# Patient Record
Sex: MALE | Race: WHITE | NOT HISPANIC OR LATINO | Employment: OTHER | URBAN - METROPOLITAN AREA
[De-identification: names, ages, dates, MRNs, and addresses within clinical notes are randomized per-mention and may not be internally consistent; named-entity substitution may affect disease eponyms.]

---

## 2017-01-16 ENCOUNTER — GENERIC CONVERSION - ENCOUNTER (OUTPATIENT)
Dept: OTHER | Facility: OTHER | Age: 67
End: 2017-01-16

## 2017-03-01 ENCOUNTER — ALLSCRIPTS OFFICE VISIT (OUTPATIENT)
Dept: OTHER | Facility: OTHER | Age: 67
End: 2017-03-01

## 2017-03-01 DIAGNOSIS — R68.81 EARLY SATIETY: ICD-10-CM

## 2017-03-01 DIAGNOSIS — R05.9 COUGH: ICD-10-CM

## 2017-03-01 DIAGNOSIS — I51.9 HEART DISEASE: ICD-10-CM

## 2017-03-01 DIAGNOSIS — I10 ESSENTIAL (PRIMARY) HYPERTENSION: ICD-10-CM

## 2017-05-05 ENCOUNTER — LAB (OUTPATIENT)
Dept: LAB | Facility: MEDICAL CENTER | Age: 67
End: 2017-05-05
Payer: COMMERCIAL

## 2017-05-05 DIAGNOSIS — I10 ESSENTIAL (PRIMARY) HYPERTENSION: ICD-10-CM

## 2017-05-05 DIAGNOSIS — R05.9 COUGH: ICD-10-CM

## 2017-05-05 DIAGNOSIS — R68.81 EARLY SATIETY: ICD-10-CM

## 2017-05-05 DIAGNOSIS — I51.9 HEART DISEASE: ICD-10-CM

## 2017-05-05 LAB
ALBUMIN SERPL BCP-MCNC: 3.9 G/DL (ref 3.5–5)
ALP SERPL-CCNC: 80 U/L (ref 46–116)
ALT SERPL W P-5'-P-CCNC: 35 U/L (ref 12–78)
ANION GAP SERPL CALCULATED.3IONS-SCNC: 7 MMOL/L (ref 4–13)
AST SERPL W P-5'-P-CCNC: 22 U/L (ref 5–45)
BASOPHILS # BLD AUTO: 0.04 THOUSANDS/ΜL (ref 0–0.1)
BASOPHILS NFR BLD AUTO: 1 % (ref 0–1)
BILIRUB SERPL-MCNC: 0.62 MG/DL (ref 0.2–1)
BUN SERPL-MCNC: 16 MG/DL (ref 5–25)
CALCIUM SERPL-MCNC: 8.9 MG/DL (ref 8.3–10.1)
CHLORIDE SERPL-SCNC: 109 MMOL/L (ref 100–108)
CHOLEST SERPL-MCNC: 136 MG/DL (ref 50–200)
CO2 SERPL-SCNC: 25 MMOL/L (ref 21–32)
CREAT SERPL-MCNC: 0.86 MG/DL (ref 0.6–1.3)
EOSINOPHIL # BLD AUTO: 0.26 THOUSAND/ΜL (ref 0–0.61)
EOSINOPHIL NFR BLD AUTO: 5 % (ref 0–6)
ERYTHROCYTE [DISTWIDTH] IN BLOOD BY AUTOMATED COUNT: 13.7 % (ref 11.6–15.1)
GFR SERPL CREATININE-BSD FRML MDRD: >60 ML/MIN/1.73SQ M
GLUCOSE P FAST SERPL-MCNC: 97 MG/DL (ref 65–99)
HCT VFR BLD AUTO: 42.1 % (ref 36.5–49.3)
HDLC SERPL-MCNC: 54 MG/DL (ref 40–60)
HGB BLD-MCNC: 14.3 G/DL (ref 12–17)
LDLC SERPL CALC-MCNC: 65 MG/DL (ref 0–100)
LYMPHOCYTES # BLD AUTO: 1.43 THOUSANDS/ΜL (ref 0.6–4.47)
LYMPHOCYTES NFR BLD AUTO: 26 % (ref 14–44)
MCH RBC QN AUTO: 31.4 PG (ref 26.8–34.3)
MCHC RBC AUTO-ENTMCNC: 34 G/DL (ref 31.4–37.4)
MCV RBC AUTO: 93 FL (ref 82–98)
MONOCYTES # BLD AUTO: 0.48 THOUSAND/ΜL (ref 0.17–1.22)
MONOCYTES NFR BLD AUTO: 9 % (ref 4–12)
NEUTROPHILS # BLD AUTO: 3.29 THOUSANDS/ΜL (ref 1.85–7.62)
NEUTS SEG NFR BLD AUTO: 59 % (ref 43–75)
NRBC BLD AUTO-RTO: 0 /100 WBCS
PLATELET # BLD AUTO: 208 THOUSANDS/UL (ref 149–390)
PMV BLD AUTO: 10.4 FL (ref 8.9–12.7)
POTASSIUM SERPL-SCNC: 4.4 MMOL/L (ref 3.5–5.3)
PROT SERPL-MCNC: 7.3 G/DL (ref 6.4–8.2)
RBC # BLD AUTO: 4.55 MILLION/UL (ref 3.88–5.62)
SODIUM SERPL-SCNC: 141 MMOL/L (ref 136–145)
TRIGL SERPL-MCNC: 83 MG/DL
WBC # BLD AUTO: 5.51 THOUSAND/UL (ref 4.31–10.16)

## 2017-05-05 PROCEDURE — 85025 COMPLETE CBC W/AUTO DIFF WBC: CPT

## 2017-05-05 PROCEDURE — 80053 COMPREHEN METABOLIC PANEL: CPT

## 2017-05-05 PROCEDURE — 80061 LIPID PANEL: CPT

## 2017-05-05 PROCEDURE — 36415 COLL VENOUS BLD VENIPUNCTURE: CPT

## 2017-05-22 ENCOUNTER — ALLSCRIPTS OFFICE VISIT (OUTPATIENT)
Dept: OTHER | Facility: OTHER | Age: 67
End: 2017-05-22

## 2017-08-28 ENCOUNTER — ANESTHESIA EVENT (OUTPATIENT)
Dept: GASTROENTEROLOGY | Facility: HOSPITAL | Age: 67
End: 2017-08-28
Payer: COMMERCIAL

## 2017-08-29 ENCOUNTER — ANESTHESIA (OUTPATIENT)
Dept: GASTROENTEROLOGY | Facility: HOSPITAL | Age: 67
End: 2017-08-29
Payer: COMMERCIAL

## 2017-08-29 ENCOUNTER — GENERIC CONVERSION - ENCOUNTER (OUTPATIENT)
Dept: OTHER | Facility: OTHER | Age: 67
End: 2017-08-29

## 2017-08-29 ENCOUNTER — HOSPITAL ENCOUNTER (OUTPATIENT)
Facility: HOSPITAL | Age: 67
Setting detail: OUTPATIENT SURGERY
Discharge: HOME | End: 2017-08-29
Attending: INTERNAL MEDICINE | Admitting: INTERNAL MEDICINE
Payer: COMMERCIAL

## 2017-08-29 VITALS
HEART RATE: 54 BPM | RESPIRATION RATE: 18 BRPM | WEIGHT: 190 LBS | BODY MASS INDEX: 28.14 KG/M2 | TEMPERATURE: 97.2 F | OXYGEN SATURATION: 97 % | SYSTOLIC BLOOD PRESSURE: 135 MMHG | DIASTOLIC BLOOD PRESSURE: 68 MMHG | HEIGHT: 69 IN

## 2017-08-29 DIAGNOSIS — Z86.010 HISTORY OF COLONIC POLYPS: ICD-10-CM

## 2017-08-29 PROCEDURE — 88342 IMHCHEM/IMCYTCHM 1ST ANTB: CPT | Performed by: INTERNAL MEDICINE

## 2017-08-29 PROCEDURE — 88305 TISSUE EXAM BY PATHOLOGIST: CPT | Performed by: INTERNAL MEDICINE

## 2017-08-29 RX ORDER — LIDOCAINE HYDROCHLORIDE 10 MG/ML
INJECTION, SOLUTION INFILTRATION; PERINEURAL AS NEEDED
Status: DISCONTINUED | OUTPATIENT
Start: 2017-08-29 | End: 2017-08-29 | Stop reason: SURG

## 2017-08-29 RX ORDER — SENNOSIDES 8.6 MG
650 CAPSULE ORAL 2 TIMES DAILY
COMMUNITY

## 2017-08-29 RX ORDER — ASCORBIC ACID 500 MG
500 TABLET ORAL DAILY
COMMUNITY

## 2017-08-29 RX ORDER — LISINOPRIL 10 MG/1
5 TABLET ORAL DAILY
COMMUNITY
End: 2018-02-14 | Stop reason: SDUPTHER

## 2017-08-29 RX ORDER — SODIUM CHLORIDE, SODIUM LACTATE, POTASSIUM CHLORIDE, CALCIUM CHLORIDE 600; 310; 30; 20 MG/100ML; MG/100ML; MG/100ML; MG/100ML
100 INJECTION, SOLUTION INTRAVENOUS CONTINUOUS
Status: DISCONTINUED | OUTPATIENT
Start: 2017-08-29 | End: 2017-08-29 | Stop reason: HOSPADM

## 2017-08-29 RX ORDER — OMEPRAZOLE 20 MG/1
20 CAPSULE, DELAYED RELEASE ORAL DAILY
COMMUNITY
End: 2018-11-07 | Stop reason: ALTCHOICE

## 2017-08-29 RX ORDER — DIPHENOXYLATE HYDROCHLORIDE AND ATROPINE SULFATE 2.5; .025 MG/1; MG/1
1 TABLET ORAL DAILY
COMMUNITY

## 2017-08-29 RX ORDER — PROPOFOL 10 MG/ML
INJECTION, EMULSION INTRAVENOUS AS NEEDED
Status: DISCONTINUED | OUTPATIENT
Start: 2017-08-29 | End: 2017-08-29 | Stop reason: SURG

## 2017-08-29 RX ORDER — METOPROLOL SUCCINATE 25 MG/1
12.5 TABLET, EXTENDED RELEASE ORAL DAILY
COMMUNITY

## 2017-08-29 RX ORDER — SIMVASTATIN 40 MG
20 TABLET ORAL
COMMUNITY

## 2017-08-29 RX ORDER — MELOXICAM 7.5 MG/1
7.5 TABLET ORAL 4 TIMES DAILY PRN
COMMUNITY
End: 2018-11-07 | Stop reason: ALTCHOICE

## 2017-08-29 RX ORDER — TRAMADOL HYDROCHLORIDE 50 MG/1
50 TABLET ORAL EVERY 6 HOURS PRN
COMMUNITY
End: 2018-11-07 | Stop reason: ALTCHOICE

## 2017-08-29 RX ORDER — PROPOFOL 10 MG/ML
INJECTION, EMULSION INTRAVENOUS CONTINUOUS PRN
Status: DISCONTINUED | OUTPATIENT
Start: 2017-08-29 | End: 2017-08-29 | Stop reason: SURG

## 2017-08-29 RX ORDER — ORPHENADRINE CITRATE 100 MG/1
100 TABLET, EXTENDED RELEASE ORAL 2 TIMES DAILY PRN
COMMUNITY
End: 2019-11-18

## 2017-08-29 RX ORDER — ASPIRIN 325 MG
325 TABLET ORAL DAILY
COMMUNITY
End: 2019-05-08

## 2017-08-29 RX ADMIN — LIDOCAINE HYDROCHLORIDE 100 MG: 10 INJECTION, SOLUTION INFILTRATION; PERINEURAL at 11:29

## 2017-08-29 RX ADMIN — PROPOFOL 110 MCG/KG/MIN: 10 INJECTION, EMULSION INTRAVENOUS at 11:29

## 2017-08-29 RX ADMIN — SODIUM CHLORIDE, POTASSIUM CHLORIDE, SODIUM LACTATE AND CALCIUM CHLORIDE 100 ML/HR: 600; 310; 30; 20 INJECTION, SOLUTION INTRAVENOUS at 09:34

## 2017-08-29 RX ADMIN — PROPOFOL 50 MG: 10 INJECTION, EMULSION INTRAVENOUS at 11:45

## 2017-08-29 RX ADMIN — PROPOFOL 150 MG: 10 INJECTION, EMULSION INTRAVENOUS at 11:29

## 2017-09-11 ENCOUNTER — GENERIC CONVERSION - ENCOUNTER (OUTPATIENT)
Dept: OTHER | Facility: OTHER | Age: 67
End: 2017-09-11

## 2017-09-20 ENCOUNTER — GENERIC CONVERSION - ENCOUNTER (OUTPATIENT)
Dept: OTHER | Facility: OTHER | Age: 67
End: 2017-09-20

## 2017-09-25 ENCOUNTER — ALLSCRIPTS OFFICE VISIT (OUTPATIENT)
Dept: OTHER | Facility: OTHER | Age: 67
End: 2017-09-25

## 2017-09-25 DIAGNOSIS — Z12.5 ENCOUNTER FOR SCREENING FOR MALIGNANT NEOPLASM OF PROSTATE: ICD-10-CM

## 2017-09-25 DIAGNOSIS — I10 ESSENTIAL (PRIMARY) HYPERTENSION: ICD-10-CM

## 2017-09-25 DIAGNOSIS — I51.9 HEART DISEASE: ICD-10-CM

## 2017-09-25 DIAGNOSIS — H69.80 OTHER SPECIFIED DISORDERS OF EUSTACHIAN TUBE, UNSPECIFIED EAR: ICD-10-CM

## 2017-11-10 ENCOUNTER — GENERIC CONVERSION - ENCOUNTER (OUTPATIENT)
Dept: OTHER | Facility: OTHER | Age: 67
End: 2017-11-10

## 2018-01-10 NOTE — MISCELLANEOUS
Attempted to contact you regarding results  Please give the office a call at your convenience      We can be reached at [phone]    Thank you,    Dr Regis Hutchinson and Staff       Electronically signed by:Debbie Paulson Mohs   Sep 20 2017 12:47PM EST

## 2018-01-12 VITALS
SYSTOLIC BLOOD PRESSURE: 132 MMHG | TEMPERATURE: 97.6 F | BODY MASS INDEX: 28.8 KG/M2 | HEART RATE: 76 BPM | OXYGEN SATURATION: 98 % | DIASTOLIC BLOOD PRESSURE: 72 MMHG | WEIGHT: 195 LBS

## 2018-01-12 VITALS
BODY MASS INDEX: 28.49 KG/M2 | SYSTOLIC BLOOD PRESSURE: 124 MMHG | WEIGHT: 192.38 LBS | DIASTOLIC BLOOD PRESSURE: 72 MMHG | HEART RATE: 70 BPM | TEMPERATURE: 97.6 F | HEIGHT: 69 IN

## 2018-01-13 VITALS
TEMPERATURE: 97.4 F | BODY MASS INDEX: 29.33 KG/M2 | HEART RATE: 68 BPM | SYSTOLIC BLOOD PRESSURE: 128 MMHG | WEIGHT: 198 LBS | HEIGHT: 69 IN | RESPIRATION RATE: 16 BRPM | DIASTOLIC BLOOD PRESSURE: 72 MMHG

## 2018-01-13 NOTE — RESULT NOTES
Discussion/Summary   Colonoscopy showed 1 tubular adenoma, would recommend repeat in 3 years >1cm  EGD did not show any H  pylori, celiac, however he had distal esophagus inflammation and prior EGD with ? chnages in lining of esophagus, therefore repeat EGD in 3 year, and continue PPi daily  thanks     Verified Results  (1) TISSUE EXAM [id number] 11:31AM Jaciel Said     Test Name Result Flag Reference   LAB AP CASE REPORT (Report)     Surgical Pathology Report             Case: [id number]                   Authorizing Provider: Mignon Hills MD    Collected:      08/29/2017 1131        Ordering Location:   [facility]    Received:      08/29/2017 [facility]                               Pathologist:      Doyle Young MD                                 Specimens:  A) - Duodenum, Cold biopsy duodenum                                  B) - Stomach, Cold gastric biopsy                                   C) - Esophagus, Cold biopsy distal esophagus                              D) - Polyp, Colorectal, Transverse polyp - cold forceps   LAB AP FINAL DIAGNOSIS (Report)     A  Duodenum (biopsy):    - Small bowel mucosa with no significant pathologic abnormalities  - No villous atrophy, increased intraepithelial lymphocytes or crypt   hyperplasia to suggest     malabsorptive enteropathy     - No active inflammation, granulomas, organisms, dysplasia or neoplasia   identified  B  Stomach (biopsy):    - Mild chronic inactive gastritis involving oxyntic and foveolar   mucosa  - Immunostain for H  pylori (with appropriate positive control) is   negative  - No intestinal metaplasia, dysplasia or neoplasia identified  C  Distal esophagus (biopsy):    - Cardio-oxyntic gastric mucosa with subacute and chronic inflammation     - No squamous mucosa present for evaluation     - No intestinal metaplasia, dysplasia or neoplasia identified      D  Transverse colon polyp (cold forceps):    - Portions of tubular adenoma  - No high-grade dysplasia or malignancy identified  Electronically signed by Jesus Demarco MD on 8/31/2017 at 12:36 PM   LAB AP NOTE      Interpretation performed at [facility], [address] [place], [place] [id number]  LAB AP SURGICAL ADDITIONAL INFORMATION (Report)     All controls performed with the immunohistochemical stains reported above   reacted appropriately  These tests were developed and their performance   characteristics determined by [facility] or   [facility]  They may not be cleared or approved by the [place]  Food and Drug Administration  The FDA has determined that such clearance   or approval is not necessary  These tests are used for clinical purposes  They should not be regarded as investigational or for research  This   laboratory has been approved by [facility], designated as a high-complexity   laboratory and is qualified to perform these tests  LAB AP GROSS DESCRIPTION (Report)     A  The specimen is received in formalin, labeled with the patient's name   and hospital number, and is designated Duodenum  The specimen consists   of multiple tan soft tissue fragments measuring in aggregate 0 7 x 0 6 x   0 2 cm  Entire submitted  One cassette  B  The specimen is received in formalin, labeled with the patient's name   and hospital number, and is designated Gastric biopsy  The specimen   consists of 2 tan soft tissue fragments measuring 0 1 and 0 7 cm in   greatest dimension  Entire submitted  One cassette  C  The specimen is received in formalin, labeled with the patient's name   and hospital number, and is designated Distal esophagus  The specimen   consists of 2 tan soft tissue fragments measuring 0 3 and 0 4 cm in   greatest dimension  Entire submitted  One cassette  D  The specimen is received in formalin, labeled with the patient's name   and hospital number, and is designated Transverse polyp  The specimen   consists of 4 tan red soft tissue fragments ranging from 0 1-0 4 cm in   greatest dimension  Entire submitted  One cassette  Note: The estimated total formalin fixation time based upon information   provided by the submitting clinician and the standard processing schedule   is 14 75 hours        AEK   LAB AP CLINICAL INFORMATION      History of colonic polyps   R/O CELIAC DX

## 2018-01-13 NOTE — PROGRESS NOTES
Assessment    1  Heart disease (429 9) (I51 9)   2  Hypertension (401 9) (I10)   3  Dysfunction of Eustachian tube, unspecified laterality (381 81) (H69 80)   4  Welcome to Medicare preventive visit (V70 0) (Z00 00)   5  Depression screen (V79 0) (Z13 89)   6  Encounter for screening for cardiovascular disorders (V81 2) (Z13 6)    Plan  Depression screen    · *VB-Depression Screening; Status:Complete;   Done: 77EMQ6957 12:00AM  Dysfunction of Eustachian tube, unspecified laterality, Heart disease, Hypertension    · (1) CBC/PLT/DIFF; Status:Active; Requested for:09Ryk4575;    · (1) COMPREHENSIVE METABOLIC PANEL; Status:Active; Requested for:40Lee7163;    · (1) LIPID PANEL, FASTING; Status:Active; Requested for:07Yss4264;   Encounter for screening for cardiovascular disorders    · EKG/ECG- POC; Status:Complete;   Done: 33RLA4075 12:00AM  Prostate cancer screening    · (1) PSA (SCREEN) (Dx V76 44 Screen for Prostate Cancer); Status:Active; Requested  for:44Vur5958;     Discussion/Summary    #1 welcome to Medicare - completed  See form  #2 health maintenance - I reviewed health maintenance as well as Medicare issues with patient  EKG done  See chronic follow-up note for chronic medical issues  Patient refuses flu shot but will get one later this year  We discussed Pneumovax/Prevnar 13  Follow-up at next scheduled visit  Recheck Medicare wellness in one year  Patient call for problems or concerns in the interim  Impression: Welcome to Medicare Visit  Cardiovascular screening and counseling: counseling was given on maintaining a healthy diet, counseling was given on maintaining a healthy weight, counseling was given on ways to improve cholesterol, counseling was given on ways to improve blood pressure, due for a lipid panel and Pt has hx of CAD - followed by Cardio     Diabetes screening and counseling: the risks and benefits of screening were discussed, counseling was given on maintaining a healthy diet, counseling was given on maintaining a healthy weight, counseling was given on ways to improve physical activity and due for blood glucose  Colorectal cancer screening and counseling: screening is current  Prostate cancer screening and counseling: the risks and benefits of screening were discussed and due for PSA  Glaucoma screening and counseling: the risks and benefits of screening were discussed and screening is current  Immunizations: the risks and benefits of influenza vaccination were discussed with the patient, the patient declines the influenza vaccination, influenza vaccination is recommended annually, the risks and benefits of pneumococcal vaccination were discussed with the patient, the patient declines the pneumococcal vaccination and the risks and benefits of the Zostavax vaccine were discussed with the patient  Advance Directive Planning: complete and up to date  Advice and education were given regarding fall risk reduction, increasing physical activity, nutrition (non-diabetic) and weight loss  Possible side effects of new medications were reviewed with the patient/guardian today  The treatment plan was reviewed with the patient/guardian  The patient/guardian understands and agrees with the treatment plan      Advance Directives  Advance Directive [facility]:   YES - Patient has an advance health care directive  Capacity/Competence: This patient has full decision making capacity for discussion of advance care planning and This patient has full decision making competency for discussion of advance care planning  The provider spent 1 minutes discussing Advance Directives  History of Present Illness  HPI: as above - Here for follow-up of multiple medical issues as well as welcome to Medicare  - pt started getting some ETD symptoms and has restarted Nasacort  No vertigo  - L neck and trapezius pain/muscle spasm that slowly worsened after canoeing with his wife while on vacation   After the pain improved, pt had decreased ROM above the shoulder (without pain) with numbness in the shoulder area  - pt denies CP, palp, lightheadedness or other CV symptoms  - pt had EGD/colonoscopy at the end of Aug  Colon with single 11mm polyp  - some feelings of not being completely empty with urination associated with occasional weak stream   Seems to come and go  Can be associated with nocturia x 1-2    - no extremity or neuro complaints  - AWV done   Welcome to Medicare and Wellness Visits: The patient is being seen for the welcome to medicare visit  Medicare Screening and Risk Factors   Hospitalizations: no previous hospitalizations  Medicare Screening Tests Risk Questions   Drug and Alcohol Use: The patient has never smoked cigarettes and has never used smokeless tobacco  The patient reports drinking 1 drinks per day  He has never used illicit drugs  Diet and Physical Activity: Current diet includes well balanced meals, limited junk food, 1 servings of fruit per day, 1 servings of vegetables per day, 2 servings of meat per day, 2 servings of whole grains per day, 1 servings of simple carbohydrates per day, 1 servings of dairy products per day, 2 cups of coffee per day, 1 cups of tea per day, 0 cans of regular soda per day, 0 cans of diet soda per day and 6 cups water qd  He exercises infrequently  Exercise: walking 24 hours per week  Mood Disorder and Cognitive Impairment Screening: PHQ-9 Depression Scale   Over the past 2 weeks, how often have you been bothered by the following problems? 1 ) Little interest or pleasure in doing things? Several days  2 ) Feeling down, depressed or hopeless? Not at all    3 ) Trouble falling asleep or sleeping too much? Not at all    4 ) Feeling tired or having little energy? Several days  5 ) Poor appetite or overeating? Not at all    6 ) Feeling bad about yourself, or that you are a failure, or have let yourself or your family down?  Not at all    7 ) Trouble concentrating on things, such as reading a newspaper or watching television? Not at all    8 ) Moving or speaking so slowly that other people could have noticed, or the opposite, moving or speaking faster than usual? Not at all  How difficult have these problems made it for you to do your work, take care of things at home, or get along with people? Somewhat difficult  Depression screening  no significant symptoms  He denies feeling down, depressed, or hopeless over the past two weeks  He denies feeling little interest or pleasure in doing things over the past two weeks  Cognitive impairment screening: difficulty learning/retaining new information, difficulty handling complex tasks, denies difficulty with reasoning, denies difficulty with spatial ability and orientation, denies difficulty with language and denies difficulty with behavior  Functional Ability/Level of Safety: Hearing is slightly decreased, slightly decreased in the right ear and slightly decreased in the left ear  He reports hearing difficulties  He does not use a hearing aid  Activities of daily living details: does not need help using the phone, no transportation help needed, does not need help shopping, no meal preparation help needed, does not need help doing housework, does not need help doing laundry, does not need help managing medications and does not need help managing money  Fall risk factors: The patient fell 0 times in the past 12 months  Home safety risk factors:  loose rugs, uneven floors, household clutter and no grab bars in the bathroom, but no unfamiliar surroundings, no poor household lighting and handrails on the stairs  Advance Directives: Advance directives: living will and durable power of  for health care directives, but no advance directives  Co-Managers and Medical Equipment/Suppliers: See Patient Care Team   Preventive Quality Program 65 and Older:  The patient is currently experiencing urinary symptoms  Urinary Incontinence Symptoms includes: urinary incontinence, incomplete bladder emptying and nocturia    Date of last glaucoma screen was 07/17/17      Patient Care Team    Care Team Member Role Specialty Office Number   Adrián Smith MD  Cardiology [phone]   Owen Encinas MD  Internal Medicine [phone]   Yemi Lugo MD Specialist Orthopedic Surgery [phone]   Karissa Diaz MD Specialist Dermatology [phone]   Adolfo Morgan MD   [phone]   Kevin Bush MD Specialist Gastroenterology Adult [phone]   Faye Armstrong MD  Gastroenterology Adult [phone]     Review of Systems    Over the past 2 weeks, how often have you been bothered by the following problems? 1 ) Little interest or pleasure in doing things? Several days  2 ) Feeling down, depressed or hopeless? Not at all    3 ) Trouble falling asleep or sleeping too much? Not at all    4 ) Feeling tired or having little energy? Several days  5 ) Poor appetite or overeating? Not at all    6 ) Feeling bad about yourself, or that you are a failure, or have let yourself or your family down? Not at all    7 ) Trouble concentrating on things, such as reading a newspaper or watching television? Not at all    8 ) Moving or speaking so slowly that other people could have noticed, or the opposite, moving or speaking faster than usual? Not at all    9 ) Thoughts that you would be better off dead or of hurting yourself in some way? Not at all  Score 2     Constitutional: as noted in HPI  Eyes: No complaints of eye pain, no red eyes, no discharge from eyes, no itchy eyes  ENT: no complaints of earache, no hearing loss, no nosebleeds, no nasal discharge, no sore throat, no hoarseness  Cardiovascular: No complaints of slow heart rate, no fast heart rate, no chest pain, no palpitations, no leg claudication, no lower extremity     Respiratory: No complaints of shortness of breath, no wheezing, no cough, no SOB on exertion, no orthopnea or PND  Gastrointestinal: No complaints of abdominal pain, no constipation, no nausea or vomiting, no diarrhea or bloody stools  Genitourinary: as noted in HPI  Musculoskeletal: as noted in HPI  Integumentary: No complaints of skin rash or skin lesions, no itching, no skin wound, no dry skin  Neurological: as noted in HPI  Psychiatric: Is not suicidal, no sleep disturbances, no anxiety or depression, no change in personality, no emotional problems  Endocrine: No complaints of proptosis, no hot flashes, no muscle weakness, no erectile dysfunction, no deepening of the voice, no feelings of weakness  Hematologic/Lymphatic: No complaints of swollen glands, no swollen glands in the neck, does not bleed easily, no easy bruising  Active Problems    1  Abdominal pain (789 00) (R10 9)   2  Acute frontal sinusitis, recurrence not specified (461 1) (J01 10)   3  Acute gastritis (535 00) (K29 00)   4  Arthralgia (719 40) (M25 50)   5  Chronic GERD (530 81) (K21 9)   6  Constipation (564 00) (K59 00)   7  Cough (786 2) (R05)   8  Dysfunction of Eustachian tube, unspecified laterality (381 81) (H69 80)   9  Dyspnea (786 09) (R06 00)   10  Early satiety (780 94) (R68 81)   11  Fatigue (780 79) (R53 83)   12  Heart disease (429 9) (I51 9)   13  History of colon polyps (V12 72) (Z86 010)   14  Hypertension (401 9) (I10)   15  Leg pain, anterior (729 5) (M79 606)   16  Low back pain (724 2) (M54 5)   17  Osteoarthritis (715 90) (M19 90)   18  Primary osteoarthritis of both hips (715 15) (M16 0)   19  Prostate cancer screening (V76 44) (Z12 5)   20  Rectal bleed (569 3) (K62 5)   21  Shortness of breath (786 05) (R06 02)   22  Sinusitis (473 9) (J32 9)   23  Trigger index finger of left hand (727 03) (S00 071)    Past Medical History    · History of colonic polyps (V12 72) (Z86 010)    The active problems and past medical history were reviewed and updated today        Surgical History    · History of Back Surgery   · History of Diagnostic Esophagogastroduodenoscopy   · History of Orchiectomy Left   · History of Wrist Surgery    The surgical history was reviewed and updated today  Family History  Mother    · Denied: Family history of Colon cancer   · Family history of Diabetes Mellitus (V18 0)   · Denied: Family history of colonic polyps   · Family history of Hypertension (V17 49)  Father    · Denied: Family history of Colon cancer   · Denied: Family history of colonic polyps   · Family history of Gastric Cancer (V16 0)   · Family history of Hypertension (V17 49)   · Family history of Liver Cancer    The family history was reviewed and updated today  Social History    · Alcohol Use (History)   · Daily Coffee Consumption (___ Cups/Day)   · Daily Cola Consumption (___ Cans/Day)   · Daily Tea Consumption (___ Cups/Day)   · Dental care, regularly   · Denied: History of Drug use   · Denied: History of Exercise frequency (times/week)   ·    · Never A Smoker   · Part-time employment   · Patient has discussed organ donor wishes with family (V49 89) (Z78 9)   · Patient has living will (V49 89) (Z78 9)   · Pets/Animals: Cat   · Power of  in existence   · Sexually active   · Some college   · Water intake, adequate (per day)  The social history was reviewed and updated today  Current Meds   1  Acetaminophen  MG Oral Tablet Extended Release Recorded   2  CoQ10 CAPS; Therapy: (LSNPJVOQ:97EMI4950) to Recorded   3  Echinacea CAPS; 1 TAB BID Recorded   4  Ecotrin 325 MG Oral Tablet Delayed Release; 1 TAB QD Recorded   5  Fiber Select Gummies Oral Tablet Chewable; 1 BID Recorded   6  GNP Melatonin 3 MG Oral Tablet; 1 tab QHS Recorded   7  Lisinopril 10 MG Oral Tablet; TAKE 1 TABLET DAILY; Therapy: 47WXE5783 to (Evaluate:30Jun2017)  Requested for: 13WLA3009; Last   Rx:73Rlu2720 Ordered   8  Loratadine 10 MG Oral Tablet Recorded   9   Metoprolol Succinate ER 25 MG Oral Tablet Extended Release 24 Hour; 1 TAB QD   Recorded   10  Multivitamins CAPS; Therapy: (XSIPBRWD:49BZS6161) to Recorded   11  Nasacort Allergy 24HR 55 MCG/ACT Nasal Aerosol; 2 puff bilat qa; Therapy: 70JEL2789 to Recorded   12  Omeprazole 20 MG Oral Capsule Delayed Release; TAKE 1 CAPSULE BY MOUTH    EVERY DAY; Therapy: 78MDU0125 to (Evaluate:12Dbu0350)  Requested for: 95Acd0532; Last    Rx:44Ddk6916 Ordered   13  Simvastatin 40 MG Oral Tablet; Therapy: 69Fdd6504 to (Last Rx:67Crz0953)  Requested for: 64Myb2989 Ordered   14  Suprep Bowel Prep Kit 17 5-3 13-1 6 GM/180ML Oral Solution; DILUTE CONTENTS AND    USE AS DIRECTED FOR BOWEL PREP; Therapy: 46YVC1769 to (Last Rx:45Xva7676)  Requested for: 47SKI5744 Ordered   15  Vitamin B6 TABS; Therapy: (HIIWQMEM:13VEM3160) to Recorded   16  Vitamin C TABS; Therapy: (SCSGGNXA:69BAI7374) to Recorded   17  Vitamin D3 CAPS; Therapy: (ZMSXEAZP:93OYC9273) to Recorded    The medication list was reviewed and updated today  Allergies    1  No Known Drug Allergies    2  No Known Food Allergies   3  Seasonal    Immunizations   1    Influenza  07-Jan-2014     Vitals  Signs    Temperature: 97 6 F  Heart Rate: 70  Systolic: 471  Diastolic: 72  Height: 5 ft 9 in  Weight: 192 lb 6 oz  BMI Calculated: 28 41  BSA Calculated: 2 03    Physical Exam    Constitutional   General appearance: No acute distress, well appearing and well nourished  Head and Face   Head and face: Normal     Eyes   Conjunctiva and lids: No erythema, swelling or discharge  Pupils and irises: Equal, round, reactive to light  Ophthalmoscopic examination: Normal fundi and optic discs  Ears, Nose, Mouth, and Throat   External inspection of ears and nose: Normal     Otoscopic examination: Tympanic membranes translucent with normal light reflex  Canals patent without erythema  Nasal mucosa, septum, and turbinates: Normal without edema or erythema      Lips, teeth, and gums: Normal, good dentition  Oropharynx: Normal with no erythema, edema, exudate or lesions  Neck   Neck: Abnormal   Slight tenderness to palpation over the lower paraspinal muscles/trapezius  Good range of motion  Posterior flexion with or without rotation does not cause left shoulder pain or numbness  Thyroid: Normal, no thyromegaly  Pulmonary   Respiratory effort: No increased work of breathing or signs of respiratory distress  Auscultation of lungs: Clear to auscultation  Cardiovascular   Auscultation of heart: Normal rate and rhythm, normal S1 and S2, no murmurs  Carotid pulses: 2+ bilaterally  Abdominal aorta: Normal     Pedal pulses: 2+ bilaterally  Peripheral vascular exam: Normal     Examination of extremities for edema and/or varicosities: Normal     Abdomen   Abdomen: Non-tender, no masses  Liver and spleen: No hepatomegaly or splenomegaly  Lymphatic   Palpation of lymph nodes in neck: No lymphadenopathy  Palpation of lymph nodes in other areas: No lymphadenopathy  Musculoskeletal   Gait and station: Normal     Inspection/palpation of digits and nails: Normal without clubbing or cyanosis  Inspection/palpation of joints, bones, and muscles: Normal     Muscle strength/tone: Normal     Skin   Skin and subcutaneous tissue: Normal without rashes or lesions  Neurologic   Cranial nerves: Cranial nerves 2-12 intact  Cortical function: Normal mental status  MMSE 30/30  Reflexes: 2+ and symmetric  Sensation: No sensory loss  Coordination: Normal finger to nose and heel to shin  normal gait and rhomberg  Psychiatric   Judgment and insight: Normal     Orientation to person, place and time: Normal     Recent and remote memory: Intact  5 min recall 3/3  Mood and affect: Normal   PHQ-9 = 2  Procedure    Procedure: Visual Acuity Test    Indication: routine screening  Inforrmation supplied by as,ma a Snellen chart     Results: 20/20 in both eyes with corrective device, 20/20 in the right eye with corrective device, 20/20 in the left eye with corrective device normal in both eyes  Color vision was reported by as,ma, screened with the [email or web address] Test and the results were normal    The patient tolerated the procedure well and was cooperative  There were no complications  Health Management  Chronic GERD   EGD; every 3 years; Last 29Aug2017; Next Due: 29Aug2020; Active  History of colon polyps   COLONOSCOPY (GI, SURG); every 3 years; Last 29Aug2017; Next Due: 29Aug2020; Active    Future Appointments    Date/Time Provider Specialty Site   04/09/2018 09:30 AM LLUVIA Musa   [address] [employer]     Signatures   Electronically signed by : LLUVIA Martinez ; Sep 25 2017  7:41PM EST                       (Author)

## 2018-01-17 NOTE — RESULT NOTES
Verified Results  FL UGI W/ [facility] [place]  [id number] 08:23AM Ponce Lozoya     Test Name Result Flag Reference   FL UGI W/ AIR W  (Report)     UPPER GI SERIES DOUBLE CONTRAST     INDICATION: Acid reflux and full feeling for 9 months  COMPARISON: None available     IMAGES: 45     FLUOROSCOPY TIME: 1 6 minutes     TECHNIQUE: The patient was given effervescent granules and barium by mouth and images of the esophagus, stomach, and small bowel were obtained  FINDINGS:  abdominal radiograph demonstrates moderate amounts of retained stool in the right colon suggesting constipation  No evidence of bowel obstruction  Bony structures are diffusely demineralized and degenerative, most notably the hips and    lumbar sacral junction  Visualized lung bases are clear with slight elevation of right hemidiaphragm  The esophagus is normal in caliber  Small left-sided laryngocele noted  Esophageal motility is normal and emptying of contrast from the esophagus is prompt  There is no mucosal mass, ulceration or fold thickening identified  Mild mass effect on    posterior esophagus from anterior osteophytes at C6-C7  The stomach is unremarkable in size  The gastric mucosa is normal  No penetrating ulcers or masses  Contrast empties promptly into the duodenum  The duodenum is normal in caliber  The ligament of Treitz/duodenojejunal junction lies in a normal position  Gastroesophageal reflux was not observed  Tiny sliding hiatal hernia only visible on Valsalva maneuver  IMPRESSION:   Tiny sliding hiatal hernia only visible on Valsalva maneuver  Tiny left laryngocele  Otherwise Unremarkable upper GI series  Workstation performed: [id number]     Signed by:    Vik Casey DO   2/15/16   125   100

## 2018-01-17 NOTE — MISCELLANEOUS
Message  patient called in stating bill was coded wrong, fax over order with correct CPT code 81349 (colonoscopy)  called billing depart Aminta Aparicio and spoke to her about the bill  she stated she will resubmit the bill once receiving order      Active Problems    1  Abdominal pain (789 00) (R10 9)   2  Acute frontal sinusitis, recurrence not specified (461 1) (J01 10)   3  Acute gastritis (535 00) (K29 00)   4  Arthralgia (719 40) (M25 50)   5  Chronic GERD (530 81) (K21 9)   6  Constipation (564 00) (K59 00)   7  Cough (786 2) (R05)   8  Depression screen (V79 0) (Z13 89)   9  Dysfunction of Eustachian tube, unspecified laterality (381 81) (H69 80)   10  Dyspnea (786 09) (R06 00)   11  Early satiety (780 94) (R68 81)   12  Encounter for screening for cardiovascular disorders (V81 2) (Z13 6)   13  Fatigue (780 79) (R53 83)   14  Heart disease (429 9) (I51 9)   15  History of colon polyps (V12 72) (Z86 010)   16  Hypertension (401 9) (I10)   17  Leg pain, anterior (729 5) (M79 606)   18  Low back pain (724 2) (M54 5)   19  Osteoarthritis (715 90) (M19 90)   20  Primary osteoarthritis of both hips (715 15) (M16 0)   21  Prostate cancer screening (V76 44) (Z12 5)   22  Rectal bleed (569 3) (K62 5)   23  Shortness of breath (786 05) (R06 02)   24  Sinusitis (473 9) (J32 9)   25  Trigger index finger of left hand (727 03) (M65 322)   26  Welcome to Medicare preventive visit (V70 0) (Z00 00)    Current Meds   1  Acetaminophen  MG Oral Tablet Extended Release Recorded   2  CoQ10 CAPS; Therapy: (ZUUFJPYS:84TVY0255) to Recorded   3  Echinacea CAPS; 1 TAB BID Recorded   4  Ecotrin 325 MG Oral Tablet Delayed Release; 1 TAB QD Recorded   5  Fiber Select Gummies Oral Tablet Chewable; 1 BID Recorded   6  GNP Melatonin 3 MG Oral Tablet; 1 tab QHS Recorded   7  Lisinopril 10 MG Oral Tablet; TAKE 1 TABLET DAILY; Therapy: 41VHB9885 to (Evaluate:30Jun2017)  Requested for: 82GZA0126; Last   Rx:35Wsr0099 Ordered   8   Loratadine 10 MG Oral Tablet Recorded   9  Metoprolol Succinate ER 25 MG Oral Tablet Extended Release 24 Hour; 1 TAB QD   Recorded   10  Multivitamins CAPS; Therapy: (PEEMNFSP:75KOI8696) to Recorded   11  Nasacort Allergy 24HR 55 MCG/ACT Nasal Aerosol; 2 puff bilat qa; Therapy: 00HLR2421 to Recorded   12  Omeprazole 20 MG Oral Capsule Delayed Release; TAKE 1 CAPSULE BY MOUTH    EVERY DAY; Therapy: 77JEV9826 to (Evaluate:17Sft4557)  Requested for: 11Efz7684; Last    Rx:07Hee2273 Ordered   13  Simvastatin 40 MG Oral Tablet; Therapy: 03Izs5099 to (Last Rx:83Tfi8858)  Requested for: 23Ktr6295 Ordered   14  Suprep Bowel Prep Kit 17 5-3 13-1 6 GM/180ML Oral Solution; DILUTE CONTENTS AND    USE AS DIRECTED FOR BOWEL PREP; Therapy: 36NSY0715 to (Last Rx:45Zsu9032)  Requested for: 80YYG5665 Ordered   15  Vitamin B6 TABS; Therapy: ([id number]) to Recorded   16  Vitamin C TABS; Therapy: (KCQYPZFA:93NDK9890) to Recorded   17  Vitamin D3 CAPS; Therapy: (TWQDZGLS:41ZJT8984) to Recorded    Allergies    1  No Known Drug Allergies    2  No Known Food Allergies   3   Seasonal    Signatures   Electronically signed by : Tomer Torres, ; Nov 10 2017  9:32AM EST                       (Author)

## 2018-01-23 NOTE — PROGRESS NOTES
Assessment   1  Heart disease (429 9) (I51 9)  2  Hypertension (401 9) (I10)  3  Acute gastritis (535 00) (K29 00)  4  Primary osteoarthritis of both hips (715 15) (M16 0)    Plan  Acute gastritis    · * FL UGI W/ AIR WO ; Status:Active; Requested for:14Jan2016;   Acute gastritis, Heart disease, Hypertension, Primary osteoarthritis of both hips    · (1) CBC/PLT/DIFF; Status:Active; Requested SWX:35OKJ2740;    · (1) COMPREHENSIVE METABOLIC PANEL; Status:Active; Requested RDU:59UEO5132;    · (1) LIPID PANEL, FASTING; Status:Active; Requested for:14Jan2016;   Cough, Dyspnea    · Stop: Montelukast Sodium 10 MG Oral Tablet  Unlinked    · Stop: Mucinex DM  MG Oral Tablet Extended Release 12 Hour    Discussion/Summary    #1 acute gastritis - I reviewed with pt  Improved with Omeprazole  Pt's father had gastric CA  REC: check UGI  Cont presetn meds  I reviewed diet  Avoid NSAIDs  Recheck 6m  #2 CAD - sable  Check labs  Monitor  Recheck as above  #3 HTN - stable  Cont present meds  #4 hip pain - cont PT and f/u with ortho  1  #5 R pre-auricular lesion - ? AK vs SCC  I reviewed with pt  REC: refer to Derm for second opinion and treatment  #6 1  HM - check labs as above in March  Recheck 6m  DELPHINE on next visit  Pt to call for problems or concerns in the interim   Possible side effects of new medications were reviewed with the patient/guardian today  The treatment plan was reviewed with the patient/guardian  The patient/guardian understands and agrees with the treatment plan   The patient was counseled regarding diagnostic results, instructions for management, risk factor reductions, prognosis, patient and family education, impressions, risks and benefits of treatment options, importance of compliance with treatment         1 Amended By: Romeo Tamez; Jan 26 2016 2:24 PM EST    Chief Complaint  6 month well check visit      History of Present Illness  as above  - pt notes that sinuses and cough have resolved  Stomach is better with omeprazole 20mg qd  Has not been taking NSAIDs for hip joint pain (taking tylenol)  - still with hip pain  Has been seen by ortho and had injections to both hips which worked for "3 weeks"  Pt in PT now - has improved ROM but unclear if it is helping the pain  - no CP, palp, lightheadedness or other CV symptoms  - no GI or  compliant  Up to date with colonoscopy (2013), and PSA (10/15)  - I reviewed recent labs with pt      Review of Systems    Constitutional: as noted in HPI  Eyes: No complaints of eye pain, no red eyes, no discharge from eyes, no itchy eyes  ENT: no complaints of earache, no hearing loss, no nosebleeds, no nasal discharge, no sore throat, no hoarseness  Cardiovascular: No complaints of slow heart rate, no fast heart rate, no chest pain, no palpitations, no leg claudication, no lower extremity  Respiratory: No complaints of shortness of breath, no wheezing, no cough, no SOB on exertion, no orthopnea or PND  Gastrointestinal: No complaints of abdominal pain, no constipation, no nausea or vomiting, no diarrhea or bloody stools  Genitourinary: No complaints of dysuria, no incontinence, no hesitancy, no nocturia, no genital lesion, no testicular pain  Musculoskeletal: as noted in HPI  Integumentary: No complaints of skin rash or skin lesions, no itching, no skin wound, no dry skin  Neurological: No compliants of headache, no confusion, no convulsions, no numbness or tingling, no dizziness or fainting, no limb weakness, no difficulty walking  Endocrine: No complaints of proptosis, no hot flashes, no muscle weakness, no erectile dysfunction, no deepening of the voice, no feelings of weakness  Hematologic/Lymphatic: No complaints of swollen glands, no swollen glands in the neck, does not bleed easily, no easy bruising  Active Problems   1  Acute frontal sinusitis, recurrence not specified (461 1) (J01 10)  2   Acute gastritis (535 00) (K29 00)  3  Arthralgia (719 40) (M25 50)  4  Cough (786 2) (R05)  5  Dysfunction of eustachian tube, unspecified laterality (381 81) (H69 80)  6  Dyspnea (786 09) (R06 00)  7  Heart disease (429 9) (I51 9)  8  Hypertension (401 9) (I10)  9  Leg pain, anterior (729 5) (M79 606)  10  Low back pain (724 2) (M54 5)  11  Osteoarthritis (715 90) (M19 90)  12  Primary osteoarthritis of both hips (715 15) (M16 0)  13  Prostate cancer screening (V76 44) (Z12 5)  14  Sinusitis (473 9) (J32 9)  15  Trigger index finger of left hand (727 03) (X09 963)    Past Medical History    The active problems and past medical history were reviewed and updated today  Surgical History   1  History of Back Surgery  2  History of Orchiectomy Left  3  History of Wrist Surgery    Family History   1  Family history of Diabetes Mellitus (V18 0)  2  Family history of Hypertension (V17 49)   3  Family history of Gastric Cancer (V16 0)  4  Family history of Hypertension (V17 49)  5  Family history of Liver Cancer    Social History    · Alcohol Use (History)   · Daily Coffee Consumption (___ Cups/Day)   · Daily Cola Consumption (___ Cans/Day)   · Daily Tea Consumption (___ Cups/Day)   · Never A Smoker  The social history was reviewed and updated today  Current Meds  1  Acetaminophen  MG Oral Tablet Extended Release Recorded  2  CoQ10 CAPS; Therapy: (BKBFBIRA:66OPI7573) to Recorded  3  Ecotrin 325 MG Oral Tablet Delayed Release; 1 TAB QD Recorded  4  GNP Melatonin 3 MG Oral Tablet; 1 tab QHS Recorded  5  Lisinopril 10 MG Oral Tablet; take 1 tablet every day; Therapy: 39AUA2060 to (Deidra Beltre)  Requested for: 08Apr2015; Last   Rx:08Apr2015 Ordered  6  Loratadine 10 MG Oral Tablet Recorded  7  Metoprolol Succinate ER 25 MG Oral Tablet Extended Release 24 Hour; 1 TAB QD   Recorded  8  Montelukast Sodium 10 MG Oral Tablet; take 1 tablet by mouth every day  Requested for:   41ACQ1823; Last Rx:13Jan2015 Ordered  9   Mucinex DM  MG Oral Tablet Extended Release 12 Hour Recorded  10  Multivitamins CAPS; Therapy: (WBYANDCH:61TKA6715) to Recorded  11  Omeprazole 20 MG Oral Capsule Delayed Release; TAKE 1 CAPSULE Daily; Therapy: 01WBH2482 to (Last Rx:06Jan2016)  Requested for: 06AWG2362 Ordered  12  Simvastatin 40 MG Oral Tablet; Therapy: 18Apr2011 to (Last Rx:18Apr2011)  Requested for: 18Apr2011 Ordered  13  Vitamin B6 TABS; Therapy: (XUBXJPVY:08FNR2299) to Recorded  14  Vitamin C TABS; Therapy: (HQEPPPAW:32PME5971) to Recorded  15  Vitamin D3 CAPS; Therapy: (CDERPCGR:79EHX9349) to Recorded    The medication list was reviewed and updated today  Allergies   1  No Known Drug Allergies    Vitals  Vital Signs [Data Includes: Current Encounter]    Recorded: 05FFQ8103 08:08AM   Temperature 96 1 F   Heart Rate 76   Respiration 16   Systolic 328   Diastolic 80   Height 5 ft 9 in   Weight 196 lb    BMI Calculated 28 94   BSA Calculated 2 04     Physical Exam    Constitutional   General appearance: No acute distress, well appearing and well nourished  Head and Face   1    Head and face: Abnormal  1  R pre-auricular area with erythematous, sl raised, scaled area with irreg border1   Palpation of the face and sinuses: No sinus tenderness  Eyes   Conjunctiva and lids: No erythema, swelling or discharge  Pupils and irises: Equal, round, reactive to light  Ophthalmoscopic examination: Normal fundi and optic discs  Ears, Nose, Mouth, and Throat   External inspection of ears and nose: Normal     Otoscopic examination: Tympanic membranes translucent with normal light reflex  Canals patent without erythema  Nasal mucosa, septum, and turbinates: Normal without edema or erythema  Lips, teeth, and gums: Normal, good dentition  Oropharynx: Normal with no erythema, edema, exudate or lesions  Neck   Neck: Supple, symmetric, trachea midline, no masses  Thyroid: Normal, no thyromegaly      Pulmonary Respiratory effort: No increased work of breathing or signs of respiratory distress  Auscultation of lungs: Clear to auscultation  Cardiovascular   Auscultation of heart: Normal rate and rhythm, normal S1 and S2, no murmurs  Carotid pulses: 2+ bilaterally  Abdominal aorta: Normal     Pedal pulses: 2+ bilaterally  Peripheral vascular exam: Normal     Examination of extremities for edema and/or varicosities: Normal     Abdomen   Abdomen: Non-tender, no masses  Liver and spleen: No hepatomegaly or splenomegaly  Musculoskeletal   Gait and station: Normal     Inspection/palpation of digits and nails: Normal without clubbing or cyanosis  Inspection/palpation of joints, bones, and muscles: Abnormal   mild decreased ROM of the hips  Muscle strength/tone: Normal     Skin   1    Skin and subcutaneous tissue: Abnormal  1  As above1   Neurologic   Cranial nerves: Cranial nerves 2-12 intact  Cortical function: Normal mental status  Reflexes: 2+ and symmetric  Sensation: No sensory loss          1 Amended By: Barbara Christianson; Jan 26 2016 2:23 PM EST    Results/Data  Results   (1) CBC/PLT/DIFF 26Oct2015 10:05AM Lizette Lozoya     Test Name Result Flag Reference   WHITE BLOOD CELL COUNT 6 5 Thousand/uL  3 8-10 8   RED BLOOD CELL COUNT 4 58 Million/uL  4 20-5 80   HEMOGLOBIN 14 2 g/dL  13 2-17 1   HEMATOCRIT 43 2 %  38 5-50 0   MCV 94 4 fL  80 0-100 0   MCH 31 0 pg  27 0-33 0   MCHC 32 8 g/dL  32 0-36 0   RDW 13 6 %  11 0-15 0   PLATELET COUNT 565 Thousand/uL  140-400   MPV 8 1 fL  7 5-11 5   ABSOLUTE NEUTROPHILS 4498 cells/uL  9603-3330   ABSOLUTE LYMPHOCYTES 1469 cells/uL  850-3900   ABSOLUTE MONOCYTES 286 cells/uL  200-950   ABSOLUTE EOSINOPHILS 208 cells/uL     ABSOLUTE BASOPHILS 39 cells/uL  0-200   NEUTROPHILS 69 2 %     LYMPHOCYTES 22 6 %     MONOCYTES 4 4 %     EOSINOPHILS 3 2 %     BASOPHILS 0 6 %       (1) COMPREHENSIVE METABOLIC PANEL 54GGP1963 58:94ON Tano Ann     Test Name Result Flag Reference   GLUCOSE 95 mg/dL  65-99   Fasting reference interval   UREA NITROGEN (BUN) 18 mg/dL  7-25   CREATININE 0 88 mg/dL  0 70-1 25   For patients >52years of age, the reference limit  for Creatinine is approximately 13% higher for people  identified as -American  eGFR NON-AFR  AMERICAN 91 mL/min/1 73m2  > OR = 60   eGFR AFRICAN AMERICAN 106 mL/min/1 73m2  > OR = 60   BUN/CREATININE RATIO   3-32   NOT APPLICABLE (calc)   SODIUM 140 mmol/L  135-146   POTASSIUM 4 6 mmol/L  3 5-5 3   CHLORIDE 106 mmol/L     CARBON DIOXIDE 26 mmol/L  19-30   CALCIUM 9 3 mg/dL  8 6-10 3   PROTEIN, TOTAL 7 1 g/dL  6 1-8 1   ALBUMIN 4 3 g/dL  3 6-5 1   GLOBULIN 2 8 g/dL (calc)  1 9-3 7   ALBUMIN/GLOBULIN RATIO 1 5 (calc)  1 0-2 5   BILIRUBIN, TOTAL 0 5 mg/dL  0 2-1 2   ALKALINE PHOSPHATASE 66 U/L     AST 21 U/L  10-35   ALT 26 U/L  9-46     (1) LIPID PANEL, FASTING 26Oct2015 10:05AM Luis Lozoya     Test Name Result Flag Reference   CHOLESTEROL, TOTAL 162 mg/dL  125-200   HDL CHOLESTEROL 71 mg/dL  > OR = 40   TRIGLICERIDES 66 mg/dL  <690   LDL-CHOLESTEROL 78 mg/dL (calc)  <130   Desirable range <100 mg/dL for patients with CHD or  diabetes and <70 mg/dL for diabetic patients with  known heart disease  CHOL/HDLC RATIO 2 3 (calc)  < OR = 5 0   NON HDL CHOLESTEROL 91 mg/dL (calc)     Target for non-HDL cholesterol is 30 mg/dL higher than   LDL cholesterol target  (1) PSA, DIAGNOSTIC (FOLLOW-UP) 26Oct2015 10:05AM Maria G Lozoya   REPORT COMMENT:  FASTING:YES     Test Name Result Flag Reference   PSA, TOTAL 0 3 ng/mL  < OR = 4 0   This test was performed using the Siemens  chemiluminescent method  Values obtained from  different assay methods cannot be used  interchangeably  PSA levels, regardless of  value, should not be interpreted as absolute  evidence of the presence or absence of disease       * Hips Xray Bilateral 2 Views (includes pelvis) 58DNN1266 10:23AM DevoraNerycynkranthi Rivas     Test Name Result Flag Reference   XR Hips Bilateral 2 V (Report)     [facility];[address];;[place];[address]   07/20/2015 1030   07/20/2015 1033   N/A     BILATERAL HIPS AND PELVIS     INDICATION- Bilateral hip pain  COMPARISON- None     VIEWS- AP pelvis and coned down views of each hip& 5 images^ 5 images     FINDINGS-     No acute pelvic fracture or pathologic bone lesions  Visualized bony pelvis appears intact  Degenerative changes are seen   at the lumbosacral junction  LEFT HIP-   There is moderate to severe narrowing and spurring seen at the left hip   joint  Bony alignment is maintained  Soft tissues are unremarkable  RIGHT HIP-   Moderate to severe narrowing and spurring is seen at the right hip   joint  Bony alignment is maintained  Soft tissues are unremarkable  IMPRESSION-     Moderate to severe degenerative changes are present in both hips  There are no fractures  Transcribed on- [id number]     HOSEA Ram MD   [place] Radiologist- HOSEA Adams MD   Electronically HOSEA Norwood MD   Released Date Time- 07/21/15 1133   ------------------------------------------------------------------------------   [id number]^YOLETTE ARREOLA   [id number]^YOLETTE ARREOLA     Future Appointments    Date/Time Provider Specialty Site   02/24/2016 09:10 AM LLUVIA Weiss   Orthopedic Surgery [facility]   07/19/2016 08:00 AM LLUVIA Steward ,  [place] [employer]     Signatures   Electronically signed by : JER Fraire ; Jan 16 2016  9:20AM EST                       (Author)    Electronically signed by : JER Fraire ; Jan 26 2016  2:25PM EST                       (Author)

## 2018-02-14 ENCOUNTER — LAB (OUTPATIENT)
Dept: LAB | Facility: CLINIC | Age: 68
End: 2018-02-14
Payer: COMMERCIAL

## 2018-02-14 DIAGNOSIS — I51.9 HEART DISEASE: ICD-10-CM

## 2018-02-14 DIAGNOSIS — Z12.5 ENCOUNTER FOR SCREENING FOR MALIGNANT NEOPLASM OF PROSTATE: ICD-10-CM

## 2018-02-14 DIAGNOSIS — H69.80 OTHER SPECIFIED DISORDERS OF EUSTACHIAN TUBE, UNSPECIFIED EAR: ICD-10-CM

## 2018-02-14 DIAGNOSIS — I10 ESSENTIAL (PRIMARY) HYPERTENSION: ICD-10-CM

## 2018-02-14 DIAGNOSIS — I10 ESSENTIAL HYPERTENSION: Primary | ICD-10-CM

## 2018-02-14 LAB
ALBUMIN SERPL BCP-MCNC: 4.3 G/DL (ref 3.5–5)
ALP SERPL-CCNC: 84 U/L (ref 46–116)
ALT SERPL W P-5'-P-CCNC: 39 U/L (ref 12–78)
ANION GAP SERPL CALCULATED.3IONS-SCNC: 5 MMOL/L (ref 4–13)
AST SERPL W P-5'-P-CCNC: 22 U/L (ref 5–45)
BASOPHILS # BLD AUTO: 0.04 THOUSANDS/ΜL (ref 0–0.1)
BASOPHILS NFR BLD AUTO: 1 % (ref 0–1)
BILIRUB SERPL-MCNC: 0.81 MG/DL (ref 0.2–1)
BUN SERPL-MCNC: 16 MG/DL (ref 5–25)
CALCIUM SERPL-MCNC: 9 MG/DL (ref 8.3–10.1)
CHLORIDE SERPL-SCNC: 106 MMOL/L (ref 100–108)
CHOLEST SERPL-MCNC: 131 MG/DL (ref 50–200)
CO2 SERPL-SCNC: 27 MMOL/L (ref 21–32)
CREAT SERPL-MCNC: 0.82 MG/DL (ref 0.6–1.3)
EOSINOPHIL # BLD AUTO: 0.25 THOUSAND/ΜL (ref 0–0.61)
EOSINOPHIL NFR BLD AUTO: 4 % (ref 0–6)
ERYTHROCYTE [DISTWIDTH] IN BLOOD BY AUTOMATED COUNT: 13.2 % (ref 11.6–15.1)
GFR SERPL CREATININE-BSD FRML MDRD: 93 ML/MIN/1.73SQ M
GLUCOSE P FAST SERPL-MCNC: 100 MG/DL (ref 65–99)
HCT VFR BLD AUTO: 42.2 % (ref 36.5–49.3)
HDLC SERPL-MCNC: 57 MG/DL (ref 40–60)
HGB BLD-MCNC: 14.9 G/DL (ref 12–17)
LDLC SERPL CALC-MCNC: 58 MG/DL (ref 0–100)
LYMPHOCYTES # BLD AUTO: 1.4 THOUSANDS/ΜL (ref 0.6–4.47)
LYMPHOCYTES NFR BLD AUTO: 25 % (ref 14–44)
MCH RBC QN AUTO: 32 PG (ref 26.8–34.3)
MCHC RBC AUTO-ENTMCNC: 35.3 G/DL (ref 31.4–37.4)
MCV RBC AUTO: 91 FL (ref 82–98)
MONOCYTES # BLD AUTO: 0.6 THOUSAND/ΜL (ref 0.17–1.22)
MONOCYTES NFR BLD AUTO: 11 % (ref 4–12)
NEUTROPHILS # BLD AUTO: 3.38 THOUSANDS/ΜL (ref 1.85–7.62)
NEUTS SEG NFR BLD AUTO: 59 % (ref 43–75)
NRBC BLD AUTO-RTO: 0 /100 WBCS
PLATELET # BLD AUTO: 214 THOUSANDS/UL (ref 149–390)
PMV BLD AUTO: 9.7 FL (ref 8.9–12.7)
POTASSIUM SERPL-SCNC: 4.4 MMOL/L (ref 3.5–5.3)
PROT SERPL-MCNC: 7.7 G/DL (ref 6.4–8.2)
PSA SERPL-MCNC: 0.3 NG/ML (ref 0–4)
RBC # BLD AUTO: 4.66 MILLION/UL (ref 3.88–5.62)
SODIUM SERPL-SCNC: 138 MMOL/L (ref 136–145)
TRIGL SERPL-MCNC: 81 MG/DL
WBC # BLD AUTO: 5.68 THOUSAND/UL (ref 4.31–10.16)

## 2018-02-14 PROCEDURE — 80061 LIPID PANEL: CPT

## 2018-02-14 PROCEDURE — 85025 COMPLETE CBC W/AUTO DIFF WBC: CPT

## 2018-02-14 PROCEDURE — G0103 PSA SCREENING: HCPCS

## 2018-02-14 PROCEDURE — 80053 COMPREHEN METABOLIC PANEL: CPT

## 2018-02-14 PROCEDURE — 36415 COLL VENOUS BLD VENIPUNCTURE: CPT

## 2018-02-14 RX ORDER — LISINOPRIL 10 MG/1
10 TABLET ORAL DAILY
Qty: 90 TABLET | Refills: 3 | Status: SHIPPED | OUTPATIENT
Start: 2018-02-14 | End: 2019-01-11 | Stop reason: SDUPTHER

## 2018-04-09 ENCOUNTER — OFFICE VISIT (OUTPATIENT)
Dept: FAMILY MEDICINE CLINIC | Facility: CLINIC | Age: 68
End: 2018-04-09
Payer: COMMERCIAL

## 2018-04-09 VITALS
SYSTOLIC BLOOD PRESSURE: 140 MMHG | WEIGHT: 201.2 LBS | TEMPERATURE: 97.6 F | DIASTOLIC BLOOD PRESSURE: 82 MMHG | HEART RATE: 60 BPM | BODY MASS INDEX: 29.8 KG/M2 | HEIGHT: 69 IN

## 2018-04-09 DIAGNOSIS — K21.9 CHRONIC GERD: Primary | ICD-10-CM

## 2018-04-09 DIAGNOSIS — I25.10 ATHEROSCLEROSIS OF NATIVE CORONARY ARTERY OF NATIVE HEART WITHOUT ANGINA PECTORIS: ICD-10-CM

## 2018-04-09 DIAGNOSIS — I10 BENIGN ESSENTIAL HYPERTENSION: ICD-10-CM

## 2018-04-09 PROBLEM — L57.0 ACTINIC KERATOSIS: Status: ACTIVE | Noted: 2017-12-08

## 2018-04-09 PROCEDURE — 1101F PT FALLS ASSESS-DOCD LE1/YR: CPT | Performed by: FAMILY MEDICINE

## 2018-04-09 PROCEDURE — 99214 OFFICE O/P EST MOD 30 MIN: CPT | Performed by: FAMILY MEDICINE

## 2018-04-09 RX ORDER — METHYLDOPA/HYDROCHLOROTHIAZIDE 250MG-25MG
1 TABLET ORAL 2 TIMES DAILY
COMMUNITY

## 2018-04-09 RX ORDER — NICOTINE POLACRILEX 4 MG/1
1 GUM, CHEWING ORAL DAILY
COMMUNITY
Start: 2016-01-06 | End: 2018-04-30 | Stop reason: SDUPTHER

## 2018-04-09 RX ORDER — TRIAMCINOLONE ACETONIDE 55 UG/1
SPRAY, METERED NASAL
COMMUNITY
Start: 2017-09-25

## 2018-04-09 RX ORDER — LORATADINE 10 MG/1
TABLET ORAL
COMMUNITY
End: 2018-11-07 | Stop reason: ALTCHOICE

## 2018-04-09 RX ORDER — ASPIRIN 325 MG
325 TABLET ORAL
COMMUNITY
Start: 2012-10-19 | End: 2018-04-30 | Stop reason: SDUPTHER

## 2018-04-09 RX ORDER — MULTIVITAMIN WITH IRON
100 TABLET ORAL
COMMUNITY
Start: 2012-10-17 | End: 2019-11-18

## 2018-04-09 RX ORDER — UBIDECARENONE 200 MG
200 CAPSULE ORAL
COMMUNITY

## 2018-04-09 RX ORDER — LANOLIN ALCOHOL/MO/W.PET/CERES
1 CREAM (GRAM) TOPICAL
COMMUNITY

## 2018-04-09 RX ORDER — LORATADINE 10 MG
TABLET ORAL 2 TIMES DAILY
COMMUNITY

## 2018-04-09 RX ORDER — ASCORBIC ACID 500 MG
500 TABLET ORAL
COMMUNITY
Start: 2012-10-17 | End: 2018-04-30 | Stop reason: SDUPTHER

## 2018-04-09 RX ORDER — DIPHENOXYLATE HYDROCHLORIDE AND ATROPINE SULFATE 2.5; .025 MG/1; MG/1
1 TABLET ORAL
COMMUNITY
Start: 2012-10-17 | End: 2018-04-30 | Stop reason: SDUPTHER

## 2018-04-09 NOTE — PROGRESS NOTES
Assessment/Plan:    Chronic GERD  Reviewed with patient  Urged diet control  Okay for patient to use omeprazole as needed  Recheck 6 months or as needed  Benign essential hypertension  Blood pressure suboptimally controlled  Patient has a blood pressure monitor at home  I will have him check his blood pressures daily for the next 2 weeks  He will follow up in 2 weeks in his office for blood pressure check  He will bring his home machine in to check for accuracy  Further recommendations based on these results  Coronary atherosclerosis  Stable  Up-to-date with Cardiology  Continue present medications  Recheck 6 months       Diagnoses and all orders for this visit:    Chronic GERD    Benign essential hypertension  -     CBC and differential; Future  -     Comprehensive metabolic panel; Future  -     Lipid panel; Future    Atherosclerosis of native coronary artery of native heart without angina pectoris  -     CBC and differential; Future  -     Comprehensive metabolic panel; Future  -     Lipid panel; Future          Subjective:      Patient ID: Stone Zendejas is a 74 y o  male  F/u multiple med issues  - Patient states that he is doing well  He is not exercising regularly  However, patient denies any chest pain, palpitations, lightheadedness or other cardiovascular symptoms with or without exertion  He is up-to-date with Cardiology ( Dr Brian Stratton)  - GERD symptoms improved with omeprazole  Pt had stopped after having his EGD/colonoscopy last Sept  Noticed symptoms returned with certain foods or too much coffee  Restarted Omeprazole 5 days ago  - no Gu or musculoskeletal complaints  - no other concerns  I reviewed recent labs with pt  The following portions of the patient's history were reviewed and updated as appropriate:   He  has a past medical history of Arthritis; Colon polyps; and Hypertension    He   Patient Active Problem List    Diagnosis Date Noted    Actinic keratosis 12/08/2017    Chronic GERD 05/22/2017    Lipid disorder 01/21/2016    Low back pain 07/14/2015    Osteoarthritis 06/03/2013    Benign essential hypertension 10/19/2012    Coronary atherosclerosis 10/17/2012     He  reports that he has never smoked  He does not have any smokeless tobacco history on file  He reports that he drinks alcohol  He reports that he does not use drugs    Current Outpatient Prescriptions   Medication Sig Dispense Refill    acetaminophen (TYLENOL) 650 mg CR tablet Take 650 mg by mouth 2 (two) times a day      ascorbic acid (VITAMIN C) 500 mg tablet Take 500 mg by mouth daily      aspirin 325 mg tablet Take 325 mg by mouth daily      Coenzyme Q10 200 MG capsule Take 200 mg by mouth      Echinacea 125 MG CAPS Take 1 tablet by mouth 2 (two) times a day      FIBER SELECT GUMMIES CHEW Chew 2 (two) times a day      lisinopril (ZESTRIL) 10 mg tablet Take 1 tablet (10 mg total) by mouth daily 90 tablet 3    loratadine (CLARITIN) 10 mg tablet Take by mouth      melatonin 3 mg Take 1 tablet by mouth      meloxicam (MOBIC) 7 5 mg tablet Take 7 5 mg by mouth 4 (four) times a day as needed      metoprolol succinate (TOPROL-XL) 25 mg 24 hr tablet Take 12 5 mg by mouth daily      multivitamin (THERAGRAN) TABS Take 1 tablet by mouth daily      omeprazole (PriLOSEC) 20 mg delayed release capsule Take 20 mg by mouth daily      orphenadrine (NORFLEX) 100 mg tablet Take 100 mg by mouth 2 (two) times a day as needed for muscle spasms      pyridoxine (VITAMIN B6) 100 mg tablet Take 100 mg by mouth      simvastatin (ZOCOR) 40 mg tablet Take 20 mg by mouth daily at bedtime      traMADol (ULTRAM) 50 mg tablet Take 50 mg by mouth every 6 (six) hours as needed for moderate pain      ascorbic acid (VITAMIN C) 500 MG tablet Take 500 mg by mouth      aspirin 325 mg tablet Take 325 mg by mouth      Cholecalciferol 2000 units TABS Take 2,000 Units by mouth      multivitamin (THERAGRAN) TABS Take 1 tablet by mouth      Omeprazole 20 MG TBEC Take 1 capsule by mouth daily      Triamcinolone Acetonide 55 MCG/ACT AERO into each nostril       No current facility-administered medications for this visit  He has No Known Allergies       Review of Systems   Constitutional: Negative  HENT: Negative  Eyes: Negative  Respiratory: Negative  Cardiovascular: Negative  Gastrointestinal: Negative  Endocrine: Negative  Genitourinary: Negative  Musculoskeletal: Negative  Skin: Negative  Allergic/Immunologic: Negative  Neurological: Negative  Hematological: Negative  Psychiatric/Behavioral: Negative  Objective:      /82   Pulse 60   Temp 97 6 °F (36 4 °C)   Ht 5' 9" (1 753 m)   Wt 91 3 kg (201 lb 3 2 oz)   BMI 29 71 kg/m²          Physical Exam   Constitutional: He is oriented to person, place, and time  He appears well-developed and well-nourished  HENT:   Head: Normocephalic and atraumatic  Right Ear: External ear normal    Left Ear: External ear normal    Nose: Nose normal    Mouth/Throat: Oropharynx is clear and moist    Eyes: Conjunctivae and EOM are normal  Pupils are equal, round, and reactive to light  Neck: Normal range of motion  Neck supple  No thyromegaly present  Cardiovascular: Normal rate, regular rhythm, normal heart sounds and intact distal pulses  No murmur heard  Pulmonary/Chest: Effort normal and breath sounds normal    Abdominal: Soft  Bowel sounds are normal  He exhibits no distension and no mass  There is no tenderness  Musculoskeletal: Normal range of motion  He exhibits no edema or tenderness  Lymphadenopathy:     He has no cervical adenopathy  Neurological: He is alert and oriented to person, place, and time  No cranial nerve deficit  Coordination normal    Skin: Skin is warm  Psychiatric: Judgment normal    Vitals reviewed

## 2018-04-10 NOTE — ASSESSMENT & PLAN NOTE
Reviewed with patient  Urged diet control  Okay for patient to use omeprazole as needed  Recheck 6 months or as needed

## 2018-04-23 ENCOUNTER — OFFICE VISIT (OUTPATIENT)
Dept: FAMILY MEDICINE CLINIC | Facility: CLINIC | Age: 68
End: 2018-04-23
Payer: COMMERCIAL

## 2018-04-23 VITALS — DIASTOLIC BLOOD PRESSURE: 82 MMHG | SYSTOLIC BLOOD PRESSURE: 128 MMHG

## 2018-04-23 DIAGNOSIS — I10 HYPERTENSION, UNSPECIFIED TYPE: Primary | ICD-10-CM

## 2018-04-23 PROCEDURE — 3074F SYST BP LT 130 MM HG: CPT | Performed by: FAMILY MEDICINE

## 2018-04-23 PROCEDURE — 99211 OFF/OP EST MAY X REQ PHY/QHP: CPT | Performed by: FAMILY MEDICINE

## 2018-04-23 PROCEDURE — 3079F DIAST BP 80-89 MM HG: CPT | Performed by: FAMILY MEDICINE

## 2018-04-23 NOTE — PROGRESS NOTES
Pt is here for blood pressure check, he had a high reading at last office visit, he also brought his home monitor in the office with him  He is currently taking lisinopril 10 mg and metoprolol succinate 12 5 mg  He states that he was becoming fatigued with the metoprolol 25 mg so he began taking a half tablet of it instead of the full  His readings at home have been both high and low  Blood pressure reading today in the office is 128/82 and his home monitor is 135/81  Per Dr Corey Fernandez continue the same dosages of medications and schedule a follow up within 3-6 months

## 2018-04-30 ENCOUNTER — OFFICE VISIT (OUTPATIENT)
Dept: FAMILY MEDICINE CLINIC | Facility: CLINIC | Age: 68
End: 2018-04-30
Payer: COMMERCIAL

## 2018-04-30 ENCOUNTER — TELEPHONE (OUTPATIENT)
Dept: FAMILY MEDICINE CLINIC | Facility: CLINIC | Age: 68
End: 2018-04-30

## 2018-04-30 VITALS
WEIGHT: 199.4 LBS | TEMPERATURE: 97.9 F | BODY MASS INDEX: 29.53 KG/M2 | SYSTOLIC BLOOD PRESSURE: 122 MMHG | DIASTOLIC BLOOD PRESSURE: 84 MMHG | HEART RATE: 68 BPM | HEIGHT: 69 IN

## 2018-04-30 DIAGNOSIS — J01.10 ACUTE NON-RECURRENT FRONTAL SINUSITIS: Primary | ICD-10-CM

## 2018-04-30 PROCEDURE — 1160F RVW MEDS BY RX/DR IN RCRD: CPT | Performed by: NURSE PRACTITIONER

## 2018-04-30 PROCEDURE — 3008F BODY MASS INDEX DOCD: CPT | Performed by: NURSE PRACTITIONER

## 2018-04-30 PROCEDURE — 99213 OFFICE O/P EST LOW 20 MIN: CPT | Performed by: NURSE PRACTITIONER

## 2018-04-30 RX ORDER — CEPHALEXIN 500 MG/1
500 CAPSULE ORAL 3 TIMES DAILY
Qty: 30 CAPSULE | Refills: 0 | Status: SHIPPED | OUTPATIENT
Start: 2018-04-30 | End: 2018-05-10

## 2018-04-30 NOTE — PROGRESS NOTES
Patient ID: Carmelita Ingram is a 74 y o  male  HPI: 74 y  o male presenting with nasal congestion and bilateral ear pressure starting 5 days ago  Patient developed a moist productive cough in the last two days and concerned the cold is worsening  He took Mucinex DM last night and was able to clear chest secretion this morning  The sinus and ear pressure remain and patient wants to feel better for an upcoming fish trip to [facility]  SUBJECTIVE    Family History   Problem Relation Age of Onset    Diabetes Mother     Hypertension Mother     Stomach cancer Father     Hypertension Father     Liver cancer Father      Social History     Social History    Marital status: /Civil Union     Spouse name: N/A    Number of children: N/A    Years of education: some college     Occupational History    part time      Social History Main Topics    Smoking status: Never Smoker    Smokeless tobacco: Not on file    Alcohol use Yes    Drug use: No    Sexual activity: Yes     Other Topics Concern    Not on file     Social History Narrative    Daily coffee consumption    Daily cola consumption    Daily tea consumption    Dental care regularly    Does not exercise    Patient has discussed organ donor wishes with family    Patient has living will    Pet: cat    POA in existence    Water intake adequate per day             Past Medical History:   Diagnosis Date    Arthritis     Colon polyps     Hypertension      Past Surgical History:   Procedure Laterality Date    BACK SURGERY      laminectomy    CARDIAC SURGERY      cardiac stent    CARPAL TUNNEL RELEASE      COLONOSCOPY      benign polyp removal    ORCHIECTOMY Left     NM ESOPHAGOGASTRODUODENOSCOPY TRANSORAL DIAGNOSTIC N/A 8/29/2017    Procedure: EGD AND COLONOSCOPY;  Surgeon: Lorin Rebolledo MD;  [facility];   Service: Gastroenterology    WRIST SURGERY       No Known Allergies    Current Outpatient Prescriptions:     acetaminophen (TYLENOL) 650 mg CR tablet, Take 650 mg by mouth 2 (two) times a day, Disp: , Rfl:     ascorbic acid (VITAMIN C) 500 mg tablet, Take 500 mg by mouth daily, Disp: , Rfl:     aspirin 325 mg tablet, Take 325 mg by mouth daily, Disp: , Rfl:     Cholecalciferol 2000 units TABS, Take 2,000 Units by mouth, Disp: , Rfl:     Coenzyme Q10 200 MG capsule, Take 200 mg by mouth, Disp: , Rfl:     dextromethorphan-guaifenesin (MUCINEX DM)  MG per 12 hr tablet, Take 1 tablet by mouth every 12 (twelve) hours, Disp: , Rfl:     Echinacea 125 MG CAPS, Take 1 tablet by mouth 2 (two) times a day, Disp: , Rfl:     FIBER SELECT GUMMIES CHEW, Chew 2 (two) times a day, Disp: , Rfl:     lisinopril (ZESTRIL) 10 mg tablet, Take 1 tablet (10 mg total) by mouth daily, Disp: 90 tablet, Rfl: 3    loratadine (CLARITIN) 10 mg tablet, Take by mouth, Disp: , Rfl:     melatonin 3 mg, Take 1 tablet by mouth, Disp: , Rfl:     metoprolol succinate (TOPROL-XL) 25 mg 24 hr tablet, Take 12 5 mg by mouth daily, Disp: , Rfl:     multivitamin (THERAGRAN) TABS, Take 1 tablet by mouth daily, Disp: , Rfl:     pyridoxine (VITAMIN B6) 100 mg tablet, Take 100 mg by mouth, Disp: , Rfl:     simvastatin (ZOCOR) 40 mg tablet, Take 20 mg by mouth daily at bedtime, Disp: , Rfl:     Triamcinolone Acetonide 55 MCG/ACT AERO, into each nostril, Disp: , Rfl:     meloxicam (MOBIC) 7 5 mg tablet, Take 7 5 mg by mouth 4 (four) times a day as needed, Disp: , Rfl:     omeprazole (PriLOSEC) 20 mg delayed release capsule, Take 20 mg by mouth daily, Disp: , Rfl:     orphenadrine (NORFLEX) 100 mg tablet, Take 100 mg by mouth 2 (two) times a day as needed for muscle spasms, Disp: , Rfl:     traMADol (ULTRAM) 50 mg tablet, Take 50 mg by mouth every 6 (six) hours as needed for moderate pain, Disp: , Rfl:     Review of Systems    Consitutional:  Denies, chills, fatigue and fever   ENT:  Positive for ear pain/pressure, nasal discharge, nasal congestion and post nasal drip   Denies eye pain, photophobia, loss of hearing, sore throat, hoarseness, itchy/watery eyes and sneezing  Pulmonary:  Positive for moist productive cough with yellow phelgm  Denies shortness of breath, dyspnea on exertion or wheezing  Cardiovascular:  Denies chest pain/pressure   Abdomen:   Denies abdominal pain, nausea, vomiting, diarrhea, constipation    Hematology/Lymphatics:   Denies swollen lymph nodes   Musculoskeletal:  Denies gait disturbance, myalgia,  arthalgia or muscle weakness    Integumentary:  Denies ecchymosis, petechiae ,rash or lesions   Neurological:  Denies headaches, dizziness, confusion, loss of consciousness or behavioral changes  Psychological:  Denies anxiety, depression or sleep disturbances      OBJECTIVE    /84   Pulse 68   Temp 97 9 °F (36 6 °C)   Ht 5' 9" (1 753 m)   Wt 90 4 kg (199 lb 6 4 oz)   BMI 29 45 kg/m²     Constitutional:  Well appearing and in no acute distress  ENT:  Bilateral TM dull without erythema or effusion noted, posterior pharynx erythematous with post nasal drip noted, frontal sinus tender to palpation  nasal mucosa erythematous and congested   Positive pressure increase with forward head tilt  Pulmonary:  clear to auscultation bilaterally and no crackles, no wheezes, chest expansion normal  Cardiovascular:  S1S2, regular rate and rhythm  Gastrointestinal:  abdomen is soft without significant tenderness   Lymphatic:  no lymphadenopathy   Musculoskeletal:  no muscular tenderness noted  Skin:  skin color, texture and turgor are normal; no bruising, rashes or lesions noted  Neurologic:  Alert and oriented x 4 and Affect and mood normal      Assessment/Plan:  Diagnoses and all orders for this visit:    Acute non-recurrent frontal sinusitis  -     cephalexin (KEFLEX) 500 mg capsule; Take 1 capsule (500 mg total) by mouth 3 (three) times a day for 10 days    Other orders  -     dextromethorphan-guaifenesin (MUCINEX DM)  MG per 12 hr tablet;  Take 1 tablet by mouth every 12 (twelve) hours      Acute non-recurrent frontal sinusitis  Reviewed with patient plan to treat with Cephalexin 500 mg TID for 10 days  Discussed with patient  To continue using Mucinex DM to keep post nasal secretions thin and able to be coughed out  Patient instructed to call in 72 hours if not feeling better or if symptoms worsen

## 2018-04-30 NOTE — TELEPHONE ENCOUNTER
Patient called stating he believes he has a sinus infection and is possibly going into his chest  Patient would like to be seen today   Patient is okay with seeing the NP if Dr Craft is full

## 2018-08-24 ENCOUNTER — APPOINTMENT (OUTPATIENT)
Dept: LAB | Facility: CLINIC | Age: 68
End: 2018-08-24
Payer: COMMERCIAL

## 2018-08-24 DIAGNOSIS — I10 BENIGN ESSENTIAL HYPERTENSION: ICD-10-CM

## 2018-08-24 DIAGNOSIS — I25.10 ATHEROSCLEROSIS OF NATIVE CORONARY ARTERY OF NATIVE HEART WITHOUT ANGINA PECTORIS: ICD-10-CM

## 2018-08-24 LAB
ALBUMIN SERPL BCP-MCNC: 4 G/DL (ref 3.5–5)
ALP SERPL-CCNC: 69 U/L (ref 46–116)
ALT SERPL W P-5'-P-CCNC: 37 U/L (ref 12–78)
ANION GAP SERPL CALCULATED.3IONS-SCNC: 8 MMOL/L (ref 4–13)
AST SERPL W P-5'-P-CCNC: 24 U/L (ref 5–45)
BASOPHILS # BLD AUTO: 0.04 THOUSANDS/ΜL (ref 0–0.1)
BASOPHILS NFR BLD AUTO: 1 % (ref 0–1)
BILIRUB SERPL-MCNC: 0.76 MG/DL (ref 0.2–1)
BUN SERPL-MCNC: 15 MG/DL (ref 5–25)
CALCIUM SERPL-MCNC: 9.1 MG/DL (ref 8.3–10.1)
CHLORIDE SERPL-SCNC: 108 MMOL/L (ref 100–108)
CHOLEST SERPL-MCNC: 119 MG/DL (ref 50–200)
CO2 SERPL-SCNC: 25 MMOL/L (ref 21–32)
CREAT SERPL-MCNC: 0.88 MG/DL (ref 0.6–1.3)
EOSINOPHIL # BLD AUTO: 0.26 THOUSAND/ΜL (ref 0–0.61)
EOSINOPHIL NFR BLD AUTO: 5 % (ref 0–6)
ERYTHROCYTE [DISTWIDTH] IN BLOOD BY AUTOMATED COUNT: 13.1 % (ref 11.6–15.1)
GFR SERPL CREATININE-BSD FRML MDRD: 90 ML/MIN/1.73SQ M
GLUCOSE P FAST SERPL-MCNC: 103 MG/DL (ref 65–99)
HCT VFR BLD AUTO: 42.5 % (ref 36.5–49.3)
HDLC SERPL-MCNC: 50 MG/DL (ref 40–60)
HGB BLD-MCNC: 14.2 G/DL (ref 12–17)
IMM GRANULOCYTES # BLD AUTO: 0.01 THOUSAND/UL (ref 0–0.2)
IMM GRANULOCYTES NFR BLD AUTO: 0 % (ref 0–2)
LDLC SERPL CALC-MCNC: 55 MG/DL (ref 0–100)
LYMPHOCYTES # BLD AUTO: 1.3 THOUSANDS/ΜL (ref 0.6–4.47)
LYMPHOCYTES NFR BLD AUTO: 25 % (ref 14–44)
MCH RBC QN AUTO: 31.1 PG (ref 26.8–34.3)
MCHC RBC AUTO-ENTMCNC: 33.4 G/DL (ref 31.4–37.4)
MCV RBC AUTO: 93 FL (ref 82–98)
MONOCYTES # BLD AUTO: 0.58 THOUSAND/ΜL (ref 0.17–1.22)
MONOCYTES NFR BLD AUTO: 11 % (ref 4–12)
NEUTROPHILS # BLD AUTO: 3.02 THOUSANDS/ΜL (ref 1.85–7.62)
NEUTS SEG NFR BLD AUTO: 58 % (ref 43–75)
NONHDLC SERPL-MCNC: 69 MG/DL
NRBC BLD AUTO-RTO: 0 /100 WBCS
PLATELET # BLD AUTO: 208 THOUSANDS/UL (ref 149–390)
PMV BLD AUTO: 10.2 FL (ref 8.9–12.7)
POTASSIUM SERPL-SCNC: 4.5 MMOL/L (ref 3.5–5.3)
PROT SERPL-MCNC: 7.5 G/DL (ref 6.4–8.2)
RBC # BLD AUTO: 4.56 MILLION/UL (ref 3.88–5.62)
SODIUM SERPL-SCNC: 141 MMOL/L (ref 136–145)
TRIGL SERPL-MCNC: 70 MG/DL
WBC # BLD AUTO: 5.21 THOUSAND/UL (ref 4.31–10.16)

## 2018-08-24 PROCEDURE — 36415 COLL VENOUS BLD VENIPUNCTURE: CPT

## 2018-08-24 PROCEDURE — 80053 COMPREHEN METABOLIC PANEL: CPT

## 2018-08-24 PROCEDURE — 85025 COMPLETE CBC W/AUTO DIFF WBC: CPT

## 2018-08-24 PROCEDURE — 80061 LIPID PANEL: CPT

## 2018-11-07 ENCOUNTER — OFFICE VISIT (OUTPATIENT)
Dept: FAMILY MEDICINE CLINIC | Facility: CLINIC | Age: 68
End: 2018-11-07
Payer: COMMERCIAL

## 2018-11-07 VITALS
BODY MASS INDEX: 29.62 KG/M2 | TEMPERATURE: 97.3 F | DIASTOLIC BLOOD PRESSURE: 88 MMHG | HEART RATE: 64 BPM | HEIGHT: 69 IN | SYSTOLIC BLOOD PRESSURE: 126 MMHG | WEIGHT: 200 LBS

## 2018-11-07 DIAGNOSIS — E78.9 LIPID DISORDER: ICD-10-CM

## 2018-11-07 DIAGNOSIS — K21.9 CHRONIC GERD: ICD-10-CM

## 2018-11-07 DIAGNOSIS — Z00.00 MEDICARE ANNUAL WELLNESS VISIT, SUBSEQUENT: Primary | ICD-10-CM

## 2018-11-07 DIAGNOSIS — Z23 ENCOUNTER FOR IMMUNIZATION: ICD-10-CM

## 2018-11-07 DIAGNOSIS — I10 BENIGN ESSENTIAL HYPERTENSION: ICD-10-CM

## 2018-11-07 DIAGNOSIS — I25.10 ATHEROSCLEROSIS OF NATIVE CORONARY ARTERY OF NATIVE HEART WITHOUT ANGINA PECTORIS: ICD-10-CM

## 2018-11-07 DIAGNOSIS — Z12.5 SCREENING FOR PROSTATE CANCER: ICD-10-CM

## 2018-11-07 PROCEDURE — 4040F PNEUMOC VAC/ADMIN/RCVD: CPT

## 2018-11-07 PROCEDURE — 99214 OFFICE O/P EST MOD 30 MIN: CPT | Performed by: FAMILY MEDICINE

## 2018-11-07 PROCEDURE — G0008 ADMIN INFLUENZA VIRUS VAC: HCPCS

## 2018-11-07 PROCEDURE — 3074F SYST BP LT 130 MM HG: CPT | Performed by: FAMILY MEDICINE

## 2018-11-07 PROCEDURE — 90670 PCV13 VACCINE IM: CPT

## 2018-11-07 PROCEDURE — 1125F AMNT PAIN NOTED PAIN PRSNT: CPT

## 2018-11-07 PROCEDURE — 1170F FXNL STATUS ASSESSED: CPT

## 2018-11-07 PROCEDURE — G0009 ADMIN PNEUMOCOCCAL VACCINE: HCPCS

## 2018-11-07 PROCEDURE — 3079F DIAST BP 80-89 MM HG: CPT | Performed by: FAMILY MEDICINE

## 2018-11-07 PROCEDURE — 3008F BODY MASS INDEX DOCD: CPT | Performed by: FAMILY MEDICINE

## 2018-11-07 PROCEDURE — 90662 IIV NO PRSV INCREASED AG IM: CPT

## 2018-11-07 PROCEDURE — G0439 PPPS, SUBSEQ VISIT: HCPCS | Performed by: FAMILY MEDICINE

## 2018-11-07 RX ORDER — FAMOTIDINE 40 MG/1
40 TABLET, FILM COATED ORAL
Qty: 30 TABLET | Refills: 3 | Status: SHIPPED | OUTPATIENT
Start: 2018-11-07 | End: 2019-05-08

## 2018-11-07 NOTE — PROGRESS NOTES
Assessment/Plan:    Chronic GERD  Stable  Will prescribe Pepcid instead of omeprazole prn (safer and quicker onset of action)  Recheck 6m    Benign essential hypertension  Stable  Cont present care  Recheck 6m    Coronary atherosclerosis  Asymptomatic  Cont present meds  Recheck 6m    Lipid disorder  Cont to monitor  Recheck  6m       Diagnoses and all orders for this visit:    Atherosclerosis of native coronary artery of native heart without angina pectoris  -     CBC and differential; Future  -     Comprehensive metabolic panel; Future  -     Lipid panel; Future    Chronic GERD  -     famotidine (PEPCID) 40 MG tablet; Take 1 tablet (40 mg total) by mouth daily at bedtime as needed for heartburn    Benign essential hypertension  -     CBC and differential; Future  -     Comprehensive metabolic panel; Future  -     Lipid panel; Future    Lipid disorder    Medicare annual wellness visit, subsequent    Screening for prostate cancer  -     PSA, Total Screen    Encounter for immunization  -     Flu Vaccine High Dose Split Preservative Free IM  -     PNEUMOCOCCAL CONJUGATE VACCINE 13-VALENT LESS THAN 5Y0  (Prevnar 13)          Subjective:      Patient ID: Negro Goode is a 79 y o  male  f/u multiple med issues  - pt feels well  No ongoing concerns/complaints  - up to date with Cardio  No Cp, palp, lightheadedness or other CV symptoms with or without exertion  - no Gi or  complaints  Has occasional GERD and would like refill of omeprazole to use prnUp todate with colonoscopy  - no extremity complaints  - AWV done        The following portions of the patient's history were reviewed and updated as appropriate:   He  has a past medical history of Arthritis; Colon polyps; and Hypertension    He   Patient Active Problem List    Diagnosis Date Noted    Actinic keratosis 12/08/2017    Chronic GERD 05/22/2017    Lipid disorder 01/21/2016    Low back pain 07/14/2015    Osteoarthritis 06/03/2013    Benign essential hypertension 10/19/2012    Coronary atherosclerosis 10/17/2012     He  has a past surgical history that includes Colonoscopy; Cardiac surgery; Orchiectomy (Left); Carpal tunnel release; Back surgery; pr esophagogastroduodenoscopy transoral diagnostic (N/A, 8/29/2017); and Wrist surgery  His family history includes Diabetes in his mother; Hypertension in his father and mother; Liver cancer in his father; Stomach cancer in his father  He  reports that he has never smoked  He does not have any smokeless tobacco history on file  He reports that he drinks alcohol  He reports that he does not use drugs  Current Outpatient Prescriptions   Medication Sig Dispense Refill    acetaminophen (TYLENOL) 650 mg CR tablet Take 650 mg by mouth 2 (two) times a day      ascorbic acid (VITAMIN C) 500 mg tablet Take 500 mg by mouth daily      aspirin 325 mg tablet Take 325 mg by mouth daily      Cholecalciferol 2000 units TABS Take 2,000 Units by mouth      Coenzyme Q10 200 MG capsule Take 200 mg by mouth      Echinacea 125 MG CAPS Take 1 tablet by mouth 2 (two) times a day      FIBER SELECT GUMMIES CHEW Chew 2 (two) times a day      lisinopril (ZESTRIL) 10 mg tablet Take 1 tablet (10 mg total) by mouth daily 90 tablet 3    melatonin 3 mg Take 1 tablet by mouth      metoprolol succinate (TOPROL-XL) 25 mg 24 hr tablet Take 12 5 mg by mouth daily      multivitamin (THERAGRAN) TABS Take 1 tablet by mouth daily      orphenadrine (NORFLEX) 100 mg tablet Take 100 mg by mouth 2 (two) times a day as needed for muscle spasms      pyridoxine (VITAMIN B6) 100 mg tablet Take 100 mg by mouth      simvastatin (ZOCOR) 40 mg tablet Take 20 mg by mouth daily at bedtime      Triamcinolone Acetonide 55 MCG/ACT AERO into each nostril      famotidine (PEPCID) 40 MG tablet Take 1 tablet (40 mg total) by mouth daily at bedtime as needed for heartburn 30 tablet 3     No current facility-administered medications for this visit        He is allergic to other       Review of Systems   Constitutional: Negative  HENT: Negative  Eyes: Negative  Respiratory: Negative  Cardiovascular: Negative  Gastrointestinal: Negative  Endocrine: Negative  Genitourinary: Negative  Musculoskeletal: Negative  Skin: Negative  Allergic/Immunologic: Negative  Neurological: Negative  Hematological: Negative  Psychiatric/Behavioral: Negative  Objective:      /88   Pulse 64   Temp (!) 97 3 °F (36 3 °C)   Ht 5' 9" (1 753 m)   Wt 90 7 kg (200 lb)   BMI 29 53 kg/m²          Physical Exam   Constitutional: He is oriented to person, place, and time  He appears well-developed and well-nourished  HENT:   Head: Normocephalic and atraumatic  Right Ear: External ear normal    Left Ear: External ear normal    Nose: Nose normal    Mouth/Throat: Oropharynx is clear and moist    Eyes: Pupils are equal, round, and reactive to light  Conjunctivae and EOM are normal    Neck: Normal range of motion  Neck supple  No thyromegaly present  Cardiovascular: Normal rate, regular rhythm, normal heart sounds and intact distal pulses  No murmur heard  Pulmonary/Chest: Effort normal and breath sounds normal    Abdominal: Soft  Bowel sounds are normal  He exhibits no distension and no mass  There is no tenderness  Musculoskeletal: Normal range of motion  He exhibits no edema  Lymphadenopathy:     He has no cervical adenopathy  Neurological: He is alert and oriented to person, place, and time  He has normal reflexes  No cranial nerve deficit  He exhibits normal muscle tone  Coordination normal    MMSE 30/30   Skin: Skin is warm  Psychiatric: Judgment normal    PHQ-2 = 2   Vitals reviewed

## 2018-11-07 NOTE — PATIENT INSTRUCTIONS
[facility]  Please follow the instructions below to securely access your online medical record  Xactiumhart allows you to send messages to your doctor, view your test results, renew your prescriptions, schedule appointments, and more  [facility] uses Single Sign on (SSO) Technology for you to log in and access our [facility], including [employer]  No more remembering multiple user names and passwords! We are going to guide you through, step by step, to help you set up your Navneetkathleenbonnie Alicja account which will provide access to your [employer] account  How Do I Sign Up? 1  In your Internet browser, go to [email or web address]       2  Click on the [facility] patient account and then click Dont have an                 Account? Create one now      3  Enter your demographic information and chose a user name (email address) and password  Think of one that is secure and easy to remember  Enter a Referral code if you have one (this is not your MyChart code ) Accept the Terms and Conditions and the Privacy Policy  4  Select your security questions that you will use to reset your password should you forget it  Click Submit  5  Enter your Xactiumhart Activation Code exactly as it appears below  You will not need to use this code after you have completed the sign-up process  If you do not sign up before the expiration date, you must request a new code  [employer] Activation Code: [email or web address]  Expires: 11/21/2018  8:35 AM    6  Confirm your email address  An email confirmation was sent to you  Please open that email and click Confirm your Email   You should then be redirected to our [employer] Single sign on page, where you will log on with the user name and password you created! Proceed to the [employer] Icon to view your personal health information          Additional Information  If you have questions, you can e-mail patient  [email or web address] or call [phone] to talk to our customer support staff  Remember, MyChart is NOT to be used for urgent needs  For medical emergencies, dial 911

## 2018-11-07 NOTE — PROGRESS NOTES
Assessment and Plan:    Problem List Items Addressed This Visit        Digestive    Chronic GERD     Stable  Will prescribe Pepcid instead of omeprazole prn (safer and quicker onset of action)  Recheck 6m         Relevant Medications    famotidine (PEPCID) 40 MG tablet       Cardiovascular and Mediastinum    Benign essential hypertension     Stable  Cont present care  Recheck 6m         Relevant Orders    CBC and differential    Comprehensive metabolic panel    Lipid panel    Coronary atherosclerosis - Primary     Asymptomatic  Cont present meds  Recheck 6m         Relevant Orders    CBC and differential    Comprehensive metabolic panel    Lipid panel       Other    Lipid disorder     Cont to monitor  Recheck  6m           Other Visit Diagnoses     Medicare annual wellness visit, subsequent        Screening for prostate cancer        Relevant Orders    PSA, Total Screen    Encounter for immunization        Relevant Orders    Flu Vaccine High Dose Split Preservative Free IM (Completed)    PNEUMOCOCCAL CONJUGATE VACCINE 13-VALENT LESS THAN 5Y0  (Prevnar 13) (Completed)        Health Maintenance Due   Topic Date Due    Medicare Annual Wellness Visit (AWV)  1950    DTaP,Tdap,and Td Vaccines (1 - Tdap) 09/10/1973    Pneumococcal PPSV23/PCV13 65+ Years / High and Highest Risk (2 of 2 - PPSV23) 01/02/2019         HPI:  Shama Gunderson is a 79 y o  male here for his Subsequent Wellness Visit      Patient Active Problem List   Diagnosis    Actinic keratosis    Chronic GERD    Coronary atherosclerosis    Benign essential hypertension    Lipid disorder    Low back pain    Osteoarthritis     Past Medical History:   Diagnosis Date    Arthritis     Colon polyps     Hypertension      Past Surgical History:   Procedure Laterality Date    BACK SURGERY      laminectomy    CARDIAC SURGERY      cardiac stent    CARPAL TUNNEL RELEASE      COLONOSCOPY      benign polyp removal    ORCHIECTOMY Left     CT ESOPHAGOGASTRODUODENOSCOPY TRANSORAL DIAGNOSTIC N/A 8/29/2017    Procedure: EGD AND COLONOSCOPY;  Surgeon: Alissa Kaba MD;  [facility]; Service: Gastroenterology    WRIST SURGERY       Family History   Problem Relation Age of Onset    Diabetes Mother     Hypertension Mother     Stomach cancer Father     Hypertension Father     Liver cancer Father      History   Smoking Status    Never Smoker   Smokeless Tobacco    Not on file     History   Alcohol Use    Yes      History   Drug Use No       Current Outpatient Prescriptions   Medication Sig Dispense Refill    acetaminophen (TYLENOL) 650 mg CR tablet Take 650 mg by mouth 2 (two) times a day      ascorbic acid (VITAMIN C) 500 mg tablet Take 500 mg by mouth daily      aspirin 325 mg tablet Take 325 mg by mouth daily      Cholecalciferol 2000 units TABS Take 2,000 Units by mouth      Coenzyme Q10 200 MG capsule Take 200 mg by mouth      Echinacea 125 MG CAPS Take 1 tablet by mouth 2 (two) times a day      FIBER SELECT GUMMIES CHEW Chew 2 (two) times a day      lisinopril (ZESTRIL) 10 mg tablet Take 1 tablet (10 mg total) by mouth daily 90 tablet 3    melatonin 3 mg Take 1 tablet by mouth      metoprolol succinate (TOPROL-XL) 25 mg 24 hr tablet Take 12 5 mg by mouth daily      multivitamin (THERAGRAN) TABS Take 1 tablet by mouth daily      orphenadrine (NORFLEX) 100 mg tablet Take 100 mg by mouth 2 (two) times a day as needed for muscle spasms      pyridoxine (VITAMIN B6) 100 mg tablet Take 100 mg by mouth      simvastatin (ZOCOR) 40 mg tablet Take 20 mg by mouth daily at bedtime      Triamcinolone Acetonide 55 MCG/ACT AERO into each nostril      famotidine (PEPCID) 40 MG tablet Take 1 tablet (40 mg total) by mouth daily at bedtime as needed for heartburn 30 tablet 3     No current facility-administered medications for this visit        Allergies   Allergen Reactions    Other Allergic Rhinitis     Seasonal     Immunization History Administered Date(s) Administered    Influenza 10/16/2016, 2017, 01/10/2018    Influenza TIV (IM) 2014    Influenza, high dose seasonal 0 5 mL 2018    Pneumococcal Conjugate 13-Valent 2018       Patient Care Team:  Loretta Ochoa MD as PCP - MD Isamar Mccormick MD Ingrid Spencer, MD Vinita Ion, MD Chanda Brooking, MD    Medicare Screening Tests and Risk Assessments:      Health Risk Assessment:  Patient rates overall health as very good  Patient feels that their physical health rating is Same  Eyesight was rated as Same  Hearing was rated as Same  Patient feels that their emotional and mental health rating is Same  Pain experienced by patient in the last 7 days has been Some  Patient's pain rating has been 3/10  Patient states that he has experienced no weight loss or gain in last 6 months  Emotional/Mental Health:  Patient has been feeling nervous/anxious  PHQ-9 Depression Screening:    Frequency of the following problems over the past two weeks:      1  Little interest or pleasure in doing things: 1 - several days      2  Feeling down, depressed, or hopeless: 0 - not at all      3  Trouble falling or staying asleep, or sleeping too much: 0 - not at all      4  Feeling tired or having little energy: 1 - several days      5  Poor appetite or overeatin - not at all      6  Feeling bad about yourself - or that you are a failure or have let yourself or your family down: 0 - not at all      7  Trouble concentrating on things, such as reading the newspaper or watching television: 0 - not at all      8  Moving or speaking so slowly that other people could have noticed  Or the opposite - being so fidgety or restless that you have been moving around a lot more than usual: 0 - not at all      9   Thoughts that you would be better off dead, or of hurting yourself in some way: 0 - not at all  PHQ-2 Score: 1  PHQ-9 Score: 2    Broken Bones/Falls: Fall Risk Assessment:    In the past year, patient has experienced: No history of falling in past year          Bladder/Bowel:  Patient has not leaked urine accidently in the last six months  Patient reports no loss of bowel control  Immunizations:  Patient has had a flu vaccination within the last year  Patient has not received a pneumonia shot  Patient has not received a shingles shot  Patient has received tetanus/diphtheria shot  Home Safety:  Patient does not have trouble with stairs inside or outside of their home  Patient currently reports that there are no safety hazards present in home, working smoke alarms, working carbon monoxide detectors  Preventative Screenings:   prostate cancer screen performed, 2/14/2018  colon cancer screen completed, 8/29/2017  cholesterol screen completed, 8/24/2018  glaucoma eye exam completed,     Nutrition:  Current diet: Regular with servings of the following:    Medications:  Patient is currently taking over-the-counter supplements  Patient is able to manage medications  Lifestyle Choices:  Patient reports no tobacco use  Patient has not smoked or used tobacco in the past   Patient reports alcohol use  Alcohol use per week: 2 or less  Patient drives a vehicle  Patient wears seat belt  Current level of exercise of physical activity described by patient as: stays active will go for a walk or boke ride at times  Activities of Daily Living:  Can get out of bed by his or her self, able to dress self, able to make own meals, able to do own shopping, able to bathe self, can do own laundry/housekeeping, can manage own money, pay bills and track expenses    Previous Hospitalizations:  No hospitalization or ED visit in past 12 months        Advanced Directives:  Patient has decided on a power of   Patient has spoken to designated power of   Patient has completed advanced directive          Preventative Screening/Counseling: Cardiovascular:      General: Risks and Benefits Discussed and Screening Current      Counseling: Healthy Diet, Healthy Weight, Improve Cholesterol, Improve Blood Pressure and Improve Exercise Tolerance          Diabetes:      General: Risks and Benefits Discussed and Screening Current      Counseling: Healthy Diet, Healthy Weight and Improve Physical Activity          Colorectal Cancer:      General: Risks and Benefits Discussed and Screening Current      Counseling: high fiber diet          Prostate Cancer:      General: Risks and Benefits Discussed and Screening Current          Glaucoma:      General: Risks and Benefits Discussed and Screening Current          Advanced Directives:   Patient has living will for healthcare, has durable POA for healthcare, patient has an advanced directive  Immunizations:  Patient reviewed and up to date      Influenza: Risks & Benefits Discussed and Influenza Due Today      Pneumococcal: Risks & Benefits Discussed and Pneumococcal Due Today      Other Preventative Counseling (Non-Medicare):   Fall Prevention, Increase physical activity and Nutrition Counseling

## 2018-11-08 NOTE — ASSESSMENT & PLAN NOTE
Stable  Will prescribe Pepcid instead of omeprazole prn (safer and quicker onset of action)   Recheck 6m

## 2019-01-11 DIAGNOSIS — I10 ESSENTIAL HYPERTENSION: ICD-10-CM

## 2019-01-13 RX ORDER — LISINOPRIL 10 MG/1
10 TABLET ORAL DAILY
Qty: 90 TABLET | Refills: 1 | Status: SHIPPED | OUTPATIENT
Start: 2019-01-13 | End: 2019-07-12 | Stop reason: SDUPTHER

## 2019-01-16 ENCOUNTER — OFFICE VISIT (OUTPATIENT)
Dept: FAMILY MEDICINE CLINIC | Facility: CLINIC | Age: 69
End: 2019-01-16
Payer: COMMERCIAL

## 2019-01-16 VITALS
DIASTOLIC BLOOD PRESSURE: 90 MMHG | TEMPERATURE: 98.1 F | HEIGHT: 69 IN | WEIGHT: 201 LBS | HEART RATE: 80 BPM | SYSTOLIC BLOOD PRESSURE: 130 MMHG | BODY MASS INDEX: 29.77 KG/M2

## 2019-01-16 DIAGNOSIS — J01.00 ACUTE NON-RECURRENT MAXILLARY SINUSITIS: Primary | ICD-10-CM

## 2019-01-16 PROCEDURE — 99213 OFFICE O/P EST LOW 20 MIN: CPT | Performed by: FAMILY MEDICINE

## 2019-01-16 PROCEDURE — 1160F RVW MEDS BY RX/DR IN RCRD: CPT | Performed by: FAMILY MEDICINE

## 2019-01-16 PROCEDURE — 3008F BODY MASS INDEX DOCD: CPT | Performed by: FAMILY MEDICINE

## 2019-01-16 RX ORDER — AMOXICILLIN 875 MG/1
875 TABLET, COATED ORAL 2 TIMES DAILY
Qty: 20 TABLET | Refills: 0 | Status: SHIPPED | OUTPATIENT
Start: 2019-01-16 | End: 2019-01-26

## 2019-01-16 NOTE — PROGRESS NOTES
Assessment/Plan:      Diagnoses and all orders for this visit:    Acute non-recurrent maxillary sinusitis  Comments:  I reviewed with pt  Trial of amox as directed  Restart flonase  Recheck 1 week if not improved - earlier if worse  Orders:  -     amoxicillin (AMOXIL) 875 mg tablet; Take 1 tablet (875 mg total) by mouth 2 (two) times a day for 10 days          Subjective:     Patient ID: Vanessa Lacey is a 79 y o  male  4+ week hx of slowly worsening nasal congestion, sinus pressure and intermittently productive cough  (+) ear pressure  No fever/chills  No improvement with Sudafed, fluticasone nasal and other conservative measures      Earache    Associated symptoms include coughing and a sore throat  Sore Throat    Associated symptoms include congestion, coughing and ear pain  Cough   Associated symptoms include ear pain and a sore throat  Pertinent negatives include no chills or fever  Review of Systems   Constitutional: Positive for fatigue  Negative for chills and fever  HENT: Positive for congestion, ear pain, sinus pressure and sore throat  Eyes: Negative  Respiratory: Positive for cough  Musculoskeletal: Negative  Objective:  Vitals:    01/16/19 1123   BP: 130/90   Pulse: 80   Temp: 98 1 °F (36 7 °C)   ; Physical Exam   Constitutional: Vital signs are normal  He appears well-developed  He appears toxic  He appears ill  No distress  HENT:   Head: Normocephalic and atraumatic  Right Ear: External ear normal    Left Ear: External ear normal    Nose: Mucosal edema and rhinorrhea present  No epistaxis  Right sinus exhibits maxillary sinus tenderness  Right sinus exhibits no frontal sinus tenderness  Left sinus exhibits maxillary sinus tenderness  Left sinus exhibits no frontal sinus tenderness  Mouth/Throat: Oropharynx is clear and moist    Eyes: Pupils are equal, round, and reactive to light  Conjunctivae and EOM are normal    Neck: Normal range of motion   No thyromegaly present  Cardiovascular: Normal rate, regular rhythm and normal heart sounds  Exam reveals no friction rub  No murmur heard  Pulmonary/Chest: Effort normal  No respiratory distress  He has no wheezes  He has no rales  Lymphadenopathy:     He has no cervical adenopathy  Neurological: He is alert  Skin: Skin is warm

## 2019-01-21 ENCOUNTER — TELEPHONE (OUTPATIENT)
Dept: FAMILY MEDICINE CLINIC | Facility: CLINIC | Age: 69
End: 2019-01-21

## 2019-01-21 NOTE — TELEPHONE ENCOUNTER
Ears are probably related to persistent eustachian tube dysfunction  They should hopefully improve within a few days   Pt to call Thurs if not improving

## 2019-01-21 NOTE — TELEPHONE ENCOUNTER
Was seen last week and feels much better     His ears still seem to be blocked     he's still suing all the meds you gave him

## 2019-03-08 ENCOUNTER — APPOINTMENT (OUTPATIENT)
Dept: LAB | Facility: CLINIC | Age: 69
End: 2019-03-08
Payer: COMMERCIAL

## 2019-03-08 DIAGNOSIS — I10 BENIGN ESSENTIAL HYPERTENSION: ICD-10-CM

## 2019-03-08 DIAGNOSIS — I25.10 ATHEROSCLEROSIS OF NATIVE CORONARY ARTERY OF NATIVE HEART WITHOUT ANGINA PECTORIS: ICD-10-CM

## 2019-03-08 LAB
ALBUMIN SERPL BCP-MCNC: 4.1 G/DL (ref 3.5–5)
ALP SERPL-CCNC: 88 U/L (ref 46–116)
ALT SERPL W P-5'-P-CCNC: 35 U/L (ref 12–78)
ANION GAP SERPL CALCULATED.3IONS-SCNC: 7 MMOL/L (ref 4–13)
AST SERPL W P-5'-P-CCNC: 22 U/L (ref 5–45)
BASOPHILS # BLD AUTO: 0.07 THOUSANDS/ΜL (ref 0–0.1)
BASOPHILS NFR BLD AUTO: 1 % (ref 0–1)
BILIRUB SERPL-MCNC: 0.74 MG/DL (ref 0.2–1)
BUN SERPL-MCNC: 14 MG/DL (ref 5–25)
CALCIUM SERPL-MCNC: 8.9 MG/DL (ref 8.3–10.1)
CHLORIDE SERPL-SCNC: 107 MMOL/L (ref 100–108)
CHOLEST SERPL-MCNC: 137 MG/DL (ref 50–200)
CO2 SERPL-SCNC: 24 MMOL/L (ref 21–32)
CREAT SERPL-MCNC: 0.92 MG/DL (ref 0.6–1.3)
EOSINOPHIL # BLD AUTO: 0.28 THOUSAND/ΜL (ref 0–0.61)
EOSINOPHIL NFR BLD AUTO: 5 % (ref 0–6)
ERYTHROCYTE [DISTWIDTH] IN BLOOD BY AUTOMATED COUNT: 13.2 % (ref 11.6–15.1)
GFR SERPL CREATININE-BSD FRML MDRD: 86 ML/MIN/1.73SQ M
GLUCOSE P FAST SERPL-MCNC: 99 MG/DL (ref 65–99)
HCT VFR BLD AUTO: 44 % (ref 36.5–49.3)
HDLC SERPL-MCNC: 49 MG/DL (ref 40–60)
HGB BLD-MCNC: 15.2 G/DL (ref 12–17)
IMM GRANULOCYTES # BLD AUTO: 0.01 THOUSAND/UL (ref 0–0.2)
IMM GRANULOCYTES NFR BLD AUTO: 0 % (ref 0–2)
LDLC SERPL CALC-MCNC: 69 MG/DL (ref 0–100)
LYMPHOCYTES # BLD AUTO: 1.48 THOUSANDS/ΜL (ref 0.6–4.47)
LYMPHOCYTES NFR BLD AUTO: 26 % (ref 14–44)
MCH RBC QN AUTO: 31.5 PG (ref 26.8–34.3)
MCHC RBC AUTO-ENTMCNC: 34.5 G/DL (ref 31.4–37.4)
MCV RBC AUTO: 91 FL (ref 82–98)
MONOCYTES # BLD AUTO: 0.55 THOUSAND/ΜL (ref 0.17–1.22)
MONOCYTES NFR BLD AUTO: 10 % (ref 4–12)
NEUTROPHILS # BLD AUTO: 3.22 THOUSANDS/ΜL (ref 1.85–7.62)
NEUTS SEG NFR BLD AUTO: 58 % (ref 43–75)
NONHDLC SERPL-MCNC: 88 MG/DL
NRBC BLD AUTO-RTO: 0 /100 WBCS
PLATELET # BLD AUTO: 194 THOUSANDS/UL (ref 149–390)
PMV BLD AUTO: 9.6 FL (ref 8.9–12.7)
POTASSIUM SERPL-SCNC: 4.5 MMOL/L (ref 3.5–5.3)
PROT SERPL-MCNC: 7.6 G/DL (ref 6.4–8.2)
PSA SERPL-MCNC: 0.4 NG/ML (ref 0–4)
RBC # BLD AUTO: 4.82 MILLION/UL (ref 3.88–5.62)
SODIUM SERPL-SCNC: 138 MMOL/L (ref 136–145)
TRIGL SERPL-MCNC: 93 MG/DL
WBC # BLD AUTO: 5.61 THOUSAND/UL (ref 4.31–10.16)

## 2019-03-08 PROCEDURE — G0103 PSA SCREENING: HCPCS | Performed by: FAMILY MEDICINE

## 2019-03-08 PROCEDURE — 80061 LIPID PANEL: CPT

## 2019-03-08 PROCEDURE — 36415 COLL VENOUS BLD VENIPUNCTURE: CPT

## 2019-03-08 PROCEDURE — 80053 COMPREHEN METABOLIC PANEL: CPT

## 2019-03-08 PROCEDURE — 85025 COMPLETE CBC W/AUTO DIFF WBC: CPT

## 2019-03-18 ENCOUNTER — OFFICE VISIT (OUTPATIENT)
Dept: FAMILY MEDICINE CLINIC | Facility: CLINIC | Age: 69
End: 2019-03-18
Payer: COMMERCIAL

## 2019-03-18 VITALS
TEMPERATURE: 98.1 F | WEIGHT: 199.2 LBS | HEIGHT: 69 IN | SYSTOLIC BLOOD PRESSURE: 132 MMHG | HEART RATE: 68 BPM | DIASTOLIC BLOOD PRESSURE: 82 MMHG | BODY MASS INDEX: 29.51 KG/M2

## 2019-03-18 DIAGNOSIS — J01.90 ACUTE NON-RECURRENT SINUSITIS, UNSPECIFIED LOCATION: Primary | ICD-10-CM

## 2019-03-18 PROCEDURE — 3008F BODY MASS INDEX DOCD: CPT | Performed by: FAMILY MEDICINE

## 2019-03-18 PROCEDURE — 1160F RVW MEDS BY RX/DR IN RCRD: CPT | Performed by: FAMILY MEDICINE

## 2019-03-18 PROCEDURE — 99213 OFFICE O/P EST LOW 20 MIN: CPT | Performed by: FAMILY MEDICINE

## 2019-03-18 RX ORDER — AMOXICILLIN 875 MG/1
875 TABLET, COATED ORAL 2 TIMES DAILY
Qty: 20 TABLET | Refills: 0 | Status: SHIPPED | OUTPATIENT
Start: 2019-03-18 | End: 2019-03-28

## 2019-03-18 RX ORDER — BENZONATATE 200 MG/1
200 CAPSULE ORAL 3 TIMES DAILY PRN
Qty: 20 CAPSULE | Refills: 0 | Status: SHIPPED | OUTPATIENT
Start: 2019-03-18 | End: 2019-05-30 | Stop reason: SDUPTHER

## 2019-03-18 NOTE — PROGRESS NOTES
BMI Counseling: Body mass index is 29 42 kg/m²  Discussed the patient's BMI with him   The BMI {[facility] BMI Counselin}

## 2019-03-18 NOTE — PROGRESS NOTES
Assessment/Plan:      Diagnoses and all orders for this visit:    Acute non-recurrent sinusitis, unspecified location  -     amoxicillin (AMOXIL) 875 mg tablet; Take 1 tablet (875 mg total) by mouth 2 (two) times a day for 10 days  -     benzonatate (TESSALON) 200 MG capsule; Take 1 capsule (200 mg total) by mouth 3 (three) times a day as needed for cough          Subjective:     Patient ID: Mireya Rivera is a 79 y o  male  4-5 day history of increased nasal congestion with sinus pressure and nonproductive cough  No fever or chills  Feels similar to his previous sinus infections  He does have some purulent nasal discharge  No other concerns  Review of Systems   Constitutional: Negative for activity change, chills, diaphoresis, fatigue and fever  HENT: Positive for congestion, postnasal drip, sinus pressure and sinus pain  Negative for ear discharge, ear pain and sore throat  Eyes: Negative  Respiratory: Positive for cough  Negative for wheezing  Cardiovascular: Negative  Objective:     Physical Exam   Constitutional: Vital signs are normal  He appears well-developed  Non-toxic appearance  No distress  HENT:   Head: Normocephalic and atraumatic  Right Ear: External ear normal    Left Ear: External ear normal    Nose: Mucosal edema and rhinorrhea present  No epistaxis  Right sinus exhibits frontal sinus tenderness  Right sinus exhibits no maxillary sinus tenderness  Left sinus exhibits frontal sinus tenderness  Left sinus exhibits no maxillary sinus tenderness  Mouth/Throat: Oropharynx is clear and moist    Eyes: Pupils are equal, round, and reactive to light  Conjunctivae and EOM are normal    Neck: Normal range of motion  No thyromegaly present  Cardiovascular: Normal rate, regular rhythm and normal heart sounds  Exam reveals no friction rub  No murmur heard  Pulmonary/Chest: Effort normal  No respiratory distress  He has no wheezes  He has no rales     Lymphadenopathy: He has no cervical adenopathy  Neurological: He is alert  Skin: Skin is warm

## 2019-05-08 ENCOUNTER — OFFICE VISIT (OUTPATIENT)
Dept: FAMILY MEDICINE CLINIC | Facility: CLINIC | Age: 69
End: 2019-05-08
Payer: COMMERCIAL

## 2019-05-08 VITALS
DIASTOLIC BLOOD PRESSURE: 82 MMHG | HEIGHT: 69 IN | BODY MASS INDEX: 29.51 KG/M2 | HEART RATE: 68 BPM | TEMPERATURE: 98.2 F | SYSTOLIC BLOOD PRESSURE: 128 MMHG | WEIGHT: 199.2 LBS

## 2019-05-08 DIAGNOSIS — I10 BENIGN ESSENTIAL HYPERTENSION: Primary | ICD-10-CM

## 2019-05-08 DIAGNOSIS — M65.332 TRIGGER MIDDLE FINGER OF LEFT HAND: ICD-10-CM

## 2019-05-08 DIAGNOSIS — E78.9 LIPID DISORDER: ICD-10-CM

## 2019-05-08 DIAGNOSIS — K21.9 CHRONIC GERD: ICD-10-CM

## 2019-05-08 DIAGNOSIS — I25.10 ATHEROSCLEROSIS OF NATIVE CORONARY ARTERY OF NATIVE HEART WITHOUT ANGINA PECTORIS: ICD-10-CM

## 2019-05-08 PROCEDURE — 3725F SCREEN DEPRESSION PERFORMED: CPT | Performed by: FAMILY MEDICINE

## 2019-05-08 PROCEDURE — 3079F DIAST BP 80-89 MM HG: CPT | Performed by: FAMILY MEDICINE

## 2019-05-08 PROCEDURE — 99214 OFFICE O/P EST MOD 30 MIN: CPT | Performed by: FAMILY MEDICINE

## 2019-05-08 PROCEDURE — 3074F SYST BP LT 130 MM HG: CPT | Performed by: FAMILY MEDICINE

## 2019-05-08 RX ORDER — FAMOTIDINE 40 MG/1
40 TABLET, FILM COATED ORAL
Qty: 90 TABLET | Refills: 1 | Status: SHIPPED | OUTPATIENT
Start: 2019-05-08 | End: 2019-11-10

## 2019-05-30 ENCOUNTER — OFFICE VISIT (OUTPATIENT)
Dept: FAMILY MEDICINE CLINIC | Facility: CLINIC | Age: 69
End: 2019-05-30
Payer: COMMERCIAL

## 2019-05-30 VITALS
BODY MASS INDEX: 29.3 KG/M2 | HEART RATE: 70 BPM | TEMPERATURE: 98.9 F | DIASTOLIC BLOOD PRESSURE: 88 MMHG | WEIGHT: 197.8 LBS | HEIGHT: 69 IN | SYSTOLIC BLOOD PRESSURE: 138 MMHG

## 2019-05-30 DIAGNOSIS — J01.00 ACUTE NON-RECURRENT MAXILLARY SINUSITIS: Primary | ICD-10-CM

## 2019-05-30 PROCEDURE — 3008F BODY MASS INDEX DOCD: CPT | Performed by: FAMILY MEDICINE

## 2019-05-30 PROCEDURE — 99213 OFFICE O/P EST LOW 20 MIN: CPT | Performed by: FAMILY MEDICINE

## 2019-05-30 PROCEDURE — 1036F TOBACCO NON-USER: CPT | Performed by: FAMILY MEDICINE

## 2019-05-30 RX ORDER — AMOXICILLIN 875 MG/1
875 TABLET, COATED ORAL 2 TIMES DAILY
Qty: 20 TABLET | Refills: 0 | Status: SHIPPED | OUTPATIENT
Start: 2019-05-30 | End: 2019-06-09

## 2019-05-30 RX ORDER — BENZONATATE 200 MG/1
200 CAPSULE ORAL 3 TIMES DAILY PRN
Qty: 20 CAPSULE | Refills: 0 | Status: SHIPPED | OUTPATIENT
Start: 2019-05-30 | End: 2019-11-18

## 2019-06-08 ENCOUNTER — OFFICE VISIT (OUTPATIENT)
Dept: URGENT CARE | Facility: MEDICAL CENTER | Age: 69
End: 2019-06-08
Payer: COMMERCIAL

## 2019-06-08 VITALS
SYSTOLIC BLOOD PRESSURE: 132 MMHG | BODY MASS INDEX: 29.36 KG/M2 | WEIGHT: 198.2 LBS | OXYGEN SATURATION: 97 % | RESPIRATION RATE: 18 BRPM | DIASTOLIC BLOOD PRESSURE: 82 MMHG | HEART RATE: 62 BPM | TEMPERATURE: 98 F | HEIGHT: 69 IN
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
## 2019-06-08 DIAGNOSIS — S80.861A TICK BITE OF RIGHT LOWER LEG, INITIAL ENCOUNTER: Primary | ICD-10-CM

## 2019-06-08 PROCEDURE — 99213 OFFICE O/P EST LOW 20 MIN: CPT | Performed by: PHYSICIAN ASSISTANT

## 2019-07-12 DIAGNOSIS — I10 ESSENTIAL HYPERTENSION: ICD-10-CM

## 2019-07-12 RX ORDER — LISINOPRIL 10 MG/1
TABLET ORAL
Qty: 90 TABLET | Refills: 1 | Status: SHIPPED | OUTPATIENT
Start: 2019-07-12 | End: 2020-01-08 | Stop reason: SDUPTHER

## 2019-10-08 ENCOUNTER — TRANSCRIBE ORDERS (OUTPATIENT)
Dept: LAB | Facility: CLINIC | Age: 69
End: 2019-10-08

## 2019-10-08 ENCOUNTER — APPOINTMENT (OUTPATIENT)
Dept: LAB | Facility: CLINIC | Age: 69
End: 2019-10-08
Payer: COMMERCIAL

## 2019-10-08 DIAGNOSIS — I10 BENIGN ESSENTIAL HYPERTENSION: ICD-10-CM

## 2019-10-08 LAB
ALBUMIN SERPL BCP-MCNC: 4.3 G/DL (ref 3.5–5)
ALP SERPL-CCNC: 84 U/L (ref 46–116)
ALT SERPL W P-5'-P-CCNC: 33 U/L (ref 12–78)
ANION GAP SERPL CALCULATED.3IONS-SCNC: 7 MMOL/L (ref 4–13)
AST SERPL W P-5'-P-CCNC: 20 U/L (ref 5–45)
BASOPHILS # BLD AUTO: 0.05 THOUSANDS/ΜL (ref 0–0.1)
BASOPHILS NFR BLD AUTO: 1 % (ref 0–1)
BILIRUB SERPL-MCNC: 0.79 MG/DL (ref 0.2–1)
BUN SERPL-MCNC: 14 MG/DL (ref 5–25)
CALCIUM SERPL-MCNC: 9.1 MG/DL (ref 8.3–10.1)
CHLORIDE SERPL-SCNC: 108 MMOL/L (ref 100–108)
CHOLEST SERPL-MCNC: 125 MG/DL (ref 50–200)
CO2 SERPL-SCNC: 24 MMOL/L (ref 21–32)
CREAT SERPL-MCNC: 0.94 MG/DL (ref 0.6–1.3)
EOSINOPHIL # BLD AUTO: 0.22 THOUSAND/ΜL (ref 0–0.61)
EOSINOPHIL NFR BLD AUTO: 4 % (ref 0–6)
ERYTHROCYTE [DISTWIDTH] IN BLOOD BY AUTOMATED COUNT: 13.1 % (ref 11.6–15.1)
GFR SERPL CREATININE-BSD FRML MDRD: 84 ML/MIN/1.73SQ M
GLUCOSE P FAST SERPL-MCNC: 103 MG/DL (ref 65–99)
HCT VFR BLD AUTO: 42.4 % (ref 36.5–49.3)
HDLC SERPL-MCNC: 53 MG/DL (ref 40–60)
HGB BLD-MCNC: 14.3 G/DL (ref 12–17)
IMM GRANULOCYTES # BLD AUTO: 0.01 THOUSAND/UL (ref 0–0.2)
IMM GRANULOCYTES NFR BLD AUTO: 0 % (ref 0–2)
LDLC SERPL CALC-MCNC: 53 MG/DL (ref 0–100)
LYMPHOCYTES # BLD AUTO: 1.51 THOUSANDS/ΜL (ref 0.6–4.47)
LYMPHOCYTES NFR BLD AUTO: 26 % (ref 14–44)
MCH RBC QN AUTO: 31.6 PG (ref 26.8–34.3)
MCHC RBC AUTO-ENTMCNC: 33.7 G/DL (ref 31.4–37.4)
MCV RBC AUTO: 94 FL (ref 82–98)
MONOCYTES # BLD AUTO: 0.57 THOUSAND/ΜL (ref 0.17–1.22)
MONOCYTES NFR BLD AUTO: 10 % (ref 4–12)
NEUTROPHILS # BLD AUTO: 3.51 THOUSANDS/ΜL (ref 1.85–7.62)
NEUTS SEG NFR BLD AUTO: 59 % (ref 43–75)
NONHDLC SERPL-MCNC: 72 MG/DL
NRBC BLD AUTO-RTO: 0 /100 WBCS
PLATELET # BLD AUTO: 205 THOUSANDS/UL (ref 149–390)
PMV BLD AUTO: 9.7 FL (ref 8.9–12.7)
POTASSIUM SERPL-SCNC: 4.3 MMOL/L (ref 3.5–5.3)
PROT SERPL-MCNC: 7.7 G/DL (ref 6.4–8.2)
RBC # BLD AUTO: 4.53 MILLION/UL (ref 3.88–5.62)
SODIUM SERPL-SCNC: 139 MMOL/L (ref 136–145)
TRIGL SERPL-MCNC: 93 MG/DL
WBC # BLD AUTO: 5.87 THOUSAND/UL (ref 4.31–10.16)

## 2019-10-08 PROCEDURE — 85025 COMPLETE CBC W/AUTO DIFF WBC: CPT

## 2019-10-08 PROCEDURE — 36415 COLL VENOUS BLD VENIPUNCTURE: CPT

## 2019-10-08 PROCEDURE — 80053 COMPREHEN METABOLIC PANEL: CPT

## 2019-10-08 PROCEDURE — 80061 LIPID PANEL: CPT

## 2019-11-08 ENCOUNTER — OFFICE VISIT (OUTPATIENT)
Dept: FAMILY MEDICINE CLINIC | Facility: CLINIC | Age: 69
End: 2019-11-08
Payer: COMMERCIAL

## 2019-11-08 ENCOUNTER — APPOINTMENT (OUTPATIENT)
Dept: RADIOLOGY | Facility: MEDICAL CENTER | Age: 69
End: 2019-11-08
Payer: COMMERCIAL

## 2019-11-08 VITALS
DIASTOLIC BLOOD PRESSURE: 80 MMHG | WEIGHT: 198.6 LBS | HEART RATE: 66 BPM | TEMPERATURE: 98.2 F | SYSTOLIC BLOOD PRESSURE: 122 MMHG | BODY MASS INDEX: 29.41 KG/M2 | HEIGHT: 69 IN

## 2019-11-08 DIAGNOSIS — M25.512 CHRONIC LEFT SHOULDER PAIN: ICD-10-CM

## 2019-11-08 DIAGNOSIS — E78.9 LIPID DISORDER: ICD-10-CM

## 2019-11-08 DIAGNOSIS — Z12.5 SCREENING FOR PROSTATE CANCER: ICD-10-CM

## 2019-11-08 DIAGNOSIS — Z23 ENCOUNTER FOR IMMUNIZATION: ICD-10-CM

## 2019-11-08 DIAGNOSIS — Z00.00 MEDICARE ANNUAL WELLNESS VISIT, SUBSEQUENT: Primary | ICD-10-CM

## 2019-11-08 DIAGNOSIS — G89.29 CHRONIC LEFT SHOULDER PAIN: ICD-10-CM

## 2019-11-08 DIAGNOSIS — K21.9 CHRONIC GERD: ICD-10-CM

## 2019-11-08 DIAGNOSIS — I25.10 ATHEROSCLEROSIS OF NATIVE CORONARY ARTERY OF NATIVE HEART WITHOUT ANGINA PECTORIS: ICD-10-CM

## 2019-11-08 DIAGNOSIS — I10 BENIGN ESSENTIAL HYPERTENSION: ICD-10-CM

## 2019-11-08 PROCEDURE — 90732 PPSV23 VACC 2 YRS+ SUBQ/IM: CPT | Performed by: FAMILY MEDICINE

## 2019-11-08 PROCEDURE — 99214 OFFICE O/P EST MOD 30 MIN: CPT | Performed by: FAMILY MEDICINE

## 2019-11-08 PROCEDURE — G0009 ADMIN PNEUMOCOCCAL VACCINE: HCPCS | Performed by: FAMILY MEDICINE

## 2019-11-08 PROCEDURE — 1101F PT FALLS ASSESS-DOCD LE1/YR: CPT | Performed by: FAMILY MEDICINE

## 2019-11-08 PROCEDURE — G0008 ADMIN INFLUENZA VIRUS VAC: HCPCS | Performed by: FAMILY MEDICINE

## 2019-11-08 PROCEDURE — 73030 X-RAY EXAM OF SHOULDER: CPT

## 2019-11-08 PROCEDURE — 90662 IIV NO PRSV INCREASED AG IM: CPT | Performed by: FAMILY MEDICINE

## 2019-11-08 PROCEDURE — G0439 PPPS, SUBSEQ VISIT: HCPCS | Performed by: FAMILY MEDICINE

## 2019-11-08 NOTE — PATIENT INSTRUCTIONS

## 2019-11-08 NOTE — PROGRESS NOTES
Assessment and Plan:     Problem List Items Addressed This Visit        Digestive    Chronic GERD     Stable  Cont famotidine prn - refilled  Recheck 6m         Relevant Medications    famotidine (PEPCID) 40 MG tablet       Cardiovascular and Mediastinum    Benign essential hypertension     Well controlled  Cont present treatment  Monitor labs  Recheck 6m           Relevant Orders    CBC and differential    Comprehensive metabolic panel    Lipid panel    Coronary atherosclerosis     Asymptomatic  Cont present meds  F/u with Cardio  Recheck 6m         Relevant Orders    CBC and differential    Comprehensive metabolic panel    Lipid panel       Other    Lipid disorder     Check labs  Continue simvastatin  Recheck 6m           Other Visit Diagnoses     Medicare annual wellness visit, subsequent    -  Primary    Chronic left shoulder pain        check xray  Refer to PT  Recheck 2-3 weeks if not improved    Relevant Orders    XR shoulder 2+ vw left (Completed)    Ambulatory referral to Physical Therapy    Encounter for immunization        Relevant Orders    influenza vaccine, [id number], high-dose, PF 0 5 mL (FLUZONE HIGH-DOSE) (Completed)    PNEUMOCOCCAL POLYSACCHARIDE VACCINE 23-VALENT =>1YO SQ IM (Pneumovax) (Completed)    Screening for prostate cancer        Relevant Orders    PSA, Total Screen           Preventive health issues were discussed with patient, and age appropriate screening tests were ordered as noted in patient's After Visit Summary  Personalized health advice and appropriate referrals for health education or preventive services given if needed, as noted in patient's After Visit Summary       History of Present Illness:     Patient presents for Medicare Annual Wellness visit    Patient Care Team:  Daisha Martinez MD as PCP - Fernandez Officer, MD Lawrence Suarez MD Gwinda Raw, MD as Endoscopist     Problem List:     Patient Active Problem List   Diagnosis    Actinic keratosis    Chronic GERD    Coronary atherosclerosis    Benign essential hypertension    Lipid disorder    Low back pain    Osteoarthritis    Trigger middle finger of left hand      Past Medical and Surgical History:     Past Medical History:   Diagnosis Date    Arthritis     Colon polyps     Hypertension      Past Surgical History:   Procedure Laterality Date    BACK SURGERY      laminectomy    CARDIAC SURGERY      cardiac stent    CARPAL TUNNEL RELEASE      COLONOSCOPY      benign polyp removal    ORCHIECTOMY Left     [place] ESOPHAGOGASTRODUODENOSCOPY TRANSORAL DIAGNOSTIC N/A 8/29/2017    Procedure: EGD AND COLONOSCOPY;  Surgeon: Desi Wetzel MD;  [facility]; Service: Gastroenterology    WRIST SURGERY        Family History:     Family History   Problem Relation Age of Onset    Diabetes Mother     Hypertension Mother     Stomach cancer Father     Hypertension Father     Liver cancer Father       Social History:     Social History     Socioeconomic History    Marital status: /Civil Union     Spouse name: None    Number of children: None    Years of education: some college    Highest education level: None   Occupational History    Occupation: part time   Social Needs    Financial resource strain: None    Food insecurity:     Worry: None     Inability: None    Transportation needs:     Medical: None     Non-medical: None   Tobacco Use    Smoking status: Never Smoker    Smokeless tobacco: Never Used   Substance and Sexual Activity    Alcohol use:  Yes    Drug use: No    Sexual activity: Yes   Lifestyle    Physical activity:     Days per week: None     Minutes per session: None    Stress: None   Relationships    Social connections:     Talks on phone: None     Gets together: None     Attends [employer] witness service: None     Active member of club or organization: None     Attends meetings of clubs or organizations: None     Relationship status: None    Intimate partner violence:     Fear of current or ex partner: None     Emotionally abused: None     Physically abused: None     Forced sexual activity: None   Other Topics Concern    None   Social History Narrative    Daily coffee consumption    Daily cola consumption    Daily tea consumption    Dental care regularly    Does not exercise    Patient has discussed organ donor wishes with family    Patient has living will    Pet: cat    POA in existence    Water intake adequate per day           Medications and Allergies:     Current Outpatient Medications   Medication Sig Dispense Refill    acetaminophen (TYLENOL) 650 mg CR tablet Take 650 mg by mouth 2 (two) times a day      ascorbic acid (VITAMIN C) 500 mg tablet Take 500 mg by mouth daily      aspirin 81 MG tablet Take 1 tablet (81 mg total) by mouth daily 30 tablet 5    benzonatate (TESSALON) 200 MG capsule Take 1 capsule (200 mg total) by mouth 3 (three) times a day as needed for cough (Patient not taking: Reported on 6/8/2019) 20 capsule 0    Cholecalciferol 2000 units TABS Take 2,000 Units by mouth      Coenzyme Q10 200 MG capsule Take 200 mg by mouth      Echinacea 125 MG CAPS Take 1 tablet by mouth 2 (two) times a day      famotidine (PEPCID) 40 MG tablet Take 1 tablet (40 mg total) by mouth daily at bedtime as needed for heartburn 90 tablet 1    FIBER SELECT GUMMIES CHEW Chew 2 (two) times a day      lisinopril (ZESTRIL) 10 mg tablet TAKE 1 TABLET DAILY 90 tablet 1    melatonin 3 mg Take 1 tablet by mouth      metoprolol succinate (TOPROL-XL) 25 mg 24 hr tablet Take 12 5 mg by mouth daily      multivitamin (THERAGRAN) TABS Take 1 tablet by mouth daily      mupirocin (BACTROBAN) 2 % ointment Apply topically 3 (three) times a day 22 g 0    orphenadrine (NORFLEX) 100 mg tablet Take 100 mg by mouth 2 (two) times a day as needed for muscle spasms      pyridoxine (VITAMIN B6) 100 mg tablet Take 100 mg by mouth      simvastatin (ZOCOR) 40 mg tablet Take 20 mg by mouth daily at bedtime      Triamcinolone Acetonide 55 MCG/ACT AERO into each nostril       No current facility-administered medications for this visit  Allergies   Allergen Reactions    Other Allergic Rhinitis     Seasonal      Immunizations:     Immunization History   Administered Date(s) Administered    INFLUENZA 01/13/2016, 10/16/2016, 01/11/2017, 01/10/2018, 11/07/2018    Influenza TIV (IM) 01/07/2014    Influenza, high dose seasonal 0 5 mL 11/07/2018, 11/08/2019    Pneumococcal Conjugate 13-Valent 11/07/2018    Pneumococcal Polysaccharide PPV23 11/08/2019      Health Maintenance:         Topic Date Due    Hepatitis C Screening  1950    CRC Screening: Colonoscopy  08/29/2020         Topic Date Due    DTaP,Tdap,and Td Vaccines (1 - Tdap) 09/10/1973      Medicare Health Risk Assessment:     /80 (BP Location: Left arm, Patient Position: Sitting, Cuff Size: Standard)   Pulse 66   Temp 98 2 °F (36 8 °C)   Ht 5' 9" (1 753 m)   Wt 90 1 kg (198 lb 9 6 oz)   BMI 29 33 kg/m²      Lala Peace is here for his Subsequent Wellness visit  Last Medicare Wellness visit information reviewed, patient interviewed and updates made to the record today  Health Risk Assessment:   Patient rates overall health as good  Patient feels that their physical health rating is same  Eyesight was rated as same  Hearing was rated as same  Patient feels that their emotional and mental health rating is same  Pain experienced in the last 7 days has been some  Patient's pain rating has been 7/10  Patient states that he has experienced no weight loss or gain in last 6 months  Intermittent L shoulder pain    Depression Screening:   PHQ-2 Score: 0      Fall Risk Screening: In the past year, patient has experienced: no history of falling in past year      Home Safety:  Patient does not have trouble with stairs inside or outside of their home   Patient has working smoke alarms and has working carbon monoxide detector  Home safety hazards include: none  Nutrition:   Current diet is Regular and Frequent junk food  Medications:   Patient is currently taking over-the-counter supplements  OTC medications include: see medication list  Patient is able to manage medications  Activities of Daily Living (ADLs)/Instrumental Activities of Daily Living (IADLs):   Walk and transfer into and out of bed and chair?: Yes  Dress and groom yourself?: Yes    Bathe or shower yourself?: Yes    Feed yourself? Yes  Do your laundry/housekeeping?: Yes  Manage your money, pay your bills and track your expenses?: Yes  Make your own meals?: Yes    Do your own shopping?: Yes    Previous Hospitalizations:   Any hospitalizations or ED visits within the last 12 months?: No      Advance Care Planning:   Living will: Yes    Durable POA for healthcare: Yes    Advanced directive: Yes    Advanced directive counseling given: Yes    End of Life Decisions reviewed with patient: Yes      Cognitive Screening:   Provider or family/friend/caregiver concerned regarding cognition?: No    PREVENTIVE SCREENINGS      Cardiovascular Screening:    General: Screening Current      Diabetes Screening:     General: Screening Current      Colorectal Cancer Screening:     General: Screening Current      Prostate Cancer Screening:    General: Screening Current      Osteoporosis Screening:    General: Screening Not Indicated      Abdominal Aortic Aneurysm (AAA) Screening:    Risk factors include: age between 73-69 yo        General: Screening Not Indicated      Lung Cancer Screening:     General: Screening Not Indicated      Hepatitis C Screening:    General: Risks and Benefits Discussed and History Hepatitis C    Hep C Screening Accepted: Yes      Other Counseling Topics:   Regular weightbearing exercise         Namrata Florian MD

## 2019-11-08 NOTE — PROGRESS NOTES
Assessment/Plan:    Chronic GERD  Stable  Cont famotidine prn - refilled  Recheck 6m    Benign essential hypertension  Well controlled  Cont present treatment  Monitor labs  Recheck 6m      Coronary atherosclerosis  Asymptomatic  Cont present meds  F/u with Cardio  Recheck 6m    Lipid disorder  Check labs  Continue simvastatin  Recheck 6m       Diagnoses and all orders for this visit:    Medicare annual wellness visit, subsequent    Chronic GERD  -     famotidine (PEPCID) 40 MG tablet; Take 1 tablet (40 mg total) by mouth daily at bedtime as needed for heartburn    Benign essential hypertension  -     CBC and differential; Future  -     Comprehensive metabolic panel; Future  -     Lipid panel; Future    Atherosclerosis of native coronary artery of native heart without angina pectoris  -     CBC and differential; Future  -     Comprehensive metabolic panel; Future  -     Lipid panel; Future    Lipid disorder    Chronic left shoulder pain  Comments:  check xray  Refer to PT  Recheck 2-3 weeks if not improved  Orders:  -     XR shoulder 2+ vw left; Future  -     Ambulatory referral to Physical Therapy; Future    Encounter for immunization  -     influenza vaccine, [id number], high-dose, PF 0 5 mL (FLUZONE HIGH-DOSE)  -     PNEUMOCOCCAL POLYSACCHARIDE VACCINE 23-VALENT =>1YO SQ IM (Pneumovax)    Screening for prostate cancer  -     PSA, Total Screen; Future          Subjective:      Patient ID: Cyril Del Toro is a 81 y o  male  f/u multiple med issues  - slowly worsening L shoulder pain over several years with worsening over the last 3 weeks  Pain is positional  No radiation to neck or arms  Pain in primarily subacromial  No pain with exertion  Started 3 years ago reaching quickly over head as the wind blew off his hat  - does not exercise regularly  No Cp, palp, lightheadedness or other CV symptoms with or without exertion  Pt is up to date with Cardio (last January)    - has occ GERD symptoms but overall it is well controled with famotidine  Would like a mail order script  Due for colonoscopy next year  - no  complaints other than some weak stream    - AWV done      The following portions of the patient's history were reviewed and updated as appropriate:   He  has a past medical history of Arthritis, Colon polyps, and Hypertension  He   Patient Active Problem List    Diagnosis Date Noted    Trigger middle finger of left hand 05/08/2019    Actinic keratosis 12/08/2017    Chronic GERD 05/22/2017    Lipid disorder 01/21/2016    Low back pain 07/14/2015    Osteoarthritis 06/03/2013    Benign essential hypertension 10/19/2012    Coronary atherosclerosis 10/17/2012     He  has a past surgical history that includes Colonoscopy; Cardiac surgery; Orchiectomy (Left); Carpal tunnel release; Back surgery; pr esophagogastroduodenoscopy transoral diagnostic (N/A, 8/29/2017); and Wrist surgery  He  reports that he has never smoked  He has never used smokeless tobacco  He reports that he drinks alcohol  He reports that he does not use drugs    Current Outpatient Medications   Medication Sig Dispense Refill    acetaminophen (TYLENOL) 650 mg CR tablet Take 650 mg by mouth 2 (two) times a day      ascorbic acid (VITAMIN C) 500 mg tablet Take 500 mg by mouth daily      aspirin 81 MG tablet Take 1 tablet (81 mg total) by mouth daily 30 tablet 5    benzonatate (TESSALON) 200 MG capsule Take 1 capsule (200 mg total) by mouth 3 (three) times a day as needed for cough (Patient not taking: Reported on 6/8/2019) 20 capsule 0    Cholecalciferol 2000 units TABS Take 2,000 Units by mouth      Coenzyme Q10 200 MG capsule Take 200 mg by mouth      Echinacea 125 MG CAPS Take 1 tablet by mouth 2 (two) times a day      famotidine (PEPCID) 40 MG tablet Take 1 tablet (40 mg total) by mouth daily at bedtime as needed for heartburn 90 tablet 1    FIBER SELECT GUMMIES CHEW Chew 2 (two) times a day      lisinopril (ZESTRIL) 10 mg tablet TAKE 1 TABLET DAILY 90 tablet 1    melatonin 3 mg Take 1 tablet by mouth      metoprolol succinate (TOPROL-XL) 25 mg 24 hr tablet Take 12 5 mg by mouth daily      multivitamin (THERAGRAN) TABS Take 1 tablet by mouth daily      mupirocin (BACTROBAN) 2 % ointment Apply topically 3 (three) times a day 22 g 0    orphenadrine (NORFLEX) 100 mg tablet Take 100 mg by mouth 2 (two) times a day as needed for muscle spasms      pyridoxine (VITAMIN B6) 100 mg tablet Take 100 mg by mouth      simvastatin (ZOCOR) 40 mg tablet Take 20 mg by mouth daily at bedtime      Triamcinolone Acetonide 55 MCG/ACT AERO into each nostril       No current facility-administered medications for this visit  He is allergic to other       Review of Systems      Objective:      /80 (BP Location: Left arm, Patient Position: Sitting, Cuff Size: Standard)   Pulse 66   Temp 98 2 °F (36 8 °C)   Ht 5' 9" (1 753 m)   Wt 90 1 kg (198 lb 9 6 oz)   BMI 29 33 kg/m²          Physical Exam   Constitutional: He is oriented to person, place, and time  He appears well-developed and well-nourished  HENT:   Head: Normocephalic and atraumatic  Right Ear: External ear normal    Left Ear: External ear normal    Nose: Nose normal    Mouth/Throat: Oropharynx is clear and moist    Eyes: Pupils are equal, round, and reactive to light  Conjunctivae and EOM are normal    Neck: Normal range of motion  Neck supple  No thyromegaly present  Cardiovascular: Normal rate, regular rhythm, normal heart sounds and intact distal pulses  No murmur heard  Pulmonary/Chest: Effort normal and breath sounds normal    Abdominal: Soft  Bowel sounds are normal  He exhibits no distension and no mass  There is no tenderness  Musculoskeletal: He exhibits no edema  Left shoulder: He exhibits tenderness (over anterior shoulder)  He exhibits normal range of motion, no swelling, no effusion, no spasm and normal pulse     Lymphadenopathy:     He has no cervical adenopathy  Neurological: He is alert and oriented to person, place, and time  He has normal reflexes  No cranial nerve deficit  He exhibits normal muscle tone  Coordination normal    minicog 5/5   Skin: Skin is warm  Psychiatric: He has a normal mood and affect  His behavior is normal  Judgment and thought content normal    PHQ-2 = 0   Vitals reviewed

## 2019-11-10 RX ORDER — FAMOTIDINE 40 MG/1
40 TABLET, FILM COATED ORAL
Qty: 90 TABLET | Refills: 1 | Status: SHIPPED | OUTPATIENT
Start: 2019-11-10 | End: 2020-05-29 | Stop reason: SDUPTHER

## 2019-11-11 ENCOUNTER — TELEPHONE (OUTPATIENT)
Dept: FAMILY MEDICINE CLINIC | Facility: CLINIC | Age: 69
End: 2019-11-11

## 2019-11-11 ENCOUNTER — OFFICE VISIT (OUTPATIENT)
Dept: FAMILY MEDICINE CLINIC | Facility: CLINIC | Age: 69
End: 2019-11-11
Payer: COMMERCIAL

## 2019-11-11 VITALS
BODY MASS INDEX: 28.79 KG/M2 | WEIGHT: 194.4 LBS | HEART RATE: 70 BPM | DIASTOLIC BLOOD PRESSURE: 82 MMHG | TEMPERATURE: 97.9 F | SYSTOLIC BLOOD PRESSURE: 124 MMHG | HEIGHT: 69 IN

## 2019-11-11 DIAGNOSIS — M25.512 CHRONIC LEFT SHOULDER PAIN: ICD-10-CM

## 2019-11-11 DIAGNOSIS — G89.29 CHRONIC LEFT SHOULDER PAIN: ICD-10-CM

## 2019-11-11 DIAGNOSIS — R68.89 FLU-LIKE SYMPTOMS: Primary | ICD-10-CM

## 2019-11-11 PROCEDURE — 99213 OFFICE O/P EST LOW 20 MIN: CPT | Performed by: FAMILY MEDICINE

## 2019-11-11 NOTE — TELEPHONE ENCOUNTER
FYI :  You referred him to Dr Amanda Koenig for shoulder, he does not do shoulders, so he will be seeing Dr Jerome Spear 11/18/2019

## 2019-11-13 ENCOUNTER — TELEPHONE (OUTPATIENT)
Dept: FAMILY MEDICINE CLINIC | Facility: CLINIC | Age: 69
End: 2019-11-13

## 2019-11-13 NOTE — TELEPHONE ENCOUNTER
Continue to watch for now   Cll Friday if not resolving - we could try an antibiotic for his sinuses at that time if needed

## 2019-11-13 NOTE — TELEPHONE ENCOUNTER
Cellulitis in arm is mostly gone  Flu symptoms are mostly gone, he does have slight sinus congestion/stuffy, sinus headache in the am when he gets up    Pl adv

## 2019-11-14 DIAGNOSIS — J32.9 SINUSITIS, UNSPECIFIED CHRONICITY, UNSPECIFIED LOCATION: Primary | ICD-10-CM

## 2019-11-14 RX ORDER — AMOXICILLIN 875 MG/1
875 TABLET, COATED ORAL 2 TIMES DAILY
Qty: 20 TABLET | Refills: 0 | Status: SHIPPED | OUTPATIENT
Start: 2019-11-14 | End: 2019-11-18

## 2019-11-14 NOTE — TELEPHONE ENCOUNTER
Patient called back stating his symptoms are getting worse  Can we please send in an antibiotic now?      [address] [phone]

## 2019-11-18 ENCOUNTER — OFFICE VISIT (OUTPATIENT)
Dept: OBGYN CLINIC | Facility: HOSPITAL | Age: 69
End: 2019-11-18
Payer: COMMERCIAL

## 2019-11-18 VITALS
WEIGHT: 196 LBS | BODY MASS INDEX: 29.03 KG/M2 | SYSTOLIC BLOOD PRESSURE: 149 MMHG | HEIGHT: 69 IN | HEART RATE: 71 BPM | DIASTOLIC BLOOD PRESSURE: 79 MMHG

## 2019-11-18 DIAGNOSIS — M19.012 PRIMARY OSTEOARTHRITIS, LEFT SHOULDER: Primary | ICD-10-CM

## 2019-11-18 PROCEDURE — 99203 OFFICE O/P NEW LOW 30 MIN: CPT | Performed by: ORTHOPAEDIC SURGERY

## 2019-11-18 PROCEDURE — 20610 DRAIN/INJ JOINT/BURSA W/O US: CPT | Performed by: ORTHOPAEDIC SURGERY

## 2019-11-18 PROCEDURE — 4040F PNEUMOC VAC/ADMIN/RCVD: CPT | Performed by: ORTHOPAEDIC SURGERY

## 2019-11-18 RX ORDER — BUPIVACAINE HYDROCHLORIDE 2.5 MG/ML
2 INJECTION, SOLUTION INFILTRATION; PERINEURAL
Status: COMPLETED | OUTPATIENT
Start: 2019-11-18 | End: 2019-11-18

## 2019-11-18 RX ORDER — BETAMETHASONE SODIUM PHOSPHATE AND BETAMETHASONE ACETATE 3; 3 MG/ML; MG/ML
6 INJECTION, SUSPENSION INTRA-ARTICULAR; INTRALESIONAL; INTRAMUSCULAR; SOFT TISSUE
Status: COMPLETED | OUTPATIENT
Start: 2019-11-18 | End: 2019-11-18

## 2019-11-18 RX ADMIN — BUPIVACAINE HYDROCHLORIDE 2 ML: 2.5 INJECTION, SOLUTION INFILTRATION; PERINEURAL at 14:44

## 2019-11-18 RX ADMIN — BETAMETHASONE SODIUM PHOSPHATE AND BETAMETHASONE ACETATE 6 MG: 3; 3 INJECTION, SUSPENSION INTRA-ARTICULAR; INTRALESIONAL; INTRAMUSCULAR; SOFT TISSUE at 14:44

## 2019-11-18 NOTE — PROGRESS NOTES
Assessment  Diagnoses and all orders for this visit:    Primary osteoarthritis, left shoulder        Discussion and Plan:    · Explained to the patient that he has an examination that is consistent with an acute flare of his mild osteoarthritic symptoms about the left shoulder  It is recommended that he begin formal physical therapy and a home exercise program for the above mentioned diagnosis  He was provided with a cortisone injection today into the glenohumeral joint  This is documented appropriately below  · He will follow up in 6-8 weeks for re evaluation of his symptoms  If symptoms persist an MRI will be warranted to evaluate for possible internal derangement  Subjective:   Patient ID: Ryan Jackson is a 81 y o  male      The patient presents today for an orthopedic surgery consultation from his PCP (Dr Chicho Boucher) on 11/11/2019 with a chief complaint of left shoulder pain  The pain began a few year(s) ago and is not associated with an acute injury  Patient notes that recently he does not mention any certain JULIAN but did have a few episodes when he had a sharp pain in the shoulder  The patient describes the pain as aching and dull in intensity,  intermittent, occurring with increasing frequency in timing, and localizes the pain to the  left subacromial joint, glenohumeral joint  The pain is worse with overhead work, overuse and raising arm over head and relieved by rest, ice, avoiding the painful activities  The pain is not associated with numbness and tingling  The pain is not associated with constitutional symptoms  The patient is not awoken at night by the pain  The patient has had no prior treatment              The following portions of the patient's history were reviewed and updated as appropriate: allergies, current medications, past family history, past medical history, past social history, past surgical history and problem list     Review of Systems   Constitutional: Negative for chills, fatigue, fever and unexpected weight change  HENT: Negative for hearing loss, nosebleeds and sore throat  Eyes: Negative for pain, redness and visual disturbance  Respiratory: Negative for cough, shortness of breath and wheezing  Cardiovascular: Negative for chest pain, palpitations and leg swelling  Gastrointestinal: Negative for abdominal pain, nausea and vomiting  Endocrine: Negative for polydipsia and polyuria  Genitourinary: Negative for frequency and urgency  Skin: Negative for color change, rash and wound  Neurological: Negative for dizziness, weakness, numbness and headaches  Psychiatric/Behavioral: Negative for behavioral problems, self-injury and suicidal ideas  Objective:  /79   Pulse 71   Ht 5' 9" (1 753 m)   Wt 88 9 kg (196 lb)   BMI 28 94 kg/m²       Left Shoulder Exam     Tenderness   The patient is experiencing no tenderness  Range of Motion   External rotation: 70   Forward flexion: 170   Internal rotation 0 degrees: Lumbar     Muscle Strength   Abduction: 5/5   External rotation: 5/5     Tests   Bejarano test: negative  Impingement: negative  Drop arm: negative    Other   Erythema: absent  Sensation: normal  Pulse: present               Physical Exam   Constitutional: He is oriented to person, place, and time  He appears well-developed and well-nourished  No distress  Eyes: Pupils are equal, round, and reactive to light  Conjunctivae are normal    Neck: Normal range of motion  Neck supple  Cardiovascular: Normal rate, regular rhythm and intact distal pulses  Pulmonary/Chest: Effort normal and breath sounds normal    Abdominal: Soft  Bowel sounds are normal    Neurological: He is alert and oriented to person, place, and time  He has normal reflexes  Skin: Skin is warm and dry  No rash noted  No erythema  Psychiatric: He has a normal mood and affect   His behavior is normal      Large joint arthrocentesis: L glenohumeral  Date/Time: 11/18/2019 2:44 PM  Consent given by: patient  Site marked: site marked  Timeout: Immediately prior to procedure a time out was called to verify the correct patient, procedure, equipment, support staff and site/side marked as required   Supporting Documentation  Indications: pain and diagnostic evaluation   Procedure Details  Location: shoulder - L glenohumeral  Preparation: Patient was prepped and draped in the usual sterile fashion  Needle size: 22 G  Ultrasound guidance: no  Approach: posterior  Medications administered: 2 mL bupivacaine 0 25 %; 6 mg betamethasone acetate-betamethasone sodium phosphate 6 (3-3) mg/mL    Patient tolerance: patient tolerated the procedure well with no immediate complications  Dressing:  Sterile dressing applied          I have personally reviewed pertinent films in PACS and my interpretation is as follows  X Ray Left Shoulder: Mild osteoarthritis of the glenohumeral joint  No other acute osseous abnormality      Scribe Attestation    I,:   Anjel Swain am acting as a scribe while in the presence of the attending physician :        I,:   Archana Delong MD personally performed the services described in this documentation    as scribed in my presence :

## 2019-11-21 ENCOUNTER — EVALUATION (OUTPATIENT)
Dept: PHYSICAL THERAPY | Facility: MEDICAL CENTER | Age: 69
End: 2019-11-21
Payer: COMMERCIAL

## 2019-11-21 DIAGNOSIS — M19.012 PRIMARY OSTEOARTHRITIS, LEFT SHOULDER: ICD-10-CM

## 2019-11-21 PROCEDURE — G8990 OTHER PT/OT CURRENT STATUS: HCPCS | Performed by: PHYSICAL THERAPIST

## 2019-11-21 PROCEDURE — 97110 THERAPEUTIC EXERCISES: CPT | Performed by: PHYSICAL THERAPIST

## 2019-11-21 PROCEDURE — G8991 OTHER PT/OT GOAL STATUS: HCPCS | Performed by: PHYSICAL THERAPIST

## 2019-11-21 PROCEDURE — 97161 PT EVAL LOW COMPLEX 20 MIN: CPT | Performed by: PHYSICAL THERAPIST

## 2019-11-21 NOTE — PROGRESS NOTES
PT Evaluation     Today's date: 2019  Patient name: Zayra Aguilar  : 1950  MRN: [id number]  Referring provider: Dimas Raygoza MD  Dx:   Encounter Diagnosis     ICD-10-CM    1  Primary osteoarthritis, left shoulder M19 012 Ambulatory referral to Physical Therapy                  Assessment  Assessment details: Zayra Aguilar is a 81 y o  male who presents with Primary osteoarthritis, left shoulder  Patient presents alert and oriented with the above impairments  Kip Karen will benefit from PT to addres deficits in order to maximize and return to prior level of function  No further referral appears necessary at this time based upon examination results  Prognosis is good given HEP compliance  Please contact me if you have any questions or recommendations  Impairments: abnormal or restricted ROM, abnormal movement, activity intolerance, impaired physical strength, lacks appropriate home exercise program and pain with function  Understanding of Dx/Px/POC: good   Prognosis: good    Goals  Short Term Goals:   1  Pain decreased 2 ratings in 4 weeks  2  ROM increased 10* in 4 weeks  3  Strength increased 1/2 grade in 4 weeks    Long Term Goals:   1  ADLS/IADLS in related activities improved to maximal level in 8 weeks  2  Reaching is improved to maximal level in 8 weeks  3   [place] with HEP in 8 weeks      Plan  Patient would benefit from: skilled physical therapy  Planned modality interventions: cryotherapy  Planned therapy interventions: strengthening, patient education, neuromuscular re-education, manual therapy, home exercise program, therapeutic exercise, stretching, flexibility and functional ROM exercises  Frequency: 1x week  Duration in weeks: 8  Treatment plan discussed with: patient        Subjective Evaluation    History of Present Illness  Mechanism of injury: Pt reports first feeling L shoulder pain a few years ago when moving quickly to grab hat off of his head that was blowing off   Pain subsided after about 1-2 weeks  About 6 months late he developed increased pain after paddling canoeing  The past few months shoulder pain again has increased in severity  Pain is intermittent in nature and most noted w/ reaching to don seat belt, reaching forward , reaching overhead  He was seen by PCP and then referred to Dr Shannon Gtz  Xrays revealed mild OA  He was given injection earlier this week which has helped to reduce his pain  He was also provided w/ referral to PT  He has remained independent w/ daily activity, but does remain cautious w/ certain movements  Initially he was experiencing sleep disturbance, but that since has subsided  He is retired     Pain  At best pain ratin  At worst pain ratin    Patient Goals  Patient goals for therapy: decreased pain, increased motion, increased strength and independence with ADLs/IADLs          Objective     Palpation     Additional Palpation Details  No tenderness to palpation    Active Range of Motion   Left Shoulder   Flexion: 150 degrees   Abduction: 165 degrees     Right Shoulder   Flexion: 165 degrees   Abduction: 175 degrees     Passive Range of Motion   Left Shoulder   Flexion: 155 degrees   Abduction: 170 degrees   External rotation 45°: 75 degrees   Internal rotation 45°: 50 degrees     Right Shoulder   Flexion: 170 degrees   Abduction: 180 degrees   External rotation 45°: 80 degrees   Internal rotation 45°: 60 degrees     Strength/Myotome Testing     Left Shoulder     Planes of Motion   Flexion: 5   Abduction: 5   External rotation at 90°: 4+   Internal rotation at 90°: 4+     Right Shoulder     Planes of Motion   Flexion: 5   Abduction: 5   External rotation at 90°: 5   Internal rotation at 90°: 5       Flowsheet Rows      Most Recent Value   PT/OT G-Codes   Current Score  68   Projected Score  75   FOTO information reviewed  Yes   Assessment Type  Evaluation   G code set  Other PT/OT Primary   Other PT Primary Current Status ()  CJ   Other PT Primary Goal Status ()  CJ             Precautions: cardiac stent, hypertension      Manual                                                                                   Exercise Diary  11/21            pulleys nv            Doorway pec stretch 30"x3            Wall slides 10" x10            TB rows GTB 5"x20            Tb extension nv            Towel IR stretch nv            Doorway ER stretch nv            B shoulder ER nv                                                                                                                                                                            Modalities

## 2019-11-26 ENCOUNTER — OFFICE VISIT (OUTPATIENT)
Dept: PHYSICAL THERAPY | Facility: MEDICAL CENTER | Age: 69
End: 2019-11-26
Payer: COMMERCIAL

## 2019-11-26 DIAGNOSIS — M19.012 PRIMARY OSTEOARTHRITIS, LEFT SHOULDER: Primary | ICD-10-CM

## 2019-11-26 PROCEDURE — 97110 THERAPEUTIC EXERCISES: CPT | Performed by: PHYSICAL THERAPIST

## 2019-11-26 PROCEDURE — 97112 NEUROMUSCULAR REEDUCATION: CPT | Performed by: PHYSICAL THERAPIST

## 2019-12-03 ENCOUNTER — OFFICE VISIT (OUTPATIENT)
Dept: PHYSICAL THERAPY | Facility: MEDICAL CENTER | Age: 69
End: 2019-12-03
Payer: COMMERCIAL

## 2019-12-03 DIAGNOSIS — M19.012 PRIMARY OSTEOARTHRITIS, LEFT SHOULDER: Primary | ICD-10-CM

## 2019-12-03 PROCEDURE — 97110 THERAPEUTIC EXERCISES: CPT | Performed by: PHYSICAL THERAPIST

## 2019-12-03 PROCEDURE — 97112 NEUROMUSCULAR REEDUCATION: CPT | Performed by: PHYSICAL THERAPIST

## 2019-12-03 NOTE — PROGRESS NOTES
Daily Note     Today's date: 12/3/2019  Patient name: John Carr  : 1950  MRN: [id number]  Referring provider: Kristin Leventhal, MD  Dx:   Encounter Diagnosis     ICD-10-CM    1  Primary osteoarthritis, left shoulder M19 012                   Subjective:  Pt feels that injection is beginning to wear off  Some pain noted w/ lifting w/ arm extended  Noticed pain while getting out boxes of [place] decorations over the weekend  Pain is not severe, but describes sensation as "not normal"      Objective: See treatment diary below  Precautions: cardiac stent, hypertension      Manual                                                                                   Exercise Diary  11/21 11/26 12/3          pulleys nv 5 min 5 min          Doorway pec stretch 30"x3 30"x3 30"x3          Wall slides 10" x10 10" x10 10" x10          TB rows GTB 5"x20 GTB 5"x20 GTB 5"x20          Tb extension nv GTB 5"x20 GTB 5"x20          Towel IR stretch nv 10"x 10 10" x10          Doorway ER stretch nv 30"x3 30"x3          TB ER nv RTB x20 RTB x20          TB IR  RTB x20 RTB x20                                                                                                                                                             Modalities                                                                Assessment: Tolerated treatment well  Patient demonstrated fatigue post treatment, exhibited good technique with therapeutic exercises and would benefit from continued PT Feels "good" post treatment  Plan: Continue per plan of care

## 2019-12-10 ENCOUNTER — OFFICE VISIT (OUTPATIENT)
Dept: PHYSICAL THERAPY | Facility: MEDICAL CENTER | Age: 69
End: 2019-12-10
Payer: COMMERCIAL

## 2019-12-10 DIAGNOSIS — M19.012 PRIMARY OSTEOARTHRITIS, LEFT SHOULDER: Primary | ICD-10-CM

## 2019-12-10 PROCEDURE — 97110 THERAPEUTIC EXERCISES: CPT | Performed by: PHYSICAL THERAPIST

## 2019-12-10 PROCEDURE — 97112 NEUROMUSCULAR REEDUCATION: CPT | Performed by: PHYSICAL THERAPIST

## 2019-12-10 NOTE — PROGRESS NOTES
Daily Note     Today's date: 12/10/2019  Patient name: Anna Rivas  : 1950  MRN: [id number]  Referring provider: Kamini Middleton MD  Dx:   Encounter Diagnosis     ICD-10-CM    1  Primary osteoarthritis, left shoulder M19 012                   Subjective:  Pt reports feeling about the same as last week  Objective: See treatment diary below  Precautions: cardiac stent, hypertension      Manual                                                                                   Exercise Diary  11/21 11/26 12/3 12/10         pulleys nv 5 min 5 min 5 min         Doorway pec stretch 30"x3 30"x3 30"x3 30"x3         Wall slides 10" x10 10" x10 10" x10 10" x10         TB rows GTB 5"x20 GTB 5"x20 GTB 5"x20 GTB 5"x20         Tb extension nv GTB 5"x20 GTB 5"x20 GTB 5"x20         Towel IR stretch nv 10"x 10 10" x10 10" x10         Doorway ER stretch nv 30"x3 30"x3 30"x3         TB ER nv RTB x20 RTB x20 RTB x20         TB IR  RTB x20 RTB x20 RTB x20         Supine active flex    x20         sidelying active abd    x20         sidelying ER    x20                                                                                                                     Modalities                                                                Assessment: Tolerated treatment well  Patient demonstrated fatigue post treatment, exhibited good technique with therapeutic exercises and would benefit from continued PT Progressed exercise as charted w/ good tolerance  Plan: Continue per plan of care

## 2019-12-17 ENCOUNTER — OFFICE VISIT (OUTPATIENT)
Dept: PHYSICAL THERAPY | Facility: MEDICAL CENTER | Age: 69
End: 2019-12-17
Payer: COMMERCIAL

## 2019-12-17 DIAGNOSIS — M19.012 PRIMARY OSTEOARTHRITIS, LEFT SHOULDER: Primary | ICD-10-CM

## 2019-12-17 PROCEDURE — 97112 NEUROMUSCULAR REEDUCATION: CPT | Performed by: PHYSICAL THERAPIST

## 2019-12-17 PROCEDURE — 97110 THERAPEUTIC EXERCISES: CPT | Performed by: PHYSICAL THERAPIST

## 2019-12-17 NOTE — PROGRESS NOTES
Daily Note     Today's date: 2019  Patient name: Thania Murguia  : 1950  MRN: [id number]  Referring provider: Yann Bhardwaj MD  Dx:   Encounter Diagnosis     ICD-10-CM    1  Primary osteoarthritis, left shoulder M19 012                   Subjective:  Pt reports muscle soreness following progressions last visit  Objective: See treatment diary below  Precautions: cardiac stent, hypertension      Manual                                                                                   Exercise Diary  11/21 11/26 12/3 12/10 12/17        pulleys nv 5 min 5 min 5 min 5 min        Doorway pec stretch 30"x3 30"x3 30"x3 30"x3 30"x3        Wall slides 10" x10 10" x10 10" x10 10" x10 10" x10        TB rows GTB 5"x20 GTB 5"x20 GTB 5"x20 GTB 5"x20 GTB 5"x20        Tb extension nv GTB 5"x20 GTB 5"x20 GTB 5"x20 GTB 5"x20        Towel IR stretch nv 10"x 10 10" x10 10" x10 np        Doorway ER stretch nv 30"x3 30"x3 30"x3 30"x3        TB ER nv RTB x20 RTB x20 RTB x20 RTB x20        TB IR  RTB x20 RTB x20 RTB x20 RTB x20        Supine active flex    x20 x20        sidelying active abd    x20 x20        sidelying ER    x20 x20        Serratus punches     x20                                                                                                       Modalities                                                                Assessment: Tolerated treatment well  Patient demonstrated fatigue post treatment, exhibited good technique with therapeutic exercises and would benefit from continued PT Added serratus punches today w/ good tolerance  No complaints of pain throughout treatment today  Plan: Continue per plan of care

## 2019-12-23 ENCOUNTER — OFFICE VISIT (OUTPATIENT)
Dept: PHYSICAL THERAPY | Facility: MEDICAL CENTER | Age: 69
End: 2019-12-23
Payer: COMMERCIAL

## 2019-12-23 DIAGNOSIS — M19.012 PRIMARY OSTEOARTHRITIS, LEFT SHOULDER: Primary | ICD-10-CM

## 2019-12-23 PROCEDURE — 97110 THERAPEUTIC EXERCISES: CPT | Performed by: PHYSICAL THERAPIST

## 2019-12-23 PROCEDURE — 97112 NEUROMUSCULAR REEDUCATION: CPT | Performed by: PHYSICAL THERAPIST

## 2019-12-31 ENCOUNTER — EVALUATION (OUTPATIENT)
Dept: PHYSICAL THERAPY | Facility: MEDICAL CENTER | Age: 69
End: 2019-12-31
Payer: COMMERCIAL

## 2019-12-31 DIAGNOSIS — M19.012 PRIMARY OSTEOARTHRITIS, LEFT SHOULDER: Primary | ICD-10-CM

## 2019-12-31 PROCEDURE — 97110 THERAPEUTIC EXERCISES: CPT | Performed by: PHYSICAL THERAPIST

## 2019-12-31 PROCEDURE — G8992 OTHER PT/OT  D/C STATUS: HCPCS | Performed by: PHYSICAL THERAPIST

## 2019-12-31 PROCEDURE — 97112 NEUROMUSCULAR REEDUCATION: CPT | Performed by: PHYSICAL THERAPIST

## 2019-12-31 PROCEDURE — G8991 OTHER PT/OT GOAL STATUS: HCPCS | Performed by: PHYSICAL THERAPIST

## 2019-12-31 NOTE — PROGRESS NOTES
PT Re-Evaluation  and PT Discharge    Today's date: 2019  Patient name: Joaquín Montague  : 1950  MRN: [id number]  Referring provider: Gayla Wolf MD  Dx:   Encounter Diagnosis     ICD-10-CM    1  Primary osteoarthritis, left shoulder M19 012                   Assessment  Assessment details: Joaquín Montague has attended  7 visits since initiating PT and has demonstrated overall improvement  Mobility and strength has improved with decreased reports of pain  Joaquín Montague has demonstrated an improvement in function as well  Currently, he has made steady progress towards his goals, an no longer reports limitations w/ daily activity  D/C today w/ updated HEP  Understanding of Dx/Px/POC: good   Prognosis: good    Goals  Short Term Goals:   1  Pain decreased 2 ratings in 4 weeks MET  2   ROM increased 10* in 4 weeks MET  3  Strength increased 1/2 grade in 4 weeks MET    Long Term Goals:   1  ADLS/IADLS in related activities improved to maximal level in 8 weeks MET  2  Reaching is improved to maximal level in 8 weeks  MET  3  [place] with HEP in 8 weeks  MET    Plan  Patient would benefit from: skilled physical therapy  Planned modality interventions: cryotherapy  Planned therapy interventions: strengthening, patient education, neuromuscular re-education, manual therapy, home exercise program, therapeutic exercise, stretching, flexibility and functional ROM exercises  Frequency: 1x week  Duration in weeks: 1  Treatment plan discussed with: patient        Subjective Evaluation    History of Present Illness  Mechanism of injury: Pt reports improvement since beginning PT  Reaching in all planes has improved  Some pain persists w/ reaching and pulling  He is independent w/ all daily activity avoiding heavy lifting       Pain  At best pain ratin  At worst pain rating: 3    Patient Goals  Patient goals for therapy: decreased pain, increased motion, increased strength and independence with ADLs/IADLs          Objective     Active Range of Motion   Left Shoulder   Flexion: 165 degrees   Abduction: 170 degrees     Passive Range of Motion   Left Shoulder   Flexion: 170 degrees   Abduction: 75 degrees   External rotation 45°: 85 degrees   Internal rotation 45°: 65 degrees     Strength/Myotome Testing     Left Shoulder     Planes of Motion   Flexion: 5   Abduction: 5   External rotation at 90°: 5   Internal rotation at 90°: 5       Flowsheet Rows      Most Recent Value   PT/OT G-Codes   Current Score  75   Projected Score  75             Precautions: cardiac stent, hypertension      Manual                                                                                   Exercise Diary  12/31            pulleys 5 MIN            Doorway pec stretch 30"x3            Wall slides 10" x10            TB rows GTB 5"x20            Tb extension GTB 5"x20            TB IR/ER RTB x20            Doorway ER stretch 30" x3            Supine active flex x20            sidelying active abd x20            sidelying ER x20            Serratus punches x20                                                                                                                                     Modalities

## 2020-01-08 DIAGNOSIS — I10 ESSENTIAL HYPERTENSION: ICD-10-CM

## 2020-01-08 RX ORDER — LISINOPRIL 10 MG/1
10 TABLET ORAL DAILY
Qty: 90 TABLET | Refills: 1 | Status: SHIPPED | OUTPATIENT
Start: 2020-01-08 | End: 2020-08-19 | Stop reason: SDUPTHER

## 2020-03-17 ENCOUNTER — APPOINTMENT (OUTPATIENT)
Dept: LAB | Facility: CLINIC | Age: 70
End: 2020-03-17
Payer: COMMERCIAL

## 2020-03-17 DIAGNOSIS — Z12.5 SCREENING FOR PROSTATE CANCER: ICD-10-CM

## 2020-03-17 DIAGNOSIS — I10 BENIGN ESSENTIAL HYPERTENSION: ICD-10-CM

## 2020-03-17 DIAGNOSIS — I25.10 ATHEROSCLEROSIS OF NATIVE CORONARY ARTERY OF NATIVE HEART WITHOUT ANGINA PECTORIS: ICD-10-CM

## 2020-03-17 LAB
ALBUMIN SERPL BCP-MCNC: 4.1 G/DL (ref 3.5–5)
ALP SERPL-CCNC: 76 U/L (ref 46–116)
ALT SERPL W P-5'-P-CCNC: 27 U/L (ref 12–78)
ANION GAP SERPL CALCULATED.3IONS-SCNC: 9 MMOL/L (ref 4–13)
AST SERPL W P-5'-P-CCNC: 20 U/L (ref 5–45)
BASOPHILS # BLD AUTO: 0.07 THOUSANDS/ΜL (ref 0–0.1)
BASOPHILS NFR BLD AUTO: 1 % (ref 0–1)
BILIRUB SERPL-MCNC: 0.64 MG/DL (ref 0.2–1)
BUN SERPL-MCNC: 19 MG/DL (ref 5–25)
CALCIUM SERPL-MCNC: 9.2 MG/DL (ref 8.3–10.1)
CHLORIDE SERPL-SCNC: 109 MMOL/L (ref 100–108)
CHOLEST SERPL-MCNC: 131 MG/DL (ref 50–200)
CO2 SERPL-SCNC: 23 MMOL/L (ref 21–32)
CREAT SERPL-MCNC: 0.88 MG/DL (ref 0.6–1.3)
EOSINOPHIL # BLD AUTO: 0.27 THOUSAND/ΜL (ref 0–0.61)
EOSINOPHIL NFR BLD AUTO: 5 % (ref 0–6)
ERYTHROCYTE [DISTWIDTH] IN BLOOD BY AUTOMATED COUNT: 13.2 % (ref 11.6–15.1)
GFR SERPL CREATININE-BSD FRML MDRD: 89 ML/MIN/1.73SQ M
GLUCOSE SERPL-MCNC: 92 MG/DL (ref 65–140)
HCT VFR BLD AUTO: 42.3 % (ref 36.5–49.3)
HDLC SERPL-MCNC: 53 MG/DL
HGB BLD-MCNC: 14.2 G/DL (ref 12–17)
IMM GRANULOCYTES # BLD AUTO: 0.02 THOUSAND/UL (ref 0–0.2)
IMM GRANULOCYTES NFR BLD AUTO: 0 % (ref 0–2)
LDLC SERPL CALC-MCNC: 62 MG/DL (ref 0–100)
LYMPHOCYTES # BLD AUTO: 1.73 THOUSANDS/ΜL (ref 0.6–4.47)
LYMPHOCYTES NFR BLD AUTO: 30 % (ref 14–44)
MCH RBC QN AUTO: 31.4 PG (ref 26.8–34.3)
MCHC RBC AUTO-ENTMCNC: 33.6 G/DL (ref 31.4–37.4)
MCV RBC AUTO: 94 FL (ref 82–98)
MONOCYTES # BLD AUTO: 0.59 THOUSAND/ΜL (ref 0.17–1.22)
MONOCYTES NFR BLD AUTO: 10 % (ref 4–12)
NEUTROPHILS # BLD AUTO: 3.03 THOUSANDS/ΜL (ref 1.85–7.62)
NEUTS SEG NFR BLD AUTO: 54 % (ref 43–75)
NONHDLC SERPL-MCNC: 78 MG/DL
NRBC BLD AUTO-RTO: 0 /100 WBCS
PLATELET # BLD AUTO: 207 THOUSANDS/UL (ref 149–390)
PMV BLD AUTO: 9.5 FL (ref 8.9–12.7)
POTASSIUM SERPL-SCNC: 4.4 MMOL/L (ref 3.5–5.3)
PROT SERPL-MCNC: 7.4 G/DL (ref 6.4–8.2)
PSA SERPL-MCNC: 0.4 NG/ML (ref 0–4)
RBC # BLD AUTO: 4.52 MILLION/UL (ref 3.88–5.62)
SODIUM SERPL-SCNC: 141 MMOL/L (ref 136–145)
TRIGL SERPL-MCNC: 82 MG/DL
WBC # BLD AUTO: 5.71 THOUSAND/UL (ref 4.31–10.16)

## 2020-03-17 PROCEDURE — 36415 COLL VENOUS BLD VENIPUNCTURE: CPT

## 2020-03-17 PROCEDURE — 80053 COMPREHEN METABOLIC PANEL: CPT

## 2020-03-17 PROCEDURE — G0103 PSA SCREENING: HCPCS

## 2020-03-17 PROCEDURE — 80061 LIPID PANEL: CPT

## 2020-03-17 PROCEDURE — 85025 COMPLETE CBC W/AUTO DIFF WBC: CPT

## 2020-05-12 ENCOUNTER — OFFICE VISIT (OUTPATIENT)
Dept: FAMILY MEDICINE CLINIC | Facility: CLINIC | Age: 70
End: 2020-05-12
Payer: COMMERCIAL

## 2020-05-12 VITALS
TEMPERATURE: 98.2 F | BODY MASS INDEX: 29.33 KG/M2 | WEIGHT: 198 LBS | DIASTOLIC BLOOD PRESSURE: 82 MMHG | HEART RATE: 74 BPM | SYSTOLIC BLOOD PRESSURE: 126 MMHG | HEIGHT: 69 IN

## 2020-05-12 DIAGNOSIS — I25.10 ATHEROSCLEROSIS OF NATIVE CORONARY ARTERY OF NATIVE HEART WITHOUT ANGINA PECTORIS: ICD-10-CM

## 2020-05-12 DIAGNOSIS — E78.9 LIPID DISORDER: ICD-10-CM

## 2020-05-12 DIAGNOSIS — I10 BENIGN ESSENTIAL HYPERTENSION: Primary | ICD-10-CM

## 2020-05-12 PROCEDURE — 3074F SYST BP LT 130 MM HG: CPT | Performed by: FAMILY MEDICINE

## 2020-05-12 PROCEDURE — 3079F DIAST BP 80-89 MM HG: CPT | Performed by: FAMILY MEDICINE

## 2020-05-12 PROCEDURE — 3008F BODY MASS INDEX DOCD: CPT | Performed by: FAMILY MEDICINE

## 2020-05-12 PROCEDURE — 4040F PNEUMOC VAC/ADMIN/RCVD: CPT | Performed by: FAMILY MEDICINE

## 2020-05-12 PROCEDURE — 99214 OFFICE O/P EST MOD 30 MIN: CPT | Performed by: FAMILY MEDICINE

## 2020-05-12 PROCEDURE — 1160F RVW MEDS BY RX/DR IN RCRD: CPT | Performed by: FAMILY MEDICINE

## 2020-05-12 PROCEDURE — 1036F TOBACCO NON-USER: CPT | Performed by: FAMILY MEDICINE

## 2020-05-29 DIAGNOSIS — K21.9 CHRONIC GERD: ICD-10-CM

## 2020-05-29 RX ORDER — FAMOTIDINE 40 MG/1
40 TABLET, FILM COATED ORAL
Qty: 90 TABLET | Refills: 1 | Status: SHIPPED | OUTPATIENT
Start: 2020-05-29

## 2020-08-19 DIAGNOSIS — I10 ESSENTIAL HYPERTENSION: ICD-10-CM

## 2020-08-19 RX ORDER — LISINOPRIL 10 MG/1
10 TABLET ORAL DAILY
Qty: 90 TABLET | Refills: 1 | Status: SHIPPED | OUTPATIENT
Start: 2020-08-19 | End: 2021-04-15 | Stop reason: SDUPTHER

## 2020-10-09 ENCOUNTER — APPOINTMENT (OUTPATIENT)
Dept: LAB | Facility: CLINIC | Age: 70
End: 2020-10-09
Payer: COMMERCIAL

## 2020-10-09 DIAGNOSIS — I10 BENIGN ESSENTIAL HYPERTENSION: ICD-10-CM

## 2020-10-09 LAB
ALBUMIN SERPL BCP-MCNC: 4.2 G/DL (ref 3.5–5)
ALP SERPL-CCNC: 86 U/L (ref 46–116)
ALT SERPL W P-5'-P-CCNC: 30 U/L (ref 12–78)
ANION GAP SERPL CALCULATED.3IONS-SCNC: 3 MMOL/L (ref 4–13)
AST SERPL W P-5'-P-CCNC: 23 U/L (ref 5–45)
BASOPHILS # BLD AUTO: 0.07 THOUSANDS/ΜL (ref 0–0.1)
BASOPHILS NFR BLD AUTO: 1 % (ref 0–1)
BILIRUB SERPL-MCNC: 0.66 MG/DL (ref 0.2–1)
BUN SERPL-MCNC: 16 MG/DL (ref 5–25)
CALCIUM SERPL-MCNC: 8.7 MG/DL (ref 8.3–10.1)
CHLORIDE SERPL-SCNC: 108 MMOL/L (ref 100–108)
CHOLEST SERPL-MCNC: 122 MG/DL (ref 50–200)
CO2 SERPL-SCNC: 27 MMOL/L (ref 21–32)
CREAT SERPL-MCNC: 0.9 MG/DL (ref 0.6–1.3)
EOSINOPHIL # BLD AUTO: 0.3 THOUSAND/ΜL (ref 0–0.61)
EOSINOPHIL NFR BLD AUTO: 5 % (ref 0–6)
ERYTHROCYTE [DISTWIDTH] IN BLOOD BY AUTOMATED COUNT: 13.2 % (ref 11.6–15.1)
GFR SERPL CREATININE-BSD FRML MDRD: 87 ML/MIN/1.73SQ M
GLUCOSE P FAST SERPL-MCNC: 98 MG/DL (ref 65–99)
HCT VFR BLD AUTO: 44.5 % (ref 36.5–49.3)
HDLC SERPL-MCNC: 55 MG/DL
HGB BLD-MCNC: 14.6 G/DL (ref 12–17)
IMM GRANULOCYTES # BLD AUTO: 0.01 THOUSAND/UL (ref 0–0.2)
IMM GRANULOCYTES NFR BLD AUTO: 0 % (ref 0–2)
LDLC SERPL CALC-MCNC: 47 MG/DL (ref 0–100)
LYMPHOCYTES # BLD AUTO: 1.61 THOUSANDS/ΜL (ref 0.6–4.47)
LYMPHOCYTES NFR BLD AUTO: 26 % (ref 14–44)
MCH RBC QN AUTO: 31.1 PG (ref 26.8–34.3)
MCHC RBC AUTO-ENTMCNC: 32.8 G/DL (ref 31.4–37.4)
MCV RBC AUTO: 95 FL (ref 82–98)
MONOCYTES # BLD AUTO: 0.54 THOUSAND/ΜL (ref 0.17–1.22)
MONOCYTES NFR BLD AUTO: 9 % (ref 4–12)
NEUTROPHILS # BLD AUTO: 3.76 THOUSANDS/ΜL (ref 1.85–7.62)
NEUTS SEG NFR BLD AUTO: 59 % (ref 43–75)
NONHDLC SERPL-MCNC: 67 MG/DL
NRBC BLD AUTO-RTO: 0 /100 WBCS
PLATELET # BLD AUTO: 200 THOUSANDS/UL (ref 149–390)
PMV BLD AUTO: 10.7 FL (ref 8.9–12.7)
POTASSIUM SERPL-SCNC: 4.5 MMOL/L (ref 3.5–5.3)
PROT SERPL-MCNC: 7.6 G/DL (ref 6.4–8.2)
RBC # BLD AUTO: 4.69 MILLION/UL (ref 3.88–5.62)
SODIUM SERPL-SCNC: 138 MMOL/L (ref 136–145)
TRIGL SERPL-MCNC: 98 MG/DL
WBC # BLD AUTO: 6.29 THOUSAND/UL (ref 4.31–10.16)

## 2020-10-09 PROCEDURE — 36415 COLL VENOUS BLD VENIPUNCTURE: CPT

## 2020-10-09 PROCEDURE — 85025 COMPLETE CBC W/AUTO DIFF WBC: CPT

## 2020-10-09 PROCEDURE — 80053 COMPREHEN METABOLIC PANEL: CPT

## 2020-10-09 PROCEDURE — 80061 LIPID PANEL: CPT

## 2020-10-22 ENCOUNTER — IMMUNIZATIONS (OUTPATIENT)
Dept: FAMILY MEDICINE CLINIC | Facility: CLINIC | Age: 70
End: 2020-10-22
Payer: COMMERCIAL

## 2020-10-22 DIAGNOSIS — Z23 ENCOUNTER FOR IMMUNIZATION: ICD-10-CM

## 2020-10-22 PROCEDURE — G0008 ADMIN INFLUENZA VIRUS VAC: HCPCS | Performed by: FAMILY MEDICINE

## 2020-10-22 PROCEDURE — 90662 IIV NO PRSV INCREASED AG IM: CPT | Performed by: FAMILY MEDICINE

## 2020-11-30 ENCOUNTER — OFFICE VISIT (OUTPATIENT)
Dept: FAMILY MEDICINE CLINIC | Facility: CLINIC | Age: 70
End: 2020-11-30
Payer: COMMERCIAL

## 2020-11-30 VITALS
SYSTOLIC BLOOD PRESSURE: 120 MMHG | HEART RATE: 76 BPM | DIASTOLIC BLOOD PRESSURE: 82 MMHG | BODY MASS INDEX: 28.73 KG/M2 | WEIGHT: 194 LBS | HEIGHT: 69 IN | TEMPERATURE: 98.1 F

## 2020-11-30 DIAGNOSIS — I25.10 ATHEROSCLEROSIS OF NATIVE CORONARY ARTERY OF NATIVE HEART WITHOUT ANGINA PECTORIS: ICD-10-CM

## 2020-11-30 DIAGNOSIS — Z86.010 HX OF ADENOMATOUS COLONIC POLYPS: ICD-10-CM

## 2020-11-30 DIAGNOSIS — E78.9 LIPID DISORDER: ICD-10-CM

## 2020-11-30 DIAGNOSIS — Z12.11 SCREEN FOR COLON CANCER: ICD-10-CM

## 2020-11-30 DIAGNOSIS — Z12.5 SCREENING FOR PROSTATE CANCER: ICD-10-CM

## 2020-11-30 DIAGNOSIS — I10 BENIGN ESSENTIAL HYPERTENSION: ICD-10-CM

## 2020-11-30 DIAGNOSIS — Z00.00 MEDICARE ANNUAL WELLNESS VISIT, SUBSEQUENT: Primary | ICD-10-CM

## 2020-11-30 DIAGNOSIS — K21.9 CHRONIC GERD: ICD-10-CM

## 2020-11-30 PROBLEM — Z86.0101 HX OF ADENOMATOUS COLONIC POLYPS: Status: ACTIVE | Noted: 2020-11-30

## 2020-11-30 PROCEDURE — T1015 CLINIC SERVICE: HCPCS | Performed by: FAMILY MEDICINE

## 2020-11-30 PROCEDURE — 99214 OFFICE O/P EST MOD 30 MIN: CPT | Performed by: FAMILY MEDICINE

## 2020-11-30 PROCEDURE — G0439 PPPS, SUBSEQ VISIT: HCPCS | Performed by: FAMILY MEDICINE

## 2020-12-16 ENCOUNTER — TELEPHONE (OUTPATIENT)
Dept: GASTROENTEROLOGY | Facility: CLINIC | Age: 70
End: 2020-12-16

## 2021-03-10 DIAGNOSIS — Z23 ENCOUNTER FOR IMMUNIZATION: ICD-10-CM

## 2021-03-18 ENCOUNTER — IMMUNIZATIONS (OUTPATIENT)
Dept: FAMILY MEDICINE CLINIC | Facility: HOSPITAL | Age: 71
End: 2021-03-18

## 2021-03-18 DIAGNOSIS — Z23 ENCOUNTER FOR IMMUNIZATION: Primary | ICD-10-CM

## 2021-03-18 PROCEDURE — 0001A SARS-COV-2 / COVID-19 MRNA VACCINE (PFIZER-BIONTECH) 30 MCG: CPT

## 2021-03-18 PROCEDURE — 91300 SARS-COV-2 / COVID-19 MRNA VACCINE (PFIZER-BIONTECH) 30 MCG: CPT

## 2021-04-02 ENCOUNTER — OFFICE VISIT (OUTPATIENT)
Dept: GASTROENTEROLOGY | Facility: AMBULARY SURGERY CENTER | Age: 71
End: 2021-04-02
Payer: COMMERCIAL

## 2021-04-02 VITALS
DIASTOLIC BLOOD PRESSURE: 78 MMHG | BODY MASS INDEX: 29.18 KG/M2 | SYSTOLIC BLOOD PRESSURE: 142 MMHG | HEIGHT: 69 IN | WEIGHT: 197 LBS

## 2021-04-02 DIAGNOSIS — K21.9 CHRONIC GERD: Primary | ICD-10-CM

## 2021-04-02 DIAGNOSIS — Z12.11 SCREEN FOR COLON CANCER: ICD-10-CM

## 2021-04-02 DIAGNOSIS — Z86.010 HX OF ADENOMATOUS COLONIC POLYPS: ICD-10-CM

## 2021-04-02 PROCEDURE — 3078F DIAST BP <80 MM HG: CPT | Performed by: INTERNAL MEDICINE

## 2021-04-02 PROCEDURE — 3077F SYST BP >= 140 MM HG: CPT | Performed by: INTERNAL MEDICINE

## 2021-04-02 PROCEDURE — 1036F TOBACCO NON-USER: CPT | Performed by: INTERNAL MEDICINE

## 2021-04-02 PROCEDURE — 3008F BODY MASS INDEX DOCD: CPT | Performed by: INTERNAL MEDICINE

## 2021-04-02 PROCEDURE — 99204 OFFICE O/P NEW MOD 45 MIN: CPT | Performed by: INTERNAL MEDICINE

## 2021-04-02 PROCEDURE — 1160F RVW MEDS BY RX/DR IN RCRD: CPT | Performed by: INTERNAL MEDICINE

## 2021-04-02 RX ORDER — MELOXICAM 7.5 MG/1
7.5 TABLET ORAL DAILY
COMMUNITY

## 2021-04-02 RX ORDER — LORATADINE 10 MG/1
CAPSULE, LIQUID FILLED ORAL
COMMUNITY

## 2021-04-02 NOTE — LETTER
April 2, 2021     Tierney Charles MD  [address]  [address] [place] [address]    Patient: Isidra Pearson   YOB: 1950   Date of Visit: 4/2/2021       Dear Dr Andrae Olvera:    Thank you for referring Isidra Pearson to me for evaluation  Below are my notes for this consultation  If you have questions, please do not hesitate to call me  I look forward to following your patient along with you  Sincerely,        Melany Mccarthy MD        CC: No Recipients  Melany Mccarthy MD  4/2/2021  2:23 PM  Sign when Signing Visit  Consultation - [facility]  Isidra Pearson 78 y o  male MRN: [id number]  Unit/Bed#:  Encounter: [id number]        Consults    ASSESSMENT/PLAN:       1  History of tubular adenoma- would recommend repeat colonoscopy at this time for increased risk screening purposes  Patient was explained about  the risks and benefits of the procedure  Risks including but not limited to bleeding, infection, perforation were explained in detail  Also explained about less than 100% sensitivity with the exam and other alternatives  2  Chronic GERD with gastritis on prior endoscopy- while there was no sign of Santiago's esophagus, there was a acute on chronic inflammation within the distal esophageal biopsies, patient is currently on famotidine 40 mg at bedtime  And has occasional breakthrough symptoms for which she takes Tums  Denies any dysphagia, hematemesis, coffee-ground emesis or melena  Patient does have family history of esophageal /gastric cancer in his father  Given breakthrough symptoms despite famotidine and chronic inflammation on distal esophageal biopsies with endoscopic concern for irregular squamocolumnar junction, it may be reasonable to proceed with repeat EGD to assess for Santiago's esophagus  Meanwhile would recommend to take famotidine every night instead of as needed    Depending on the findings on the endoscopy, will determine whether patient needs to be restarted on PPI  PPI was previously taken off due to the long term side effect profile associated with its use     ______________________________________________________________________    Reason for Consult / Principal Problem: [unfilled]    HPI: Raine Sarmiento is a 78y o  year old male with history of GERD, presents for colon cancer screening evaluation  Patient underwent colonoscopy by me in August of 2017 at which time he was noted to have single colon  Adenomatous polyp, severe diverticulosis involving the left side of the colon and small internal hemorrhoids  He was recommended repeat colonoscopy at 3 year interval     He was also noted to have irregular squamocolumnar junction on EGD along with mild chronic gastritis within the antrum  Duodenal biopsies were negative for celiac disease, gastric biopsies were negative for H pylori but did show mild chronic inactive gastritis without any intestinal metaplasia  Distal esophageal biopsies did not show signs of Santiago's but did show chronic inflammation  Patient reports that he has been doing well  Denies any change in bowel habits, hematochezia, abdominal pain or unintentional weight loss  Patient does report occasional symptoms of acid reflux, reports that he takes Pepcid on most nights but occasionally forgets  He also reports that sometimes he wakes up in the middle of the night with acid reflux especially when having eaten late at night  He reports that he has to then take Tums which help with symptom relief  He has been off of PPI for several years due to the concern for side effects long-term  He denies any dysphagia, odynophagia, loss of appetite or early satiety  No nausea or vomiting  Of note, patient does have family history of esophageal /gastric cancer in his father  He also endorses occasionally taking Mobic      Labs are reviewed, most recently hemoglobin was normal, liver functions are normal, kidney function was normal     Review of Systems: The remainder of the review of systems was negative except for the pertinent positives noted in HPI  Historical Information   Past Medical History:   Diagnosis Date    Arthritis     Colon polyps     GERD (gastroesophageal reflux disease)     Hypertension      Past Surgical History:   Procedure Laterality Date    BACK SURGERY      laminectomy    CARDIAC SURGERY      cardiac stent    CARPAL TUNNEL RELEASE      COLONOSCOPY      benign polyp removal    ORCHIECTOMY Left     NE ESOPHAGOGASTRODUODENOSCOPY TRANSORAL DIAGNOSTIC N/A 8/29/2017    Procedure: EGD AND COLONOSCOPY;  Surgeon: Penelope Marin MD;  [facility]; Service: Gastroenterology    UPPER GASTROINTESTINAL ENDOSCOPY      WRIST SURGERY       Social History   Social History     Substance and Sexual Activity   Alcohol Use Yes    Comment: occs     Social History     Substance and Sexual Activity   Drug Use No     Social History     Tobacco Use   Smoking Status Never Smoker   Smokeless Tobacco Never Used     Family History   Problem Relation Age of Onset    Diabetes Mother     Hypertension Mother     Stomach cancer Father     Hypertension Father     Liver cancer Father        Meds/Allergies     (Not in a hospital admission)    No current facility-administered medications for this visit  Allergies   Allergen Reactions    Other Allergic Rhinitis     Seasonal       Objective     Blood pressure 142/78, height 5' 9" (1 753 m), weight 89 4 kg (197 lb)  [unfilled]    PHYSICAL EXAM     GEN: well nourished, well developed, no acute distress  HEENT: anicteric, MMM, no cervical or supraclavicular lymphadenopathy  CV: RRR, no m/r/g  CHEST: CTA b/l, no WRR  ABD: +BS, soft, NT/ND, no hepatosplenomegaly  EXT: no c/c/e  SKIN: no rashes,  NEURO: aaox3    Lab Results:   No visits with results within 1 Day(s) from this visit     Latest known visit with results is:   Appointment on 10/09/2020 Component Date Value    WBC 10/09/2020 6 29     RBC 10/09/2020 4 69     Hemoglobin 10/09/2020 14 6     Hematocrit 10/09/2020 44 5     MCV 10/09/2020 95     MCH 10/09/2020 31 1     MCHC 10/09/2020 32 8     RDW 10/09/2020 13 2     MPV 10/09/2020 10 7     Platelets 59/33/9629 200     nRBC 10/09/2020 0     Neutrophils Relative 10/09/2020 59     Immat GRANS % 10/09/2020 0     Lymphocytes Relative 10/09/2020 26     Monocytes Relative 10/09/2020 9     Eosinophils Relative 10/09/2020 5     Basophils Relative 10/09/2020 1     Neutrophils Absolute 10/09/2020 3 76     Immature Grans Absolute 10/09/2020 0 01     Lymphocytes Absolute 10/09/2020 1 61     Monocytes Absolute 10/09/2020 0 54     Eosinophils Absolute 10/09/2020 0 30     Basophils Absolute 10/09/2020 0 07     Sodium 10/09/2020 138     Potassium 10/09/2020 4 5     Chloride 10/09/2020 108     CO2 10/09/2020 27     ANION GAP 10/09/2020 3*    BUN 10/09/2020 16     Creatinine 10/09/2020 0 90     Glucose, Fasting 10/09/2020 98     Calcium 10/09/2020 8 7     AST 10/09/2020 23     ALT 10/09/2020 30     Alkaline Phosphatase 10/09/2020 86     Total Protein 10/09/2020 7 6     Albumin 10/09/2020 4 2     Total Bilirubin 10/09/2020 0 66     eGFR 10/09/2020 87     Cholesterol 10/09/2020 122     Triglycerides 10/09/2020 98     HDL, Direct 10/09/2020 55     LDL Calculated 10/09/2020 47     Non-HDL-Chol (CHOL-HDL) 10/09/2020 67      Imaging Studies: I have personally reviewed pertinent films in PACS                Answers for HPI/ROS submitted by the patient on 4/1/2021   Abdominal pain  Chronicity: recurrent  Onset: more than 1 year ago  Onset quality: undetermined  Frequency: intermittently  Episode duration: 1 hours  Progression since onset: gradually improving  Pain location: epigastric region  Pain - numeric: 3/10  Pain quality: a sensation of fullness  Radiates to: does not radiate  anorexia: Yes  belching: Yes  constipation: Yes  Aggravated by: certain positions, drinking alcohol, NSAIDs  Relieved by: certain positions

## 2021-04-02 NOTE — PROGRESS NOTES
Consultation - 126 [facility]  Tino Luciano 78 y o  male MRN: [id number]  Unit/Bed#:  Encounter: [id number]        Consults    ASSESSMENT/PLAN:       1  History of tubular adenoma- would recommend repeat colonoscopy at this time for increased risk screening purposes  Patient was explained about  the risks and benefits of the procedure  Risks including but not limited to bleeding, infection, perforation were explained in detail  Also explained about less than 100% sensitivity with the exam and other alternatives  2  Chronic GERD with gastritis on prior endoscopy- while there was no sign of Santiago's esophagus, there was a acute on chronic inflammation within the distal esophageal biopsies, patient is currently on famotidine 40 mg at bedtime  And has occasional breakthrough symptoms for which she takes Tums  Denies any dysphagia, hematemesis, coffee-ground emesis or melena  Patient does have family history of esophageal /gastric cancer in his father  Given breakthrough symptoms despite famotidine and chronic inflammation on distal esophageal biopsies with endoscopic concern for irregular squamocolumnar junction, it may be reasonable to proceed with repeat EGD to assess for Santiago's esophagus  Meanwhile would recommend to take famotidine every night instead of as needed  Depending on the findings on the endoscopy, will determine whether patient needs to be restarted on PPI  PPI was previously taken off due to the long term side effect profile associated with its use     ______________________________________________________________________    Reason for Consult / Principal Problem: [unfilled]    HPI: Tino Luciano is a 78y o  year old male with history of GERD, presents for colon cancer screening evaluation     Patient underwent colonoscopy by me in August of 2017 at which time he was noted to have single colon  Adenomatous polyp, severe diverticulosis involving the left side of the colon and small internal hemorrhoids  He was recommended repeat colonoscopy at 3 year interval     He was also noted to have irregular squamocolumnar junction on EGD along with mild chronic gastritis within the antrum  Duodenal biopsies were negative for celiac disease, gastric biopsies were negative for H pylori but did show mild chronic inactive gastritis without any intestinal metaplasia  Distal esophageal biopsies did not show signs of Santiago's but did show chronic inflammation  Patient reports that he has been doing well  Denies any change in bowel habits, hematochezia, abdominal pain or unintentional weight loss  Patient does report occasional symptoms of acid reflux, reports that he takes Pepcid on most nights but occasionally forgets  He also reports that sometimes he wakes up in the middle of the night with acid reflux especially when having eaten late at night  He reports that he has to then take Tums which help with symptom relief  He has been off of PPI for several years due to the concern for side effects long-term  He denies any dysphagia, odynophagia, loss of appetite or early satiety  No nausea or vomiting  Of note, patient does have family history of esophageal /gastric cancer in his father  He also endorses occasionally taking Mobic  Labs are reviewed, most recently hemoglobin was normal, liver functions are normal, kidney function was normal     Review of Systems: The remainder of the review of systems was negative except for the pertinent positives noted in HPI       Historical Information   Past Medical History:   Diagnosis Date    Arthritis     Colon polyps     GERD (gastroesophageal reflux disease)     Hypertension      Past Surgical History:   Procedure Laterality Date    BACK SURGERY      laminectomy    CARDIAC SURGERY      cardiac stent    CARPAL TUNNEL RELEASE      COLONOSCOPY      benign polyp removal    ORCHIECTOMY Left     [place] ESOPHAGOGASTRODUODENOSCOPY TRANSORAL DIAGNOSTIC N/A 8/29/2017    Procedure: EGD AND COLONOSCOPY;  Surgeon: Deanne Del Cid MD;  [facility]; Service: Gastroenterology    UPPER GASTROINTESTINAL ENDOSCOPY      WRIST SURGERY       Social History   Social History     Substance and Sexual Activity   Alcohol Use Yes    Comment: occs     Social History     Substance and Sexual Activity   Drug Use No     Social History     Tobacco Use   Smoking Status Never Smoker   Smokeless Tobacco Never Used     Family History   Problem Relation Age of Onset    Diabetes Mother     Hypertension Mother     Stomach cancer Father     Hypertension Father     Liver cancer Father        Meds/Allergies     (Not in a hospital admission)    No current facility-administered medications for this visit  Allergies   Allergen Reactions    Other Allergic Rhinitis     Seasonal       Objective     Blood pressure 142/78, height 5' 9" (1 753 m), weight 89 4 kg (197 lb)  [unfilled]    PHYSICAL EXAM     GEN: well nourished, well developed, no acute distress  HEENT: anicteric, MMM, no cervical or supraclavicular lymphadenopathy  CV: RRR, no m/r/g  CHEST: CTA b/l, no WRR  ABD: +BS, soft, NT/ND, no hepatosplenomegaly  EXT: no c/c/e  SKIN: no rashes,  NEURO: aaox3    Lab Results:   No visits with results within 1 Day(s) from this visit     Latest known visit with results is:   Appointment on 10/09/2020   Component Date Value    WBC 10/09/2020 6 29     RBC 10/09/2020 4 69     Hemoglobin 10/09/2020 14 6     Hematocrit 10/09/2020 44 5     MCV 10/09/2020 95     MCH 10/09/2020 31 1     MCHC 10/09/2020 32 8     RDW 10/09/2020 13 2     MPV 10/09/2020 10 7     Platelets 95/68/6832 200     nRBC 10/09/2020 0     Neutrophils Relative 10/09/2020 59     Immat GRANS % 10/09/2020 0     Lymphocytes Relative 10/09/2020 26     Monocytes Relative 10/09/2020 9     Eosinophils Relative 10/09/2020 5     Basophils Relative 10/09/2020 1     Neutrophils Absolute 10/09/2020 3 76     Immature Grans Absolute 10/09/2020 0 01     Lymphocytes Absolute 10/09/2020 1 61     Monocytes Absolute 10/09/2020 0 54     Eosinophils Absolute 10/09/2020 0 30     Basophils Absolute 10/09/2020 0 07     Sodium 10/09/2020 138     Potassium 10/09/2020 4 5     Chloride 10/09/2020 108     CO2 10/09/2020 27     ANION GAP 10/09/2020 3*    BUN 10/09/2020 16     Creatinine 10/09/2020 0 90     Glucose, Fasting 10/09/2020 98     Calcium 10/09/2020 8 7     AST 10/09/2020 23     ALT 10/09/2020 30     Alkaline Phosphatase 10/09/2020 86     Total Protein 10/09/2020 7 6     Albumin 10/09/2020 4 2     Total Bilirubin 10/09/2020 0 66     eGFR 10/09/2020 87     Cholesterol 10/09/2020 122     Triglycerides 10/09/2020 98     HDL, Direct 10/09/2020 55     LDL Calculated 10/09/2020 47     Non-HDL-Chol (CHOL-HDL) 10/09/2020 67      Imaging Studies: I have personally reviewed pertinent films in PACS                Answers for HPI/ROS submitted by the patient on 4/1/2021   Abdominal pain  Chronicity: recurrent  Onset: more than 1 year ago  Onset quality: undetermined  Frequency: intermittently  Episode duration: 1 hours  Progression since onset: gradually improving  Pain location: epigastric region  Pain - numeric: 3/10  Pain quality: a sensation of fullness  Radiates to: does not radiate  anorexia: Yes  belching: Yes  constipation: Yes  Aggravated by: certain positions, drinking alcohol, NSAIDs  Relieved by: certain positions

## 2021-04-08 ENCOUNTER — IMMUNIZATIONS (OUTPATIENT)
Dept: FAMILY MEDICINE CLINIC | Facility: HOSPITAL | Age: 71
End: 2021-04-08

## 2021-04-08 DIAGNOSIS — Z23 ENCOUNTER FOR IMMUNIZATION: Primary | ICD-10-CM

## 2021-04-08 PROCEDURE — 0002A SARS-COV-2 / COVID-19 MRNA VACCINE (PFIZER-BIONTECH) 30 MCG: CPT

## 2021-04-08 PROCEDURE — 91300 SARS-COV-2 / COVID-19 MRNA VACCINE (PFIZER-BIONTECH) 30 MCG: CPT

## 2021-04-13 ENCOUNTER — APPOINTMENT (OUTPATIENT)
Dept: LAB | Facility: CLINIC | Age: 71
End: 2021-04-13
Payer: COMMERCIAL

## 2021-04-13 ENCOUNTER — TRANSCRIBE ORDERS (OUTPATIENT)
Dept: LAB | Facility: CLINIC | Age: 71
End: 2021-04-13

## 2021-04-13 DIAGNOSIS — Z12.5 SCREENING FOR PROSTATE CANCER: ICD-10-CM

## 2021-04-13 DIAGNOSIS — I10 BENIGN ESSENTIAL HYPERTENSION: ICD-10-CM

## 2021-04-13 LAB
ALBUMIN SERPL BCP-MCNC: 4 G/DL (ref 3.5–5)
ALP SERPL-CCNC: 87 U/L (ref 46–116)
ALT SERPL W P-5'-P-CCNC: 29 U/L (ref 12–78)
ANION GAP SERPL CALCULATED.3IONS-SCNC: 7 MMOL/L (ref 4–13)
AST SERPL W P-5'-P-CCNC: 27 U/L (ref 5–45)
BASOPHILS # BLD AUTO: 0.05 THOUSANDS/ΜL (ref 0–0.1)
BASOPHILS NFR BLD AUTO: 1 % (ref 0–1)
BILIRUB SERPL-MCNC: 0.87 MG/DL (ref 0.2–1)
BUN SERPL-MCNC: 16 MG/DL (ref 5–25)
CALCIUM SERPL-MCNC: 9.1 MG/DL (ref 8.3–10.1)
CHLORIDE SERPL-SCNC: 108 MMOL/L (ref 100–108)
CHOLEST SERPL-MCNC: 122 MG/DL (ref 50–200)
CO2 SERPL-SCNC: 23 MMOL/L (ref 21–32)
CREAT SERPL-MCNC: 0.86 MG/DL (ref 0.6–1.3)
EOSINOPHIL # BLD AUTO: 0.23 THOUSAND/ΜL (ref 0–0.61)
EOSINOPHIL NFR BLD AUTO: 4 % (ref 0–6)
ERYTHROCYTE [DISTWIDTH] IN BLOOD BY AUTOMATED COUNT: 13.2 % (ref 11.6–15.1)
GFR SERPL CREATININE-BSD FRML MDRD: 89 ML/MIN/1.73SQ M
GLUCOSE P FAST SERPL-MCNC: 98 MG/DL (ref 65–99)
HCT VFR BLD AUTO: 39.9 % (ref 36.5–49.3)
HDLC SERPL-MCNC: 49 MG/DL
HGB BLD-MCNC: 13.7 G/DL (ref 12–17)
IMM GRANULOCYTES # BLD AUTO: 0.01 THOUSAND/UL (ref 0–0.2)
IMM GRANULOCYTES NFR BLD AUTO: 0 % (ref 0–2)
LDLC SERPL CALC-MCNC: 61 MG/DL (ref 0–100)
LYMPHOCYTES # BLD AUTO: 1.43 THOUSANDS/ΜL (ref 0.6–4.47)
LYMPHOCYTES NFR BLD AUTO: 25 % (ref 14–44)
MCH RBC QN AUTO: 31.7 PG (ref 26.8–34.3)
MCHC RBC AUTO-ENTMCNC: 34.3 G/DL (ref 31.4–37.4)
MCV RBC AUTO: 92 FL (ref 82–98)
MONOCYTES # BLD AUTO: 0.55 THOUSAND/ΜL (ref 0.17–1.22)
MONOCYTES NFR BLD AUTO: 10 % (ref 4–12)
NEUTROPHILS # BLD AUTO: 3.43 THOUSANDS/ΜL (ref 1.85–7.62)
NEUTS SEG NFR BLD AUTO: 60 % (ref 43–75)
NONHDLC SERPL-MCNC: 73 MG/DL
NRBC BLD AUTO-RTO: 0 /100 WBCS
PLATELET # BLD AUTO: 196 THOUSANDS/UL (ref 149–390)
PMV BLD AUTO: 9.9 FL (ref 8.9–12.7)
POTASSIUM SERPL-SCNC: 4.1 MMOL/L (ref 3.5–5.3)
PROT SERPL-MCNC: 7.4 G/DL (ref 6.4–8.2)
PSA SERPL-MCNC: 0.3 NG/ML (ref 0–4)
RBC # BLD AUTO: 4.32 MILLION/UL (ref 3.88–5.62)
SODIUM SERPL-SCNC: 138 MMOL/L (ref 136–145)
TRIGL SERPL-MCNC: 59 MG/DL
WBC # BLD AUTO: 5.7 THOUSAND/UL (ref 4.31–10.16)

## 2021-04-13 PROCEDURE — 36415 COLL VENOUS BLD VENIPUNCTURE: CPT

## 2021-04-13 PROCEDURE — 80053 COMPREHEN METABOLIC PANEL: CPT

## 2021-04-13 PROCEDURE — G0103 PSA SCREENING: HCPCS

## 2021-04-13 PROCEDURE — 85025 COMPLETE CBC W/AUTO DIFF WBC: CPT

## 2021-04-13 PROCEDURE — 80061 LIPID PANEL: CPT

## 2021-04-15 DIAGNOSIS — I10 ESSENTIAL HYPERTENSION: ICD-10-CM

## 2021-04-16 RX ORDER — LISINOPRIL 10 MG/1
10 TABLET ORAL DAILY
Qty: 90 TABLET | Refills: 3 | Status: SHIPPED | OUTPATIENT
Start: 2021-04-16 | End: 2022-03-29 | Stop reason: SDUPTHER

## 2021-04-29 ENCOUNTER — ANESTHESIA EVENT (OUTPATIENT)
Dept: GASTROENTEROLOGY | Facility: AMBULARY SURGERY CENTER | Age: 71
End: 2021-04-29

## 2021-05-14 ENCOUNTER — HOSPITAL ENCOUNTER (OUTPATIENT)
Dept: GASTROENTEROLOGY | Facility: AMBULARY SURGERY CENTER | Age: 71
Setting detail: OUTPATIENT SURGERY
Discharge: HOME | End: 2021-05-14
Attending: INTERNAL MEDICINE
Payer: COMMERCIAL

## 2021-05-14 ENCOUNTER — ANESTHESIA (OUTPATIENT)
Dept: GASTROENTEROLOGY | Facility: AMBULARY SURGERY CENTER | Age: 71
End: 2021-05-14

## 2021-05-14 VITALS
SYSTOLIC BLOOD PRESSURE: 125 MMHG | WEIGHT: 182 LBS | HEIGHT: 69 IN | TEMPERATURE: 98.1 F | RESPIRATION RATE: 19 BRPM | DIASTOLIC BLOOD PRESSURE: 66 MMHG | HEART RATE: 71 BPM | BODY MASS INDEX: 26.96 KG/M2 | OXYGEN SATURATION: 96 %

## 2021-05-14 DIAGNOSIS — Z86.010 HX OF ADENOMATOUS COLONIC POLYPS: ICD-10-CM

## 2021-05-14 DIAGNOSIS — K25.3 ACUTE GASTRIC ULCER WITHOUT HEMORRHAGE OR PERFORATION: Primary | ICD-10-CM

## 2021-05-14 DIAGNOSIS — Z12.11 SCREEN FOR COLON CANCER: ICD-10-CM

## 2021-05-14 DIAGNOSIS — K21.9 CHRONIC GERD: ICD-10-CM

## 2021-05-14 PROCEDURE — 88342 IMHCHEM/IMCYTCHM 1ST ANTB: CPT | Performed by: PATHOLOGY

## 2021-05-14 PROCEDURE — 43239 EGD BIOPSY SINGLE/MULTIPLE: CPT | Performed by: INTERNAL MEDICINE

## 2021-05-14 PROCEDURE — 88341 IMHCHEM/IMCYTCHM EA ADD ANTB: CPT | Performed by: PATHOLOGY

## 2021-05-14 PROCEDURE — 88305 TISSUE EXAM BY PATHOLOGIST: CPT | Performed by: PATHOLOGY

## 2021-05-14 PROCEDURE — 88313 SPECIAL STAINS GROUP 2: CPT | Performed by: PATHOLOGY

## 2021-05-14 PROCEDURE — 45385 COLONOSCOPY W/LESION REMOVAL: CPT | Performed by: INTERNAL MEDICINE

## 2021-05-14 RX ORDER — GLYCOPYRROLATE 0.2 MG/ML
INJECTION INTRAMUSCULAR; INTRAVENOUS AS NEEDED
Status: DISCONTINUED | OUTPATIENT
Start: 2021-05-14 | End: 2021-05-14

## 2021-05-14 RX ORDER — SODIUM CHLORIDE, SODIUM LACTATE, POTASSIUM CHLORIDE, CALCIUM CHLORIDE 600; 310; 30; 20 MG/100ML; MG/100ML; MG/100ML; MG/100ML
INJECTION, SOLUTION INTRAVENOUS CONTINUOUS PRN
Status: DISCONTINUED | OUTPATIENT
Start: 2021-05-14 | End: 2021-05-14

## 2021-05-14 RX ORDER — LIDOCAINE HYDROCHLORIDE 20 MG/ML
INJECTION, SOLUTION EPIDURAL; INFILTRATION; INTRACAUDAL; PERINEURAL AS NEEDED
Status: DISCONTINUED | OUTPATIENT
Start: 2021-05-14 | End: 2021-05-14

## 2021-05-14 RX ORDER — PROPOFOL 10 MG/ML
INJECTION, EMULSION INTRAVENOUS AS NEEDED
Status: DISCONTINUED | OUTPATIENT
Start: 2021-05-14 | End: 2021-05-14

## 2021-05-14 RX ORDER — PANTOPRAZOLE SODIUM 40 MG/1
40 TABLET, DELAYED RELEASE ORAL DAILY
Qty: 60 TABLET | Refills: 3 | Status: SHIPPED | OUTPATIENT
Start: 2021-05-14 | End: 2021-05-17 | Stop reason: SDUPTHER

## 2021-05-14 RX ORDER — SODIUM CHLORIDE 9 MG/ML
50 INJECTION, SOLUTION INTRAVENOUS CONTINUOUS
Status: DISCONTINUED | OUTPATIENT
Start: 2021-05-14 | End: 2021-05-18 | Stop reason: HOSPADM

## 2021-05-14 RX ADMIN — PROPOFOL 20 MG: 10 INJECTION, EMULSION INTRAVENOUS at 11:08

## 2021-05-14 RX ADMIN — GLYCOPYRROLATE 0.1 MG: 0.2 INJECTION, SOLUTION INTRAMUSCULAR; INTRAVENOUS at 10:53

## 2021-05-14 RX ADMIN — LIDOCAINE HYDROCHLORIDE 5 ML: 20 INJECTION, SOLUTION EPIDURAL; INFILTRATION; INTRACAUDAL at 10:53

## 2021-05-14 RX ADMIN — PROPOFOL 30 MG: 10 INJECTION, EMULSION INTRAVENOUS at 11:04

## 2021-05-14 RX ADMIN — PROPOFOL 30 MG: 10 INJECTION, EMULSION INTRAVENOUS at 11:01

## 2021-05-14 RX ADMIN — PROPOFOL 50 MG: 10 INJECTION, EMULSION INTRAVENOUS at 10:54

## 2021-05-14 RX ADMIN — PROPOFOL 100 MG: 10 INJECTION, EMULSION INTRAVENOUS at 10:53

## 2021-05-14 RX ADMIN — SODIUM CHLORIDE, SODIUM LACTATE, POTASSIUM CHLORIDE, AND CALCIUM CHLORIDE: .6; .31; .03; .02 INJECTION, SOLUTION INTRAVENOUS at 10:49

## 2021-05-14 RX ADMIN — PROPOFOL 50 MG: 10 INJECTION, EMULSION INTRAVENOUS at 10:56

## 2021-05-14 NOTE — DISCHARGE INSTRUCTIONS
Gastritis   WHAT YOU NEED TO KNOW:   Gastritis is inflammation or irritation of the lining of your stomach  DISCHARGE INSTRUCTIONS:   Call 911 for any of the following:   · You develop chest pain or shortness of breath  Seek care immediately if:   · You vomit blood  · You have black or bloody bowel movements  · You have severe stomach or back pain  Contact your healthcare provider if:   · You have a fever  · You have new or worsening symptoms, even after treatment  · You have questions or concerns about your condition or care  Medicines:   · Medicines  may be given to help treat a bacterial infection or decrease stomach acid  · Take your medicine as directed  Contact your healthcare provider if you think your medicine is not helping or if you have side effects  Tell him or her if you are allergic to any medicine  Keep a list of the medicines, vitamins, and herbs you take  Include the amounts, and when and why you take them  Bring the list or the pill bottles to follow-up visits  Carry your medicine list with you in case of an emergency  Manage or prevent gastritis:   · Do not smoke  Nicotine and other chemicals in cigarettes and cigars can make your symptoms worse and cause lung damage  Ask your healthcare provider for information if you currently smoke and need help to quit  E-cigarettes or smokeless tobacco still contain nicotine  Talk to your healthcare provider before you use these products  · Do not drink alcohol  Alcohol can prevent healing and make your gastritis worse  Talk to your healthcare provider if you need help to stop drinking  · Do not take NSAIDs or aspirin unless directed  These and similar medicines can cause irritation  If your healthcare provider says it is okay to take NSAIDs, take them with food  · Do not eat foods that cause irritation  Foods such as oranges and salsa can cause burning or pain  Eat a variety of healthy foods  Examples include fruits (not citrus), vegetables, low-fat dairy products, beans, whole-grain breads, and lean meats and fish  Try to eat small meals, and drink water with your meals  Do not eat for at least 3 hours before you go to bed  · Find ways to relax and decrease stress  Stress can increase stomach acid and make gastritis worse  Activities such as yoga, meditation, or listening to music can help you relax  Spend time with friends, or do things you enjoy  Follow up with your healthcare provider as directed: You may need ongoing tests or treatment, or referral to a gastroenterologist  Write down your questions so you remember to ask them during your visits  © Copyright [address] Information is for End User's use only and may not be sold, redistributed or otherwise used for commercial purposes  All illustrations and images included in CareNotes® are the copyrighted property of A D A M , Inc  or [employer]   The above information is an  only  It is not intended as medical advice for individual conditions or treatments  Talk to your doctor, nurse or pharmacist before following any medical regimen to see if it is safe and effective for you  Upper Endoscopy   WHAT YOU NEED TO KNOW:   An upper endoscopy is also called an upper gastrointestinal (GI) endoscopy, or an esophagogastroduodenoscopy (EGD)  You may feel bloated, gassy, or have some abdominal discomfort after your procedure  Your throat may be sore for 24 to 36 hours  You may burp or pass gas from air that is still inside your body  DISCHARGE INSTRUCTIONS:   Call 911 for any of the following:   · You have sudden chest pain or trouble breathing  Seek care immediately if:   · You feel dizzy or faint  · You have trouble swallowing  · Your bowel movements are very dark or black  · Your abdomen is hard and firm and you have severe pain  · You vomit blood    Contact your healthcare provider if:   · You feel full or bloated and cannot burp or pass gas  · You have not had a bowel movement for 3 days after your procedure  · You have neck pain  · You have a fever or chills  · You have nausea or are vomiting  · You have a rash or hives  · You have questions or concerns about your endoscopy  Relieve a sore throat:  Suck on throat lozenges or crushed ice  Gargle with a small amount of warm salt water  Mix 1 teaspoon of salt and 1 cup of warm water to make salt water  Relieve gas and discomfort from bloating:  Lie on your right side with a heating pad on your abdomen  Take short walks to help pass gas  Eat small meals until bloating is relieved  Rest after your procedure: You have been given medicine to relax you  Do not  drive or make important decisions until the day after your procedure  Return to your normal activity as directed  You can usually return to work the day after your procedure  Follow up with your healthcare provider as directed:  Write down your questions so you remember to ask them during your visits  © 2017 [address] is for End User's use only and may not be sold, redistributed or otherwise used for commercial purposes  All illustrations and images included in CareNotes® are the copyrighted property of A D A M , Inc  or Jeremiah Janina  The above information is an  only  It is not intended as medical advice for individual conditions or treatments  Talk to your doctor, nurse or pharmacist before following any medical regimen to see if it is safe and effective for you  Colorectal Polyps   WHAT YOU NEED TO KNOW:   Colorectal polyps are small growths of tissue in the lining of the colon and rectum  Most polyps are hyperplastic polyps and are usually benign (noncancerous)  Certain types of polyps, called adenomatous polyps, may turn into cancer        DISCHARGE INSTRUCTIONS:   Follow up with your healthcare provider or gastroenterologist as directed: You may need to return for more tests, such as another colonoscopy  Write down your questions so you remember to ask them during your visits  Reduce your risk for colorectal polyps:   · Eat a variety of healthy foods:  Healthy foods include fruit, vegetables, whole-grain breads, low-fat dairy products, beans, lean meat, and fish  Ask if you need to be on a special diet  · Maintain a healthy weight:  Ask your healthcare provider if you need to lose weight and how much you need to lose  Ask for help with a weight loss program     · Exercise:  Begin to exercise slowly and do more as you get stronger  Talk with your healthcare provider before you start an exercise program      · Limit alcohol:  Your risk for polyps increases the more you drink  · Do not smoke: If you smoke, it is never too late to quit  Ask for information about how to stop  For support and more information:   · Telly Hernandez ([facility]) [address], [place] [address]  Phone: 1- 128 - [phone]  Web Address: www digestive  [facility]    Contact your healthcare provider or gastroenterologist if:   · You have a fever  · You have chills, a cough, or feel weak and achy  · You have abdominal pain that does not go away or gets worse after you take medicine  · Your abdomen is swollen  · You are losing weight without trying  · You have questions or concerns about your condition or care  Seek care immediately or call 911 if:   · You have sudden shortness of breath  · You have a fast heart rate, fast breathing, or are too dizzy to stand up  · You have severe abdominal pain  · You see blood in your bowel movement  © Copyright [address] Information is for End User's use only and may not be sold, redistributed or otherwise used for commercial purposes   All illustrations and images included in [facility] are the copyrighted property of A D A M , Inc  or [facility] Charmaine Dutta   The above information is an  only  It is not intended as medical advice for individual conditions or treatments  Talk to your doctor, nurse or pharmacist before following any medical regimen to see if it is safe and effective for you  Colonoscopy   WHAT YOU NEED TO KNOW:   A colonoscopy is a procedure to examine the inside of your colon (intestine) with a scope  Polyps or tissue growths may have been removed during your colonoscopy  It is normal to feel bloated and to have some abdominal discomfort  You should be passing gas  If you have hemorrhoids or you had polyps removed, you may have a small amount of bleeding  DISCHARGE INSTRUCTIONS:   Seek care immediately if:   · You have a large amount of bright red blood in your bowel movements  · Your abdomen is hard and firm and you have severe pain  · You have sudden trouble breathing  Contact your healthcare provider if:   · You develop a rash or hives  · You have a fever within 24 hours of your procedure       · You have not had a bowel movement for 3 days after your procedure  · You have questions or concerns about your condition or care  Activity:   · Do not lift, strain, or run  for 3 days after your procedure  · Rest after your procedure  You have been given medicine to relax you  Do not  drive or make important decisions until the day after your procedure  Return to your normal activity as directed  · Relieve gas and discomfort from bloating  by lying on your right side with a heating pad on your abdomen  You may need to take short walks to help the gas move out  Eat small meals until bloating is relieved  If you had polyps removed: For 7 days after your procedure:  · Do not  take aspirin  · Do not  go on long car rides    Follow up with your healthcare provider as directed:  Write down your questions so you remember to ask them during your visits  © 2017 [address] is for End User's use only and may not be sold, redistributed or otherwise used for commercial purposes  All illustrations and images included in CareNotes® are the copyrighted property of [employer]  or Jeremiah Roa  The above information is an  only  It is not intended as medical advice for individual conditions or treatments  Talk to your doctor, nurse or pharmacist before following any medical regimen to see if it is safe and effective for you  Diverticulosis   WHAT YOU NEED TO KNOW:   Diverticulosis is a condition that causes small pockets called diverticula to form in your intestine  These pockets make it difficult for bowel movements to pass through your digestive system  DISCHARGE INSTRUCTIONS:   Seek care immediately if:   · You have severe pain on the left side of your lower abdomen  · Your bowel movements are bright or dark red  Contact your healthcare provider if:   · You have a fever and chills  · You feel dizzy or lightheaded  · You have nausea, or you are vomiting  · You have a change in your bowel movements  · You have questions or concerns about your condition or care  Medicines:   · Medicines  to soften your bowel movements may be given  You may also need medicines to treat symptoms such as bloating and pain  · Take your medicine as directed  Contact your healthcare provider if you think your medicine is not helping or if you have side effects  Tell him or her if you are allergic to any medicine  Keep a list of the medicines, vitamins, and herbs you take  Include the amounts, and when and why you take them  Bring the list or the pill bottles to follow-up visits  Carry your medicine list with you in case of an emergency  Self-care: The goal of treatment is to manage any symptoms you have and prevent other problems such as diverticulitis   Diverticulitis is swelling or infection of the diverticula  Your healthcare provider may recommend any of the following:  · Eat a variety of high-fiber foods  High-fiber foods help you have regular bowel movements  High-fiber foods include cooked beans, fruits, vegetables, and some cereals  Most adults need 25 to 35 grams of fiber each day  Your healthcare provider may recommend that you have more  Ask your healthcare provider how much fiber you need  Increase fiber slowly  You may have abdominal discomfort, bloating, and gas if you add fiber to your diet too quickly  You may need to take a fiber supplement if you are not getting enough fiber from food  · Drink liquids as directed  You may need to drink 2 to 3 liters (8 to 12 cups) of liquids every day  Ask your healthcare provider how much liquid to drink each day and which liquids are best for you  · Apply heat  on your abdomen for 20 to 30 minutes every 2 hours for as many days as directed  Heat helps decrease pain and muscle spasms  Help prevent diverticulitis or other symptoms: The following may help decrease your risk for diverticulitis or symptoms, such as bleeding  Talk to your provider about these or other things you can do to prevent problems that may occur with diverticulosis  · Exercise regularly  Ask your healthcare provider about the best exercise plan for you  Exercise can help you have regular bowel movements  Get 30 minutes of exercise on most days of the week  · Maintain a healthy weight  Ask your healthcare provider how much you should weigh  Ask him or her to help you create a weight loss plan if you are overweight  · Do not smoke  Nicotine and other chemicals in cigarettes increase your risk for diverticulitis  Ask your healthcare provider for information if you currently smoke and need help to quit  E-cigarettes or smokeless tobacco still contain nicotine  Talk to your healthcare provider before you use these products       · Ask your healthcare provider if it is safe to take NSAIDs  NSAIDs may increase your risk of diverticulitis  Follow up with your healthcare provider as directed:  Write down your questions so you remember to ask them during your visits  © Copyright [address] Information is for End User's use only and may not be sold, redistributed or otherwise used for commercial purposes  All illustrations and images included in CareNotes® are the copyrighted property of A D A M , Inc  or [facility] Charmaine Dutta   The above information is an  only  It is not intended as medical advice for individual conditions or treatments  Talk to your doctor, nurse or pharmacist before following any medical regimen to see if it is safe and effective for you  Diverticulosis Diet   WHAT YOU NEED TO KNOW:   What is a diverticulosis diet? A diverticulosis diet includes high-fiber foods  High-fiber foods help you have regular bowel movements  Extra fiber may decrease your risk of forming new diverticula (small pockets) in your intestine  A high-fiber diet may also help prevent diverticulitis  Diverticulitis is a painful condition that occurs when diverticula become inflamed or infected  You do not need to avoid nuts, seeds, corn, or popcorn while you are on a diverticulosis diet  How much fiber do I need? You may need 25 to 35 grams of fiber each day  Ask your dietitian or healthcare provider how much fiber you should have  Increase your intake of fiber slowly  When you eat more fiber, you may have gas and feel bloated  You may need to take a fiber supplement if you do not get enough fiber from food  Drink plenty of liquids as you increase the fiber in your diet  Your dietitian or healthcare provider may recommend 8 eight-ounce cups or more each day  Ask which liquids are best for you  Which foods are high in fiber?    · Foods with at least 4 grams of fiber per serving:      ? ? to ½ cup of high-fiber cereal (check the nutrition label on the box)    ? ½ cup of blackberries or raspberries    ? 4 dried prunes    ? 1 cooked artichoke    ? ½ cup of cooked legumes, such as lentils, or red, kidney, and castellano beans    · Foods with 1 to 3 grams of fiber per serving:      ? 1 slice of whole-wheat, pumpernickel, or rye bread    ? 4 whole-wheat crackers    ? ½ cup of cereal with 1 to 3 grams of fiber per serving (check the nutrition label on the box)    ? 1 piece of fruit, such as an apple, banana, pear, kiwi, or orange    ? 3 dates    ? ½ cup of canned apricots, fruit cocktail, peaches, or pears    ? ½ cup of raw or cooked vegetables, such as carrots, cauliflower, cabbage, spinach, squash, or corn    When should I contact my healthcare provider? · You have questions about a high-fiber diet  · You have a change in your bowel movements  · You have an upset stomach  · You have a fever  · You have pain in your lower abdomen on the left side  · You have questions about your condition or care  CARE AGREEMENT:   You have the right to help plan your care  Learn about your health condition and how it may be treated  Discuss treatment options with your healthcare providers to decide what care you want to receive  You always have the right to refuse treatment  The above information is an  only  It is not intended as medical advice for individual conditions or treatments  Talk to your doctor, nurse or pharmacist before following any medical regimen to see if it is safe and effective for you  © Copyright [address] Information is for End User's use only and may not be sold, redistributed or otherwise used for commercial purposes  All illustrations and images included in CareNotes® are the copyrighted property of [employer]  or Whitney Castañeda      Hemorrhoids   WHAT YOU NEED TO KNOW:   Hemorrhoids are swollen blood vessels inside your rectum (internal hemorrhoids) or on your anus (external hemorrhoids)   Sometimes a hemorrhoid may prolapse  This means it extends out of your anus  DISCHARGE INSTRUCTIONS:   Seek care immediately if:   · You have severe pain in your rectum or around your anus  · You have severe pain in your abdomen and you are vomiting  · You have bleeding from your anus that soaks through your underwear  Contact your healthcare provider if:   · You have frequent and painful bowel movements  · Your hemorrhoid looks or feels more swollen than usual      · You do not have a bowel movement for 2 days or more  · You see or feel tissue coming through your anus  · You have questions or concerns about your condition or care  Medicines: You may  need any of the following:  · Medicine  may be given to decrease pain, swelling, and itching  The medicine may come as a pad, cream, or ointment  · Stool softeners  help treat or prevent constipation  · NSAIDs , such as ibuprofen, help decrease swelling, pain, and fever  NSAIDs can cause stomach bleeding or kidney problems in certain people  If you take blood thinner medicine, always ask your healthcare provider if NSAIDs are safe for you  Always read the medicine label and follow directions  · Take your medicine as directed  Contact your healthcare provider if you think your medicine is not helping or if you have side effects  Tell him or her if you are allergic to any medicine  Keep a list of the medicines, vitamins, and herbs you take  Include the amounts, and when and why you take them  Bring the list or the pill bottles to follow-up visits  Carry your medicine list with you in case of an emergency  Manage your symptoms:   · Apply ice on your anus for 15 to 20 minutes every hour or as directed  Use an ice pack, or put crushed ice in a plastic bag  Cover it with a towel before you apply it to your anus  Ice helps prevent tissue damage and decreases swelling and pain  · Take a sitz bath    Fill a bathtub with 4 to 6 inches of warm water  You may also use a sitz bath pan that fits inside a toilet bowl  Sit in the sitz bath for 15 minutes  Do this 3 times a day, and after each bowel movement  The warm water can help decrease pain and swelling  · Keep your anal area clean  Gently wash the area with warm water daily  Soap may irritate the area  After a bowel movement, wipe with moist towelettes or wet toilet paper  Dry toilet paper can irritate the area  Prevent hemorrhoids:   · Do not strain to have a bowel movement  Do not sit on the toilet too long  These actions can increase pressure on the tissues in your rectum and anus  · Drink plenty of liquids  Liquids can help prevent constipation  Ask how much liquid to drink each day and which liquids are best for you  · Eat a variety of high-fiber foods  Examples include fruits, vegetables, and whole grains  Ask your healthcare provider how much fiber you need each day  You may need to take a fiber supplement  · Exercise as directed  Exercise, such as walking, may make it easier to have a bowel movement  Ask your healthcare provider to help you create an exercise plan  · Do not have anal sex  Anal sex can weaken the skin around your rectum and anus  · Avoid heavy lifting  This can cause straining and increase your risk for another hemorrhoid  Follow up with your healthcare provider as directed:  Write down your questions so you remember to ask them during your visits  © Copyright [address] Information is for End User's use only and may not be sold, redistributed or otherwise used for commercial purposes  All illustrations and images included in CareNotes® are the copyrighted property of A D A M , Inc  or [facility] Charmaine Dutta   The above information is an  only  It is not intended as medical advice for individual conditions or treatments   Talk to your doctor, nurse or pharmacist before following any medical regimen to see if it is safe and effective for you  Pantoprazole (By mouth)   Pantoprazole (pan-TOE-pra-zole)  Treats gastroesophageal reflux disease (GERD), a damaged esophagus, and conditions that cause your stomach to make too much acid, including Zollinger-Saenz syndrome  This medicine is a proton pump inhibitor (PPI)  Brand Name(s): Protonix   There may be other brand names for this medicine  When This Medicine Should Not Be Used: This medicine is not right for everyone  Do not use it if you had an allergic reaction to pantoprazole or similar medicines  How to Use This Medicine:   Packet, Tablet, Delayed Release Tablet, Long Acting Tablet  · Your doctor will tell you how much medicine to use  Do not use more than directed  · Delayed-release tablet: Swallow the tablet whole  Do not crush, break, or chew it  · Delayed-release packet: Take the medicine mixed in apple juice or applesauce at least 30 minutes before a meal  It may also be given using a nasogastric tube when mixed in apple juice only  ? To prepare with applesauce:   § Mix the packet contents with 1 teaspoon of applesauce  Do not mix with water or other liquids or food  Do not divide the packet contents to make smaller doses  § Swallow the mixture within 10 minutes after you mix it  Do not chew or crush the granules  § Sip some water after you take the mixture to make sure you swallow all of the medicine  ? To prepare with apple juice:   § Mix the packet contents with 1 teaspoon of apple juice in a small cup  Do not divide the packet contents to make smaller doses  § Stir for 5 seconds and drink the mixture immediately  Do not chew or crush the granules  § To make sure you get all of the medicine, add more apple juice to the cup  Drink it immediately  ? To prepare for a feeding tube:   § Pour the packet contents in a 2-ounce (60 milliliter [mL]) catheter-tip syringe  § Add 10 mL of apple juice to the syringe  Add the mixture to the tube   Gently tap or shake the barrel of the syringe to help empty it  § Add 10 mL of apple juice to the syringe and put it in the tube  Do this at least 2 times  There should be no granules left in the syringe  · This medicine should come with a Medication Guide  Ask your pharmacist for a copy if you do not have one  · Missed dose: Take a dose as soon as you remember  If it is almost time for your next dose, wait until then and take a regular dose  Do not take extra medicine to make up for a missed dose  · Store the medicine in a closed container at room temperature, away from heat, moisture, and direct light  Drugs and Foods to Avoid:   Ask your doctor or pharmacist before using any other medicine, including over-the-counter medicines, vitamins, and herbal products  · Do not use pantoprazole together with medicine that contains rilpivirine  · Some medicines can affect how pantoprazole works  Tell your doctor if you are using any of the following:   ? Ampicillin, atazanavir, dasatinib, digoxin, erlotinib, itraconazole, ketoconazole, methotrexate, mycophenolate mofetil, nelfinavir, nilotinib, saquinavir  ? Blood thinner (including warfarin)  ? Diuretic (water pill)  ? Iron supplements  Warnings While Using This Medicine:   · Tell your doctor if you are pregnant or breastfeeding, or if you have kidney disease, liver disease, lupus, or osteoporosis  · This medicine may cause the following problems:  ? Kidney problems, including acute tubulointerstitial nephritis  ? Increased risk of broken bones in the hip, wrist, or spine (more likely if used several times per day or longer than 1 year)  ? Lupus  ? Fundic gland polyps (abnormal growth in the upper part of your stomach)  · This medicine can cause diarrhea  Call your doctor if the diarrhea becomes severe, does not stop, or is bloody  Do not take any medicine to stop diarrhea until you have talked to your doctor   Diarrhea can occur 2 months or more after you stop taking this medicine  · Tell any doctor or dentist who treats you that you are using this medicine  This medicine may affect certain medical test results  · Your doctor will do lab tests at regular visits to check on the effects of this medicine  Keep all appointments  · Keep all medicine out of the reach of children  Never share your medicine with anyone  Possible Side Effects While Using This Medicine:   Call your doctor right away if you notice any of these side effects:  · Allergic reaction: Itching or hives, swelling in your face or hands, swelling or tingling in your mouth or throat, chest tightness, trouble breathing  · Blistering, peeling, red skin rash  · Fever, joint pain, swelling in your body, unusual weight gain, change in how much or how often you urinate, blood in the urine  · Joint pain, rash on your cheeks or arms that gets worse in the sun  · Seizures, dizziness, uneven heartbeat, muscle cramps or twitching  · Severe diarrhea, stomach cramp or pain, nausea, vomiting, loss of appetite, weight loss  · Unusual tiredness or weakness  If you notice these less serious side effects, talk with your doctor:   · Headache  If you notice other side effects that you think are caused by this medicine, tell your doctor  Call your doctor for medical advice about side effects  You may report side effects to FDA at 0-617-FDA-7901  © Copyright [address] 2021 Information is for End User's use only and may not be sold, redistributed or otherwise used for commercial purposes  The above information is an  only  It is not intended as medical advice for individual conditions or treatments  Talk to your doctor, nurse or pharmacist before following any medical regimen to see if it is safe and effective for you

## 2021-05-14 NOTE — H&P
History and Physical - [facility]  Phyllis Thornton 78 y o  male MRN: [id number]        HPI:  63-year-old male with history of GERD and colon polyps  Regular bowel movements  Historical Information   Past Medical History:   Diagnosis Date    Arthritis     Colon polyps     GERD (gastroesophageal reflux disease)     Hyperlipidemia     Hypertension     Seasonal allergies      Past Surgical History:   Procedure Laterality Date    BACK SURGERY      laminectomy    CARDIAC SURGERY      cardiac stent    CARPAL TUNNEL RELEASE      COLONOSCOPY      benign polyp removal    ORCHIECTOMY Left     TX ESOPHAGOGASTRODUODENOSCOPY TRANSORAL DIAGNOSTIC N/A 8/29/2017    Procedure: EGD AND COLONOSCOPY;  Surgeon: Beryle Homme, MD;  [facility]; Service: Gastroenterology    UPPER GASTROINTESTINAL ENDOSCOPY      WRIST SURGERY       Social History   Social History     Substance and Sexual Activity   Alcohol Use Yes    Alcohol/week: 2 0 standard drinks    Types: 2 Cans of beer per week     Social History     Substance and Sexual Activity   Drug Use No     Social History     Tobacco Use   Smoking Status Never Smoker   Smokeless Tobacco Never Used     Family History   Problem Relation Age of Onset    Diabetes Mother     Hypertension Mother     Stomach cancer Father     Hypertension Father     Liver cancer Father        Meds/Allergies     (Not in a hospital admission)      No Known Allergies    Objective     Blood pressure 129/68, pulse 57, temperature (!) 97 4 °F (36 3 °C), temperature source Temporal, resp  rate 18, height 5' 9" (1 753 m), weight 82 6 kg (182 lb), SpO2 96 %      PHYSICAL EXAM:    Gen: NAD  CV: S1 & S2 normal, RRR  CHEST: Clear to auscultate  ABD: soft, NT/ND, good bowel sounds  EXT: no edema    ASSESSMENT:     GERD, history of colon polyps    PLAN:    EGD and colonoscopy

## 2021-05-14 NOTE — ANESTHESIA POSTPROCEDURE EVALUATION
Post-Op Assessment Note    CV Status:  Stable  Pain Score: 0    Pain management: adequate     Mental Status:  Sleepy and arousable   Hydration Status:  Euvolemic   PONV Controlled:  Controlled   Airway Patency:  Patent      Post Op Vitals Reviewed: Yes      Staff: CRNA         No complications documented      BP 99/53 (05/14/21 1116)    Temp 98 1 °F (36 7 °C) (05/14/21 1116)    Pulse 58 (05/14/21 1116)   Resp 18 (05/14/21 1116)    SpO2 99 % (05/14/21 1116)

## 2021-05-14 NOTE — ANESTHESIA PREPROCEDURE EVALUATION
Procedure:  COLONOSCOPY  EGD    Relevant Problems   ANESTHESIA (within normal limits)      CARDIO   (+) Benign essential hypertension   (+) Coronary atherosclerosis (LAD stent 2009  stable since then )      GI/HEPATIC   (+) Chronic GERD      MUSCULOSKELETAL   (+) Low back pain   (+) Osteoarthritis   (+) Primary osteoarthritis, left shoulder      NEURO/PSYCH   (+) Hx of adenomatous colonic polyps      Other   (+) Lipid disorder        Physical Exam    Airway    Mallampati score: II  TM Distance: >3 FB  Neck ROM: full     Dental       Cardiovascular  Cardiovascular exam normal    Pulmonary  Pulmonary exam normal     Other Findings        Anesthesia Plan  ASA Score- 2     Anesthesia Type- IV sedation with anesthesia with ASA Monitors  Additional Monitors:   Airway Plan:           Plan Factors-Exercise tolerance (METS): >4 METS  Chart reviewed  Existing labs reviewed  Patient is not a current smoker  Patient not instructed to abstain from smoking on day of procedure  Patient did not smoke on day of surgery  Induction-     Postoperative Plan-     Informed Consent- Anesthetic plan and risks discussed with patient and spouse  I personally reviewed this patient with the CRNA  Discussed and agreed on the Anesthesia Plan with the CRNA  Rosio Victoria No results found for: HGBA1C    Lab Results   Component Value Date    K 4 1 04/13/2021     04/13/2021    CO2 23 04/13/2021    BUN 16 04/13/2021    CREATININE 0 86 04/13/2021    GLUF 98 04/13/2021    CALCIUM 9 1 04/13/2021    AST 27 04/13/2021    ALT 29 04/13/2021    ALKPHOS 87 04/13/2021    EGFR 89 04/13/2021       Lab Results   Component Value Date    WBC 5 70 04/13/2021    HGB 13 7 04/13/2021    HCT 39 9 04/13/2021    MCV 92 04/13/2021     04/13/2021 Jan 2020 cardiology note     Manzanares Keishadelfino was seen today for follow-up and medication refill      Diagnoses and all orders for this visit:    Postsurgical percutaneous transluminal coronary angioplasty status  S/p LAD HALLEY asymptomatic 2009 Continue risk modification Begin aerobic exercise      Lipid disorder  LDL= 58    Essential hypertension, benign  Controlled

## 2021-05-17 ENCOUNTER — TELEPHONE (OUTPATIENT)
Dept: GASTROENTEROLOGY | Facility: AMBULARY SURGERY CENTER | Age: 71
End: 2021-05-17

## 2021-05-17 DIAGNOSIS — K25.9 GASTRIC ULCER, UNSPECIFIED CHRONICITY, UNSPECIFIED WHETHER GASTRIC ULCER HEMORRHAGE OR PERFORATION PRESENT: Primary | ICD-10-CM

## 2021-05-17 RX ORDER — PANTOPRAZOLE SODIUM 40 MG/1
40 TABLET, DELAYED RELEASE ORAL
Qty: 60 TABLET | Refills: 2 | Status: SHIPPED | OUTPATIENT
Start: 2021-05-17 | End: 2021-08-15

## 2021-05-17 NOTE — TELEPHONE ENCOUNTER
Ordered Protonix 40 mg twice a day for 3 months due to ulcers in the stomach  Biopsies are still pending and we will call him with the results  Thank you!

## 2021-05-17 NOTE — TELEPHONE ENCOUNTER
Patients GI provider:  Dr Ermias Chacon    Number to return call: ( [phone]    Reason for call: Pt calling to confirm pantoprazole dose 1 tab daily or 2 tabs daily    Scheduled procedure/appointment date if applicable: Apt/procedure

## 2021-05-17 NOTE — TELEPHONE ENCOUNTER
Patient had EGD 5/14 and Dr Criss Middleton wrote in recommendations "protonix 40 mg twice daily", however he ordered once daily  Given patient had diffuse erythema and ulceration, protonix 40 mg BID?  Please update rx if appropriate and I will inform patient

## 2021-05-19 DIAGNOSIS — K25.3 ACUTE GASTRIC ULCER WITHOUT HEMORRHAGE OR PERFORATION: Primary | ICD-10-CM

## 2021-05-26 NOTE — PROGRESS NOTES
Assessment & Plan:      K21 9 Chronic GERD  I have evaluated the patient and found the condition to be: Stable  I have evaluated the patient and: Recommended continue with same treatment plan    I25 10 Coronary atherosclerosis  I have evaluated the patient and found the condition to be: Stable  I have evaluated the patient and: Recommended continue with same treatment plan    I10 Benign essential hypertension  I have evaluated the patient and found the condition to be: Stable  I have evaluated the patient and: Recommended continue with same treatment plan    M19 90 Osteoarthritis  I have evaluated the patient and found the condition to be: Worsening  I have evaluated the patient and: Ordered additional services/ studies  The patient should continue treatment and follow-up with: physical therapy    M19 012 Primary osteoarthritis, left shoulder  I have evaluated the patient and found the condition to be: Stable  I have evaluated the patient and: Recommended continue with same treatment plan    E78 9 Lipid disorder  I have evaluated the patient and found the condition to be: Stable  I have evaluated the patient and: Recommended continue with same treatment plan         Subjective:     Patient ID: Cisco Sutton is a 78 y o  male     Chief Complaint   Patient presents with    Follow-up      f/u multiple med issues and Enhanced visit  - pt feels well  Had both COVID vaccinations (Guille Moore)  - notes that his R hip has been bothering him more  Saw ortho several years ago  Improved with PT but now has increased pain over the last year  Had injections in the past which did not help  Pain is primarily in the lateral portion of the hip, thigh and lower leg  No weakness  Worse with standing/walking    - up to date with Cardio  Denies CP, palpitations, lightheadedness or other CV symptoms with or without exertion  - no new GI issues  Had colonoscopy that revealed 3 polyps last month  Due for repeat in 3m   Using pantoprazole for GERD with good effect  - feels that he is not always emptying his bladder  Normally has nocturia x 1-2/night  Review of Systems   Constitutional: Negative  HENT: Negative  Eyes: Negative  Respiratory: Negative  Cardiovascular: Negative  Gastrointestinal: Negative  Endocrine: Negative  Genitourinary: Positive for decreased urine volume and frequency  Negative for discharge, flank pain, penile pain, penile swelling, scrotal swelling and urgency  Musculoskeletal: Positive for arthralgias, gait problem and myalgias  Negative for back pain, joint swelling, neck pain and neck stiffness  Skin: Negative  Allergic/Immunologic: Negative  Neurological: Negative for dizziness, weakness, light-headedness, numbness and headaches  Hematological: Negative  Psychiatric/Behavioral: Negative  Objective:      /72   Pulse 74   Temp 97 9 °F (36 6 °C)   Ht 5' 9" (1 753 m)   Wt 88 kg (194 lb)   BMI 28 65 kg/m²     Problem List Items Addressed This Visit        Digestive    Chronic GERD     Stable  Continue famotidine  Monitor diet  Recheck 6m            Cardiovascular and Mediastinum    Benign essential hypertension - Primary     Well controlled  Cont present treatment  Monitor labs  Counseled re: diet, exercise and weight loss effects on BP  BMI Counseling: Body mass index is 28 65 kg/m²  The BMI is above normal  Nutrition recommendations include moderation in carbohydrate intake, increasing intake of lean protein, reducing intake of saturated fat and trans fat and reducing intake of cholesterol  Recheck 6m   Recheck 6m           Coronary atherosclerosis     Asymptomatic Check labs  Continue present care  Recheck 6m            Musculoskeletal and Integument    Localized osteoarthrosis of right hip     I reviewed with pt  Appears to be worsening  Will refer back to PT but consider re-eval by ortho if not  Improving in 2 weeks            Relevant Orders    Ambulatory referral to Physical Therapy       Other    Lipid disorder     Monitor labs  Continue present care  Recheck 6m         Low back pain     Exam unremarkable  Most of R leg pain seems to be coming from R hip  Continue to monitor  Refer to PT         Relevant Orders    Ambulatory referral to Physical Therapy          Physical Exam  Vitals signs reviewed  Constitutional:       Appearance: Normal appearance  He is well-developed  HENT:      Head: Normocephalic and atraumatic  Right Ear: Tympanic membrane, ear canal and external ear normal       Left Ear: Tympanic membrane, ear canal and external ear normal       Mouth/Throat:      Mouth: Mucous membranes are moist    Eyes:      Extraocular Movements: Extraocular movements intact  Conjunctiva/sclera: Conjunctivae normal       Pupils: Pupils are equal, round, and reactive to light  Neck:      Musculoskeletal: Normal range of motion and neck supple  No muscular tenderness  Thyroid: No thyromegaly  Vascular: No carotid bruit or JVD  Cardiovascular:      Rate and Rhythm: Normal rate and regular rhythm  Pulses: Normal pulses  Heart sounds: Normal heart sounds  No murmur  Pulmonary:      Effort: Pulmonary effort is normal  No respiratory distress  Breath sounds: Normal breath sounds  No wheezing or rales  Abdominal:      General: Bowel sounds are normal  There is no distension  Palpations: Abdomen is soft  There is no mass  Tenderness: There is no abdominal tenderness  Musculoskeletal: Normal range of motion  General: Tenderness (R groin discomfort with abduction and internal rotation) present  No swelling or deformity  Right lower leg: No edema  Left lower leg: No edema  Comments: Low back NT  SLR (-) bilat   Lymphadenopathy:      Cervical: No cervical adenopathy  Skin:     General: Skin is warm  Capillary Refill: Capillary refill takes less than 2 seconds     Neurological:      Mental Status: He is alert and oriented to person, place, and time  Cranial Nerves: No cranial nerve deficit  Sensory: No sensory deficit  Motor: No weakness or abnormal muscle tone  Gait: Gait (mildly antalgic gait with short R leg swing) normal       Deep Tendon Reflexes: Reflexes normal    Psychiatric:         Mood and Affect: Mood normal          Behavior: Behavior normal          Thought Content:  Thought content normal          Judgment: Judgment normal       Comments:

## 2021-05-27 ENCOUNTER — RA CDI HCC (OUTPATIENT)
Dept: OTHER | Facility: HOSPITAL | Age: 71
End: 2021-05-27

## 2021-05-27 NOTE — PROGRESS NOTES
[facility] 77  coding opportunities          Chart reviewed, no opportunity found: CHART REVIEWED, NO OPPORTUNITY FOUND              Patients insurance company: [employer] (Medicare Advantage and Commercial)

## 2021-06-04 ENCOUNTER — OFFICE VISIT (OUTPATIENT)
Dept: FAMILY MEDICINE CLINIC | Facility: CLINIC | Age: 71
End: 2021-06-04
Payer: COMMERCIAL

## 2021-06-04 VITALS
HEIGHT: 69 IN | HEART RATE: 74 BPM | WEIGHT: 194 LBS | TEMPERATURE: 97.9 F | BODY MASS INDEX: 28.73 KG/M2 | DIASTOLIC BLOOD PRESSURE: 72 MMHG | SYSTOLIC BLOOD PRESSURE: 120 MMHG

## 2021-06-04 DIAGNOSIS — M54.50 CHRONIC BILATERAL LOW BACK PAIN, UNSPECIFIED WHETHER SCIATICA PRESENT: ICD-10-CM

## 2021-06-04 DIAGNOSIS — K21.9 CHRONIC GERD: ICD-10-CM

## 2021-06-04 DIAGNOSIS — M16.11 LOCALIZED OSTEOARTHROSIS OF RIGHT HIP: ICD-10-CM

## 2021-06-04 DIAGNOSIS — I25.10 ATHEROSCLEROSIS OF NATIVE CORONARY ARTERY OF NATIVE HEART WITHOUT ANGINA PECTORIS: ICD-10-CM

## 2021-06-04 DIAGNOSIS — E78.9 LIPID DISORDER: ICD-10-CM

## 2021-06-04 DIAGNOSIS — I10 BENIGN ESSENTIAL HYPERTENSION: Primary | ICD-10-CM

## 2021-06-04 DIAGNOSIS — G89.29 CHRONIC BILATERAL LOW BACK PAIN, UNSPECIFIED WHETHER SCIATICA PRESENT: ICD-10-CM

## 2021-06-04 PROCEDURE — T1015 CLINIC SERVICE: HCPCS | Performed by: FAMILY MEDICINE

## 2021-06-04 PROCEDURE — 3074F SYST BP LT 130 MM HG: CPT | Performed by: FAMILY MEDICINE

## 2021-06-04 PROCEDURE — 1160F RVW MEDS BY RX/DR IN RCRD: CPT | Performed by: FAMILY MEDICINE

## 2021-06-04 PROCEDURE — 99214 OFFICE O/P EST MOD 30 MIN: CPT | Performed by: FAMILY MEDICINE

## 2021-06-04 PROCEDURE — 3008F BODY MASS INDEX DOCD: CPT | Performed by: FAMILY MEDICINE

## 2021-06-04 PROCEDURE — 3078F DIAST BP <80 MM HG: CPT | Performed by: FAMILY MEDICINE

## 2021-06-04 PROCEDURE — 1036F TOBACCO NON-USER: CPT | Performed by: FAMILY MEDICINE

## 2021-06-05 NOTE — ASSESSMENT & PLAN NOTE
I reviewed with pt  Appears to be worsening  Will refer back to PT but consider re-eval by ortho if not  Improving in 2 weeks

## 2021-06-05 NOTE — ASSESSMENT & PLAN NOTE
Well controlled  Cont present treatment  Monitor labs  Counseled re: diet, exercise and weight loss effects on BP  BMI Counseling: Body mass index is 28 65 kg/m²  The BMI is above normal  Nutrition recommendations include moderation in carbohydrate intake, increasing intake of lean protein, reducing intake of saturated fat and trans fat and reducing intake of cholesterol    Recheck 6m   Recheck 6m

## 2021-06-05 NOTE — ASSESSMENT & PLAN NOTE
Exam unremarkable  Most of R leg pain seems to be coming from R hip  Continue to monitor   Refer to PT

## 2021-06-08 ENCOUNTER — EVALUATION (OUTPATIENT)
Dept: PHYSICAL THERAPY | Facility: MEDICAL CENTER | Age: 71
End: 2021-06-08
Payer: COMMERCIAL

## 2021-06-08 DIAGNOSIS — M16.11 LOCALIZED OSTEOARTHROSIS OF RIGHT HIP: ICD-10-CM

## 2021-06-08 DIAGNOSIS — G89.29 CHRONIC BILATERAL LOW BACK PAIN, UNSPECIFIED WHETHER SCIATICA PRESENT: ICD-10-CM

## 2021-06-08 DIAGNOSIS — M54.50 CHRONIC BILATERAL LOW BACK PAIN, UNSPECIFIED WHETHER SCIATICA PRESENT: ICD-10-CM

## 2021-06-08 PROCEDURE — 97161 PT EVAL LOW COMPLEX 20 MIN: CPT | Performed by: PHYSICAL THERAPIST

## 2021-06-08 PROCEDURE — 97110 THERAPEUTIC EXERCISES: CPT | Performed by: PHYSICAL THERAPIST

## 2021-06-08 NOTE — PROGRESS NOTES
PT Evaluation     Today's date: 2021  Patient name: Van Alejandre  : 1950  MRN: [id number]  Referring provider: Sharon Elliott*  Dx:   Encounter Diagnosis     ICD-10-CM    1  Localized osteoarthrosis of right hip  M16 11 Ambulatory referral to Physical Therapy   2  Chronic bilateral low back pain, unspecified whether sciatica present  M54 5 Ambulatory referral to Physical Therapy    G89 29        Start Time: 0810  Stop Time: 842  Total time in clinic (min): 32 minutes    Assessment  Assessment details: Pt is a 78 y o male who presents with increased R hip pain, decreased b/l hip ROM, decreased LE strength, and decreased activity tolerance secondary to symptoms  These impairments limit the patient from participating in prolonged standing and walking, decreased tolerance to complete yard work and decreased ability to participate in the community  Pt was educated about the use of SPC and its benefit on acute pain of the R hip  I believe this patient is a good candidate for and will benefit from skilled physical therapy for LE strengthening exercises, manual therapy to the R hip, LE ROM exercises and mechanics training to improve symptoms and assist the patient to return to PLOF  Positive Prognostic Indicators: good tolerance and success with previous PT    Negative Prognostic Indicators: chronic reoccurring condition    Impairments: abnormal or restricted ROM, abnormal movement, activity intolerance, impaired physical strength, lacks appropriate home exercise program and pain with function    Symptom irritability: lowUnderstanding of Dx/Px/POC: good   Prognosis: good    Goals  STGs: 4 weeks  1) Pt will have SPR decrease of 2 units at rest  2) pt will have improved R hip abduction strength to 5/5  3) pt will have improved foto score of 10 points    LTGs: 8 weeks  1) pt will be independent with HEP by D/C  2) pt will be independent with symptom management by D/C  3) pt will report no more than 1/10 pain in the R hip with prolonged walking in order to improve activity tolerance by DC  Plan  Patient would benefit from: skilled physical therapy  Planned modality interventions: cryotherapy and thermotherapy: hydrocollator packs  Planned therapy interventions: joint mobilization, manual therapy, neuromuscular re-education, patient education, strengthening, stretching, therapeutic activities, therapeutic exercise and home exercise program  Frequency: 2x week  Duration in weeks: 8  Plan of Care beginning date: 6/8/2021  Plan of Care expiration date: 8/3/2021  Treatment plan discussed with: patient        Subjective Evaluation    History of Present Illness  Mechanism of injury: DOO: about a week ago  JULIAN: insidious gradual onset      Subjective Comments: pt reports that he has b/l OA of his hips but his R hip has gotten worse sine Wednesday  Today he reports improved symptoms over the past couple days  Pt reports increased difficulty sleeping  He reports good outcomes from PT  He has seen orthopedic surgeons in the past as well  Pt reports that he has been trying some light exercises but does not know if this is contributing to pain  He is taking meloxicam and using Voltaren gel which is helping  Pt reports that most pain is when standing for 6+ hours  This pain will start in back  However this time he had acute pain in the lateral aspect of the hip  Sitting improves symptoms  Denies N&T in legs  Pain   Rest: 3-4/10   Best: 1/10   Worst: 8/10    Relieving Factors: sitting, medication and gel    Exacerbating Factors: prolonged standing and walking     Sleeping: side sleeper  Uses knee pillow with good success    Home Set-up: independent with stair and chairs     ADLs:  Independent     Work/Hobbies: fishing, bike riding, gardening yard work    Previous Treatment: previous PT    Goals:  Decrease pain, improve ROM and flexibility             Objective     Palpation   Left   Tenderness of the piriformis  Right   Tenderness of the gluteus medius and piriformis  Trigger point to gluteus medius  Active Range of Motion   Left Hip   Flexion: 95 degrees   Extension: 10 degrees   Abduction: 25 degrees     Right Hip   Flexion: 95 degrees   Extension: 10 degrees   Abduction: 25 degrees     Passive Range of Motion   Left Hip   Flexion: 100 degrees   Abduction: 25 degrees   External rotation (90/90): 20 degrees   Internal rotation (90/90): 5 degrees     Right Hip   Flexion: 97 degrees   Abduction: 25 degrees   External rotation (90/90): 20 degrees   Internal rotation (90/90): 5 degrees     Strength/Myotome Testing     Left Hip   Planes of Motion   Flexion: 4+  Extension: 4+  Abduction: 4+  Adduction: 5    Right Hip   Planes of Motion   Flexion: 4+  Extension: 4+  Abduction: 4+  Adduction: 5    Left Knee   Flexion: 4+  Extension: 4+    Right Knee   Flexion: 4+  Extension: 4+    Left Ankle/Foot   Dorsiflexion: 5  Plantar flexion: 5    Right Ankle/Foot   Dorsiflexion: 5  Plantar flexion: 5    Ambulation     Ambulation: Level Surfaces   Ambulation without assistive device: independent    Ambulation: Stairs   Ascend stairs: independent  Pattern: reciprocal  Railings: without rails  Descend stairs: independent  Pattern: reciprocal  Railings: without rails    Functional Assessment      Squat    Left within functional limits and right within functional limits  Precautions: GERD, HTN, OA      Manuals 6/8            PROM R hip                                                    Neuro Re-Ed             bridges x10            Supine clamshells             SLS             Side stepping             Monster walks                                       Ther Ex             Mini squats x10            Standing hip abd/ext x10 ea              SKC (glute stretch) 5" x10             Step ups             Lateral step ups             bike                                       Ther Activity Gait Training                                       Modalities

## 2021-06-11 ENCOUNTER — OFFICE VISIT (OUTPATIENT)
Dept: PHYSICAL THERAPY | Facility: MEDICAL CENTER | Age: 71
End: 2021-06-11
Payer: COMMERCIAL

## 2021-06-11 DIAGNOSIS — G89.29 CHRONIC BILATERAL LOW BACK PAIN, UNSPECIFIED WHETHER SCIATICA PRESENT: ICD-10-CM

## 2021-06-11 DIAGNOSIS — M16.11 LOCALIZED OSTEOARTHROSIS OF RIGHT HIP: Primary | ICD-10-CM

## 2021-06-11 DIAGNOSIS — M54.50 CHRONIC BILATERAL LOW BACK PAIN, UNSPECIFIED WHETHER SCIATICA PRESENT: ICD-10-CM

## 2021-06-11 PROCEDURE — 97140 MANUAL THERAPY 1/> REGIONS: CPT | Performed by: PHYSICAL THERAPIST

## 2021-06-11 PROCEDURE — 97110 THERAPEUTIC EXERCISES: CPT | Performed by: PHYSICAL THERAPIST

## 2021-06-11 PROCEDURE — 97112 NEUROMUSCULAR REEDUCATION: CPT | Performed by: PHYSICAL THERAPIST

## 2021-06-11 NOTE — PROGRESS NOTES
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 and did multiple trips up and down stairs with no complaints  Objective: See treatment diary below      Assessment: Tolerated treatment well  Pt completed all exercises well with minimal complatins of discomfort in his R hip  HEP progressed  Continue to progress as tolerated  Patient would benefit from continued PT      Plan: Continue per plan of care  Precautions: GERD, HTN, OA      Manuals 6/8 6/10           PROM R hip  RK           LAD R hip  RK           Lateral distraction R hip  RK                        Neuro Re-Ed             bridges x10 2x10           Supine clamshells  2x10 gtb           SLS  30"x3 ea  Side stepping  gtb 3 laps           Monster walks                                       Ther Ex             Mini squats x10 2x10           Standing hip abd/ext x10 ea  2x10 ea  SKC (glute stretch) 5" x10  5" x10           Step ups  6" x10 ea             Lateral step ups  6" x10 ea            bike  5'                                     Ther Activity                                       Gait Training                                       Modalities

## 2021-06-16 ENCOUNTER — OFFICE VISIT (OUTPATIENT)
Dept: PHYSICAL THERAPY | Facility: MEDICAL CENTER | Age: 71
End: 2021-06-16
Payer: COMMERCIAL

## 2021-06-16 DIAGNOSIS — G89.29 CHRONIC BILATERAL LOW BACK PAIN, UNSPECIFIED WHETHER SCIATICA PRESENT: ICD-10-CM

## 2021-06-16 DIAGNOSIS — M16.9 LOCALIZED OSTEOARTHRITIS OF HIP: Primary | ICD-10-CM

## 2021-06-16 DIAGNOSIS — M54.50 CHRONIC BILATERAL LOW BACK PAIN, UNSPECIFIED WHETHER SCIATICA PRESENT: ICD-10-CM

## 2021-06-16 PROCEDURE — 97110 THERAPEUTIC EXERCISES: CPT | Performed by: PHYSICAL THERAPIST

## 2021-06-16 PROCEDURE — 97112 NEUROMUSCULAR REEDUCATION: CPT | Performed by: PHYSICAL THERAPIST

## 2021-06-16 PROCEDURE — 97140 MANUAL THERAPY 1/> REGIONS: CPT | Performed by: PHYSICAL THERAPIST

## 2021-06-16 NOTE — PROGRESS NOTES
Daily Note     Today's date: 2021  Patient name: Tino Luciano  : 1950  MRN: [id number]  Referring provider: Maritza Rockwell*  Dx:   Encounter Diagnosis     ICD-10-CM    1  Localized osteoarthritis of hip  M16 9    2  Chronic bilateral low back pain, unspecified whether sciatica present  M54 5     G89 29        Start Time: 0932  Stop Time: 1020  Total time in clinic (min): 48 minutes    Subjective: Patient has noticed decreased pain due to therapy and maintaining HEP  Patient noted feeling slight tightness in the R hip but no pain  Patient stated he feels good with the exercises and feels significant improvements since the beginning of treatment  Objective: See treatment diary below      Assessment: Tolerated treatment well  Patient completed all exercises with no pain  Patient completed new exercise with good mechanics  Patient would benefit from continued PT  Patient feels comfortable with the HEP and would like to make next week his last week  Plan: Continue treatment as planned     Precautions: GERD, HTN, OA    Treatment session was completed by KEITH Choi under the direct supervision of Uma Dumont PT, DPT for the entirety of session  Manuals 6/8 6/10 6/16          PROM R hip  RK MK SPT          LAD R hip  RK MK SPT          Lateral distraction R hip  RK DC                       Neuro Re-Ed             bridges x10 2x10 2x10          Supine clamshells  2x10 gtb 2x10          SLS  30"x3 ea  30"x3          Side stepping  gtb 3 laps gtb 3 laps          Monster walks   gtb 3 laps                                    Ther Ex             Mini squats x10 2x10 2x10          Standing hip abd/ext x10 ea  2x10 ea  2x10 ea  SKC (glute stretch) 5" x10  5" x10 5" x10          Step ups  6" x10 ea  6"x10 ea            Lateral step ups  6" x10 ea  6"x10 ea           bike  5' 5'                                    Ther Activity                                       Gait Training                                       Modalities

## 2021-06-23 ENCOUNTER — OFFICE VISIT (OUTPATIENT)
Dept: PHYSICAL THERAPY | Facility: MEDICAL CENTER | Age: 71
End: 2021-06-23
Payer: COMMERCIAL

## 2021-06-23 DIAGNOSIS — M16.9 LOCALIZED OSTEOARTHRITIS OF HIP: Primary | ICD-10-CM

## 2021-06-23 DIAGNOSIS — M54.50 CHRONIC BILATERAL LOW BACK PAIN, UNSPECIFIED WHETHER SCIATICA PRESENT: ICD-10-CM

## 2021-06-23 DIAGNOSIS — G89.29 CHRONIC BILATERAL LOW BACK PAIN, UNSPECIFIED WHETHER SCIATICA PRESENT: ICD-10-CM

## 2021-06-23 PROCEDURE — 97110 THERAPEUTIC EXERCISES: CPT | Performed by: PHYSICAL THERAPIST

## 2021-06-23 PROCEDURE — 97140 MANUAL THERAPY 1/> REGIONS: CPT | Performed by: PHYSICAL THERAPIST

## 2021-06-23 NOTE — PROGRESS NOTES
Daily Note     Today's date: 2021  Patient name: Ruthy Welch  : 1950  MRN: [id number]  Referring provider: Wendy Braden*  Dx:   Encounter Diagnosis     ICD-10-CM    1  Localized osteoarthritis of hip  M16 9    2  Chronic bilateral low back pain, unspecified whether sciatica present  M54 5     G89 29        Start Time: 0933  Stop Time: 1017  Total time in clinic (min): 44 minutes    Subjective: Patient stated his R hip was feeling sore this morning secondary to a lot of work over the weekend  Patient reported back stiffness from being bent over too long  Patient reports he feels comfortable with HEP and wants to make today his last visit  Pt reports that his pain has improved since beginning and sightly improved hip ROM  When questioned, pt reports no limitations functionally  Objective: See treatment diary below  ROM hip (active/passive)  Flexion:             R 100*/105*  Extension:         R 6*/12*  Abduction:         R16*- passive  Adduction:         R9*- passive   IR:                     R 14*/19*  ER:                    R 26*/34*    MMT  5/5 globally    Lumbar ROM:  Flexion: minimally limited  Extension: moderately limited    Lumbar joint mobility:  WNL L1-L5 no pain    Pain ratings  Rest: 2/10  Best: 10  Worst: 4/10      Assessment: Tolerated treatment well  Patient completed exercises with no pain  Patient was able to complete stairs reciprocally with one railing, squat, and complete step up with no complications besides tightness  Patient notes no functional limitations outside of therapy  Patient states he feels comfortable and confident with HEP  At this time, patient DC from PT  Will leave chart open for 2 weeks pending onset of adverse symptoms  Patient would benefit from continued PT        Plan: DC from PT     Precautions: GERD, HTN, OA    Treatment session was completed by KEITH Maguire under the direct supervision of Eli Putnam PT, DPT for the entirety of session  Manuals 6/8 6/10 6/16 6/23         PROM R hip  RK [address] SPT [address] SPT         LAD R hip  RK [address]PT [address] SPT         Lateral distraction R hip  RK DC                       Neuro Re-Ed             bridges x10 2x10 2x10          Supine clamshells  2x10 gtb 2x10          SLS  30"x3 ea  30"x3          Side stepping  gtb 3 laps gtb 3 laps btb 4 laps         Monster walks   gtb 3 laps                                    Ther Ex             Mini squats x10 2x10 2x10 2x10         Standing hip abd/ext x10 ea  2x10 ea  2x10 ea  SKC (glute stretch) 5" x10  5" x10 5" x10          Step ups  6" x10 ea  6"x10 ea  8"x10 ea           Lateral step ups  6" x10 ea  6"x10 ea  8"x10 ea          bike  5' 5' 5'                                   Ther Activity                                       Gait Training                                       Modalities

## 2021-06-30 ENCOUNTER — APPOINTMENT (OUTPATIENT)
Dept: PHYSICAL THERAPY | Facility: MEDICAL CENTER | Age: 71
End: 2021-06-30
Payer: COMMERCIAL

## 2021-07-19 ENCOUNTER — APPOINTMENT (OUTPATIENT)
Dept: LAB | Facility: MEDICAL CENTER | Age: 71
End: 2021-07-19
Payer: COMMERCIAL

## 2021-07-19 DIAGNOSIS — K25.3 ACUTE GASTRIC ULCER WITHOUT HEMORRHAGE OR PERFORATION: ICD-10-CM

## 2021-07-19 PROCEDURE — 87338 HPYLORI STOOL AG IA: CPT

## 2021-07-20 LAB — H PYLORI AG STL QL IA: NEGATIVE

## 2021-10-12 ENCOUNTER — RX CHECK (OUTPATIENT)
Dept: URBAN - METROPOLITAN AREA CLINIC 6 | Facility: CLINIC | Age: 71
End: 2021-10-12

## 2021-10-12 DIAGNOSIS — H52.03: ICD-10-CM

## 2021-10-12 DIAGNOSIS — H52.4: ICD-10-CM

## 2021-10-12 DIAGNOSIS — H52.203: ICD-10-CM

## 2021-10-12 PROCEDURE — G8428 CUR MEDS NOT DOCUMENT: HCPCS

## 2021-10-12 PROCEDURE — 92012 INTRM OPH EXAM EST PATIENT: CPT | Mod: NC

## 2021-10-12 ASSESSMENT — VISUAL ACUITY
OD_CC: 20/25-1
OD_CC: J1
OS_CC: J1
OS_CC: 20/25-1

## 2021-10-26 ENCOUNTER — FOLLOW UP (OUTPATIENT)
Dept: URBAN - METROPOLITAN AREA CLINIC 6 | Facility: CLINIC | Age: 71
End: 2021-10-26

## 2021-10-26 DIAGNOSIS — H40.023: ICD-10-CM

## 2021-10-26 PROCEDURE — 92133 CPTRZD OPH DX IMG PST SGM ON: CPT

## 2021-10-26 PROCEDURE — 92014 COMPRE OPH EXAM EST PT 1/>: CPT

## 2021-10-26 PROCEDURE — 92202 OPSCPY EXTND ON/MAC DRAW: CPT

## 2021-10-26 PROCEDURE — G8427 DOCREV CUR MEDS BY ELIG CLIN: HCPCS

## 2021-10-26 ASSESSMENT — TONOMETRY
OD_IOP_MMHG: 18
OS_IOP_MMHG: 22

## 2021-10-26 ASSESSMENT — VISUAL ACUITY
OU_CC: J1+
OD_CC: 20/30-1
OS_CC: 20/20-2

## 2021-12-17 ENCOUNTER — OFFICE VISIT (OUTPATIENT)
Dept: FAMILY MEDICINE CLINIC | Facility: CLINIC | Age: 71
End: 2021-12-17
Payer: COMMERCIAL

## 2021-12-17 VITALS
HEART RATE: 74 BPM | DIASTOLIC BLOOD PRESSURE: 80 MMHG | WEIGHT: 194 LBS | BODY MASS INDEX: 28.73 KG/M2 | HEIGHT: 69 IN | SYSTOLIC BLOOD PRESSURE: 118 MMHG | TEMPERATURE: 98.1 F

## 2021-12-17 DIAGNOSIS — N40.1 BENIGN PROSTATIC HYPERPLASIA WITH URINARY HESITANCY: ICD-10-CM

## 2021-12-17 DIAGNOSIS — Z11.59 NEED FOR HEPATITIS C SCREENING TEST: ICD-10-CM

## 2021-12-17 DIAGNOSIS — I25.10 ATHEROSCLEROSIS OF NATIVE CORONARY ARTERY OF NATIVE HEART WITHOUT ANGINA PECTORIS: ICD-10-CM

## 2021-12-17 DIAGNOSIS — R39.11 BENIGN PROSTATIC HYPERPLASIA WITH URINARY HESITANCY: ICD-10-CM

## 2021-12-17 DIAGNOSIS — M25.522 LEFT ELBOW PAIN: ICD-10-CM

## 2021-12-17 DIAGNOSIS — I10 BENIGN ESSENTIAL HYPERTENSION: ICD-10-CM

## 2021-12-17 DIAGNOSIS — Z00.00 MEDICARE ANNUAL WELLNESS VISIT, SUBSEQUENT: Primary | ICD-10-CM

## 2021-12-17 PROCEDURE — 1036F TOBACCO NON-USER: CPT | Performed by: FAMILY MEDICINE

## 2021-12-17 PROCEDURE — 3079F DIAST BP 80-89 MM HG: CPT | Performed by: FAMILY MEDICINE

## 2021-12-17 PROCEDURE — G0439 PPPS, SUBSEQ VISIT: HCPCS | Performed by: FAMILY MEDICINE

## 2021-12-17 PROCEDURE — 1125F AMNT PAIN NOTED PAIN PRSNT: CPT | Performed by: FAMILY MEDICINE

## 2021-12-17 PROCEDURE — 1160F RVW MEDS BY RX/DR IN RCRD: CPT | Performed by: FAMILY MEDICINE

## 2021-12-17 PROCEDURE — 3074F SYST BP LT 130 MM HG: CPT | Performed by: FAMILY MEDICINE

## 2021-12-17 PROCEDURE — 3288F FALL RISK ASSESSMENT DOCD: CPT | Performed by: FAMILY MEDICINE

## 2021-12-17 PROCEDURE — 3725F SCREEN DEPRESSION PERFORMED: CPT | Performed by: FAMILY MEDICINE

## 2021-12-17 PROCEDURE — 3008F BODY MASS INDEX DOCD: CPT | Performed by: FAMILY MEDICINE

## 2021-12-17 PROCEDURE — 99214 OFFICE O/P EST MOD 30 MIN: CPT | Performed by: FAMILY MEDICINE

## 2021-12-17 PROCEDURE — 1170F FXNL STATUS ASSESSED: CPT | Performed by: FAMILY MEDICINE

## 2021-12-17 RX ORDER — TAMSULOSIN HYDROCHLORIDE 0.4 MG/1
0.4 CAPSULE ORAL
Qty: 30 CAPSULE | Refills: 5 | Status: SHIPPED | OUTPATIENT
Start: 2021-12-17 | End: 2022-06-01 | Stop reason: SDUPTHER

## 2022-01-13 ENCOUNTER — APPOINTMENT (OUTPATIENT)
Dept: LAB | Facility: MEDICAL CENTER | Age: 72
End: 2022-01-13
Payer: COMMERCIAL

## 2022-01-13 ENCOUNTER — APPOINTMENT (OUTPATIENT)
Dept: RADIOLOGY | Facility: MEDICAL CENTER | Age: 72
End: 2022-01-13
Payer: COMMERCIAL

## 2022-01-13 DIAGNOSIS — I10 BENIGN ESSENTIAL HYPERTENSION: ICD-10-CM

## 2022-01-13 DIAGNOSIS — Z11.59 NEED FOR HEPATITIS C SCREENING TEST: ICD-10-CM

## 2022-01-13 DIAGNOSIS — M25.522 LEFT ELBOW PAIN: ICD-10-CM

## 2022-01-13 LAB
ALBUMIN SERPL BCP-MCNC: 4.3 G/DL (ref 3.5–5)
ALP SERPL-CCNC: 80 U/L (ref 46–116)
ALT SERPL W P-5'-P-CCNC: 24 U/L (ref 12–78)
ANION GAP SERPL CALCULATED.3IONS-SCNC: 6 MMOL/L (ref 4–13)
AST SERPL W P-5'-P-CCNC: 20 U/L (ref 5–45)
BASOPHILS # BLD AUTO: 0.05 THOUSANDS/ΜL (ref 0–0.1)
BASOPHILS NFR BLD AUTO: 1 % (ref 0–1)
BILIRUB SERPL-MCNC: 0.54 MG/DL (ref 0.2–1)
BUN SERPL-MCNC: 13 MG/DL (ref 5–25)
CALCIUM SERPL-MCNC: 9.5 MG/DL (ref 8.3–10.1)
CHLORIDE SERPL-SCNC: 110 MMOL/L (ref 100–108)
CHOLEST SERPL-MCNC: 124 MG/DL
CO2 SERPL-SCNC: 23 MMOL/L (ref 21–32)
CREAT SERPL-MCNC: 0.93 MG/DL (ref 0.6–1.3)
EOSINOPHIL # BLD AUTO: 0.26 THOUSAND/ΜL (ref 0–0.61)
EOSINOPHIL NFR BLD AUTO: 4 % (ref 0–6)
ERYTHROCYTE [DISTWIDTH] IN BLOOD BY AUTOMATED COUNT: 13.2 % (ref 11.6–15.1)
GFR SERPL CREATININE-BSD FRML MDRD: 83 ML/MIN/1.73SQ M
GLUCOSE P FAST SERPL-MCNC: 102 MG/DL (ref 65–99)
HCT VFR BLD AUTO: 43.8 % (ref 36.5–49.3)
HCV AB SER QL: NORMAL
HDLC SERPL-MCNC: 54 MG/DL
HGB BLD-MCNC: 14.6 G/DL (ref 12–17)
IMM GRANULOCYTES # BLD AUTO: 0.02 THOUSAND/UL (ref 0–0.2)
IMM GRANULOCYTES NFR BLD AUTO: 0 % (ref 0–2)
LDLC SERPL CALC-MCNC: 59 MG/DL (ref 0–100)
LYMPHOCYTES # BLD AUTO: 1.55 THOUSANDS/ΜL (ref 0.6–4.47)
LYMPHOCYTES NFR BLD AUTO: 25 % (ref 14–44)
MCH RBC QN AUTO: 31.2 PG (ref 26.8–34.3)
MCHC RBC AUTO-ENTMCNC: 33.3 G/DL (ref 31.4–37.4)
MCV RBC AUTO: 94 FL (ref 82–98)
MONOCYTES # BLD AUTO: 0.55 THOUSAND/ΜL (ref 0.17–1.22)
MONOCYTES NFR BLD AUTO: 9 % (ref 4–12)
NEUTROPHILS # BLD AUTO: 3.89 THOUSANDS/ΜL (ref 1.85–7.62)
NEUTS SEG NFR BLD AUTO: 61 % (ref 43–75)
NONHDLC SERPL-MCNC: 70 MG/DL
NRBC BLD AUTO-RTO: 0 /100 WBCS
PLATELET # BLD AUTO: 220 THOUSANDS/UL (ref 149–390)
PMV BLD AUTO: 9.9 FL (ref 8.9–12.7)
POTASSIUM SERPL-SCNC: 4.6 MMOL/L (ref 3.5–5.3)
PROT SERPL-MCNC: 7.9 G/DL (ref 6.4–8.2)
RBC # BLD AUTO: 4.68 MILLION/UL (ref 3.88–5.62)
SODIUM SERPL-SCNC: 139 MMOL/L (ref 136–145)
TRIGL SERPL-MCNC: 54 MG/DL
WBC # BLD AUTO: 6.32 THOUSAND/UL (ref 4.31–10.16)

## 2022-01-13 PROCEDURE — 85025 COMPLETE CBC W/AUTO DIFF WBC: CPT

## 2022-01-13 PROCEDURE — 80061 LIPID PANEL: CPT

## 2022-01-13 PROCEDURE — 86803 HEPATITIS C AB TEST: CPT

## 2022-01-13 PROCEDURE — 36415 COLL VENOUS BLD VENIPUNCTURE: CPT

## 2022-01-13 PROCEDURE — 80053 COMPREHEN METABOLIC PANEL: CPT

## 2022-01-13 PROCEDURE — 73080 X-RAY EXAM OF ELBOW: CPT

## 2022-02-22 ENCOUNTER — IOP CHECK (OUTPATIENT)
Dept: URBAN - METROPOLITAN AREA CLINIC 6 | Facility: CLINIC | Age: 72
End: 2022-02-22

## 2022-02-22 DIAGNOSIS — H16.223: ICD-10-CM

## 2022-02-22 DIAGNOSIS — H40.023: ICD-10-CM

## 2022-02-22 DIAGNOSIS — H25.813: ICD-10-CM

## 2022-02-22 PROCEDURE — 92012 INTRM OPH EXAM EST PATIENT: CPT

## 2022-02-22 PROCEDURE — 92083 EXTENDED VISUAL FIELD XM: CPT

## 2022-02-22 ASSESSMENT — VISUAL ACUITY
OD_CC: 20/30-1
OS_CC: 20/25-1
OU_CC: J1+

## 2022-02-22 ASSESSMENT — TONOMETRY
OS_IOP_MMHG: 16
OD_IOP_MMHG: 25
OD_IOP_MMHG: 18
OS_IOP_MMHG: 24

## 2022-03-29 DIAGNOSIS — I10 ESSENTIAL HYPERTENSION: ICD-10-CM

## 2022-03-29 RX ORDER — LISINOPRIL 10 MG/1
10 TABLET ORAL DAILY
Qty: 90 TABLET | Refills: 3 | Status: SHIPPED | OUTPATIENT
Start: 2022-03-29 | End: 2022-06-01 | Stop reason: SDUPTHER

## 2022-04-04 ENCOUNTER — TELEPHONE (OUTPATIENT)
Dept: OBGYN CLINIC | Facility: HOSPITAL | Age: 72
End: 2022-04-04

## 2022-04-04 NOTE — TELEPHONE ENCOUNTER
Hello,  Please advise if the following patient can be forced onto the schedule:    Jim Herrera    :1950    [facility]:[id number]    Call back #:[phone]    Insurance:blue cross /blue jourabdirahman    Reason for appointment:lt thumb/trigger fingers    Requested doctor/location:[facility]/[place]    Thank you    Silver Whitaker

## 2022-05-19 ENCOUNTER — OFFICE VISIT (OUTPATIENT)
Dept: OBGYN CLINIC | Facility: CLINIC | Age: 72
End: 2022-05-19
Payer: COMMERCIAL

## 2022-05-19 ENCOUNTER — APPOINTMENT (OUTPATIENT)
Dept: RADIOLOGY | Facility: AMBULARY SURGERY CENTER | Age: 72
End: 2022-05-19
Attending: SURGERY
Payer: COMMERCIAL

## 2022-05-19 VITALS
WEIGHT: 194.2 LBS | HEART RATE: 64 BPM | SYSTOLIC BLOOD PRESSURE: 116 MMHG | HEIGHT: 69 IN | BODY MASS INDEX: 28.76 KG/M2 | DIASTOLIC BLOOD PRESSURE: 69 MMHG

## 2022-05-19 DIAGNOSIS — G56.02 CARPAL TUNNEL SYNDROME ON LEFT: ICD-10-CM

## 2022-05-19 DIAGNOSIS — M18.12 ARTHRITIS OF CARPOMETACARPAL (CMC) JOINT OF LEFT THUMB: Primary | ICD-10-CM

## 2022-05-19 DIAGNOSIS — M19.022 PRIMARY OSTEOARTHRITIS OF LEFT ELBOW: ICD-10-CM

## 2022-05-19 DIAGNOSIS — M65.332 TRIGGER MIDDLE FINGER OF LEFT HAND: ICD-10-CM

## 2022-05-19 DIAGNOSIS — M79.642 LEFT HAND PAIN: ICD-10-CM

## 2022-05-19 DIAGNOSIS — M65.342 TRIGGER FINGER, LEFT RING FINGER: ICD-10-CM

## 2022-05-19 PROCEDURE — 3074F SYST BP LT 130 MM HG: CPT | Performed by: SURGERY

## 2022-05-19 PROCEDURE — 1160F RVW MEDS BY RX/DR IN RCRD: CPT | Performed by: SURGERY

## 2022-05-19 PROCEDURE — 3078F DIAST BP <80 MM HG: CPT | Performed by: SURGERY

## 2022-05-19 PROCEDURE — 99215 OFFICE O/P EST HI 40 MIN: CPT | Performed by: SURGERY

## 2022-05-19 PROCEDURE — 1036F TOBACCO NON-USER: CPT | Performed by: SURGERY

## 2022-05-19 PROCEDURE — 73130 X-RAY EXAM OF HAND: CPT

## 2022-05-19 PROCEDURE — 20600 DRAIN/INJ JOINT/BURSA W/O US: CPT | Performed by: SURGERY

## 2022-05-19 PROCEDURE — 3008F BODY MASS INDEX DOCD: CPT | Performed by: SURGERY

## 2022-05-19 RX ORDER — TRIAMCINOLONE ACETONIDE 40 MG/ML
20 INJECTION, SUSPENSION INTRA-ARTICULAR; INTRAMUSCULAR
Status: COMPLETED | OUTPATIENT
Start: 2022-05-19 | End: 2022-05-19

## 2022-05-19 RX ORDER — LIDOCAINE HYDROCHLORIDE 10 MG/ML
1 INJECTION, SOLUTION INFILTRATION; PERINEURAL
Status: COMPLETED | OUTPATIENT
Start: 2022-05-19 | End: 2022-05-19

## 2022-05-19 RX ORDER — BUPIVACAINE HYDROCHLORIDE 2.5 MG/ML
0.5 INJECTION, SOLUTION INFILTRATION; PERINEURAL
Status: COMPLETED | OUTPATIENT
Start: 2022-05-19 | End: 2022-05-19

## 2022-05-19 RX ADMIN — TRIAMCINOLONE ACETONIDE 20 MG: 40 INJECTION, SUSPENSION INTRA-ARTICULAR; INTRAMUSCULAR at 10:10

## 2022-05-19 RX ADMIN — LIDOCAINE HYDROCHLORIDE 1 ML: 10 INJECTION, SOLUTION INFILTRATION; PERINEURAL at 10:10

## 2022-05-19 RX ADMIN — BUPIVACAINE HYDROCHLORIDE 0.5 ML: 2.5 INJECTION, SOLUTION INFILTRATION; PERINEURAL at 10:10

## 2022-05-19 NOTE — PROGRESS NOTES
Akua PONCE  Attending, Orthopaedic Surgery  Hand, Wrist, and Elbow Surgery  [facility]      ORTHOPAEDIC HAND, WRIST, AND ELBOW OFFICE  VISIT       ASSESSMENT/PLAN:      72 y/o male with left thumb CMC arthritis, which is most painful today as well as trigger fingers of the long and ring finger, carpal tunnel syndrome and left elbow osteoarthritis  Patient was provided with a cortisone injection today in the office into his left [facility] joint  He tolerated the injection well and it is documented approprietly below  He was also given a comfort cool brace and prescription for Voltaren Gel to apply prn for pain relief  As for his possible carpal tunnel syndrome, we will obtain an US to further evaluate for this diagnosis  Explained to the patient that as this would be a revision surgery if he does again have carpal tunnel it may not be as effective as his first surgery  He understood and all questions were answered  At this time, his left elbow and trigger finger symptoms are tolerable on a daily basis so he wishes to continue with bracing of the trigger fingers and activity modification for his left elbow  The patient verbalized understanding of exam findings and treatment plan  We engaged in the shared decision-making process and treatment options were discussed at length with the patient  Surgical and conservative management discussed today along with risks and benefits  Diagnoses and all orders for this visit:    Arthritis of carpometacarpal [place]) joint of left thumb    Carpal tunnel syndrome on left    Trigger middle finger of left hand    Trigger finger, left ring finger    Primary osteoarthritis of left elbow        Follow Up:  Return for discussion of US results  To Do Next Visit:  Re-evaluation of current issue      General Discussions:  [facility] Arthritis: The anatomy and physiology of carpometacarpal joint arthritis was discussed with the patient today in the office  Deterioration of the articular cartilage eventually leads to hypermobility at the thumb [address] joint, resulting in joint subluxation, osteophyte formation, cystic changes within the trapezium and base of the first metacarpal, as well as subchondral sclerosis  Eventually, pain, limited mobility, and compensatory hyperextension at the metacarpophalangeal joint may develop  While normal activity and usage of the thumb joint may provide a painful experience to the patient, this typically does not result in damage to the thumb or hand  Treatment options include resting thumb spica splints to decreased joint edema, pain, and inflammation  Therapy exercises to strengthen the thenar musculature may relieve pain, but do not alter the overall continued development of osteoarthritis  Oral medications, topical medications, corticosteroid injections may decrease pain and increase overall function  Eventually, approximately 5% of patients may require surgical intervention  Trigger Finger: The anatomy and physiology of trigger finger was discussed with the patient today in the office  Edema and increased contact pressure within the flexor tendons at the A1 pulley can cause pain, crepitation, and triggering or locking of the digit resulting in limitation of function  Symptoms can occur at anytime but are typically worse in the morning or after a brief rest from repetitive activity  Treatment options include resting/nighttime MP blocking splints to decrease edema, oral anti-inflammatory medications, home or formal therapy exercises, up to 2 steroid injections within the tendon sheath, or surgical release  While majority of patients do respond to conservative treatment, up to 20% may require surgical release  ____________________________________________________________________________________________________________________________________________      CHIEF COMPLAINT:  Chief Complaint   Patient presents with    Left Thumb - Pain, Numbness, Tingling    Left Hand - Pain       SUBJECTIVE:  Mervat Izquierdo is a 73y o  year old RHD male who presents today for an initial evaluation for his left thumb, trigger fingers of the long and ring fingers as well as left elbow pain  Patient reports a hyperextension injury to his elbow about 2 years  He denies any acute injury to the long and ring fingers  He does have a brace for his trigger fingers which he states does provide him with benefit at night  His elbow pain is intermittent in timing and not on a daily basis  He also mentions numbness and tingling into his thumb, index and long finger on the left  He does have a history of a carpal tunnel release years ago with Dr Agnes Negron at [facility]  Pain is worse with gripping activities         Pain/symptom timing:  Worse during the day when active about the thumb  Pain/symptom context:  Worse with activites and work  Pain/symptom modifying factors:  Rest makes better, activities make worse  Pain/symptom associated signs/symptoms: none    Prior treatment   · NSAIDsYes   · Injections No   · Bracing/Orthotics Yes    Physical Therapy No     I have personally reviewed all the relevant PMH, PSH, SH, FH, Medications and allergies      PAST MEDICAL HISTORY:  Past Medical History:   Diagnosis Date    Arthritis     Colon polyps     GERD (gastroesophageal reflux disease)     Hyperlipidemia     Hypertension     Seasonal allergies        PAST SURGICAL HISTORY:  Past Surgical History:   Procedure Laterality Date    BACK SURGERY      laminectomy    CARDIAC SURGERY      cardiac stent    CARPAL TUNNEL RELEASE      COLONOSCOPY      benign polyp removal    ORCHIECTOMY Left     UT ESOPHAGOGASTRODUODENOSCOPY TRANSORAL DIAGNOSTIC N/A 8/29/2017    Procedure: EGD AND COLONOSCOPY;  Surgeon: Shefali Barragan MD;  [facility]; Service: Gastroenterology    UPPER GASTROINTESTINAL ENDOSCOPY      WRIST SURGERY         FAMILY HISTORY:  Family History   Problem Relation Age of Onset    Diabetes Mother     Hypertension Mother     Stomach cancer Father     Hypertension Father     Liver cancer Father        SOCIAL HISTORY:  Social History     Tobacco Use    Smoking status: Never Smoker    Smokeless tobacco: Never Used   Vaping Use    Vaping Use: Never used   Substance Use Topics    Alcohol use:  Yes     Alcohol/week: 2 0 standard drinks     Types: 2 Cans of beer per week    Drug use: No       MEDICATIONS:    Current Outpatient Medications:     acetaminophen (TYLENOL) 650 mg CR tablet, Take 650 mg by mouth 2 (two) times a day, Disp: , Rfl:     ascorbic acid (VITAMIN C) 500 mg tablet, Take 500 mg by mouth daily, Disp: , Rfl:     aspirin 81 MG tablet, Take 1 tablet (81 mg total) by mouth daily, Disp: 30 tablet, Rfl: 5    Cholecalciferol 2000 units TABS, Take 2,000 Units by mouth, Disp: , Rfl:     Coenzyme Q10 200 MG capsule, Take 200 mg by mouth, Disp: , Rfl:     Echinacea 125 MG CAPS, Take 1 tablet by mouth 2 (two) times a day, Disp: , Rfl:     famotidine (PEPCID) 40 MG tablet, Take 1 tablet (40 mg total) by mouth daily at bedtime as needed for heartburn, Disp: 90 tablet, Rfl: 1    FIBER SELECT GUMMIES CHEW, Chew 2 (two) times a day, Disp: , Rfl:     lisinopril (ZESTRIL) 10 mg tablet, Take 1 tablet (10 mg total) by mouth daily, Disp: 90 tablet, Rfl: 3    Loratadine 10 MG CAPS, Take by mouth, Disp: , Rfl:     melatonin 3 mg, Take 1 tablet by mouth, Disp: , Rfl:     meloxicam (MOBIC) 7 5 mg tablet, Take 7 5 mg by mouth daily, Disp: , Rfl:     metoprolol succinate (TOPROL-XL) 25 mg 24 hr tablet, Take 12 5 mg by mouth daily, Disp: , Rfl:     multivitamin (THERAGRAN) TABS, Take 1 tablet by mouth daily, Disp: , Rfl:    simvastatin (ZOCOR) 40 mg tablet, Take 20 mg by mouth daily at bedtime, Disp: , Rfl:     tamsulosin (FLOMAX) 0 4 mg, Take 1 capsule (0 4 mg total) by mouth daily with dinner, Disp: 30 capsule, Rfl: 5    Triamcinolone Acetonide 55 MCG/ACT AERO, into each nostril, Disp: , Rfl:     pantoprazole (PROTONIX) 40 mg tablet, Take 1 tablet (40 mg total) by mouth 2 (two) times a day before meals, Disp: 60 tablet, Rfl: 2    ALLERGIES:  No Known Allergies        REVIEW OF SYSTEMS:  Review of Systems   Constitutional: Negative for chills, fatigue, fever and unexpected weight change  HENT: Negative for hearing loss, nosebleeds and sore throat  Eyes: Negative for pain, redness and visual disturbance  Respiratory: Negative for cough, shortness of breath and wheezing  Cardiovascular: Negative for chest pain, palpitations and leg swelling  Gastrointestinal: Negative for abdominal pain, nausea and vomiting  Endocrine: Negative for polydipsia and polyuria  Genitourinary: Negative for frequency and urgency  Skin: Negative for color change, rash and wound  Neurological: Positive for numbness  Negative for dizziness, weakness and headaches  Psychiatric/Behavioral: Negative for behavioral problems, self-injury and suicidal ideas         VITALS:  Vitals:    05/19/22 0917   BP: 116/69   Pulse: 64       LABS:  HgA1c: No results found for: HGBA1C  BMP:   Lab Results   Component Value Date    CALCIUM 9 5 01/13/2022    K 4 6 01/13/2022    CO2 23 01/13/2022     (H) 01/13/2022    BUN 13 01/13/2022    CREATININE 0 93 01/13/2022       _____________________________________________________  PHYSICAL EXAMINATION:  General: well developed and well nourished, alert, oriented times 3 and appears comfortable  Psychiatric: Normal  HEENT: Normocephalic, Atraumatic Trachea Midline, No torticollis  Pulmonary: No audible wheezing or respiratory distress   Abdomen/GI: Non tender, non distended   Cardiovascular: No pitting edema, 2+ radial pulse   Skin: No masses, erythema, lacerations, fluctation, ulcerations  Neurovascular: Sensation Intact to the Median, Ulnar, Radial Nerve, Motor Intact to the Median, Ulnar, Radial Nerve and Pulses Intact  Musculoskeletal: Normal, except as noted in detailed exam and in HPI  MUSCULOSKELETAL EXAMINATION:    Left Elbow:  Skin is intact  No erythema or ecchymosis  Full ROM without pain  5/5 strength  Mild crepitation with radial head motion  Sensation is intact distally  2+ radial pulse  No varus or valgus instability  Negative Tinel's at the elbow  left CMC Exam:  No adduction contracture  No hyperextension deformity of MCP joint  Positive localized tenderness over radial and dorsal aspect of thumb (CMC joint)  Grind test is Positive for pain and Positive for crepitus  No triggering or tenderness over the A1 pulley  No pain with Finkelsteins maneuver    left long and ring finger:  Positive palpable nodule over the A1 pulley  Negative tenderness to palpation over A1 pulley  Positive catching  Positive clicking  Left Carpal Tunnel Exam:    Negative thenar atrophy  Negative phalen's test  Positive carpal tunnel compression  Negative tinels over median nerve at the wrist   Opposition strength 5/5  Abduction strength 5/5       ___________________________________________________  STUDIES REVIEWED:  I have personally reviewed AP lateral and oblique radiographs of left hand which demonstrate advanced osteoarthritis of the [facility] joint of the thumb  X Rays of the left elbow were also reviewed which demonstrate mild to moderate osteoarthritis with osteophyte formation present          PROCEDURES PERFORMED:  Small joint arthrocentesis: L thumb CMC  [place] Protocol:  Consent: Verbal consent obtained    Risks and benefits: risks, benefits and alternatives were discussed  Consent given by: patient    Supporting Documentation  Indications: pain and diagnostic evaluation   Procedure Details  Location: thumb - L thumb CMC  Preparation: Patient was prepped and draped in the usual sterile fashion  Needle size: 25 G  Ultrasound guidance: no  Approach: radial  Medications administered: 1 mL lidocaine 1 %; 0 5 mL bupivacaine 0 25 %; 20 mg triamcinolone acetonide 40 mg/mL    Patient tolerance: patient tolerated the procedure well with no immediate complications  Dressing:  Sterile dressing applied             _____________________________________________________      Scribe Attestation    I,:  Nestor Brewer am acting as a scribe while in the presence of the attending physician :       I,:  Delfin Frankel, MD personally performed the services described in this documentation    as scribed in my presence :

## 2022-06-01 DIAGNOSIS — I10 ESSENTIAL HYPERTENSION: ICD-10-CM

## 2022-06-01 DIAGNOSIS — R39.11 BENIGN PROSTATIC HYPERPLASIA WITH URINARY HESITANCY: ICD-10-CM

## 2022-06-01 DIAGNOSIS — N40.1 BENIGN PROSTATIC HYPERPLASIA WITH URINARY HESITANCY: ICD-10-CM

## 2022-06-01 RX ORDER — LISINOPRIL 10 MG/1
10 TABLET ORAL DAILY
Qty: 90 TABLET | Refills: 3 | Status: SHIPPED | OUTPATIENT
Start: 2022-06-01

## 2022-06-01 RX ORDER — TAMSULOSIN HYDROCHLORIDE 0.4 MG/1
0.4 CAPSULE ORAL
Qty: 30 CAPSULE | Refills: 5 | Status: SHIPPED | OUTPATIENT
Start: 2022-06-01

## 2022-06-09 ENCOUNTER — IOP CHECK (OUTPATIENT)
Dept: URBAN - METROPOLITAN AREA CLINIC 6 | Facility: CLINIC | Age: 72
End: 2022-06-09

## 2022-06-09 DIAGNOSIS — H40.023: ICD-10-CM

## 2022-06-09 DIAGNOSIS — H16.223: ICD-10-CM

## 2022-06-09 DIAGNOSIS — H25.813: ICD-10-CM

## 2022-06-09 PROCEDURE — 92012 INTRM OPH EXAM EST PATIENT: CPT

## 2022-06-09 ASSESSMENT — TONOMETRY
OD_IOP_MMHG: 25
OS_IOP_MMHG: 24

## 2022-06-09 ASSESSMENT — VISUAL ACUITY
OS_CC: 20/25
OD_CC: 20/30
OU_CC: J1+

## 2022-06-21 ENCOUNTER — HOSPITAL ENCOUNTER (OUTPATIENT)
Dept: RADIOLOGY | Facility: HOSPITAL | Age: 72
Discharge: HOME | End: 2022-06-21
Attending: SURGERY
Payer: COMMERCIAL

## 2022-06-21 DIAGNOSIS — G56.02 CARPAL TUNNEL SYNDROME ON LEFT: ICD-10-CM

## 2022-06-21 PROCEDURE — 76882 US LMTD JT/FCL EVL NVASC XTR: CPT

## 2022-06-29 ENCOUNTER — OFFICE VISIT (OUTPATIENT)
Dept: OBGYN CLINIC | Facility: CLINIC | Age: 72
End: 2022-06-29
Payer: COMMERCIAL

## 2022-06-29 VITALS
HEART RATE: 75 BPM | DIASTOLIC BLOOD PRESSURE: 77 MMHG | SYSTOLIC BLOOD PRESSURE: 122 MMHG | RESPIRATION RATE: 17 BRPM | WEIGHT: 190.2 LBS | HEIGHT: 69 IN | BODY MASS INDEX: 28.17 KG/M2

## 2022-06-29 DIAGNOSIS — G56.02 CARPAL TUNNEL SYNDROME ON LEFT: ICD-10-CM

## 2022-06-29 DIAGNOSIS — M18.12 ARTHRITIS OF CARPOMETACARPAL (CMC) JOINT OF LEFT THUMB: Primary | ICD-10-CM

## 2022-06-29 PROCEDURE — 3008F BODY MASS INDEX DOCD: CPT | Performed by: SURGERY

## 2022-06-29 PROCEDURE — 1036F TOBACCO NON-USER: CPT | Performed by: SURGERY

## 2022-06-29 PROCEDURE — 99213 OFFICE O/P EST LOW 20 MIN: CPT | Performed by: SURGERY

## 2022-06-29 PROCEDURE — 1160F RVW MEDS BY RX/DR IN RCRD: CPT | Performed by: SURGERY

## 2022-06-29 NOTE — PROGRESS NOTES
ASSESSMENT/PLAN:      74 y/o with left thumb CMC arthritis, left carpal tunnel syndrome, and improved left hand trigger fingers  Patient and I discussed continuing to use his soft and hard thumb brace for his CMC arthritis  I am happy to see he is having relief from his previous corticosteroid injections  We did discuss that these injections can be repeated every 3 months if needed  He may also use Voltaren gel on the painful area of his thumb  Regards to his left hand numbness and tingling his ultrasound does demonstrate increased cross-sectional area of the median nerve suspicious for carpal tunnel syndrome  He was provided with a wrist cock-up brace today in the office to be worn at night  He may continue activities as tolerated  Follow up PRN  The patient verbalized understanding of exam findings and treatment plan  We engaged in the shared decision-making process and treatment options were discussed at length with the patient  Surgical and conservative management discussed today along with risks and benefits  Diagnoses and all orders for this visit:    Arthritis of carpometacarpal Carter) joint of left thumb    Carpal tunnel syndrome on left  -     Cock Up Wrist Splint            Follow Up:  Return if symptoms worsen or fail to improve  To Do Next Visit:  Re-evaluation of current issue    ____________________________________________________________________________________________________________________________________________      CHIEF COMPLAINT:  Chief Complaint   Patient presents with    Left Thumb - Follow-up, Pain    Left Hand - Follow-up, Numbness, Tingling    Left Ring Finger - Follow-up, Locking, Pain    Left Middle Finger - Pain, Locking, Follow-up       SUBJECTIVE:  Mark Luna is a 73y o  year old RHD male who presents to the office today for follow-up evaluation of his left thumb CMC arthritis, left ring and long trigger fingers, and left hand numbness and tingling    Patient states he has no clicking, popping, or locking of the fingers  His trigger fingers have fully resolved  He did get an injection at the last visit to the left thumb [facility] which has greatly improved his joint pain  He does continue to wear brace as well  Also notes his carpal tunnel syndrome symptoms have improved with the thumb CMC injection  He has been using a soft brace at night  He does have a previous history of a left carpal tunnel release done endoscopically about 10 years ago  He did have an ultrasound prior to today of his carpal tunnel  Patient denies any new injuries  He has been using Voltaren gel over-the-counter  I have personally reviewed all the relevant PMH, PSH, SH, FH, Medications and allergies  PAST MEDICAL HISTORY:  Past Medical History:   Diagnosis Date    Arthritis     Colon polyps     GERD (gastroesophageal reflux disease)     Hyperlipidemia     Hypertension     Seasonal allergies        PAST SURGICAL HISTORY:  Past Surgical History:   Procedure Laterality Date    BACK SURGERY      laminectomy    CARDIAC SURGERY      cardiac stent    CARPAL TUNNEL RELEASE      COLONOSCOPY      benign polyp removal    ORCHIECTOMY Left     GA ESOPHAGOGASTRODUODENOSCOPY TRANSORAL DIAGNOSTIC N/A 8/29/2017    Procedure: EGD AND COLONOSCOPY;  Surgeon: Jasmine Mancilla MD;  [facility]; Service: Gastroenterology    UPPER GASTROINTESTINAL ENDOSCOPY      WRIST SURGERY         FAMILY HISTORY:  Family History   Problem Relation Age of Onset    Diabetes Mother     Hypertension Mother     Stomach cancer Father     Hypertension Father     Liver cancer Father        SOCIAL HISTORY:  Social History     Tobacco Use    Smoking status: Never Smoker    Smokeless tobacco: Never Used   Vaping Use    Vaping Use: Never used   Substance Use Topics    Alcohol use: Yes     Alcohol/week: 2 0 standard drinks     Types: 2 Cans of beer per week    Drug use:  No MEDICATIONS:    Current Outpatient Medications:     acetaminophen (TYLENOL) 650 mg CR tablet, Take 650 mg by mouth 2 (two) times a day, Disp: , Rfl:     ascorbic acid (VITAMIN C) 500 mg tablet, Take 500 mg by mouth daily, Disp: , Rfl:     aspirin 81 MG tablet, Take 1 tablet (81 mg total) by mouth daily, Disp: 30 tablet, Rfl: 5    Cholecalciferol 2000 units TABS, Take 2,000 Units by mouth, Disp: , Rfl:     Coenzyme Q10 200 MG capsule, Take 200 mg by mouth, Disp: , Rfl:     Diclofenac Sodium (VOLTAREN) 1 %, Apply 2 g topically in the morning and 2 g at noon and 2 g in the evening and 2 g before bedtime  , Disp: 2 g, Rfl: 0    Echinacea 125 MG CAPS, Take 1 tablet by mouth 2 (two) times a day, Disp: , Rfl:     famotidine (PEPCID) 40 MG tablet, Take 1 tablet (40 mg total) by mouth daily at bedtime as needed for heartburn, Disp: 90 tablet, Rfl: 1    FIBER SELECT GUMMIES CHEW, Chew 2 (two) times a day, Disp: , Rfl:     lisinopril (ZESTRIL) 10 mg tablet, Take 1 tablet (10 mg total) by mouth daily, Disp: 90 tablet, Rfl: 3    Loratadine 10 MG CAPS, Take by mouth, Disp: , Rfl:     melatonin 3 mg, Take 1 tablet by mouth, Disp: , Rfl:     meloxicam (MOBIC) 7 5 mg tablet, Take 7 5 mg by mouth daily, Disp: , Rfl:     metoprolol succinate (TOPROL-XL) 25 mg 24 hr tablet, Take 12 5 mg by mouth daily, Disp: , Rfl:     multivitamin (THERAGRAN) TABS, Take 1 tablet by mouth daily, Disp: , Rfl:     simvastatin (ZOCOR) 40 mg tablet, Take 20 mg by mouth daily at bedtime, Disp: , Rfl:     tamsulosin (FLOMAX) 0 4 mg, Take 1 capsule (0 4 mg total) by mouth daily with dinner, Disp: 30 capsule, Rfl: 5    Triamcinolone Acetonide 55 MCG/ACT AERO, into each nostril, Disp: , Rfl:     pantoprazole (PROTONIX) 40 mg tablet, Take 1 tablet (40 mg total) by mouth 2 (two) times a day before meals, Disp: 60 tablet, Rfl: 2    ALLERGIES:  No Known Allergies    REVIEW OF SYSTEMS:  Review of Systems   Constitutional: Negative for chills, fever and unexpected weight change  HENT: Negative for hearing loss, nosebleeds and sore throat  Eyes: Negative for pain, redness and visual disturbance  Respiratory: Negative for cough, shortness of breath and wheezing  Cardiovascular: Negative for chest pain, palpitations and leg swelling  Gastrointestinal: Negative for abdominal pain, nausea and vomiting  Endocrine: Negative for polydipsia and polyuria  Genitourinary: Negative for dysuria and hematuria  Musculoskeletal: Negative for arthralgias, joint swelling and myalgias  Skin: Negative for rash and wound  Neurological: Negative for dizziness, numbness and headaches  Psychiatric/Behavioral: Negative for agitation, decreased concentration and suicidal ideas  VITALS:  Vitals:    06/29/22 1548   BP: 122/77   Pulse: 75   Resp: 17       LABS:  HgA1c: No results found for: HGBA1C  BMP:   Lab Results   Component Value Date    CALCIUM 9 5 01/13/2022    K 4 6 01/13/2022    CO2 23 01/13/2022     (H) 01/13/2022    BUN 13 01/13/2022    CREATININE 0 93 01/13/2022       _____________________________________________________  PHYSICAL EXAMINATION:  General: well developed and well nourished, alert, oriented times 3 and appears comfortable  Psychiatric: Normal  HEENT: Normocephalic, Atraumatic Trachea Midline, No torticollis  Pulmonary: No audible wheezing or respiratory distress   Cardiovascular: No pitting edema, 2+ radial pulse   Abdominal/GI: abdomen non tender, non distended   Skin: No masses, erythema, lacerations, fluctation, ulcerations  Neurovascular: Sensation Intact to the Median, Ulnar, Radial Nerve, Motor Intact to the Median, Ulnar, Radial Nerve and Pulses Intact  Musculoskeletal: Normal, except as noted in detailed exam and in HPI  MUSCULOSKELETAL EXAMINATION:  Left wrist:  Skin intact  No erythema or ecchymosis noted  No swelling noted  Mildly tender to palpate thumb CMC joint  Nontender at the A1 pulleys    No triggering noted  Able make composite fist   Negative Tinel's at carpal tunnel  Positive Durkan compression test   Sensation intact    Brisk capillary refill noted to all digits    ___________________________________________________  STUDIES REVIEWED:  I have personally reviewed ultrasound the left wrist which demonstrates increased cross-sectional area of the median nerve suspicious for carpal tunnel syndrome      PROCEDURES PERFORMED:  Procedures  No Procedures performed today    _____________________________________________________      Junious Cluster    I,:  Kaitlin Green am acting as a scribe while in the presence of the attending physician :       I,:  Veena Valente MD personally performed the services described in this documentation    as scribed in my presence :

## 2022-08-18 ENCOUNTER — RA CDI HCC (OUTPATIENT)
Dept: OTHER | Facility: HOSPITAL | Age: 72
End: 2022-08-18

## 2022-08-18 NOTE — PROGRESS NOTES
[facility] 77  coding opportunities       Chart reviewed, no opportunity found: CHART REVIEWED, NO OPPORTUNITY FOUND        Patients Insurance     Medicare Insurance: [employer]

## 2022-08-25 ENCOUNTER — OFFICE VISIT (OUTPATIENT)
Dept: FAMILY MEDICINE CLINIC | Facility: CLINIC | Age: 72
End: 2022-08-25
Payer: COMMERCIAL

## 2022-08-25 VITALS
DIASTOLIC BLOOD PRESSURE: 86 MMHG | HEIGHT: 69 IN | HEART RATE: 68 BPM | OXYGEN SATURATION: 98 % | BODY MASS INDEX: 28.29 KG/M2 | SYSTOLIC BLOOD PRESSURE: 122 MMHG | TEMPERATURE: 98.6 F | WEIGHT: 191 LBS

## 2022-08-25 DIAGNOSIS — E78.9 LIPID DISORDER: ICD-10-CM

## 2022-08-25 DIAGNOSIS — I25.10 ATHEROSCLEROSIS OF NATIVE CORONARY ARTERY OF NATIVE HEART WITHOUT ANGINA PECTORIS: ICD-10-CM

## 2022-08-25 DIAGNOSIS — I10 BENIGN ESSENTIAL HYPERTENSION: Primary | ICD-10-CM

## 2022-08-25 DIAGNOSIS — B35.6 TINEA CRURIS: ICD-10-CM

## 2022-08-25 DIAGNOSIS — Z12.5 SCREENING FOR PROSTATE CANCER: ICD-10-CM

## 2022-08-25 PROCEDURE — 99214 OFFICE O/P EST MOD 30 MIN: CPT | Performed by: FAMILY MEDICINE

## 2022-08-25 PROCEDURE — 3074F SYST BP LT 130 MM HG: CPT | Performed by: FAMILY MEDICINE

## 2022-08-25 PROCEDURE — 1160F RVW MEDS BY RX/DR IN RCRD: CPT | Performed by: FAMILY MEDICINE

## 2022-08-25 PROCEDURE — 3079F DIAST BP 80-89 MM HG: CPT | Performed by: FAMILY MEDICINE

## 2022-08-25 RX ORDER — CLOTRIMAZOLE AND BETAMETHASONE DIPROPIONATE 10; .64 MG/G; MG/G
CREAM TOPICAL 2 TIMES DAILY
Qty: 30 G | Refills: 0 | Status: SHIPPED | OUTPATIENT
Start: 2022-08-25

## 2022-08-25 NOTE — PROGRESS NOTES
Assessment/Plan:    Benign essential hypertension  Well controlled  Cont present treatment  Monitor labs  Recheck 6m      Coronary atherosclerosis  Pt asymptomatic  Continue present care  F/u with Cardio  Recheck 6m    Lipid disorder  Continue simvastatin   Monitor lipids  Adjust dose if not at goal   Recheck 6m    Tinea cruris  I reviewed with pt  Start lotrisone as directed  Recheck 2w if not improved - earlier if worse       Diagnoses and all orders for this visit:    Benign essential hypertension  -     CBC and differential; Future  -     Comprehensive metabolic panel; Future  -     Lipid panel; Future    Atherosclerosis of native coronary artery of native heart without angina pectoris    Lipid disorder    Tinea cruris  -     clotrimazole-betamethasone (LOTRISONE) 1-0 05 % cream; Apply topically 2 (two) times a day    Screening for prostate cancer  -     PSA, Total Screen; Future        Subjective:      Patient ID: Isidra Pearson is a 73 y o  male  f/u multiple med issues  - pt feels well  - R hip seems to be a little better  Started to become a little more active - no increased discomfort  - urination is stable  Stopped Flomax- did not help much and has not noted any worsening symptoms since stopping    - pt more active as above  Pt is up to date with Cardio  Pt denies CP, palpitations, lightheadedness or other CV symptoms with or without exertion  - no new GI issues  Still with GERD - using famotidine  Up to date with colonoscopy  - pt with sl erythematous, sl pruritic groin rash  Worse in the hot weather  The following portions of the patient's history were reviewed and updated as appropriate:   He  has a past medical history of Arthritis, Colon polyps, GERD (gastroesophageal reflux disease), Hyperlipidemia, Hypertension, and Seasonal allergies    He   Patient Active Problem List    Diagnosis Date Noted    Tinea cruris 08/27/2022    Left elbow pain 12/17/2021    Benign prostatic hyperplasia with urinary hesitancy 12/17/2021    Screen for colon cancer 04/02/2021    Hx of adenomatous colonic polyps 11/30/2020    Primary osteoarthritis, left shoulder 11/18/2019    Trigger middle finger of left hand 05/08/2019    Actinic keratosis 12/08/2017    Chronic GERD 05/22/2017    Lipid disorder 01/21/2016    Low back pain 07/14/2015    Localized osteoarthrosis of right hip 06/03/2013    Benign essential hypertension 10/19/2012    Coronary atherosclerosis 10/17/2012     He  has a past surgical history that includes Colonoscopy; Cardiac surgery; Orchiectomy (Left); Carpal tunnel release; Back surgery; pr esophagogastroduodenoscopy transoral diagnostic (N/A, 8/29/2017); Wrist surgery; and Upper gastrointestinal endoscopy  He  reports that he has never smoked  He has never used smokeless tobacco  He reports current alcohol use of about 2 0 standard drinks of alcohol per week  He reports that he does not use drugs  Current Outpatient Medications   Medication Sig Dispense Refill    acetaminophen (TYLENOL) 650 mg CR tablet Take 650 mg by mouth 2 (two) times a day      ascorbic acid (VITAMIN C) 500 mg tablet Take 500 mg by mouth daily      aspirin 81 MG tablet Take 1 tablet (81 mg total) by mouth daily 30 tablet 5    Cholecalciferol 2000 units TABS Take 2,000 Units by mouth      clotrimazole-betamethasone (LOTRISONE) 1-0 05 % cream Apply topically 2 (two) times a day 30 g 0    Coenzyme Q10 200 MG capsule Take 200 mg by mouth      Diclofenac Sodium (VOLTAREN) 1 % Apply 2 g topically in the morning and 2 g at noon and 2 g in the evening and 2 g before bedtime   2 g 0    Echinacea 125 MG CAPS Take 1 tablet by mouth 2 (two) times a day      famotidine (PEPCID) 40 MG tablet Take 1 tablet (40 mg total) by mouth daily at bedtime as needed for heartburn 90 tablet 1    FIBER SELECT GUMMIES CHEW Chew 2 (two) times a day      lisinopril (ZESTRIL) 10 mg tablet Take 1 tablet (10 mg total) by mouth daily 90 tablet 3    Loratadine 10 MG CAPS Take by mouth      melatonin 3 mg Take 1 tablet by mouth      meloxicam (MOBIC) 7 5 mg tablet Take 7 5 mg by mouth daily      metoprolol succinate (TOPROL-XL) 25 mg 24 hr tablet Take 12 5 mg by mouth daily      multivitamin (THERAGRAN) TABS Take 1 tablet by mouth daily      simvastatin (ZOCOR) 40 mg tablet Take 20 mg by mouth daily at bedtime      tamsulosin (FLOMAX) 0 4 mg Take 1 capsule (0 4 mg total) by mouth daily with dinner 30 capsule 5    Triamcinolone Acetonide 55 MCG/ACT AERO into each nostril      pantoprazole (PROTONIX) 40 mg tablet Take 1 tablet (40 mg total) by mouth 2 (two) times a day before meals 60 tablet 2     No current facility-administered medications for this visit  He has No Known Allergies       Review of Systems   Constitutional: Negative  HENT: Negative  Eyes: Negative  Respiratory: Negative  Cardiovascular: Negative  Gastrointestinal: Negative  Endocrine: Negative  Genitourinary: Negative  Musculoskeletal: Positive for arthralgias (mild scattered)  Skin: Positive for rash  Allergic/Immunologic: Negative  Neurological: Negative  Hematological: Negative  Psychiatric/Behavioral: Negative  Objective:      /86   Pulse 68   Temp 98 6 °F (37 °C)   Ht 5' 9" (1 753 m)   Wt 86 6 kg (191 lb)   SpO2 98%   BMI 28 21 kg/m²          Physical Exam  Vitals reviewed  Constitutional:       Appearance: He is well-developed  HENT:      Head: Normocephalic and atraumatic  Right Ear: Tympanic membrane, ear canal and external ear normal       Left Ear: Tympanic membrane, ear canal and external ear normal       Mouth/Throat:      Mouth: Mucous membranes are moist    Eyes:      General: No scleral icterus  Extraocular Movements: Extraocular movements intact  Conjunctiva/sclera: Conjunctivae normal       Pupils: Pupils are equal, round, and reactive to light     Neck:      Thyroid: No thyromegaly  Vascular: No carotid bruit  Cardiovascular:      Rate and Rhythm: Normal rate and regular rhythm  Pulses: Normal pulses  Heart sounds: Normal heart sounds  No murmur heard  Pulmonary:      Effort: Pulmonary effort is normal       Breath sounds: Normal breath sounds  Abdominal:      General: Bowel sounds are normal  There is no distension  Palpations: Abdomen is soft  There is no mass  Tenderness: There is no abdominal tenderness  Musculoskeletal:         General: No swelling, tenderness or deformity  Normal range of motion  Cervical back: Normal range of motion and neck supple  No muscular tenderness  Right lower leg: No edema  Left lower leg: No edema  Lymphadenopathy:      Cervical: No cervical adenopathy  Skin:     General: Skin is warm and dry  Capillary Refill: Capillary refill takes less than 2 seconds  Neurological:      Mental Status: He is alert and oriented to person, place, and time  Cranial Nerves: No cranial nerve deficit  Sensory: No sensory deficit  Motor: No weakness  Coordination: Coordination normal       Gait: Gait normal       Deep Tendon Reflexes: Reflexes normal    Psychiatric:         Mood and Affect: Mood normal          Behavior: Behavior normal          Thought Content:  Thought content normal          Judgment: Judgment normal

## 2022-08-27 PROBLEM — B35.6 TINEA CRURIS: Status: ACTIVE | Noted: 2022-08-27

## 2022-09-30 ENCOUNTER — VBI (OUTPATIENT)
Dept: ADMINISTRATIVE | Facility: OTHER | Age: 72
End: 2022-09-30

## 2022-10-12 PROBLEM — Z12.11 SCREEN FOR COLON CANCER: Status: RESOLVED | Noted: 2021-04-02 | Resolved: 2022-10-12

## 2022-10-19 ENCOUNTER — FOLLOW UP (OUTPATIENT)
Dept: URBAN - METROPOLITAN AREA CLINIC 6 | Facility: CLINIC | Age: 72
End: 2022-10-19

## 2022-10-19 DIAGNOSIS — H40.023: ICD-10-CM

## 2022-10-19 DIAGNOSIS — H16.223: ICD-10-CM

## 2022-10-19 DIAGNOSIS — H25.13: ICD-10-CM

## 2022-10-19 PROCEDURE — 92014 COMPRE OPH EXAM EST PT 1/>: CPT

## 2022-10-19 PROCEDURE — 92133 CPTRZD OPH DX IMG PST SGM ON: CPT

## 2022-10-19 PROCEDURE — 92202 OPSCPY EXTND ON/MAC DRAW: CPT

## 2022-10-19 ASSESSMENT — TONOMETRY
OS_IOP_MMHG: 22
OD_IOP_MMHG: 22

## 2022-10-19 ASSESSMENT — VISUAL ACUITY
OD_CC: 20/30
OS_CC: 20/30

## 2022-10-24 ENCOUNTER — OFFICE VISIT (OUTPATIENT)
Dept: FAMILY MEDICINE CLINIC | Facility: CLINIC | Age: 72
End: 2022-10-24
Payer: COMMERCIAL

## 2022-10-24 VITALS
HEART RATE: 68 BPM | BODY MASS INDEX: 28.73 KG/M2 | DIASTOLIC BLOOD PRESSURE: 80 MMHG | HEIGHT: 69 IN | WEIGHT: 194 LBS | TEMPERATURE: 97.7 F | SYSTOLIC BLOOD PRESSURE: 124 MMHG | OXYGEN SATURATION: 96 %

## 2022-10-24 DIAGNOSIS — J01.90 ACUTE NON-RECURRENT SINUSITIS, UNSPECIFIED LOCATION: Primary | ICD-10-CM

## 2022-10-24 PROCEDURE — 99214 OFFICE O/P EST MOD 30 MIN: CPT | Performed by: NURSE PRACTITIONER

## 2022-10-24 RX ORDER — BROMPHENIRAMINE MALEATE, PSEUDOEPHEDRINE HYDROCHLORIDE, AND DEXTROMETHORPHAN HYDROBROMIDE 2; 30; 10 MG/5ML; MG/5ML; MG/5ML
5 SYRUP ORAL EVERY 6 HOURS PRN
Qty: 118 ML | Refills: 0 | Status: SHIPPED | OUTPATIENT
Start: 2022-10-24 | End: 2022-10-30

## 2022-10-24 RX ORDER — AMOXICILLIN 875 MG/1
875 TABLET, COATED ORAL 2 TIMES DAILY
Qty: 14 TABLET | Refills: 0 | Status: SHIPPED | OUTPATIENT
Start: 2022-10-24 | End: 2022-10-31

## 2022-10-24 NOTE — PROGRESS NOTES
Name: Raul Hernandez      : 1950      MRN: [id number]  Encounter Provider: DAVID Moralez  Encounter Date: 10/24/2022   Encounter department: [address] [place] [address]     1  Acute non-recurrent sinusitis, unspecified location  -     amoxicillin (AMOXIL) 875 mg tablet; Take 1 tablet (875 mg total) by mouth 2 (two) times a day for 7 days  -     brompheniramine-pseudoephedrine-DM 30-2-10 MG/5ML syrup; Take 5 mL by mouth every 6 (six) hours as needed for congestion or cough for up to 6 days  Discussed with patient plan to treat with a 7 day course of amoxicillin and some Bromfed DM  Patient instructed to call if no improvement in 72 hours or symptoms worsen       Subjective      72 y o male presenting with congestion and cough for the past week  He has not been around any one suspected or having COVID-19  He took a home COVID test with negative results  He reports coughing up some grayish mucous and has been sleeping in his recliner due to cough over the weekend  His wife has been sick for the past week also and was prescribed Bromfed DM so she gave him some which did seem to help a little witht he cough/congestion  Review of Systems   Constitutional: Negative for fever  HENT: Positive for congestion, ear pain ( clogged), postnasal drip, rhinorrhea and sinus pressure  Negative for sore throat and tinnitus  Respiratory: Positive for cough  Negative for chest tightness, shortness of breath and wheezing  Cardiovascular: Negative for chest pain and palpitations  Gastrointestinal: Negative for abdominal distention, abdominal pain, diarrhea and nausea  Musculoskeletal: Negative for myalgias  Neurological: Negative for dizziness, light-headedness and headaches  Psychiatric/Behavioral: Negative          Current Outpatient Medications on File Prior to Visit   Medication Sig   • acetaminophen (TYLENOL) 650 mg CR tablet Take 650 mg by mouth 2 (two) times a day   • ascorbic acid (VITAMIN C) 500 mg tablet Take 500 mg by mouth daily   • aspirin 81 MG tablet Take 1 tablet (81 mg total) by mouth daily   • Cholecalciferol 2000 units TABS Take 2,000 Units by mouth   • clotrimazole-betamethasone (LOTRISONE) 1-0 05 % cream Apply topically 2 (two) times a day   • Coenzyme Q10 200 MG capsule Take 200 mg by mouth   • Diclofenac Sodium (VOLTAREN) 1 % Apply 2 g topically in the morning and 2 g at noon and 2 g in the evening and 2 g before bedtime  • Echinacea 125 MG CAPS Take 1 tablet by mouth 2 (two) times a day   • famotidine (PEPCID) 40 MG tablet Take 1 tablet (40 mg total) by mouth daily at bedtime as needed for heartburn   • FIBER SELECT GUMMIES CHEW Chew 2 (two) times a day   • lisinopril (ZESTRIL) 10 mg tablet Take 1 tablet (10 mg total) by mouth daily   • Loratadine 10 MG CAPS Take by mouth   • melatonin 3 mg Take 1 tablet by mouth   • meloxicam (MOBIC) 7 5 mg tablet Take 7 5 mg by mouth daily   • metoprolol succinate (TOPROL-XL) 25 mg 24 hr tablet Take 12 5 mg by mouth daily   • multivitamin (THERAGRAN) TABS Take 1 tablet by mouth daily   • pantoprazole (PROTONIX) 40 mg tablet Take 1 tablet (40 mg total) by mouth 2 (two) times a day before meals   • simvastatin (ZOCOR) 40 mg tablet Take 20 mg by mouth daily at bedtime   • tamsulosin (FLOMAX) 0 4 mg Take 1 capsule (0 4 mg total) by mouth daily with dinner   • Triamcinolone Acetonide 55 MCG/ACT AERO into each nostril       Objective     /80 (BP Location: Left arm, Patient Position: Sitting, Cuff Size: Standard)   Pulse 68   Temp 97 7 °F (36 5 °C)   Ht 5' 9" (1 753 m)   Wt 88 kg (194 lb)   SpO2 96%   BMI 28 65 kg/m² (Reviewed)    Physical Exam  Vitals reviewed  Constitutional:       General: He is not in acute distress  Appearance: He is well-developed and well-groomed  He is not ill-appearing  HENT:      Head: Normocephalic and atraumatic        Right Ear: Ear canal and external ear normal  Tympanic membrane is injected  Left Ear: Ear canal and external ear normal  Tympanic membrane is injected  Nose: Nasal tenderness, mucosal edema and congestion present  Right Turbinates: Swollen  Left Turbinates: Swollen  Right Sinus: Maxillary sinus tenderness present  Left Sinus: Maxillary sinus tenderness present  Mouth/Throat:      Mouth: Mucous membranes are moist       Pharynx: Oropharynx is clear  Eyes:      General: Lids are normal       Extraocular Movements: Extraocular movements intact  Conjunctiva/sclera: Conjunctivae normal       Pupils: Pupils are equal, round, and reactive to light  Neck:      Trachea: Trachea and phonation normal    Cardiovascular:      Rate and Rhythm: Normal rate and regular rhythm  Heart sounds: Normal heart sounds  Pulmonary:      Effort: Pulmonary effort is normal       Breath sounds: Normal breath sounds  Skin:     General: Skin is warm and dry  Capillary Refill: Capillary refill takes less than 2 seconds  Neurological:      General: No focal deficit present  Mental Status: He is alert and oriented to person, place, and time  Psychiatric:         Mood and Affect: Mood normal          Behavior: Behavior normal  Behavior is cooperative  Thought Content:  Thought content normal        Remus Magaly CRNP

## 2022-11-21 ENCOUNTER — TELEPHONE (OUTPATIENT)
Dept: FAMILY MEDICINE CLINIC | Facility: CLINIC | Age: 72
End: 2022-11-21

## 2022-11-21 DIAGNOSIS — J01.90 ACUTE NON-RECURRENT SINUSITIS, UNSPECIFIED LOCATION: Primary | ICD-10-CM

## 2022-11-21 RX ORDER — AZITHROMYCIN 250 MG/1
TABLET, FILM COATED ORAL
Qty: 6 TABLET | Refills: 0 | Status: SHIPPED | OUTPATIENT
Start: 2022-11-21 | End: 2022-11-25

## 2022-11-21 NOTE — TELEPHONE ENCOUNTER
Pt called stating on Friday he started with nasal congestion, scratchy throat, ears blocked and a headache  Denies, fever, cough or chest congestion, n/v/d  States spouse was recently diagnosed with a sinus infection  Asking for something to be called in to the [facility] on [address]      Pt is covid negative    Has been taking Mucinex DM

## 2022-11-29 ENCOUNTER — APPOINTMENT (OUTPATIENT)
Dept: LAB | Facility: MEDICAL CENTER | Age: 72
End: 2022-11-29

## 2022-11-29 DIAGNOSIS — Z12.5 SCREENING FOR PROSTATE CANCER: ICD-10-CM

## 2022-11-29 DIAGNOSIS — I10 BENIGN ESSENTIAL HYPERTENSION: ICD-10-CM

## 2022-11-29 LAB
ALBUMIN SERPL BCP-MCNC: 3.9 G/DL (ref 3.5–5)
ALP SERPL-CCNC: 76 U/L (ref 46–116)
ALT SERPL W P-5'-P-CCNC: 27 U/L (ref 12–78)
ANION GAP SERPL CALCULATED.3IONS-SCNC: 2 MMOL/L (ref 4–13)
AST SERPL W P-5'-P-CCNC: 22 U/L (ref 5–45)
BASOPHILS # BLD AUTO: 0.07 THOUSANDS/ÂΜL (ref 0–0.1)
BASOPHILS NFR BLD AUTO: 1 % (ref 0–1)
BILIRUB SERPL-MCNC: 0.64 MG/DL (ref 0.2–1)
BUN SERPL-MCNC: 13 MG/DL (ref 5–25)
CALCIUM SERPL-MCNC: 9.5 MG/DL (ref 8.3–10.1)
CHLORIDE SERPL-SCNC: 108 MMOL/L (ref 96–108)
CHOLEST SERPL-MCNC: 129 MG/DL
CO2 SERPL-SCNC: 26 MMOL/L (ref 21–32)
CREAT SERPL-MCNC: 0.96 MG/DL (ref 0.6–1.3)
EOSINOPHIL # BLD AUTO: 0.28 THOUSAND/ÂΜL (ref 0–0.61)
EOSINOPHIL NFR BLD AUTO: 5 % (ref 0–6)
ERYTHROCYTE [DISTWIDTH] IN BLOOD BY AUTOMATED COUNT: 13 % (ref 11.6–15.1)
GFR SERPL CREATININE-BSD FRML MDRD: 79 ML/MIN/1.73SQ M
GLUCOSE P FAST SERPL-MCNC: 102 MG/DL (ref 65–99)
HCT VFR BLD AUTO: 42.5 % (ref 36.5–49.3)
HDLC SERPL-MCNC: 51 MG/DL
HGB BLD-MCNC: 14.5 G/DL (ref 12–17)
IMM GRANULOCYTES # BLD AUTO: 0.02 THOUSAND/UL (ref 0–0.2)
IMM GRANULOCYTES NFR BLD AUTO: 0 % (ref 0–2)
LDLC SERPL CALC-MCNC: 58 MG/DL (ref 0–100)
LYMPHOCYTES # BLD AUTO: 1.77 THOUSANDS/ÂΜL (ref 0.6–4.47)
LYMPHOCYTES NFR BLD AUTO: 29 % (ref 14–44)
MCH RBC QN AUTO: 31.8 PG (ref 26.8–34.3)
MCHC RBC AUTO-ENTMCNC: 34.1 G/DL (ref 31.4–37.4)
MCV RBC AUTO: 93 FL (ref 82–98)
MONOCYTES # BLD AUTO: 0.61 THOUSAND/ÂΜL (ref 0.17–1.22)
MONOCYTES NFR BLD AUTO: 10 % (ref 4–12)
NEUTROPHILS # BLD AUTO: 3.27 THOUSANDS/ÂΜL (ref 1.85–7.62)
NEUTS SEG NFR BLD AUTO: 55 % (ref 43–75)
NONHDLC SERPL-MCNC: 78 MG/DL
NRBC BLD AUTO-RTO: 0 /100 WBCS
PLATELET # BLD AUTO: 223 THOUSANDS/UL (ref 149–390)
PMV BLD AUTO: 9.6 FL (ref 8.9–12.7)
POTASSIUM SERPL-SCNC: 4.8 MMOL/L (ref 3.5–5.3)
PROT SERPL-MCNC: 7.5 G/DL (ref 6.4–8.4)
PSA SERPL-MCNC: 0.4 NG/ML (ref 0–4)
RBC # BLD AUTO: 4.56 MILLION/UL (ref 3.88–5.62)
SODIUM SERPL-SCNC: 136 MMOL/L (ref 135–147)
TRIGL SERPL-MCNC: 101 MG/DL
WBC # BLD AUTO: 6.02 THOUSAND/UL (ref 4.31–10.16)

## 2022-12-14 ENCOUNTER — RA CDI HCC (OUTPATIENT)
Dept: OTHER | Facility: HOSPITAL | Age: 72
End: 2022-12-14

## 2022-12-14 NOTE — PROGRESS NOTES
[facility]  coding opportunities       Chart reviewed, no opportunity found: CHART REVIEWED, NO OPPORTUNITY FOUND        Patients Insurance     Medicare Insurance: [employer] [facility] Advantage

## 2022-12-20 ENCOUNTER — OFFICE VISIT (OUTPATIENT)
Dept: FAMILY MEDICINE CLINIC | Facility: CLINIC | Age: 72
End: 2022-12-20

## 2022-12-20 VITALS
DIASTOLIC BLOOD PRESSURE: 84 MMHG | WEIGHT: 198 LBS | SYSTOLIC BLOOD PRESSURE: 122 MMHG | BODY MASS INDEX: 29.33 KG/M2 | RESPIRATION RATE: 16 BRPM | OXYGEN SATURATION: 96 % | TEMPERATURE: 97.9 F | HEART RATE: 70 BPM | HEIGHT: 69 IN

## 2022-12-20 DIAGNOSIS — I25.10 ATHEROSCLEROSIS OF NATIVE CORONARY ARTERY OF NATIVE HEART WITHOUT ANGINA PECTORIS: ICD-10-CM

## 2022-12-20 DIAGNOSIS — I10 BENIGN ESSENTIAL HYPERTENSION: ICD-10-CM

## 2022-12-20 DIAGNOSIS — R39.11 BENIGN PROSTATIC HYPERPLASIA WITH URINARY HESITANCY: ICD-10-CM

## 2022-12-20 DIAGNOSIS — Z00.00 MEDICARE ANNUAL WELLNESS VISIT, SUBSEQUENT: Primary | ICD-10-CM

## 2022-12-20 DIAGNOSIS — E78.9 LIPID DISORDER: ICD-10-CM

## 2022-12-20 DIAGNOSIS — N40.1 BENIGN PROSTATIC HYPERPLASIA WITH URINARY HESITANCY: ICD-10-CM

## 2022-12-20 NOTE — PROGRESS NOTES
Assessment and Plan:     Problem List Items Addressed This Visit        Cardiovascular and Mediastinum    Benign essential hypertension     Well controlled  Cont present treatment  Monitor labs  Recheck 6m           Coronary atherosclerosis     Pt asymptomatic  Continue present care  Monitor lipids and BP  F/u with Cardio  Recheck 6m            Genitourinary    Benign prostatic hyperplasia with urinary hesitancy     Persistent symptoms which are not severe  PSA stable  Continue to monitor  Recheck 6m            Other    Lipid disorder     LDL = 58  Continue present care  Recheck 6m        Other Visit Diagnoses     Medicare annual wellness visit, subsequent    -  Primary          Depression Screening and Follow-up Plan: Patient was screened for depression during today's encounter  They screened negative with a PHQ-2 score of 0  Preventive health issues were discussed with patient, and age appropriate screening tests were ordered as noted in patient's After Visit Summary  Personalized health advice and appropriate referrals for health education or preventive services given if needed, as noted in patient's After Visit Summary  History of Present Illness:     Patient presents for a Medicare Wellness Visit    f/u multiple med issues  - pt feels well  - pt feels that the R hip is slowly worsening  Has seen ortho in the past for it  Xrays in 2015 showed moderate to severe OA bilat  Pt states that the pain does not limit activity and improves with rest  - urination is "maybe a little improved"  Pt no longer takes Flomax  May not be emptying his bladder  completely  - pt is up to date with Cardio  Pt denies CP, palpitations, lightheadedness or other CV symptoms with or without exertion  - no new GI issues  GERD stable on famotidine  Up to date with colonoscopy     - AWV done       Patient Care Team:  Charleen Polanco MD as PCP - Mary Kaet Denise MD as PCP - [facility] MD Fatuma Mccord MD Belton Stands, MD Amelia Cobbs, MD as Endoscopist     Review of Systems:     Review of Systems   Constitutional: Negative  HENT: Negative  Eyes: Negative  Respiratory: Negative  Cardiovascular: Negative  Gastrointestinal: Negative  Endocrine: Negative  Genitourinary: Negative  Musculoskeletal: Positive for arthralgias (mild scattered)  Skin: Negative  Negative for rash  Allergic/Immunologic: Negative  Neurological: Negative  Hematological: Negative  Psychiatric/Behavioral: Negative  Problem List:     Patient Active Problem List   Diagnosis   • Actinic keratosis   • Chronic GERD   • Coronary atherosclerosis   • Benign essential hypertension   • Lipid disorder   • Low back pain   • Localized osteoarthrosis of right hip   • Trigger middle finger of left hand   • Primary osteoarthritis, left shoulder   • Hx of adenomatous colonic polyps   • Left elbow pain   • Benign prostatic hyperplasia with urinary hesitancy   • Tinea cruris      Past Medical and Surgical History:     Past Medical History:   Diagnosis Date   • Arthritis    • Colon polyps    • GERD (gastroesophageal reflux disease)    • Hyperlipidemia    • Hypertension    • Seasonal allergies      Past Surgical History:   Procedure Laterality Date   • BACK SURGERY      laminectomy   • CARDIAC SURGERY      cardiac stent   • CARPAL TUNNEL RELEASE     • COLONOSCOPY      benign polyp removal   • ORCHIECTOMY Left    • MT ESOPHAGOGASTRODUODENOSCOPY TRANSORAL DIAGNOSTIC N/A 8/29/2017    Procedure: EGD AND COLONOSCOPY;  Surgeon: Denise Soto MD;  [facility];   Service: Gastroenterology   • UPPER GASTROINTESTINAL ENDOSCOPY     • WRIST SURGERY        Family History:     Family History   Problem Relation Age of Onset   • Diabetes Mother    • Hypertension Mother    • Stomach cancer Father    • Hypertension Father    • Liver cancer Father       Social History:     Social History     Socioeconomic History   • Marital status: /Civil Union     Spouse name: None   • Number of children: None   • Years of education: some college   • Highest education level: None   Occupational History   • Occupation: part time   Tobacco Use   • Smoking status: Never   • Smokeless tobacco: Never   Vaping Use   • Vaping Use: Never used   Substance and Sexual Activity   • Alcohol use: Yes     Alcohol/week: 2 0 standard drinks     Types: 2 Cans of beer per week   • Drug use: No   • Sexual activity: Yes   Other Topics Concern   • None   Social History Narrative    Daily coffee consumption    Daily cola consumption    Daily tea consumption    Dental care regularly    Does not exercise    Patient has discussed organ donor wishes with family    Patient has living will    Pet: cat    POA in existence    Water intake adequate per day         Social Determinants of Health     Financial Resource Strain: Low Risk    • Difficulty of Paying Living Expenses: Not very hard   Food Insecurity: Not on file   Transportation Needs: No Transportation Needs   • Lack of Transportation (Medical): No   • Lack of Transportation (Non-Medical):  No   Physical Activity: Not on file   Stress: Not on file   Social Connections: Not on file   Intimate Partner Violence: Not on file   Housing Stability: Not on file      Medications and Allergies:     Current Outpatient Medications   Medication Sig Dispense Refill   • acetaminophen (TYLENOL) 650 mg CR tablet Take 650 mg by mouth daily at bedtime     • ascorbic acid (VITAMIN C) 500 mg tablet Take 500 mg by mouth daily     • aspirin 81 MG tablet Take 1 tablet (81 mg total) by mouth daily 30 tablet 5   • Cholecalciferol 2000 units TABS Take 2,000 Units by mouth     • clotrimazole-betamethasone (LOTRISONE) 1-0 05 % cream Apply topically 2 (two) times a day 30 g 0   • Coenzyme Q10 200 MG capsule Take 200 mg by mouth daily     • Diclofenac Sodium (VOLTAREN) 1 % Apply 2 g topically in the morning and 2 g at noon and 2 g in the evening and 2 g before bedtime  2 g 0   • Echinacea 125 MG CAPS Take 1 tablet by mouth 2 (two) times a day     • famotidine (PEPCID) 40 MG tablet Take 1 tablet (40 mg total) by mouth daily at bedtime as needed for heartburn 90 tablet 1   • FIBER SELECT GUMMIES CHEW Chew 2 (two) times a day     • lisinopril (ZESTRIL) 10 mg tablet Take 1 tablet (10 mg total) by mouth daily 90 tablet 3   • Loratadine 10 MG CAPS Take by mouth if needed     • melatonin 3 mg Take 1 tablet by mouth if needed     • meloxicam (MOBIC) 7 5 mg tablet Take 7 5 mg by mouth if needed for moderate pain     • metoprolol succinate (TOPROL-XL) 25 mg 24 hr tablet Take 12 5 mg by mouth daily     • multivitamin (THERAGRAN) TABS Take 1 tablet by mouth daily     • simvastatin (ZOCOR) 40 mg tablet Take 20 mg by mouth daily at bedtime     • Triamcinolone Acetonide 55 MCG/ACT AERO into each nostril if needed (seasonal allergies)     • pantoprazole (PROTONIX) 40 mg tablet Take 1 tablet (40 mg total) by mouth 2 (two) times a day before meals 60 tablet 2   • tamsulosin (FLOMAX) 0 4 mg Take 1 capsule (0 4 mg total) by mouth daily with dinner 30 capsule 5     No current facility-administered medications for this visit       No Known Allergies   Immunizations:     Immunization History   Administered Date(s) Administered   • COVID-19 PFIZER VACCINE 0 3 ML IM 03/18/2021, 04/08/2021, 10/21/2021, 06/22/2022   • COVID-19 Pfizer Vac BIVALENT Andrew-sucrose 12 Yr+ IM (BOOSTER ONLY) 10/04/2022   • INFLUENZA 01/13/2016, 10/16/2016, 01/11/2017, 01/10/2018, 11/07/2018, 10/28/2022   • Influenza, high dose seasonal 0 7 mL 11/07/2018, 11/08/2019, 10/22/2020   • Influenza, seasonal, injectable 01/07/2014   • Pneumococcal Conjugate 13-Valent 11/07/2018   • Pneumococcal Polysaccharide PPV23 11/08/2019      Health Maintenance:         Topic Date Due   • Colorectal Cancer Screening  05/14/2024   • Hepatitis C Screening  Completed Topic Date Due   • Hepatitis B Vaccine (1 of 3 - 3-dose series) Never done      Medicare Screening Tests and Risk Assessments:     Diego Tom is here for his Subsequent Wellness visit  Last Medicare Wellness visit information reviewed, patient interviewed and updates made to the record today  Health Risk Assessment:   Patient rates overall health as good  Patient feels that their physical health rating is same  Patient is satisfied with their life  Eyesight was rated as slightly worse  Hearing was rated as same  Patient feels that their emotional and mental health rating is same  Patients states they are sometimes angry  Patient states they are sometimes unusually tired/fatigued  Pain experienced in the last 7 days has been some  Patient's pain rating has been 4/10  Patient states that he has experienced no weight loss or gain in last 6 months  bilat hips    Depression Screening:   PHQ-2 Score: 0      Fall Risk Screening: In the past year, patient has experienced: history of falling in past year    Number of falls: 1  Injured during fall?: No    Feels unsteady when standing or walking?: No    Worried about falling?: No      Home Safety:  Patient does not have trouble with stairs inside or outside of their home  Patient has working smoke alarms and has working carbon monoxide detector  Home safety hazards include: none  Nutrition:   Current diet is Regular  Medications:   Patient is currently taking over-the-counter supplements  OTC medications include: see medication list  Patient is able to manage medications  Activities of Daily Living (ADLs)/Instrumental Activities of Daily Living (IADLs):   Walk and transfer into and out of bed and chair?: Yes  Dress and groom yourself?: Yes    Bathe or shower yourself?: Yes    Feed yourself?  Yes  Do your laundry/housekeeping?: Yes  Manage your money, pay your bills and track your expenses?: Yes  Make your own meals?: Yes    Do your own shopping?: Yes    Previous Hospitalizations:   Any hospitalizations or ED visits within the last 12 months?: No      Advance Care Planning:   Living will: Yes    Durable POA for healthcare: Yes    Advanced directive: Yes    Advanced directive counseling given: Yes      Cognitive Screening:   Provider or family/friend/caregiver concerned regarding cognition?: No    PREVENTIVE SCREENINGS      Cardiovascular Screening:    General: Screening Current      Diabetes Screening:     General: Screening Current      Colorectal Cancer Screening:     General: Screening Current      Prostate Cancer Screening:    General: Screening Current      Osteoporosis Screening:    General: Screening Not Indicated      Abdominal Aortic Aneurysm (AAA) Screening:    Risk factors include: age between 73-67 yo        Lung Cancer Screening:     General: Screening Not Indicated      Hepatitis C Screening:    General: Screening Current    Screening, Brief Intervention, and Referral to Treatment (SBIRT)    Screening      AUDIT-C Screenin) How often did you have a drink containing alcohol in the past year? 2 to 3 times a week  2) How many drinks did you have on a typical day when you were drinking in the past year? 1 to 2  3) How often did you have 6 or more drinks on one occasion in the past year? never    AUDIT-C Score: 3  Interpretation: Score 0-3 (male): Negative screen for alcohol misuse    Single Item Drug Screening:  How often have you used an illegal drug (including marijuana) or a prescription medication for non-medical reasons in the past year? never    Single Item Drug Screen Score: 0  Interpretation: Negative screen for possible drug use disorder    Other Counseling Topics:   Calcium and vitamin D intake and regular weightbearing exercise  No results found       Physical Exam:     /84   Pulse 70   Temp 97 9 °F (36 6 °C)   Resp 16   Ht 5' 9" (1 753 m)   Wt 89 8 kg (198 lb)   SpO2 96%   BMI 29 24 kg/m²     Physical Exam  Vitals reviewed  Constitutional:       Appearance: He is well-developed  HENT:      Head: Normocephalic and atraumatic  Right Ear: Tympanic membrane, ear canal and external ear normal       Left Ear: Tympanic membrane, ear canal and external ear normal       Mouth/Throat:      Mouth: Mucous membranes are moist    Eyes:      Extraocular Movements: Extraocular movements intact  Conjunctiva/sclera: Conjunctivae normal       Pupils: Pupils are equal, round, and reactive to light  Neck:      Thyroid: No thyromegaly  Vascular: No JVD  Cardiovascular:      Rate and Rhythm: Normal rate and regular rhythm  Pulses: Normal pulses  Heart sounds: Normal heart sounds  No murmur heard  Pulmonary:      Effort: Pulmonary effort is normal  No respiratory distress  Breath sounds: Normal breath sounds  No wheezing or rales  Abdominal:      General: Bowel sounds are normal  There is no distension  Palpations: Abdomen is soft  There is no mass  Tenderness: There is no abdominal tenderness  Musculoskeletal:         General: Tenderness (with abduction/internal rotation of R hip) present  No swelling or deformity  Cervical back: Normal range of motion and neck supple  No muscular tenderness  Right lower leg: No edema  Left lower leg: No edema  Lymphadenopathy:      Cervical: No cervical adenopathy  Skin:     General: Skin is warm  Capillary Refill: Capillary refill takes less than 2 seconds  Neurological:      Mental Status: He is alert and oriented to person, place, and time  Cranial Nerves: No cranial nerve deficit  Sensory: No sensory deficit  Motor: No weakness or abnormal muscle tone  Gait: Gait normal       Comments: minicog 5/5   Psychiatric:         Mood and Affect: Mood normal          Behavior: Behavior normal          Thought Content:  Thought content normal          Judgment: Judgment normal       Comments: PHQ-2/9 Depression Screening Little interest or pleasure in doing things: 0 - not at all  Feeling down, depressed, or hopeless: 0 - not at all  PHQ-2 Score: 0  PHQ-2 Interpretation: Negative depression screen               Chanel Salazar MD

## 2022-12-20 NOTE — PATIENT INSTRUCTIONS
Medicare Preventive Visit Patient Instructions  Thank you for completing your Welcome to Medicare Visit or Medicare Annual Wellness Visit today  Your next wellness visit will be due in one year (12/21/2023)  The screening/preventive services that you may require over the next 5-10 years are detailed below  Some tests may not apply to you based off risk factors and/or age  Screening tests ordered at today's visit but not completed yet may show as past due  Also, please note that scanned in results may not display below  Preventive Screenings:  Service Recommendations Previous Testing/Comments   Colorectal Cancer Screening  · Colonoscopy    · Fecal Occult Blood Test (FOBT)/Fecal Immunochemical Test (FIT)  · Fecal DNA/Cologuard Test  · Flexible Sigmoidoscopy Age: 39-70 years old   Colonoscopy: every 10 years (May be performed more frequently if at higher risk)  OR  FOBT/FIT: every 1 year  OR  Cologuard: every 3 years  OR  Sigmoidoscopy: every 5 years  Screening may be recommended earlier than age 39 if at higher risk for colorectal cancer  Also, an individualized decision between you and your healthcare provider will decide whether screening between the ages of 74-80 would be appropriate   Colonoscopy: 05/14/2021  FOBT/FIT: Not on file  Cologuard: Not on file  Sigmoidoscopy: Not on file    Screening Current     Prostate Cancer Screening Individualized decision between patient and health care provider in men between ages of 53-78   Medicare will cover every 12 months beginning on the day after your 50th birthday PSA: 0 4 ng/mL     Screening Current     Hepatitis C Screening Once for adults born between 1945 and 1965  More frequently in patients at high risk for Hepatitis C Hep C Antibody: 01/13/2022    Screening Current   Diabetes Screening 1-2 times per year if you're at risk for diabetes or have pre-diabetes Fasting glucose: 102 mg/dL (11/29/2022)  A1C: No results in last 5 years (No results in last 5 years)  Screening Current   Cholesterol Screening Once every 5 years if you don't have a lipid disorder  May order more often based on risk factors  Lipid panel: 11/29/2022  Screening Current      Other Preventive Screenings Covered by Medicare:  1  Abdominal Aortic Aneurysm (AAA) Screening: covered once if your at risk  You're considered to be at risk if you have a family history of AAA or a male between the age of 73-68 who smoking at least 100 cigarettes in your lifetime  2  Lung Cancer Screening: covers low dose CT scan once per year if you meet all of the following conditions: (1) Age 50-69; (2) No signs or symptoms of lung cancer; (3) Current smoker or have quit smoking within the last 15 years; (4) You have a tobacco smoking history of at least 20 pack years (packs per day x number of years you smoked); (5) You get a written order from a healthcare provider  3  Glaucoma Screening: covered annually if you're considered high risk: (1) You have diabetes OR (2) Family history of glaucoma OR (3)  aged 48 and older OR (3)  American aged 72 and older  3  Osteoporosis Screening: covered every 2 years if you meet one of the following conditions: (1) Have a vertebral abnormality; (2) On glucocorticoid therapy for more than 3 months; (3) Have primary hyperparathyroidism; (4) On osteoporosis medications and need to assess response to drug therapy  5  HIV Screening: covered annually if you're between the age of 12-76  Also covered annually if you are younger than 13 and older than 72 with risk factors for HIV infection  For pregnant patients, it is covered up to 3 times per pregnancy      Immunizations:  Immunization Recommendations   Influenza Vaccine Annual influenza vaccination during flu season is recommended for all persons aged >= 6 months who do not have contraindications   Pneumococcal Vaccine   * Pneumococcal conjugate vaccine = PCV13 (Prevnar 13), PCV15 (Vaxneuvance), PCV20 (Prevnar 20)  * Pneumococcal polysaccharide vaccine = PPSV23 (Pneumovax) Adults 2364 years old: 1-3 doses may be recommended based on certain risk factors  Adults 72 years old: 1-2 doses may be recommended based off what pneumonia vaccine you previously received   Hepatitis B Vaccine 3 dose series if at intermediate or high risk (ex: diabetes, end stage renal disease, liver disease)   Tetanus (Td) Vaccine - COST NOT COVERED BY MEDICARE PART B Following completion of primary series, a booster dose should be given every 10 years to maintain immunity against tetanus  Td may also be given as tetanus wound prophylaxis  Tdap Vaccine - COST NOT COVERED BY MEDICARE PART B Recommended at least once for all adults  For pregnant patients, recommended with each pregnancy  Shingles Vaccine (Shingrix) - COST NOT COVERED BY MEDICARE PART B  2 shot series recommended in those aged 48 and above     Health Maintenance Due:      Topic Date Due   • Colorectal Cancer Screening  05/14/2024   • Hepatitis C Screening  Completed     Immunizations Due:      Topic Date Due   • Hepatitis B Vaccine (1 of 3 - 3-dose series) Never done     Advance Directives   What are advance directives? Advance directives are legal documents that state your wishes and plans for medical care  These plans are made ahead of time in case you lose your ability to make decisions for yourself  Advance directives can apply to any medical decision, such as the treatments you want, and if you want to donate organs  What are the types of advance directives? There are many types of advance directives, and each state has rules about how to use them  You may choose a combination of any of the following:  · Living will: This is a written record of the treatment you want  You can also choose which treatments you do not want, which to limit, and which to stop at a certain time  This includes surgery, medicine, IV fluid, and tube feedings     · Durable power of  for [facility]): This is a written record that states who you want to make healthcare choices for you when you are unable to make them for yourself  This person, called a proxy, is usually a family member or a friend  You may choose more than 1 proxy  · Do not resuscitate (DNR) order:  A DNR order is used in case your heart stops beating or you stop breathing  It is a request not to have certain forms of treatment, such as CPR  A DNR order may be included in other types of advance directives  · Medical directive: This covers the care that you want if you are in a coma, near death, or unable to make decisions for yourself  You can list the treatments you want for each condition  Treatment may include pain medicine, surgery, blood transfusions, dialysis, IV or tube feedings, and a ventilator (breathing machine)  · Values history: This document has questions about your views, beliefs, and how you feel and think about life  This information can help others choose the care that you would choose  Why are advance directives important? An advance directive helps you control your care  Although spoken wishes may be used, it is better to have your wishes written down  Spoken wishes can be misunderstood, or not followed  Treatments may be given even if you do not want them  An advance directive may make it easier for your family to make difficult choices about your care  Weight Management   Why it is important to manage your weight:  Being overweight increases your risk of health conditions such as heart disease, high blood pressure, type 2 diabetes, and certain types of cancer  It can also increase your risk for osteoarthritis, sleep apnea, and other respiratory problems  Aim for a slow, steady weight loss  Even a small amount of weight loss can lower your risk of health problems  How to lose weight safely:  A safe and healthy way to lose weight is to eat fewer calories and get regular exercise   You can lose up about 1 pound a week by decreasing the number of calories you eat by 500 calories each day  Healthy meal plan for weight management:  A healthy meal plan includes a variety of foods, contains fewer calories, and helps you stay healthy  A healthy meal plan includes the following:  · Eat whole-grain foods more often  A healthy meal plan should contain fiber  Fiber is the part of grains, fruits, and vegetables that is not broken down by your body  Whole-grain foods are healthy and provide extra fiber in your diet  Some examples of whole-grain foods are whole-wheat breads and pastas, oatmeal, brown rice, and bulgur  · Eat a variety of vegetables every day  Include dark, leafy greens such as spinach, kale, anabell greens, and mustard greens  Eat yellow and orange vegetables such as carrots, sweet potatoes, and winter squash  · Eat a variety of fruits every day  Choose fresh or canned fruit (canned in its own juice or light syrup) instead of juice  Fruit juice has very little or no fiber  · Eat low-fat dairy foods  Drink fat-free (skim) milk or 1% milk  Eat fat-free yogurt and low-fat cottage cheese  Try low-fat cheeses such as mozzarella and other reduced-fat cheeses  · Choose meat and other protein foods that are low in fat  Choose beans or other legumes such as split peas or lentils  Choose fish, skinless poultry (chicken or turkey), or lean cuts of red meat (beef or pork)  Before you cook meat or poultry, cut off any visible fat  · Use less fat and oil  Try baking foods instead of frying them  Add less fat, such as margarine, sour cream, regular salad dressing and mayonnaise to foods  Eat fewer high-fat foods  Some examples of high-fat foods include french fries, doughnuts, ice cream, and cakes  · Eat fewer sweets  Limit foods and drinks that are high in sugar  This includes candy, cookies, regular soda, and sweetened drinks  Exercise:  Exercise at least 30 minutes per day on most days of the week  Some examples of exercise include walking, biking, dancing, and swimming  You can also fit in more physical activity by taking the stairs instead of the elevator or parking farther away from stores  Ask your healthcare provider about the best exercise plan for you  © Copyright [employer] 2018 Information is for End User's use only and may not be sold, redistributed or otherwise used for commercial purposes  All illustrations and images included in CareNotes® are the copyrighted property of [employer]  or [facility] Preventive Visit Patient Instructions  Thank you for completing your Welcome to Medicare Visit or Medicare Annual Wellness Visit today  Your next wellness visit will be due in one year (12/22/2023)  The screening/preventive services that you may require over the next 5-10 years are detailed below  Some tests may not apply to you based off risk factors and/or age  Screening tests ordered at today's visit but not completed yet may show as past due  Also, please note that scanned in results may not display below  Preventive Screenings:  Service Recommendations Previous Testing/Comments   Colorectal Cancer Screening  · Colonoscopy    · Fecal Occult Blood Test (FOBT)/Fecal Immunochemical Test (FIT)  · Fecal DNA/Cologuard Test  · Flexible Sigmoidoscopy Age: 39-70 years old   Colonoscopy: every 10 years (May be performed more frequently if at higher risk)  OR  FOBT/FIT: every 1 year  OR  Cologuard: every 3 years  OR  Sigmoidoscopy: every 5 years  Screening may be recommended earlier than age 39 if at higher risk for colorectal cancer  Also, an individualized decision between you and your healthcare provider will decide whether screening between the ages of 74-80 would be appropriate   Colonoscopy: 05/14/2021  FOBT/FIT: Not on file  Cologuard: Not on file  Sigmoidoscopy: Not on file    Screening Current     Prostate Cancer Screening Individualized decision between patient and health care provider in men between ages of 53-78   Medicare will cover every 12 months beginning on the day after your 50th birthday PSA: 0 4 ng/mL     Screening Current     Hepatitis C Screening Once for adults born between 1945 and 1965  More frequently in patients at high risk for Hepatitis C Hep C Antibody: 01/13/2022    Screening Current   Diabetes Screening 1-2 times per year if you're at risk for diabetes or have pre-diabetes Fasting glucose: 102 mg/dL (11/29/2022)  A1C: No results in last 5 years (No results in last 5 years)  Screening Current   Cholesterol Screening Once every 5 years if you don't have a lipid disorder  May order more often based on risk factors  Lipid panel: 11/29/2022  Screening Current      Other Preventive Screenings Covered by Medicare:  6  Abdominal Aortic Aneurysm (AAA) Screening: covered once if your at risk  You're considered to be at risk if you have a family history of AAA or a male between the age of 73-68 who smoking at least 100 cigarettes in your lifetime  7  Lung Cancer Screening: covers low dose CT scan once per year if you meet all of the following conditions: (1) Age 50-69; (2) No signs or symptoms of lung cancer; (3) Current smoker or have quit smoking within the last 15 years; (4) You have a tobacco smoking history of at least 20 pack years (packs per day x number of years you smoked); (5) You get a written order from a healthcare provider  8  Glaucoma Screening: covered annually if you're considered high risk: (1) You have diabetes OR (2) Family history of glaucoma OR (3)  aged 48 and older OR (3)  American aged 72 and older  5  Osteoporosis Screening: covered every 2 years if you meet one of the following conditions: (1) Have a vertebral abnormality; (2) On glucocorticoid therapy for more than 3 months; (3) Have primary hyperparathyroidism; (4) On osteoporosis medications and need to assess response to drug therapy    10  HIV Screening: covered annually if you're between the age of 12-76  Also covered annually if you are younger than 13 and older than 72 with risk factors for HIV infection  For pregnant patients, it is covered up to 3 times per pregnancy  Immunizations:  Immunization Recommendations   Influenza Vaccine Annual influenza vaccination during flu season is recommended for all persons aged >= 6 months who do not have contraindications   Pneumococcal Vaccine   * Pneumococcal conjugate vaccine = PCV13 (Prevnar 13), PCV15 (Vaxneuvance), PCV20 (Prevnar 20)  * Pneumococcal polysaccharide vaccine = PPSV23 (Pneumovax) Adults 25-60 years old: 1-3 doses may be recommended based on certain risk factors  Adults 72 years old: 1-2 doses may be recommended based off what pneumonia vaccine you previously received   Hepatitis B Vaccine 3 dose series if at intermediate or high risk (ex: diabetes, end stage renal disease, liver disease)   Tetanus (Td) Vaccine - COST NOT COVERED BY MEDICARE PART B Following completion of primary series, a booster dose should be given every 10 years to maintain immunity against tetanus  Td may also be given as tetanus wound prophylaxis  Tdap Vaccine - COST NOT COVERED BY MEDICARE PART B Recommended at least once for all adults  For pregnant patients, recommended with each pregnancy  Shingles Vaccine (Shingrix) - COST NOT COVERED BY MEDICARE PART B  2 shot series recommended in those aged 48 and above     Health Maintenance Due:      Topic Date Due   • Colorectal Cancer Screening  05/14/2024   • Hepatitis C Screening  Completed     Immunizations Due:      Topic Date Due   • Hepatitis B Vaccine (1 of 3 - 3-dose series) Never done     Advance Directives   What are advance directives? Advance directives are legal documents that state your wishes and plans for medical care  These plans are made ahead of time in case you lose your ability to make decisions for yourself   Advance directives can apply to any medical decision, such as the treatments you want, and if you want to donate organs  What are the types of advance directives? There are many types of advance directives, and each state has rules about how to use them  You may choose a combination of any of the following:  · Living will: This is a written record of the treatment you want  You can also choose which treatments you do not want, which to limit, and which to stop at a certain time  This includes surgery, medicine, IV fluid, and tube feedings  · Durable power of  for healthcare [facility]): This is a written record that states who you want to make healthcare choices for you when you are unable to make them for yourself  This person, called a proxy, is usually a family member or a friend  You may choose more than 1 proxy  · Do not resuscitate (DNR) order:  A DNR order is used in case your heart stops beating or you stop breathing  It is a request not to have certain forms of treatment, such as CPR  A DNR order may be included in other types of advance directives  · Medical directive: This covers the care that you want if you are in a coma, near death, or unable to make decisions for yourself  You can list the treatments you want for each condition  Treatment may include pain medicine, surgery, blood transfusions, dialysis, IV or tube feedings, and a ventilator (breathing machine)  · Values history: This document has questions about your views, beliefs, and how you feel and think about life  This information can help others choose the care that you would choose  Why are advance directives important? An advance directive helps you control your care  Although spoken wishes may be used, it is better to have your wishes written down  Spoken wishes can be misunderstood, or not followed  Treatments may be given even if you do not want them  An advance directive may make it easier for your family to make difficult choices about your care     Fall Prevention    Fall prevention  includes ways to make your home and other areas safer  It also includes ways you can move more carefully to prevent a fall  Health conditions that cause changes in your blood pressure, vision, or muscle strength and coordination may increase your risk for falls  Medicines may also increase your risk for falls if they make you dizzy, weak, or sleepy  Fall prevention tips:   · Stand or sit up slowly  · Use assistive devices as directed  · Wear shoes that fit well and have soles that   · Wear a personal alarm  · Stay active  · Manage your medical conditions  Home Safety Tips:  · Add items to prevent falls in the bathroom  · Keep paths clear  · Install bright lights in your home  · Keep items you use often on shelves within reach  · Paint or place reflective tape on the edges of your stairs  Weight Management   Why it is important to manage your weight:  Being overweight increases your risk of health conditions such as heart disease, high blood pressure, type 2 diabetes, and certain types of cancer  It can also increase your risk for osteoarthritis, sleep apnea, and other respiratory problems  Aim for a slow, steady weight loss  Even a small amount of weight loss can lower your risk of health problems  How to lose weight safely:  A safe and healthy way to lose weight is to eat fewer calories and get regular exercise  You can lose up about 1 pound a week by decreasing the number of calories you eat by 500 calories each day  Healthy meal plan for weight management:  A healthy meal plan includes a variety of foods, contains fewer calories, and helps you stay healthy  A healthy meal plan includes the following:  · Eat whole-grain foods more often  A healthy meal plan should contain fiber  Fiber is the part of grains, fruits, and vegetables that is not broken down by your body  Whole-grain foods are healthy and provide extra fiber in your diet   Some examples of whole-grain foods are whole-wheat breads and pastas, oatmeal, brown rice, and bulgur  · Eat a variety of vegetables every day  Include dark, leafy greens such as spinach, kale, anabell greens, and mustard greens  Eat yellow and orange vegetables such as carrots, sweet potatoes, and winter squash  · Eat a variety of fruits every day  Choose fresh or canned fruit (canned in its own juice or light syrup) instead of juice  Fruit juice has very little or no fiber  · Eat low-fat dairy foods  Drink fat-free (skim) milk or 1% milk  Eat fat-free yogurt and low-fat cottage cheese  Try low-fat cheeses such as mozzarella and other reduced-fat cheeses  · Choose meat and other protein foods that are low in fat  Choose beans or other legumes such as split peas or lentils  Choose fish, skinless poultry (chicken or turkey), or lean cuts of red meat (beef or pork)  Before you cook meat or poultry, cut off any visible fat  · Use less fat and oil  Try baking foods instead of frying them  Add less fat, such as margarine, sour cream, regular salad dressing and mayonnaise to foods  Eat fewer high-fat foods  Some examples of high-fat foods include french fries, doughnuts, ice cream, and cakes  · Eat fewer sweets  Limit foods and drinks that are high in sugar  This includes candy, cookies, regular soda, and sweetened drinks  Exercise:  Exercise at least 30 minutes per day on most days of the week  Some examples of exercise include walking, biking, dancing, and swimming  You can also fit in more physical activity by taking the stairs instead of the elevator or parking farther away from stores  Ask your healthcare provider about the best exercise plan for you  © Copyright [employer] 2018 Information is for End User's use only and may not be sold, redistributed or otherwise used for commercial purposes   All illustrations and images included in CareNotes® are the copyrighted property of A D A M [employer]  or [employer]

## 2023-01-16 ENCOUNTER — TELEPHONE (OUTPATIENT)
Dept: FAMILY MEDICINE CLINIC | Facility: CLINIC | Age: 73
End: 2023-01-16

## 2023-01-16 DIAGNOSIS — J01.90 ACUTE NON-RECURRENT SINUSITIS, UNSPECIFIED LOCATION: Primary | ICD-10-CM

## 2023-01-16 RX ORDER — AMOXICILLIN 875 MG/1
875 TABLET, COATED ORAL 2 TIMES DAILY
Qty: 20 TABLET | Refills: 0 | Status: SHIPPED | OUTPATIENT
Start: 2023-01-16 | End: 2023-01-26

## 2023-01-16 NOTE — TELEPHONE ENCOUNTER
Patient states he believes he has a sinus infection     Symptoms began Thursday with HA, nasal congestion, sore throat, yellow mucus, sneezing    He denies fever, chills, cough, body aches, n/v/d    He states he took a covid test this morning and it was negative     He said he has been taking Mucinex, flonase and Tylenol but they are not helping     He is asking if you would send medication into the pharmacy     [facility] - he is aware to follow up with pharmacy if no call back

## 2023-03-29 DIAGNOSIS — I10 ESSENTIAL HYPERTENSION: ICD-10-CM

## 2023-03-29 RX ORDER — LISINOPRIL 10 MG/1
10 TABLET ORAL DAILY
Qty: 90 TABLET | Refills: 3 | Status: SHIPPED | OUTPATIENT
Start: 2023-03-29

## 2023-04-19 ENCOUNTER — 6 MONTH FOLLOW UP (OUTPATIENT)
Dept: URBAN - METROPOLITAN AREA CLINIC 6 | Facility: CLINIC | Age: 73
End: 2023-04-19

## 2023-04-19 DIAGNOSIS — H40.023: ICD-10-CM

## 2023-04-19 DIAGNOSIS — H25.813: ICD-10-CM

## 2023-04-19 DIAGNOSIS — H16.223: ICD-10-CM

## 2023-04-19 PROCEDURE — 92015 DETERMINE REFRACTIVE STATE: CPT

## 2023-04-19 PROCEDURE — 92012 INTRM OPH EXAM EST PATIENT: CPT

## 2023-04-19 PROCEDURE — 92083 EXTENDED VISUAL FIELD XM: CPT

## 2023-04-19 PROCEDURE — 92020 GONIOSCOPY: CPT

## 2023-04-19 ASSESSMENT — VISUAL ACUITY
OS_SC: 20/25
OU_SC: 20/20
OD_SC: 20/50-1
OD_CC: 20/25
OS_PH: 20/25-2
OU_CC: 20/20-2
OS_CC: 20/40+2
OU_CC: J1+

## 2023-04-19 ASSESSMENT — TONOMETRY
OS_IOP_MMHG: 23
OD_IOP_MMHG: 25

## 2023-04-25 ENCOUNTER — TELEPHONE (OUTPATIENT)
Dept: FAMILY MEDICINE CLINIC | Facility: CLINIC | Age: 73
End: 2023-04-25

## 2023-04-25 DIAGNOSIS — J01.90 ACUTE NON-RECURRENT SINUSITIS, UNSPECIFIED LOCATION: Primary | ICD-10-CM

## 2023-04-25 RX ORDER — AMOXICILLIN 875 MG/1
875 TABLET, COATED ORAL 2 TIMES DAILY
Qty: 20 TABLET | Refills: 0 | Status: SHIPPED | OUTPATIENT
Start: 2023-04-25 | End: 2023-05-05

## 2023-04-25 NOTE — TELEPHONE ENCOUNTER
Patient called stating for about 1 week, he has had a scratchy throat, nasal congestion, sore throat, slight fatigue  He denies headache, SOB, chest pain, chest congestion, fever, chills body aches, ear pain  COVID NEG this AM    He is asking if medication can be sent to [facility]

## 2023-06-22 ENCOUNTER — OFFICE VISIT (OUTPATIENT)
Dept: OBGYN CLINIC | Facility: CLINIC | Age: 73
End: 2023-06-22
Payer: COMMERCIAL

## 2023-06-22 VITALS
BODY MASS INDEX: 28.14 KG/M2 | DIASTOLIC BLOOD PRESSURE: 77 MMHG | HEIGHT: 69 IN | WEIGHT: 190 LBS | HEART RATE: 57 BPM | SYSTOLIC BLOOD PRESSURE: 122 MMHG

## 2023-06-22 DIAGNOSIS — G56.02 CARPAL TUNNEL SYNDROME ON LEFT: ICD-10-CM

## 2023-06-22 DIAGNOSIS — G56.22 CUBITAL TUNNEL SYNDROME ON LEFT: ICD-10-CM

## 2023-06-22 DIAGNOSIS — M18.12 ARTHRITIS OF CARPOMETACARPAL (CMC) JOINT OF LEFT THUMB: Primary | ICD-10-CM

## 2023-06-22 PROCEDURE — 20600 DRAIN/INJ JOINT/BURSA W/O US: CPT | Performed by: SURGERY

## 2023-06-22 PROCEDURE — 99214 OFFICE O/P EST MOD 30 MIN: CPT | Performed by: SURGERY

## 2023-06-22 RX ORDER — BUPIVACAINE HYDROCHLORIDE 2.5 MG/ML
0.5 INJECTION, SOLUTION INFILTRATION; PERINEURAL
Status: COMPLETED | OUTPATIENT
Start: 2023-06-22 | End: 2023-06-22

## 2023-06-22 RX ORDER — LIDOCAINE HYDROCHLORIDE 10 MG/ML
1 INJECTION, SOLUTION INFILTRATION; PERINEURAL
Status: COMPLETED | OUTPATIENT
Start: 2023-06-22 | End: 2023-06-22

## 2023-06-22 RX ORDER — BETAMETHASONE SODIUM PHOSPHATE AND BETAMETHASONE ACETATE 3; 3 MG/ML; MG/ML
6 INJECTION, SUSPENSION INTRA-ARTICULAR; INTRALESIONAL; INTRAMUSCULAR; SOFT TISSUE
Status: COMPLETED | OUTPATIENT
Start: 2023-06-22 | End: 2023-06-22

## 2023-06-22 RX ORDER — TRIAMCINOLONE ACETONIDE 40 MG/ML
20 INJECTION, SUSPENSION INTRA-ARTICULAR; INTRAMUSCULAR
Status: COMPLETED | OUTPATIENT
Start: 2023-06-22 | End: 2023-06-22

## 2023-06-22 RX ORDER — METHYLPREDNISOLONE 4 MG/1
TABLET ORAL
Qty: 21 EACH | Refills: 0 | Status: SHIPPED | OUTPATIENT
Start: 2023-06-22

## 2023-06-22 RX ADMIN — LIDOCAINE HYDROCHLORIDE 1 ML: 10 INJECTION, SOLUTION INFILTRATION; PERINEURAL at 08:00

## 2023-06-22 RX ADMIN — TRIAMCINOLONE ACETONIDE 20 MG: 40 INJECTION, SUSPENSION INTRA-ARTICULAR; INTRAMUSCULAR at 08:00

## 2023-06-22 RX ADMIN — BETAMETHASONE SODIUM PHOSPHATE AND BETAMETHASONE ACETATE 6 MG: 3; 3 INJECTION, SUSPENSION INTRA-ARTICULAR; INTRALESIONAL; INTRAMUSCULAR; SOFT TISSUE at 08:00

## 2023-06-22 RX ADMIN — BUPIVACAINE HYDROCHLORIDE 0.5 ML: 2.5 INJECTION, SOLUTION INFILTRATION; PERINEURAL at 08:00

## 2023-06-22 NOTE — PROGRESS NOTES
ASSESSMENT/PLAN:      72-year-old male with left thumb CMC arthritis, left carpal tunnel syndrome, new onset of left cubital tunnel syndrome symptoms, and improved left hand trigger fingers  Has found significant relief in the past from cortisone injection into the left [facility] joint, which we will repeat today  Orders were placed today for the patient to obtain ultrasound evaluation of the left elbow to confirm cubital tunnel syndrome, and for patient to receive cortisone injection of the cubital tunnel under ultrasound guidance  I recommend he continue using his cock-up wrist splint at night as well as Comfort Cool splint during the day as needed  He may continue using topical Voltaren gel and over-the-counter oral pain medication as needed to aid with relief of his symptoms  We will plan to see him back after the ultrasound to review results and determine appropriate treatment pathway at that time  Patient was offered, accepted, and received a cortisone injection of the left thumb CMC joint  Risks and benefits of CSI were discussed with the patient  The corticosteroid injection was administered without any immediate complication and was well tolerated by the patient  This was done under sterile technique  Post injection instructions and expectations were discussed  It was explained to the patient that cortisone injections can be repeated as early as every 3 months  The patient verbalized understanding of exam findings and treatment plan  We engaged in the shared decision-making process and treatment options were discussed at length with the patient  Surgical and conservative management discussed today along with risks and benefits  Diagnoses and all orders for this visit:    Arthritis of carpometacarpal [place]) joint of left thumb    Carpal tunnel syndrome on left    Cubital tunnel syndrome on left  -     US MSK limited; Future  -     US msk guidance;  Future    Other orders  -     Small joint arthrocentesis: L thumb CMC        Follow Up:  No follow-ups on file  To Do Next Visit:   Review US results, evaluate efficacy of cortisone injections of the left cubital tunnel and left thumb CMC joint    ____________________________________________________________________________________________________________________________________________      CHIEF COMPLAINT:  Chief Complaint   Patient presents with   • Follow-up     Patient would like an injection in the left thumb for [facility] last injection was on 5/19/22       SUBJECTIVE:  Dedra Runner is a 81y o  year old RHD male who presents for follow-up evaluation of his left thumb CMC arthritis as well as left hand numbness and tingling  He denies clicking, popping, or catching of the fingers  At his last visit on 5/19/2022, patient received cortisone injection into the left [facility] joint which she states was significantly beneficial for him for at least 6 months until a gradual return of symptoms  He states he applies Voltaren gel and uses a Comfort Cool brace as needed, both which provided him relief of his symptoms  Regards to his hand paresthesias, he notes numbness and tingling into the long ring and small fingers  He does wear a cock-up wrist brace at night with some relief  States his symptoms have worsened over the past 1 month as he has increased his outdoor activity with warmer weather  I have personally reviewed all the relevant PMH, PSH, SH, FH, Medications and allergies       PAST MEDICAL HISTORY:  Past Medical History:   Diagnosis Date   • Arthritis    • Colon polyps    • GERD (gastroesophageal reflux disease)    • Hyperlipidemia    • Hypertension    • Seasonal allergies        PAST SURGICAL HISTORY:  Past Surgical History:   Procedure Laterality Date   • BACK SURGERY      laminectomy   • CARDIAC SURGERY      cardiac stent   • CARPAL TUNNEL RELEASE     • COLONOSCOPY      benign polyp removal   • ORCHIECTOMY Left    • [place] ESOPHAGOGASTRODUODENOSCOPY TRANSORAL DIAGNOSTIC N/A 8/29/2017    Procedure: EGD AND COLONOSCOPY;  Surgeon: Tl Scott MD;  Location: [facility]; Service: Gastroenterology   • UPPER GASTROINTESTINAL ENDOSCOPY     • WRIST SURGERY         FAMILY HISTORY:  Family History   Problem Relation Age of Onset   • Diabetes Mother    • Hypertension Mother    • Stomach cancer Father    • Hypertension Father    • Liver cancer Father        SOCIAL HISTORY:  Social History     Tobacco Use   • Smoking status: Never   • Smokeless tobacco: Never   Vaping Use   • Vaping Use: Never used   Substance Use Topics   • Alcohol use: Yes     Alcohol/week: 2 0 standard drinks of alcohol     Types: 2 Cans of beer per week   • Drug use: No       MEDICATIONS:    Current Outpatient Medications:   •  acetaminophen (TYLENOL) 650 mg CR tablet, Take 650 mg by mouth daily at bedtime, Disp: , Rfl:   •  ascorbic acid (VITAMIN C) 500 mg tablet, Take 500 mg by mouth daily, Disp: , Rfl:   •  aspirin 81 MG tablet, Take 1 tablet (81 mg total) by mouth daily, Disp: 30 tablet, Rfl: 5  •  Cholecalciferol 2000 units TABS, Take 2,000 Units by mouth, Disp: , Rfl:   •  clotrimazole-betamethasone (LOTRISONE) 1-0 05 % cream, Apply topically 2 (two) times a day, Disp: 30 g, Rfl: 0  •  Coenzyme Q10 200 MG capsule, Take 200 mg by mouth daily, Disp: , Rfl:   •  Diclofenac Sodium (VOLTAREN) 1 %, Apply 2 g topically in the morning and 2 g at noon and 2 g in the evening and 2 g before bedtime  , Disp: 2 g, Rfl: 0  •  Echinacea 125 MG CAPS, Take 1 tablet by mouth 2 (two) times a day, Disp: , Rfl:   •  famotidine (PEPCID) 40 MG tablet, Take 1 tablet (40 mg total) by mouth daily at bedtime as needed for heartburn, Disp: 90 tablet, Rfl: 1  •  FIBER SELECT GUMMIES CHEW, Chew 2 (two) times a day, Disp: , Rfl:   •  lisinopril (ZESTRIL) 10 mg tablet, Take 1 tablet (10 mg total) by mouth daily, Disp: 90 tablet, Rfl: 3  •  Loratadine 10 MG CAPS, Take by mouth if needed, Disp: , "Rfl:   •  melatonin 3 mg, Take 1 tablet by mouth if needed, Disp: , Rfl:   •  meloxicam (MOBIC) 7 5 mg tablet, Take 7 5 mg by mouth if needed for moderate pain, Disp: , Rfl:   •  metoprolol succinate (TOPROL-XL) 25 mg 24 hr tablet, Take 12 5 mg by mouth daily, Disp: , Rfl:   •  multivitamin (THERAGRAN) TABS, Take 1 tablet by mouth daily, Disp: , Rfl:   •  simvastatin (ZOCOR) 40 mg tablet, Take 20 mg by mouth daily at bedtime, Disp: , Rfl:   •  tamsulosin (FLOMAX) 0 4 mg, Take 1 capsule (0 4 mg total) by mouth daily with dinner, Disp: 30 capsule, Rfl: 5  •  Triamcinolone Acetonide 55 MCG/ACT AERO, into each nostril if needed (seasonal allergies), Disp: , Rfl:   •  pantoprazole (PROTONIX) 40 mg tablet, Take 1 tablet (40 mg total) by mouth 2 (two) times a day before meals, Disp: 60 tablet, Rfl: 2    ALLERGIES:  No Known Allergies    REVIEW OF SYSTEMS:  Review of Systems   Constitutional: Negative for chills and fever  HENT: Negative for ear pain and sore throat  Eyes: Negative for pain and visual disturbance  Respiratory: Negative for cough and shortness of breath  Cardiovascular: Negative for chest pain and palpitations  Gastrointestinal: Negative for abdominal pain and vomiting  Genitourinary: Negative for dysuria and hematuria  Musculoskeletal: Positive for arthralgias  Negative for back pain  Skin: Negative for color change and rash  Neurological: Negative for seizures and syncope  All other systems reviewed and are negative        VITALS:  Vitals:    06/22/23 0759   BP: 122/77   Pulse: 57       LABS:  HgA1c: No results found for: \"HGBA1C\"  BMP:   Lab Results   Component Value Date    CALCIUM 9 5 11/29/2022    K 4 8 11/29/2022    CO2 26 11/29/2022     11/29/2022    BUN 13 11/29/2022    CREATININE 0 96 11/29/2022       _____________________________________________________  PHYSICAL EXAMINATION:  General: well developed and well nourished, alert, oriented times 3 and appears " comfortable  Psychiatric: Normal  HEENT: Normocephalic, Atraumatic Trachea Midline, No torticollis  Pulmonary: No audible wheezing or respiratory distress   Cardiovascular: No pitting edema, 2+ radial pulse   Abdominal/GI: abdomen non tender, non distended   Skin: No masses, erythema, lacerations, fluctation, ulcerations  Neurovascular: Sensation Intact to the Median, Ulnar, Radial Nerve, Motor Intact to the Median, Ulnar, Radial Nerve and Pulses Intact  Musculoskeletal: Normal, except as noted in detailed exam and in HPI  MUSCULOSKELETAL EXAMINATION:  left CMC Exam:  No adduction contracture  No hyperextension deformity of MCP joint  Positive localized tenderness over radial and dorsal aspect of thumb (CMC joint)  Grind test is Positive for pain and Positive for crepitus  Metacarpal load shift test positive  No triggering or tenderness over the A1 pulley  negative pain with Finkelstein’s maneuver     Left Carpal Tunnel Exam:    Negative thenar atrophy  Negative phalen's test  Negative carpal tunnel compression  Positive tinels over median nerve at the wrist   Opposition strength 5/5  Abduction strength 5/5  Left Ulnar Nerve Exam:    Negative intrinsic atrophy  Negative  deformity at the elbow  Full range of motion with flexion and extension of the elbow  Negative ulnar nerve compression test at the elbow  Negative tinels over the ulnar nerve at the elbow  FDP strength is 5/5 to the ring finger  FDP strength is 5/5 to the small finger  Intrinsic strength 5/5      ___________________________________________________  STUDIES REVIEWED:  I have personally reviewed MSK US obtained 6/21/2022 which demonstrates: carpal tunnel syndrome based on increased CSA of the median nerve          PROCEDURES PERFORMED:  Small joint arthrocentesis: L thumb CMC  [place] Protocol:  Consent: Verbal consent obtained    Risks and benefits: risks, benefits and alternatives were discussed  Consent given by: patient  Time "out: Immediately prior to procedure a \"time out\" was called to verify the correct patient, procedure, equipment, support staff and site/side marked as required    Patient understanding: patient states understanding of the procedure being performed  Site marked: the operative site was marked  Patient identity confirmed: verbally with patient    Supporting Documentation  Indications: pain   Procedure Details  Location: thumb - L thumb CMC  Needle size: 25 G  Ultrasound guidance: no  Approach: volar  Medications administered: 6 mg betamethasone acetate-betamethasone sodium phosphate 6 (3-3) mg/mL; 1 mL lidocaine 1 %; 0 5 mL bupivacaine 0 25 %; 20 mg triamcinolone acetonide 40 mg/mL    Patient tolerance: patient tolerated the procedure well with no immediate complications  Dressing:  Sterile dressing applied             _____________________________________________________      Scribe Attestation    I,:  Amparo Guerrero am acting as a scribe while in the presence of the attending physician :       I,:  Jamilah Le MD personally performed the services described in this documentation    as scribed in my presence :                 "

## 2023-07-20 ENCOUNTER — TELEPHONE (OUTPATIENT)
Dept: FAMILY MEDICINE CLINIC | Facility: CLINIC | Age: 73
End: 2023-07-20

## 2023-07-20 NOTE — TELEPHONE ENCOUNTER
No need for epipen at this time, but he can see allergist to see if they have other recommendations or testing.  Recommend Dr. Jose Villanueva

## 2023-07-20 NOTE — TELEPHONE ENCOUNTER
Patient was stung yesterday by a bee on his finger in both hands   His hands got very swollen. He stated that his reactions seem to be getting worse each time he gets stung. He does not get any SOB he just gets very swollen at the site he gets stung.      He did take Benadryl and it seems to be helping,but He was wondering is he should have a Epipen in case     Please advise

## 2023-08-31 ENCOUNTER — RA CDI HCC (OUTPATIENT)
Dept: OTHER | Facility: HOSPITAL | Age: 73
End: 2023-08-31

## 2023-08-31 NOTE — PROGRESS NOTES
[address] [facility] coding opportunities       Chart reviewed, no opportunity found: CHART REVIEWED, NO OPPORTUNITY FOUND        Patients Insurance     Medicare Insurance: [employer]

## 2023-09-08 ENCOUNTER — OFFICE VISIT (OUTPATIENT)
Dept: FAMILY MEDICINE CLINIC | Facility: CLINIC | Age: 73
End: 2023-09-08
Payer: COMMERCIAL

## 2023-09-08 VITALS
SYSTOLIC BLOOD PRESSURE: 124 MMHG | TEMPERATURE: 98.6 F | HEART RATE: 69 BPM | WEIGHT: 187.2 LBS | DIASTOLIC BLOOD PRESSURE: 82 MMHG | OXYGEN SATURATION: 98 % | BODY MASS INDEX: 27.73 KG/M2 | HEIGHT: 69 IN

## 2023-09-08 DIAGNOSIS — I25.10 ATHEROSCLEROSIS OF NATIVE CORONARY ARTERY OF NATIVE HEART WITHOUT ANGINA PECTORIS: ICD-10-CM

## 2023-09-08 DIAGNOSIS — E78.9 LIPID DISORDER: ICD-10-CM

## 2023-09-08 DIAGNOSIS — I10 ESSENTIAL HYPERTENSION: Primary | ICD-10-CM

## 2023-09-08 PROCEDURE — 99214 OFFICE O/P EST MOD 30 MIN: CPT | Performed by: FAMILY MEDICINE

## 2023-09-08 RX ORDER — FLUTICASONE PROPIONATE 50 MCG
1 SPRAY, SUSPENSION (ML) NASAL DAILY
COMMUNITY

## 2023-09-08 NOTE — PROGRESS NOTES
Name: Martha Jalloh      : 1950      MRN: [id number]  Encounter Provider: Andrews Martell MD  Encounter Date: 2023   Encounter department: [address] [place] [address]     1. Essential hypertension  Assessment & Plan:  Well controlled. Cont present treatment. Monitor labs. Recheck 6m      Orders:  -     CBC and differential; Future  -     Comprehensive metabolic panel; Future  -     Lipid panel; Future    2. Atherosclerosis of native coronary artery of native heart without angina pectoris  Assessment & Plan:  Pt without anginal symptoms. Continue present care. Check labs. F/u with Cardio      3. Lipid disorder  Assessment & Plan:  Check labs. Continue simvastatin. Change med if not at goal. Recheck 6m             Subjective     f/u multiple med issues  - pt feels well. - pt with a history of left carpal tunnel syndrome, status post arthroscopic CTS release approximately 10 years ago now with return of symptoms. He has been in contact with hand surgery (Dr. Garcia Nurse) and is scheduled for an ultrasound of the carpal tunnel and elbow next month to further assess.  - pt states his R hip is "problematic" at times and is unchanged from last year. Xrays in 2015 showed moderate to severe OA bilat. patient has seen orthopedics in the past.  He feels that he is getting closer to needing a replacement. - pt is due to see Cardio. Pt denies CP, palpitations, lightheadedness or other CV symptoms with or without exertion  - no new GI issues. GERD stable on famotidine. Up to date with colonoscopy. Review of Systems   Constitutional: Negative. HENT: Negative. Eyes: Negative. Respiratory: Negative. Cardiovascular: Negative. Gastrointestinal: Negative. Endocrine: Negative. Genitourinary: Negative. Musculoskeletal: Positive for arthralgias (L CTS). Skin: Negative. Allergic/Immunologic: Negative. Neurological: Negative.     Hematological: Negative. Psychiatric/Behavioral: Negative. Past Medical History:   Diagnosis Date   • Arthritis    • Colon polyps    • GERD (gastroesophageal reflux disease)    • Hyperlipidemia    • Hypertension    • Seasonal allergies      Past Surgical History:   Procedure Laterality Date   • BACK SURGERY      laminectomy   • CARDIAC SURGERY      cardiac stent   • CARPAL TUNNEL RELEASE     • COLONOSCOPY      benign polyp removal   • ORCHIECTOMY Left    • [place] ESOPHAGOGASTRODUODENOSCOPY TRANSORAL DIAGNOSTIC N/A 8/29/2017    Procedure: EGD AND COLONOSCOPY;  Surgeon: Ramiro Fermin MD;  Location[facility]; Service: Gastroenterology   • UPPER GASTROINTESTINAL ENDOSCOPY     • WRIST SURGERY       Family History   Problem Relation Age of Onset   • Diabetes Mother    • Hypertension Mother    • Stomach cancer Father    • Hypertension Father    • Liver cancer Father      Social History     Socioeconomic History   • Marital status: /Civil Union     Spouse name: None   • Number of children: None   • Years of education: some college   • Highest education level: None   Occupational History   • Occupation: part time   Tobacco Use   • Smoking status: Never   • Smokeless tobacco: Never   Vaping Use   • Vaping Use: Never used   Substance and Sexual Activity   • Alcohol use:  Yes     Alcohol/week: 2.0 standard drinks of alcohol     Types: 2 Cans of beer per week   • Drug use: No   • Sexual activity: Yes   Other Topics Concern   • None   Social History Narrative    Daily coffee consumption    Daily cola consumption    Daily tea consumption    Dental care regularly    Does not exercise    Patient has discussed organ donor wishes with family    Patient has living will    Pet: cat    POA in existence    Water intake adequate per day         Social Determinants of Health     Financial Resource Strain: Low Risk  (12/20/2022)    Overall Financial Resource Strain (CARDIA)    • Difficulty of Paying Living Expenses: Not very hard   Food Insecurity: Not on file   Transportation Needs: No Transportation Needs (12/20/2022)    PRAPARE - Transportation    • Lack of Transportation (Medical): No    • Lack of Transportation (Non-Medical): No   Physical Activity: Not on file   Stress: Not on file   Social Connections: Not on file   Intimate Partner Violence: Not on file   Housing Stability: Not on file     Current Outpatient Medications on File Prior to Visit   Medication Sig   • acetaminophen (TYLENOL) 650 mg CR tablet Take 650 mg by mouth daily at bedtime   • ascorbic acid (VITAMIN C) 500 mg tablet Take 500 mg by mouth daily   • aspirin 81 MG tablet Take 1 tablet (81 mg total) by mouth daily   • Cholecalciferol 2000 units TABS Take 2,000 Units by mouth   • clotrimazole-betamethasone (LOTRISONE) 1-0.05 % cream Apply topically 2 (two) times a day   • Coenzyme Q10 200 MG capsule Take 200 mg by mouth daily   • Diclofenac Sodium (VOLTAREN) 1 % Apply 2 g topically in the morning and 2 g at noon and 2 g in the evening and 2 g before bedtime.    • Echinacea 125 MG CAPS Take 1 tablet by mouth 2 (two) times a day   • famotidine (PEPCID) 40 MG tablet Take 1 tablet (40 mg total) by mouth daily at bedtime as needed for heartburn   • FIBER SELECT GUMMIES CHEW Chew 2 (two) times a day   • fluticasone (FLONASE) 50 mcg/act nasal spray 1 spray into each nostril daily   • lisinopril (ZESTRIL) 10 mg tablet Take 1 tablet (10 mg total) by mouth daily   • Loratadine 10 MG CAPS Take by mouth if needed   • melatonin 3 mg Take 1 tablet by mouth if needed   • meloxicam (MOBIC) 7.5 mg tablet Take 7.5 mg by mouth if needed for moderate pain   • metoprolol succinate (TOPROL-XL) 25 mg 24 hr tablet Take 12.5 mg by mouth daily   • multivitamin (THERAGRAN) TABS Take 1 tablet by mouth daily   • simvastatin (ZOCOR) 40 mg tablet Take 20 mg by mouth daily at bedtime   • tamsulosin (FLOMAX) 0.4 mg Take 1 capsule (0.4 mg total) by mouth daily with dinner   • metoprolol succinate (TOPROL-XL) 25 mg 24 hr tablet Take 12.5 mg by mouth daily   • simvastatin (ZOCOR) 40 mg tablet Take 20 mg by mouth daily at bedtime     No Known Allergies  Immunization History   Administered Date(s) Administered   • COVID-19 PFIZER VACCINE 0.3 ML IM 03/18/2021, 04/08/2021, 10/21/2021, 06/22/2022   • COVID-19 Pfizer Vac BIVALENT Andrew-sucrose 12 Yr+ IM (BOOSTER ONLY) 10/04/2022   • INFLUENZA 01/13/2016, 10/16/2016, 01/11/2017, 01/10/2018, 11/07/2018, 10/28/2022   • Influenza, high dose seasonal 0.7 mL 11/07/2018, 11/08/2019, 10/22/2020   • Influenza, seasonal, injectable 01/07/2014   • Pneumococcal Conjugate 13-Valent 11/07/2018   • Pneumococcal Polysaccharide PPV23 11/08/2019       Objective     /82 (BP Location: Left arm, Patient Position: Sitting, Cuff Size: Adult)   Pulse 69   Temp 98.6 °F (37 °C)   Ht 5' 9" (1.753 m)   Wt 84.9 kg (187 lb 3.2 oz)   SpO2 98%   BMI 27.64 kg/m²     Physical Exam  Vitals reviewed. Constitutional:       Appearance: He is well-developed. HENT:      Head: Normocephalic and atraumatic. Right Ear: Tympanic membrane, ear canal and external ear normal.      Left Ear: Tympanic membrane, ear canal and external ear normal.      Nose: Nose normal.      Mouth/Throat:      Mouth: Mucous membranes are moist.   Eyes:      General: No scleral icterus. Extraocular Movements: Extraocular movements intact. Conjunctiva/sclera: Conjunctivae normal.      Pupils: Pupils are equal, round, and reactive to light. Neck:      Thyroid: No thyromegaly. Cardiovascular:      Rate and Rhythm: Normal rate and regular rhythm. Pulses: Normal pulses. Heart sounds: Normal heart sounds. No murmur heard. Pulmonary:      Effort: Pulmonary effort is normal.      Breath sounds: Normal breath sounds. Abdominal:      General: Bowel sounds are normal. There is no distension. Palpations: Abdomen is soft. There is no mass. Tenderness: There is no abdominal tenderness.    Musculoskeletal: General: Tenderness (L wrist) present. No swelling or deformity. Normal range of motion. Cervical back: Normal range of motion and neck supple. No tenderness. No muscular tenderness. Right lower leg: No edema. Left lower leg: No edema. Lymphadenopathy:      Cervical: No cervical adenopathy. Skin:     General: Skin is warm and dry. Capillary Refill: Capillary refill takes less than 2 seconds. Neurological:      Mental Status: He is alert and oriented to person, place, and time. Cranial Nerves: No cranial nerve deficit. Sensory: No sensory deficit. Motor: No weakness. Gait: Gait normal.   Psychiatric:         Mood and Affect: Mood normal.         Behavior: Behavior normal.         Thought Content:  Thought content normal.         Judgment: Judgment normal.       Osmar Figueroa MD

## 2023-09-15 ENCOUNTER — APPOINTMENT (OUTPATIENT)
Dept: ULTRASOUND IMAGING | Facility: HOSPITAL | Age: 73
End: 2023-09-15
Attending: SURGERY
Payer: COMMERCIAL

## 2023-09-15 ENCOUNTER — HOSPITAL ENCOUNTER (OUTPATIENT)
Dept: ULTRASOUND IMAGING | Facility: HOSPITAL | Age: 73
Discharge: HOME | End: 2023-09-15
Attending: SURGERY
Payer: COMMERCIAL

## 2023-09-15 DIAGNOSIS — G56.22 CUBITAL TUNNEL SYNDROME ON LEFT: ICD-10-CM

## 2023-09-15 PROCEDURE — 20606 DRAIN/INJ JOINT/BURSA W/US: CPT

## 2023-09-15 PROCEDURE — 76882 US LMTD JT/FCL EVL NVASC XTR: CPT

## 2023-09-15 RX ORDER — LIDOCAINE HYDROCHLORIDE 10 MG/ML
5 INJECTION, SOLUTION EPIDURAL; INFILTRATION; INTRACAUDAL; PERINEURAL ONCE
Status: COMPLETED | OUTPATIENT
Start: 2023-09-15 | End: 2023-09-15

## 2023-09-15 RX ORDER — METHYLPREDNISOLONE ACETATE 80 MG/ML
80 INJECTION, SUSPENSION INTRA-ARTICULAR; INTRALESIONAL; INTRAMUSCULAR; SOFT TISSUE ONCE
Status: COMPLETED | OUTPATIENT
Start: 2023-09-15 | End: 2023-09-15

## 2023-09-15 RX ADMIN — METHYLPREDNISOLONE ACETATE 80 MG: 80 INJECTION, SUSPENSION INTRA-ARTICULAR; INTRALESIONAL; INTRAMUSCULAR; SOFT TISSUE at 08:55

## 2023-09-15 RX ADMIN — LIDOCAINE HYDROCHLORIDE 5 ML: 10 INJECTION, SOLUTION EPIDURAL; INFILTRATION; INTRACAUDAL; PERINEURAL at 08:50

## 2023-09-20 ENCOUNTER — OFFICE VISIT (OUTPATIENT)
Dept: OBGYN CLINIC | Facility: CLINIC | Age: 73
End: 2023-09-20
Payer: COMMERCIAL

## 2023-09-20 VITALS
HEIGHT: 69 IN | WEIGHT: 187.2 LBS | BODY MASS INDEX: 27.73 KG/M2 | DIASTOLIC BLOOD PRESSURE: 77 MMHG | SYSTOLIC BLOOD PRESSURE: 132 MMHG | HEART RATE: 49 BPM

## 2023-09-20 DIAGNOSIS — G56.02 CARPAL TUNNEL SYNDROME ON LEFT: ICD-10-CM

## 2023-09-20 DIAGNOSIS — G56.22 NEURITIS OF LEFT ULNAR NERVE: Primary | ICD-10-CM

## 2023-09-20 PROCEDURE — 99213 OFFICE O/P EST LOW 20 MIN: CPT | Performed by: SURGERY

## 2023-09-20 NOTE — PROGRESS NOTES
ASSESSMENT/PLAN:      70-year-old male here for his left ulnar neuritis, left carpal tunnel syndrome and, left thumb CMC joint osteoarthritis. Ultrasound was reviewed noting that there are no significant findings of cubital tunnel. It is recommended to start occupational therapy to work on ulnar nerve glides with modalities. They can addressed any carpal tunnel as well. He should continue with the nighttime splinting. We will follow-up with patient in 3 months for reevaluation    The patient verbalized understanding of exam findings and treatment plan. We engaged in the shared decision-making process and treatment options were discussed at length with the patient. Surgical and conservative management discussed today along with risks and benefits. Diagnoses and all orders for this visit:    Neuritis of left ulnar nerve  -     Ambulatory Referral to PT/OT Hand Therapy; Future    Carpal tunnel syndrome on left        Follow Up:  Return in about 3 months (around 12/20/2023) for left . To Do Next Visit:  Re-evaluation of current issue    ____________________________________________________________________________________________________________________________________________      CHIEF COMPLAINT:  Chief Complaint   Patient presents with   • Left Hand - Follow-up   • Left Elbow - Follow-up       SUBJECTIVE:  Lucian Diaz is a 72y.o. year old LHD male who presents today to review an ultrasound of the left elbow to further evaluate for cubital tunnel syndrome. Patient also had a localized cortisone injection on 9/15/2023 for his 2 ulnar digits symptoms. He reports that it may be too soon to see any benefit from the injection. He is splinting for his capral tunnel at night. He also has left thumb CMC joint pain that he had injected at his last visit, which gave him significant relief. He does have trigger fingers on both hands but current;y not triggering today.         I have personally reviewed all the relevant PMH, PSH, SH, FH, Medications and allergies. PAST MEDICAL HISTORY:  Past Medical History:   Diagnosis Date   • Arthritis    • Colon polyps    • GERD (gastroesophageal reflux disease)    • Hyperlipidemia    • Hypertension    • Seasonal allergies        PAST SURGICAL HISTORY:  Past Surgical History:   Procedure Laterality Date   • BACK SURGERY      laminectomy   • CARDIAC SURGERY      cardiac stent   • CARPAL TUNNEL RELEASE     • COLONOSCOPY      benign polyp removal   • ORCHIECTOMY Left    • NE ESOPHAGOGASTRODUODENOSCOPY TRANSORAL DIAGNOSTIC N/A 8/29/2017    Procedure: EGD AND COLONOSCOPY;  Surgeon: Noni Nowak MD;  Location: [facility]; Service: Gastroenterology   • UPPER GASTROINTESTINAL ENDOSCOPY     • WRIST SURGERY         FAMILY HISTORY:  Family History   Problem Relation Age of Onset   • Diabetes Mother    • Hypertension Mother    • Stomach cancer Father    • Hypertension Father    • Liver cancer Father        SOCIAL HISTORY:  Social History     Tobacco Use   • Smoking status: Never   • Smokeless tobacco: Never   Vaping Use   • Vaping Use: Never used   Substance Use Topics   • Alcohol use:  Yes     Alcohol/week: 2.0 standard drinks of alcohol     Types: 2 Cans of beer per week   • Drug use: No       MEDICATIONS:    Current Outpatient Medications:   •  acetaminophen (TYLENOL) 650 mg CR tablet, Take 650 mg by mouth daily at bedtime, Disp: , Rfl:   •  ascorbic acid (VITAMIN C) 500 mg tablet, Take 500 mg by mouth daily, Disp: , Rfl:   •  aspirin 81 MG tablet, Take 1 tablet (81 mg total) by mouth daily, Disp: 30 tablet, Rfl: 5  •  Cholecalciferol 2000 units TABS, Take 2,000 Units by mouth, Disp: , Rfl:   •  clotrimazole-betamethasone (LOTRISONE) 1-0.05 % cream, Apply topically 2 (two) times a day, Disp: 30 g, Rfl: 0  •  Coenzyme Q10 200 MG capsule, Take 200 mg by mouth daily, Disp: , Rfl:   •  Diclofenac Sodium (VOLTAREN) 1 %, Apply 2 g topically in the morning and 2 g at noon and 2 g in the evening and 2 g before bedtime. , Disp: 2 g, Rfl: 0  •  Echinacea 125 MG CAPS, Take 1 tablet by mouth 2 (two) times a day, Disp: , Rfl:   •  famotidine (PEPCID) 40 MG tablet, Take 1 tablet (40 mg total) by mouth daily at bedtime as needed for heartburn, Disp: 90 tablet, Rfl: 1  •  FIBER SELECT GUMMIES CHEW, Chew 2 (two) times a day, Disp: , Rfl:   •  fluticasone (FLONASE) 50 mcg/act nasal spray, 1 spray into each nostril daily, Disp: , Rfl:   •  lisinopril (ZESTRIL) 10 mg tablet, Take 1 tablet (10 mg total) by mouth daily, Disp: 90 tablet, Rfl: 3  •  Loratadine 10 MG CAPS, Take by mouth if needed, Disp: , Rfl:   •  melatonin 3 mg, Take 1 tablet by mouth if needed, Disp: , Rfl:   •  meloxicam (MOBIC) 7.5 mg tablet, Take 7.5 mg by mouth if needed for moderate pain, Disp: , Rfl:   •  metoprolol succinate (TOPROL-XL) 25 mg 24 hr tablet, Take 12.5 mg by mouth daily, Disp: , Rfl:   •  multivitamin (THERAGRAN) TABS, Take 1 tablet by mouth daily, Disp: , Rfl:   •  simvastatin (ZOCOR) 40 mg tablet, Take 20 mg by mouth daily at bedtime, Disp: , Rfl:   •  tamsulosin (FLOMAX) 0.4 mg, Take 1 capsule (0.4 mg total) by mouth daily with dinner, Disp: 30 capsule, Rfl: 5    ALLERGIES:  No Known Allergies    REVIEW OF SYSTEMS:  Review of Systems   Constitutional: Negative for chills, fever and unexpected weight change. HENT: Negative for hearing loss, nosebleeds and sore throat. Eyes: Negative for pain, redness and visual disturbance. Respiratory: Negative for cough, shortness of breath and wheezing. Cardiovascular: Negative for chest pain, palpitations and leg swelling. Gastrointestinal: Negative for abdominal pain, nausea and vomiting. Endocrine: Negative for polydipsia and polyuria. Genitourinary: Negative for dysuria and hematuria. Musculoskeletal: Positive for arthralgias. Skin: Negative for rash and wound. Neurological: Positive for numbness. Negative for dizziness, light-headedness and headaches. Psychiatric/Behavioral: Negative for decreased concentration, dysphoric mood and suicidal ideas. The patient is not nervous/anxious. VITALS:  Vitals:    09/20/23 0940   BP: 132/77   Pulse: (!) 49       LABS:  HgA1c: No results found for: "HGBA1C"  BMP:   Lab Results   Component Value Date    CALCIUM 9.5 11/29/2022    K 4.8 11/29/2022    CO2 26 11/29/2022     11/29/2022    BUN 13 11/29/2022    CREATININE 0.96 11/29/2022       _____________________________________________________  PHYSICAL EXAMINATION:  General: well developed and well nourished, alert, oriented times 3 and appears comfortable  Psychiatric: Normal  HEENT: Normocephalic, Atraumatic Trachea Midline, No torticollis  Pulmonary: No audible wheezing or respiratory distress   Cardiovascular: No pitting edema, 2+ radial pulse   Abdominal/GI: abdomen non tender, non distended   Skin: No masses, erythema, lacerations, fluctation, ulcerations  Neurovascular: Sensation Intact to the Radial Nerve, Decreased Sensation to  the Median Nerve, Decreased Sensation to  the Ulnar Nerve, Motor Intact to the Median, Ulnar, Radial Nerve and Pulses Intact  Musculoskeletal: Normal, except as noted in detailed exam and in HPI. MUSCULOSKELETAL EXAMINATION:    Left Carpal Tunnel Exam:    Negative thenar atrophy. Negative phalen's test. Positive carpal tunnel compression. Positive tinels over median nerve at the wrist.  Opposition strength 5/5. Abduction strength 5/5. Left Ulnar Nerve Exam:    Negative intrinsic atrophy. Negative  deformity at the elbow. Full range of motion with flexion and extension of the elbow. Positive ulnar nerve compression test at the elbow. Positive tinels over the ulnar nerve at the elbow. Negative cross finger test in the index and long fingers.    Intrinsic strength 5/5 .    __________________________________________________  STUDIES REVIEWED:  I have personally reviewed US of th left elbow from 9/15/2023 which demonstrate no clear evidence of cubital tunnel syndrome          PROCEDURES PERFORMED:  Procedures  No Procedures performed today    _____________________________________________________      Jonathon Gillette    I,:  Joan Chong am acting as a scribe while in the presence of the attending physician.:       I,:  Jody Brody MD personally performed the services described in this documentation    as scribed in my presence.:

## 2023-10-02 ENCOUNTER — EVALUATION (OUTPATIENT)
Dept: OCCUPATIONAL THERAPY | Facility: MEDICAL CENTER | Age: 73
End: 2023-10-02
Payer: COMMERCIAL

## 2023-10-02 DIAGNOSIS — G56.22 NEURITIS OF LEFT ULNAR NERVE: Primary | ICD-10-CM

## 2023-10-02 PROCEDURE — 97165 OT EVAL LOW COMPLEX 30 MIN: CPT

## 2023-10-02 PROCEDURE — 97530 THERAPEUTIC ACTIVITIES: CPT

## 2023-10-02 NOTE — PROGRESS NOTES
OT Evaluation     Today's date: 10/2/2023  Patient name: Valentina Lombard  : 1950  MRN: [id number]  Referring provider: Chani Schmid MD  Dx:   Encounter Diagnosis     ICD-10-CM    1. Neuritis of left ulnar nerve  G56.22 Ambulatory Referral to PT/OT Hand Therapy                     Assessment  Assessment details: Pt presents to OT evaluation on 10/20/2023 secondary to ulnar nerve neuritis and carpal tunnel syndrome of the L hand. Numbness through ulnar side of forearm and ring/pinky fingers. Injection in the elbow three weeks ago. Reports that if he holds the steering wheel, magazine, newspaper he will have numbness in the L hand. History of injury to elbow a few years ago while using extension ladder while trying to use extended. Numbness through thumb, middle, and index finger due to carpal tunnel. Previously had carpal tunnel surgery on the L hand. Sleeps with a pre meli wrist cock up brace for management of symptoms. Reports relief with the brace. Positive tinel's at elbow. Pt has normal elbow, wrist, and digit AROM. Pt was educated on Heelbo for protection of elbow flexion at night. Pt was educated on wrist PROM, ulnar and median nerve glides. Pt will be seen for OT services 1-2x/wk for 10 weeks to address numbness with function. POC was reviewed with the pt, pt understands and agrees with POC. Impairments: abnormal or restricted ROM, activity intolerance, impaired physical strength, lacks appropriate home exercise program and pain with function    Goals  STG:  - Pt will exhibit understanding and demonstrate carry over of HEP   - Pt will begin to verbally report a decrease in pain from time of initial evaluation with use of modalities. - Pt will demonstrate compliance with brace wearing schedule. LTG:  - Pt will report decreased numbness with nerve glides within 12 weeks.   - Pt will consistently report decreased pain when compared to initial evaluation.  - Pt will report feeling >75% back to normal for increased independence in daily routine and tasks. - Pt will express readiness for discharge with improved independence. Plan  Patient would benefit from: skilled occupational therapy  Planned modality interventions: thermotherapy: hydrocollator packs  Planned therapy interventions: manual therapy, massage, IASTM, patient education, strengthening, stretching, therapeutic activities, therapeutic exercise, functional ROM exercises, home exercise program, graded motor, graded exercise and graded activity  Frequency: 2x week  Duration in weeks: 10  Plan of Care beginning date: 10/2/2023  Plan of Care expiration date: 2023  Treatment plan discussed with: patient        Subjective Evaluation    History of Present Illness  Mechanism of injury: Pt presents to OT evaluation on 10/20/2023 secondary to ulnar nerve neuritis and carpal tunnel syndrome of the L hand. Numbness through ulnar side of forearm and ring/pinky fingers. Injection in the elbow three weeks ago. Reports that if he holds the steering wheel, magazine, newspaper he will have numbness in the L hand. History of injury to elbow a few years ago while using extension ladder while trying to use extended. Numbness through thumb, middle, and index finger due to carpal tunnel. Previously had carpal tunnel surgery on the L hand. Sleeps with a pre meli wrist cock up brace for management of symptoms. Reports relief with the brace. "I can do most things but then my symptoms get worse."    Work: Retired. IADL: Difficulty with yard work due to vibration of tools.     Patient Goals  Patient goals for therapy: decreased edema, decreased pain, increased motion, return to work, return to [employer] activities, independence with ADLs/IADLs and increased strength  Patient goal: To decrease the pain  Pain  Current pain ratin  At best pain ratin  At worst pain ratin  Relieving factors: medications    Hand dominance: right          Objective Active Range of Motion     Left Elbow   Flexion: WFL  Extension: WFL  Forearm supination: WFL  Forearm pronation: WFL    Right Elbow   Flexion: WFL  Extension: WFL  Forearm supination: WFL  Forearm pronation: WFL    Left Wrist   Wrist flexion: 62 degrees   Wrist extension: 52 degrees   Radial deviation: WFL  Ulnar deviation: WFL      Right Wrist   Wrist flexion: 62 degrees   Wrist extension: 52 degrees   Radial deviation: WFL  Ulnar deviation: WFL    Left Thumb     Opposition: Full Opposition  Full Composite Fist    Right Thumb   Opposition: Full Opposition  Full Composite Fist    Strength/Myotome Testing     Left Wrist/Hand      (2nd hand position)     Trial 1: 62    Right Wrist/Hand      (2nd hand position)     Trial 1: 62    Swelling     Left Wrist/Hand   Circumference MCP: 22.8 cm  Circumference wrist: 17.8 cm    Right Wrist/Hand   Circumference MCP: 23.2 cm  Circumference wrist: 18.5 cm      Flowsheet Rows    Flowsheet Row Most Recent Value   PT/OT G-Codes    Current Score 65   Projected Score 70             Precautions: Universal     Manuals HEP                        Ther Ex     Education on HEP and dx x5 min     AROM tendon glides x10    AROM table top extension x10    AROM digit add/abduction x10    AROM wrist flex/ext/RD/UD x10    AROM forearm rotation x10    PROM wrist flex/ext 3 x 20 second hold    Median Nerve Glides     Ulnar Nerve Glides                         Modalities     Heat 10-15 min 10 min in conjunction with HEP education          Therapist supervised patient with use of modalities during today's treatment session. Skin integrity was assessed and appeared normal following use of modalities. Assessment:   Pt presents to OT evaluation on 10/20/2023 secondary to ulnar nerve neuritis and carpal tunnel syndrome of the L hand. Numbness through ulnar side of forearm and ring/pinky fingers. Injection in the elbow three weeks ago.  Reports that if he holds the steering wheel, magazine, newspaper he will have numbness in the L hand. History of injury to elbow a few years ago while using extension ladder while trying to use extended. Numbness through thumb, middle, and index finger due to carpal tunnel. Previously had carpal tunnel surgery on the L hand. Sleeps with a pre meli wrist cock up brace for management of symptoms. Reports relief with the brace. Positive tinel's at elbow. Pt has normal elbow, wrist, and digit AROM. Pt was educated on Heelbo for protection of elbow flexion at night. Pt was educated on wrist PROM, ulnar and median nerve glides. Pt will be seen for OT services 1-2x/wk for 10 weeks to address numbness with function. POC was reviewed with the pt, pt understands and agrees with POC. Plan:   Focus on numbness to improve ability to complete daily activites with ease.   POC 10/2/2023-12/11/2023

## 2023-10-06 ENCOUNTER — OFFICE VISIT (OUTPATIENT)
Dept: OCCUPATIONAL THERAPY | Facility: MEDICAL CENTER | Age: 73
End: 2023-10-06
Payer: COMMERCIAL

## 2023-10-06 DIAGNOSIS — G56.22 NEURITIS OF LEFT ULNAR NERVE: Primary | ICD-10-CM

## 2023-10-06 PROCEDURE — 97110 THERAPEUTIC EXERCISES: CPT

## 2023-10-06 PROCEDURE — 97140 MANUAL THERAPY 1/> REGIONS: CPT

## 2023-10-06 NOTE — PROGRESS NOTES
Daily Note     Today's date: 10/6/2023  Patient name: Wicho Jimenez  : 1950  MRN: [id number]  Referring provider: Herve May MD  Dx:   Encounter Diagnosis     ICD-10-CM    1. Neuritis of left ulnar nerve  G56.22                      Subjective: "I have not gotten into a routine yet with the exercises."    Objective: See treatment diary below     Precautions: Universal     Manuals HEP    Forearm flexors  Time: 10 minutes   K-tape on forearm flexors               Ther Ex     Education on HEP and dx x5 min     AROM tendon glides x10 X 10   AROM table top extension x10 X 10   AROM digit add/abduction x10 X 10   AROM wrist flex/ext/RD/UD x10 X 10   AROM forearm rotation x10 X 10   PROM wrist flex/ext 3 x 20 second hold 3 x 20 seconds hold   Median Nerve Glides  X 10   Ulnar Nerve Glides  X 10                       Modalities     Heat  5 minutes          Therapist supervised patient with use of modalities during today's treatment session. Skin integrity was assessed and appeared normal following use of modalities. Assessment:   Pt is continuing to benefit from OT treatment services. Reviewed PROM of wrist flexion and extension. Introduced IASTM on forearm flexors for soft tissue tightness. K-tape applied at forearm flexors. Will continue to progress pt as tolerated. To begin strengthening next session. Plan:   Focus on numbness to improve ability to complete daily activites with ease.   POC 10/2/2023-2023

## 2023-10-10 ENCOUNTER — OFFICE VISIT (OUTPATIENT)
Dept: OCCUPATIONAL THERAPY | Facility: MEDICAL CENTER | Age: 73
End: 2023-10-10
Payer: COMMERCIAL

## 2023-10-10 DIAGNOSIS — G56.22 NEURITIS OF LEFT ULNAR NERVE: Primary | ICD-10-CM

## 2023-10-10 PROCEDURE — 97140 MANUAL THERAPY 1/> REGIONS: CPT

## 2023-10-10 PROCEDURE — 97110 THERAPEUTIC EXERCISES: CPT

## 2023-10-10 NOTE — PROGRESS NOTES
Daily Note     Today's date: 10/10/2023  Patient name: Breanne Smalls  : 1950  MRN: [id number]  Referring provider: Ministerio Leger MD  Dx:   Encounter Diagnosis     ICD-10-CM    1. Neuritis of left ulnar nerve  G56.22                      Subjective: "About the same."    Objective: See treatment diary below     Precautions: Universal     Manuals HEP    Forearm flexors  Time: 10 minutes   K-tape on forearm flexors               Ther Ex     Education on HEP and dx x5 min     AROM tendon glides x10 X 10   AROM table top extension x10 X 10   AROM digit add/abduction x10 X 10   AROM wrist flex/ext/RD/UD x10 X 10   AROM forearm rotation x10 X 10   PROM wrist flex/ext 3 x 20 second hold 3 x 20 seconds hold   Median Nerve Glides  X 10   Ulnar Nerve Glides  X 10   PREs: Wrist flexion, wrist extension, radial deviation, supination/pronation  2 x 10   Juxtaciser  X 10             Modalities     Heat  5 minutes          Therapist supervised patient with use of modalities during today's treatment session. Skin integrity was assessed and appeared normal following use of modalities. Assessment:   Pt is continuing to benefit from OT treatment services. Continued to complete PROM stretching. Educated pt on strengthening of wrist today. Will continue to progress pt as tolerated. Plan:   Focus on numbness to improve ability to complete daily activites with ease.   POC 10/2/2023-2023

## 2023-10-12 ENCOUNTER — OFFICE VISIT (OUTPATIENT)
Dept: OCCUPATIONAL THERAPY | Facility: MEDICAL CENTER | Age: 73
End: 2023-10-12
Payer: COMMERCIAL

## 2023-10-12 DIAGNOSIS — G56.22 NEURITIS OF LEFT ULNAR NERVE: Primary | ICD-10-CM

## 2023-10-12 PROCEDURE — 97110 THERAPEUTIC EXERCISES: CPT

## 2023-10-12 NOTE — PROGRESS NOTES
Daily Note     Today's date: 10/12/2023  Patient name: Mikael Sanchez  : 1950  MRN: [id number]  Referring provider: Odin Garcia MD  Dx:   Encounter Diagnosis     ICD-10-CM    1. Neuritis of left ulnar nerve  G56.22                        Subjective: "Right now it's better."    Objective: See treatment diary below     Precautions: Universal     Manuals HEP    Forearm flexors  Time: 10 minutes   K-tape on forearm flexors               Ther Ex     Education on HEP and dx x5 min     AROM tendon glides x10 X 10   AROM table top extension x10 X 10   AROM digit add/abduction x10 X 10   AROM wrist flex/ext/RD/UD x10 X 10   AROM forearm rotation x10 X 10   PROM wrist flex/ext 3 x 20 second hold 3 x 20 seconds hold   Median Nerve Glides  X 10   Ulnar Nerve Glides  X 10   PREs: Wrist flexion, wrist extension, radial deviation, supination/pronation 3#  2 x 10   Juxtaciser  X 10   Green Flexbar: Pronation, supination, prograde, retrograde  X 20        Modalities     Heat  5 minutes          Therapist supervised patient with use of modalities during today's treatment session. Skin integrity was assessed and appeared normal following use of modalities. Assessment:   Pt is continuing to benefit from OT treatment services. Continued to complete PROM stretching. Wrist strengthening with 3# today. Introduced green flexbar bends. K-taped wrist for palmar opening. Will continue to progress pt as tolerated. Plan:   Focus on numbness to improve ability to complete daily activites with ease.   POC 10/2/2023-2023

## 2023-10-16 ENCOUNTER — OFFICE VISIT (OUTPATIENT)
Dept: OCCUPATIONAL THERAPY | Facility: MEDICAL CENTER | Age: 73
End: 2023-10-16
Payer: COMMERCIAL

## 2023-10-16 ENCOUNTER — APPOINTMENT (OUTPATIENT)
Dept: LAB | Facility: MEDICAL CENTER | Age: 73
End: 2023-10-16
Payer: COMMERCIAL

## 2023-10-16 DIAGNOSIS — G56.22 NEURITIS OF LEFT ULNAR NERVE: Primary | ICD-10-CM

## 2023-10-16 DIAGNOSIS — I10 ESSENTIAL HYPERTENSION: ICD-10-CM

## 2023-10-16 LAB
ALBUMIN SERPL BCP-MCNC: 4.5 G/DL (ref 3.5–5)
ALP SERPL-CCNC: 69 U/L (ref 34–104)
ALT SERPL W P-5'-P-CCNC: 16 U/L (ref 7–52)
ANION GAP SERPL CALCULATED.3IONS-SCNC: 5 MMOL/L
AST SERPL W P-5'-P-CCNC: 17 U/L (ref 13–39)
BASOPHILS # BLD AUTO: 0.07 THOUSANDS/ÂΜL (ref 0–0.1)
BASOPHILS NFR BLD AUTO: 1 % (ref 0–1)
BILIRUB SERPL-MCNC: 0.67 MG/DL (ref 0.2–1)
BUN SERPL-MCNC: 15 MG/DL (ref 5–25)
CALCIUM SERPL-MCNC: 9.7 MG/DL (ref 8.4–10.2)
CHLORIDE SERPL-SCNC: 105 MMOL/L (ref 96–108)
CHOLEST SERPL-MCNC: 138 MG/DL
CO2 SERPL-SCNC: 28 MMOL/L (ref 21–32)
CREAT SERPL-MCNC: 0.79 MG/DL (ref 0.6–1.3)
EOSINOPHIL # BLD AUTO: 0.24 THOUSAND/ÂΜL (ref 0–0.61)
EOSINOPHIL NFR BLD AUTO: 4 % (ref 0–6)
ERYTHROCYTE [DISTWIDTH] IN BLOOD BY AUTOMATED COUNT: 13.1 % (ref 11.6–15.1)
GFR SERPL CREATININE-BSD FRML MDRD: 90 ML/MIN/1.73SQ M
GLUCOSE P FAST SERPL-MCNC: 95 MG/DL (ref 65–99)
HCT VFR BLD AUTO: 42.9 % (ref 36.5–49.3)
HDLC SERPL-MCNC: 55 MG/DL
HGB BLD-MCNC: 15.1 G/DL (ref 12–17)
IMM GRANULOCYTES # BLD AUTO: 0.03 THOUSAND/UL (ref 0–0.2)
IMM GRANULOCYTES NFR BLD AUTO: 1 % (ref 0–2)
LDLC SERPL CALC-MCNC: 64 MG/DL (ref 0–100)
LYMPHOCYTES # BLD AUTO: 1.64 THOUSANDS/ÂΜL (ref 0.6–4.47)
LYMPHOCYTES NFR BLD AUTO: 25 % (ref 14–44)
MCH RBC QN AUTO: 33 PG (ref 26.8–34.3)
MCHC RBC AUTO-ENTMCNC: 35.2 G/DL (ref 31.4–37.4)
MCV RBC AUTO: 94 FL (ref 82–98)
MONOCYTES # BLD AUTO: 0.62 THOUSAND/ÂΜL (ref 0.17–1.22)
MONOCYTES NFR BLD AUTO: 10 % (ref 4–12)
NEUTROPHILS # BLD AUTO: 3.89 THOUSANDS/ÂΜL (ref 1.85–7.62)
NEUTS SEG NFR BLD AUTO: 59 % (ref 43–75)
NONHDLC SERPL-MCNC: 83 MG/DL
NRBC BLD AUTO-RTO: 0 /100 WBCS
PLATELET # BLD AUTO: 209 THOUSANDS/UL (ref 149–390)
PMV BLD AUTO: 9.7 FL (ref 8.9–12.7)
POTASSIUM SERPL-SCNC: 4.6 MMOL/L (ref 3.5–5.3)
PROT SERPL-MCNC: 7.5 G/DL (ref 6.4–8.4)
RBC # BLD AUTO: 4.58 MILLION/UL (ref 3.88–5.62)
SODIUM SERPL-SCNC: 138 MMOL/L (ref 135–147)
TRIGL SERPL-MCNC: 97 MG/DL
WBC # BLD AUTO: 6.49 THOUSAND/UL (ref 4.31–10.16)

## 2023-10-16 PROCEDURE — 36415 COLL VENOUS BLD VENIPUNCTURE: CPT

## 2023-10-16 PROCEDURE — 80053 COMPREHEN METABOLIC PANEL: CPT

## 2023-10-16 PROCEDURE — 97140 MANUAL THERAPY 1/> REGIONS: CPT

## 2023-10-16 PROCEDURE — 80061 LIPID PANEL: CPT

## 2023-10-16 PROCEDURE — 85025 COMPLETE CBC W/AUTO DIFF WBC: CPT

## 2023-10-16 PROCEDURE — 97110 THERAPEUTIC EXERCISES: CPT

## 2023-10-16 NOTE — PROGRESS NOTES
Daily Note     Today's date: 10/16/2023  Patient name: Ok Parent  : 1950  MRN: [id number]  Referring provider: Shavonne Ordoñez MD  Dx:   Encounter Diagnosis     ICD-10-CM    1. Neuritis of left ulnar nerve  G56.22                        Subjective: "I think the numbness is less frequent."    Objective: See treatment diary below     Precautions: Universal     Manuals HEP    Forearm flexors  Time: 10 minutes   K-tape on forearm flexors               Ther Ex     Education on HEP and dx x5 min     AROM tendon glides x10 X 10   AROM table top extension x10 X 10   AROM digit add/abduction x10 X 10   AROM wrist flex/ext/RD/UD x10 X 10   AROM forearm rotation x10 X 10   PROM wrist flex/ext 3 x 20 second hold 3 x 20 seconds hold   Median Nerve Glides  X 10   Ulnar Nerve Glides  X 10   PREs: Wrist flexion, wrist extension, radial deviation, supination/pronation 3#  2 x 10   Juxtaciser  X 10   Green Flexbar: Pronation, supination, prograde, retrograde  X 20        Modalities     Heat  5 minutes          Therapist supervised patient with use of modalities during today's treatment session. Skin integrity was assessed and appeared normal following use of modalities. Assessment:   Pt is continuing to benefit from OT treatment services. Continued with IASTM for soft tissue tightness on forearm flexors. Strengthening 3# and green flexbar, no discomfort or pain reported. Will continue to progress pt as tolerated. Plan:   Focus on numbness to improve ability to complete daily activites with ease.   POC 10/2/2023-2023

## 2023-10-26 ENCOUNTER — 6 MONTH FOLLOW UP (OUTPATIENT)
Dept: URBAN - METROPOLITAN AREA CLINIC 6 | Facility: CLINIC | Age: 73
End: 2023-10-26

## 2023-10-26 DIAGNOSIS — H25.813: ICD-10-CM

## 2023-10-26 DIAGNOSIS — H40.023: ICD-10-CM

## 2023-10-26 DIAGNOSIS — H04.123: ICD-10-CM

## 2023-10-26 PROCEDURE — 92202 OPSCPY EXTND ON/MAC DRAW: CPT

## 2023-10-26 PROCEDURE — 92133 CPTRZD OPH DX IMG PST SGM ON: CPT

## 2023-10-26 PROCEDURE — 92014 COMPRE OPH EXAM EST PT 1/>: CPT

## 2023-10-26 ASSESSMENT — TONOMETRY
OD_IOP_MMHG: 19
OS_IOP_MMHG: 19

## 2023-10-26 ASSESSMENT — VISUAL ACUITY
OD_CC: 20/40+1
OU_CC: J2
OS_CC: 20/30+1

## 2023-12-15 ENCOUNTER — RA CDI HCC (OUTPATIENT)
Dept: OTHER | Facility: HOSPITAL | Age: 73
End: 2023-12-15

## 2023-12-15 NOTE — PROGRESS NOTES
[address] [facility] coding opportunities       Chart reviewed, no opportunity found: CHART REVIEWED, NO OPPORTUNITY FOUND        Patients Insurance     Medicare Insurance: [employer]

## 2023-12-20 ENCOUNTER — OFFICE VISIT (OUTPATIENT)
Dept: OBGYN CLINIC | Facility: CLINIC | Age: 73
End: 2023-12-20
Payer: COMMERCIAL

## 2023-12-20 VITALS
HEIGHT: 68 IN | WEIGHT: 190 LBS | HEART RATE: 64 BPM | SYSTOLIC BLOOD PRESSURE: 135 MMHG | DIASTOLIC BLOOD PRESSURE: 79 MMHG | BODY MASS INDEX: 28.79 KG/M2

## 2023-12-20 DIAGNOSIS — G56.02 CARPAL TUNNEL SYNDROME ON LEFT: ICD-10-CM

## 2023-12-20 DIAGNOSIS — Z01.812 ENCOUNTER FOR PRE-OPERATIVE LABORATORY TESTING: ICD-10-CM

## 2023-12-20 DIAGNOSIS — M65.331 TRIGGER FINGER, RIGHT MIDDLE FINGER: ICD-10-CM

## 2023-12-20 DIAGNOSIS — M65.321 TRIGGER FINGER, RIGHT INDEX FINGER: ICD-10-CM

## 2023-12-20 DIAGNOSIS — M18.12 ARTHRITIS OF CARPOMETACARPAL (CMC) JOINT OF LEFT THUMB: ICD-10-CM

## 2023-12-20 DIAGNOSIS — G56.22 CUBITAL TUNNEL SYNDROME ON LEFT: Primary | ICD-10-CM

## 2023-12-20 DIAGNOSIS — M65.322 TRIGGER FINGER, LEFT INDEX FINGER: ICD-10-CM

## 2023-12-20 PROCEDURE — 99215 OFFICE O/P EST HI 40 MIN: CPT | Performed by: SURGERY

## 2023-12-20 PROCEDURE — 20550 NJX 1 TENDON SHEATH/LIGAMENT: CPT | Performed by: SURGERY

## 2023-12-20 RX ORDER — CHLORHEXIDINE GLUCONATE ORAL RINSE 1.2 MG/ML
15 SOLUTION DENTAL ONCE
OUTPATIENT
Start: 2023-12-20 | End: 2023-12-20

## 2023-12-20 RX ORDER — LIDOCAINE HYDROCHLORIDE 10 MG/ML
1 INJECTION, SOLUTION INFILTRATION; PERINEURAL
Status: COMPLETED | OUTPATIENT
Start: 2023-12-20 | End: 2023-12-20

## 2023-12-20 RX ORDER — DEXAMETHASONE SODIUM PHOSPHATE 10 MG/ML
40 INJECTION, SOLUTION INTRAMUSCULAR; INTRAVENOUS
Status: COMPLETED | OUTPATIENT
Start: 2023-12-20 | End: 2023-12-20

## 2023-12-20 RX ADMIN — DEXAMETHASONE SODIUM PHOSPHATE 40 MG: 10 INJECTION, SOLUTION INTRAMUSCULAR; INTRAVENOUS at 09:30

## 2023-12-20 RX ADMIN — LIDOCAINE HYDROCHLORIDE 1 ML: 10 INJECTION, SOLUTION INFILTRATION; PERINEURAL at 09:30

## 2023-12-20 NOTE — PROGRESS NOTES
ASSESSMENT/PLAN:      74 yo male with left recurrent carpal tunnel, left cubital tunnel, left CMC arthritis, and trigger fingers of the left index and right index and middle fingers    Done today:   - Consent signed for left carpal/cubital release and left index TFR  - Initial injections done for right index/long trigger fingers  - Repeat left CMC injection deferred due to continued symptomatic relief     Patient exhibits clinical and diagnostic evidence of recurrent left carpal tunnel syndrome and left cubital tunnel syndrome as well as left index trigger finger.  He has had some improvement in paresthesias and triggering and injections with therapy, bracing, but continues to note intermittent symptoms which worsen particularly with increased activity such as gardening.  We discussed next steps in treatment including continued conservative care versus operative intervention.  Patient wishes to proceed surgically on the left side with a left cubital tunnel release, left revision carpal tunnel release, and left index trigger finger release.  Risk benefits alternatives surgery were discussed and informed consent was signed for the same.  Postoperative protocol and expectations were reviewed and all questions answered to the best of our ability.  He will be seen 10 to 14 days following surgery for postop evaluation and suture removal  We discussed treatment in regards to the right index and long trigger fingers. Since these do seem to be the most bothersome issue today we discussed initial treatment with a steroid injection into the affected finger. This can be curative in about 70 % of cases, however if it is not there is a surgical procedure that can be done to resolve the issue. Risks, benefits, and alternatives were discussed and patient elected to proceed with initial index and long trigger finger injection today. Patient tolerated the procedure well with no immediate complications. Will follow up on date of  surgery.        The patient verbalized understanding of exam findings and treatment plan. We engaged in the shared decision-making process and treatment options were discussed at length with the patient. Surgical and conservative management discussed today along with risks and benefits.    Diagnoses and all orders for this visit:    Cubital tunnel syndrome on left  -     Case request operating room: RELEASE CUBITAL TUNNEL, RELEASE CARPAL TUNNEL REVISION, RELEASE TRIGGER FINGER LEFT INDEX; Standing  -     Basic metabolic panel; Future  -     EKG 12 lead; Future  -     Case request operating room: RELEASE CUBITAL TUNNEL, RELEASE CARPAL TUNNEL REVISION, RELEASE TRIGGER FINGER LEFT INDEX    Carpal tunnel syndrome on left  -     Case request operating room: RELEASE CUBITAL TUNNEL, RELEASE CARPAL TUNNEL REVISION, RELEASE TRIGGER FINGER LEFT INDEX; Standing  -     Basic metabolic panel; Future  -     EKG 12 lead; Future  -     Case request operating room: RELEASE CUBITAL TUNNEL, RELEASE CARPAL TUNNEL REVISION, RELEASE TRIGGER FINGER LEFT INDEX    Arthritis of carpometacarpal (CMC) joint of left thumb    Trigger finger, left index finger  -     Case request operating room: RELEASE CUBITAL TUNNEL, RELEASE CARPAL TUNNEL REVISION, RELEASE TRIGGER FINGER LEFT INDEX; Standing  -     Basic metabolic panel; Future  -     EKG 12 lead; Future  -     Case request operating room: RELEASE CUBITAL TUNNEL, RELEASE CARPAL TUNNEL REVISION, RELEASE TRIGGER FINGER LEFT INDEX    Trigger finger, right index finger  -     Hand/upper extremity injection    Trigger finger, right middle finger  -     Hand/upper extremity injection    Encounter for pre-operative laboratory testing  -     Basic metabolic panel; Future  -     EKG 12 lead; Future    Other orders  -     Nursing Communication Warmimg Interventions Implemented; Standing  -     Nursing Communication CHG bath, have staff wash entire body (neck down) per pre-op bathing protocol. Routine,  evening prior to, and day of surgery.; Standing  -     Nursing Communication Swab both nares with Povidone-Iodine solution, EXCLUDE if patient has shellfish/Iodine allergy, and replace with nasal alcohol swabstick. Routine, day of surgery, on call to OR; Standing  -     chlorhexidine (PERIDEX) 0.12 % oral rinse 15 mL  -     Void on call to OR; Standing  -     Insert peripheral IV; Standing  -     Diet NPO; Sips with meds; Standing  -     ceFAZolin (ANCEF) 2,000 mg in dextrose 5 % 100 mL IVPB          Cubital Tunnel Release:   The anatomy and physiology of cubital tunnel syndrome were discussed with the patient today in the office.  Typically, increased elbow flexion activities decrease blood flow within the intraneural spaces, resulting in a feeling of numbness, tingling, weakness, or clumsiness within the hand and fingers.  Occasionally, anatomic structures such as medial elbow osteophytes, the medial head of the triceps, were subluxing ulnar nerve may result in increased pressure or aggravation at the cubital tunnel.  Typical signs and symptoms usually include numbness and tingling within the ring and small finger, weakness with , and weakness with pinch.  Conservative treatment and includes nocturnal bracing to keep the elbow in a semi-extended position, activity modification, therapy, and avoiding excessive elbow flexion activities.  A majority of patients typically respond to conservative treatment over a period of approximately 3-6 months.  Vitamin B6 one tablet daily over the counter may helpful to reduce symptoms.  EMG/NCV testing of the ulnar nerve at the elbow is not as reliable as carpal tunnel syndrome.  Surgical intervention in the form of in situ release of the ulnar nerve at the elbow or ulnar nerve transposition may be required in up to 20% of patients. The patient has elected to undergo cubital tunnel release.  The possibility of converting to a subcutaneous or submuscular ulnar nerve  transposition depending on the nerve stability was discussed with the patient.  Typically, in the postoperative period, light activities are allowed immediately, driving is allowed when narcotic medications have stopped, and the incision may get wet after 5 days.  Heavy activities will be allowed after follow up appointment in 1-2 weeks.  While the pain within the ring and small finger of the hands generally improves rapidly, the numbness and tingling, as well as the strength, will slowly improve over a period of weeks to months.  Total recovery can take up to 18 months from the time of surgery.  Numbness and tingling near the incision, or near the medial aspect of the forearm was discussed with the patient.  The patient has an understanding of the above mentioned discussion. The risks and benefits of the procedure were explained to the patient, which include, but are not limited to: Bleeding, infection, recurrence, pain, scar, damage to tendons, damage to nerves, and damage to blood vessels, failure to give desired results and complications related to anesthesia.  These risks, along with alternative conservative treatment options, and postoperative protocols were voiced back and understood by the patient.  All questions were answered to the patient's satisfaction.  The patient agrees to comply with a standard postoperative protocol, and is willing to proceed.  Education was provided via written and auditory forms.  There were no barriers to learning. Written handouts regarding wound care, incision and scar care, and general preoperative information was provided to the patient.  Prior to surgery, the patient may be requested to stop all anti-inflammatory medications.  Prophylactic aspirin, Plavix, and Coumadin may be allowed to be continued.  Medications including vitamin E., ginkgo, and fish oil are requested to be stopped approximately one week prior to surgery.  Hypertensive medications and beta blockers, if  taken, should be continued. Vitamin B6 one tablet daily over the counter may helpful to reduce symptoms.  , Open Carpal Tunnel Release:   The anatomy and physiology of carpal tunnel syndrome was discussed with the patient today.  Increase pressure localized under the transverse carpal ligament can cause pain, numbness, tingling, or dysesthesias within the median nerve distribution as well as feelings of fatigue, clumsiness, or awkwardness.  These symptoms typically occur at night and worse in the morning upon waking.  Eventually, untreated carpal tunnel syndrome can result in weakness and permanent loss of muscle within the thenar compartment of the hand.  Treatment options were discussed with the patient.  Conservative treatment includes nocturnal resting splints to keep the nerve in a neutral position, ergonomic changes within the work or home environment, activity modification, and tendon gliding exercises.  Vitamin B6 one tablet daily over the counter may helpful to reduce symptoms.  Steroid injections within the carpal canal can help a majority of patients, however this is often self-limited in a majority of patients.  Surgical intervention to divide the transverse carpal ligament typically results in a long-lasting relief of the patient's complaints, with the recurrence rate of less than 1%. The patient has elected to undergo an open carpal tunnel release.  The palmar incision technique was discussed in the office with the patient today.   In the postoperative period, light activities are allowed immediately, driving is allowed when narcotic medication has stopped, and the bandages may be removed and incision may get wet after 2 days.  Heavy activities (lifting more than approximately 10 pounds) will be allowed after follow up appointment in 1-2 weeks.  While night symptoms (waking from sleep, pain, and discomfort in the hands) generally improves rapidly, the numbness and tingling as well as the strength will  slowly improve over weeks to months depending on the chronicity and severity of the carpal tunnel syndrome.  Pillar pain and scar discomfort were discussed with the patient which are self-limiting conditions.  The risks of bleeding and infection from the surgery are less than 1%.  Risk of recurrence is approximately 0.5%.  The risks of nerve injury or nerve damage or damage to the blood vessels is approximately 1 in 1200. The patient has an understanding of the above mentioned discussion.The risks and benefits of the procedure were explained to the patient, which include, but are not limited to: Bleeding, infection, recurrence, pain, scar, damage to tendons, damage to nerves, and damage to blood vessels, failure to give desired results and complications related to anesthesia.  These risks, along with alternative conservative treatment options, and postoperative protocols were voiced back and understood by the patient.  All questions were answered to the patient's satisfaction.  The patient agrees to comply with a standard postoperative protocol, and is willing to proceed.  Education was provided via written and auditory forms.  There were no barriers to learning. Written handouts regarding wound care, incision and scar care, and general preoperative information was provided to the patient.  Prior to surgery, the patient may be requested to stop all anti-inflammatory medications.  Prophylactic aspirin, Plavix, and Coumadin may be allowed to be continued.  Medications including vitamin E., ginkgo, and fish oil are requested to be stopped approximately one week prior to surgery.  Hypertensive medications and beta blockers, if taken, s, and Trigger Finger Release: The anatomy and physiology of trigger finger was discussed with the patient today in the office.  Edema and increased contact pressure within the flexor tendons at the A1 pulley can cause pain, crepitation, and limitation of function.  Treatment options  include resting MP blocking splints to decrease edema, oral anti-inflammatory medications, home or formal therapy exercises, up to 2 steroid injections or surgical release.  While majority of patients do respond to conservative treatment, up to 20% may require surgical release.  The patient has elected release of the trigger finger.  The patient has elected to undergo a release of the A1 pulley (trigger finger).  A small incision will be made over the palmar aspect of the hand, the tendon sheath holding the flexor tendons will be released.  In the postoperative period, light activities are allowed immediately, driving is allowed when narcotic medication has stopped, and the incision may get wet after 2 days.  Heavy activities (lifting more than approximately 10 pounds) will be allowed after the follow up appointment in 1-2 weeks.  While the pain and discomfort within the wrist typically improves rapidly, some residual discomfort may be present for up to 6 weeks.  The nodule that is typically palpable in the palmar aspect of the hand will not be removed, as this would necessitate removal of a portion of the flexor tendon, however the catching, clicking, and locking should resolve.  Approximate success rate is 98%.The risks and benefits of the procedure were explained to the patient, which include, but are not limited to: Bleeding, infection, recurrence, pain, scar, damage to tendons, damage to nerves, and damage to blood vessels, need for future surgery and complications related to anesthesia.  If bony work is done, risks also include malunion and nonunion.  These risks, along with alternative conservative treatment options, and postoperative protocols were voiced back and understood by the patient.  All questions were answered to the patient's satisfaction.  The patient agrees to comply with a standard postoperative protocol, and is willing to proceed.  Education was provided via written and auditory forms.  There  were no barriers to learning. Written handouts regarding wound care, incision and scar care, and general preoperative information, as well as risks and benefits were provided to the patient.    Follow Up:  Return for After Surgery.      To Do Next Visit:  Re-evaluation of current issue and Sutures out    ____________________________________________________________________________________________________________________________________________      CHIEF COMPLAINT:  Chief Complaint   Patient presents with    Follow-up     Patient is still having carpal tunnel issues        SUBJECTIVE:  Amando Mace is a 73 y.o. year old male who presents for follow up evaluation of left upper extremity paresthesias, multiple trigger fingers, and left CMC arthritis. He has been in therapy for the left sided paresthesias which has helped. He also states he has been wearing a brace on the left wrist and at the trigger fingers at night which helps. He has had injections to the CMC which provide good pain relief, no prior trigger finger injections.  Today he notes pain at the right index and long finger MP/A1 pulleys which is new since his last visit. There is clicking and locking at these fingers which is bothersome. Left thumb CMC is not bothersome today. Paresthesias have improved somewhat but do still bother him. Cubital tunnel injection did improve symptoms.        I have personally reviewed all the relevant PMH, PSH, SH, FH, Medications and allergies.     PAST MEDICAL HISTORY:  Past Medical History:   Diagnosis Date    Arthritis     Colon polyps     GERD (gastroesophageal reflux disease)     Hyperlipidemia     Hypertension     Seasonal allergies        PAST SURGICAL HISTORY:  Past Surgical History:   Procedure Laterality Date    BACK SURGERY      laminectomy    CARDIAC SURGERY      cardiac stent    CARPAL TUNNEL RELEASE      COLONOSCOPY      benign polyp removal    ORCHIECTOMY Left     DC ESOPHAGOGASTRODUODENOSCOPY TRANSORAL  DIAGNOSTIC N/A 8/29/2017    Procedure: EGD AND COLONOSCOPY;  Surgeon: Talat Park MD;  Location: [facility];  Service: Gastroenterology    UPPER GASTROINTESTINAL ENDOSCOPY      WRIST SURGERY         FAMILY HISTORY:  Family History   Problem Relation Age of Onset    Diabetes Mother     Hypertension Mother     Stomach cancer Father     Hypertension Father     Liver cancer Father        SOCIAL HISTORY:  Social History     Tobacco Use    Smoking status: Never    Smokeless tobacco: Never   Vaping Use    Vaping status: Never Used   Substance Use Topics    Alcohol use: Yes     Alcohol/week: 2.0 standard drinks of alcohol     Types: 2 Cans of beer per week    Drug use: No       MEDICATIONS:    Current Outpatient Medications:     acetaminophen (TYLENOL) 650 mg CR tablet, Take 650 mg by mouth daily at bedtime, Disp: , Rfl:     ascorbic acid (VITAMIN C) 500 mg tablet, Take 500 mg by mouth daily, Disp: , Rfl:     aspirin 81 MG tablet, Take 1 tablet (81 mg total) by mouth daily, Disp: 30 tablet, Rfl: 5    Cholecalciferol 2000 units TABS, Take 2,000 Units by mouth, Disp: , Rfl:     clotrimazole-betamethasone (LOTRISONE) 1-0.05 % cream, Apply topically 2 (two) times a day, Disp: 30 g, Rfl: 0    Coenzyme Q10 200 MG capsule, Take 200 mg by mouth daily, Disp: , Rfl:     Diclofenac Sodium (VOLTAREN) 1 %, Apply 2 g topically in the morning and 2 g at noon and 2 g in the evening and 2 g before bedtime., Disp: 2 g, Rfl: 0    Echinacea 125 MG CAPS, Take 1 tablet by mouth 2 (two) times a day, Disp: , Rfl:     famotidine (PEPCID) 40 MG tablet, Take 1 tablet (40 mg total) by mouth daily at bedtime as needed for heartburn, Disp: 90 tablet, Rfl: 1    FIBER SELECT GUMMIES CHEW, Chew 2 (two) times a day, Disp: , Rfl:     fluticasone (FLONASE) 50 mcg/act nasal spray, 1 spray into each nostril daily, Disp: , Rfl:     lisinopril (ZESTRIL) 10 mg tablet, Take 1 tablet (10 mg total) by mouth daily, Disp: 90 tablet, Rfl: 3    Loratadine 10 MG  "CAPS, Take by mouth if needed, Disp: , Rfl:     melatonin 3 mg, Take 1 tablet by mouth if needed, Disp: , Rfl:     meloxicam (MOBIC) 7.5 mg tablet, Take 7.5 mg by mouth if needed for moderate pain, Disp: , Rfl:     metoprolol succinate (TOPROL-XL) 25 mg 24 hr tablet, Take 12.5 mg by mouth daily, Disp: , Rfl:     multivitamin (THERAGRAN) TABS, Take 1 tablet by mouth daily, Disp: , Rfl:     simvastatin (ZOCOR) 40 mg tablet, Take 20 mg by mouth daily at bedtime, Disp: , Rfl:     tamsulosin (FLOMAX) 0.4 mg, Take 1 capsule (0.4 mg total) by mouth daily with dinner, Disp: 30 capsule, Rfl: 5    ALLERGIES:  No Known Allergies    REVIEW OF SYSTEMS:  Review of Systems   Constitutional:  Negative for chills, fatigue, fever and unexpected weight change.   HENT:  Negative for hearing loss, nosebleeds and sore throat.    Eyes:  Negative for pain, redness and visual disturbance.   Respiratory:  Negative for cough, shortness of breath and wheezing.    Cardiovascular:  Negative for chest pain, palpitations and leg swelling.   Gastrointestinal:  Negative for abdominal pain, nausea and vomiting.   Endocrine: Negative for polydipsia and polyuria.   Genitourinary:  Negative for difficulty urinating and hematuria.   Musculoskeletal:  Positive for arthralgias. Negative for joint swelling and myalgias.   Skin:  Negative for rash and wound.   Neurological:  Negative for dizziness, numbness and headaches.   Psychiatric/Behavioral:  Negative for decreased concentration, dysphoric mood and suicidal ideas. The patient is not nervous/anxious.        VITALS:  Vitals:    12/20/23 0925   BP: 135/79   Pulse: 64       LABS:  HgA1c: No results found for: \"HGBA1C\"  BMP:   Lab Results   Component Value Date    CALCIUM 9.7 10/16/2023    K 4.6 10/16/2023    CO2 28 10/16/2023     10/16/2023    BUN 15 10/16/2023    CREATININE 0.79 10/16/2023       _____________________________________________________  PHYSICAL EXAMINATION:  General: well developed and " "well nourished, alert, oriented times 3, and appears comfortable  Psychiatric: Normal  HEENT: Normocephalic, Atraumatic Trachea Midline, No torticollis  Pulmonary: No audible wheezing or respiratory distress   Cardiovascular: No pitting edema, 2+ radial pulse   Skin: No masses, erythema, lacerations, fluctation, ulcerations  Neurovascular: Sensation Intact to the Median, Ulnar, Radial Nerve, Motor Intact to the Median, Ulnar, Radial Nerve, and Pulses Intact  Musculoskeletal: Normal, except as noted in detailed exam and in HPI.      MUSCULOSKELETAL EXAMINATION:  right index and long finger:  Negative palpable nodule over the A1 pulley.  Positive tenderness to palpation over A1 pulley. Positive catching. Positive clicking.      Left Carpal Tunnel Exam:     Negative thenar atrophy. Negative phalen's test. Negative carpal tunnel compression. Positive tinels over median nerve at the wrist.  Opposition strength 5/5.  Abduction strength 5/5.       Left Ulnar Nerve Exam:     Negative intrinsic atrophy.  Negative  deformity at the elbow.   Full range of motion with flexion and extension of the elbow.   Negative ulnar nerve compression test at the elbow. Negative tinels over the ulnar nerve at the elbow.  FDP strength is 5/5 to the ring finger. FDP strength is 5/5 to the small finger.  Intrinsic strength 5/5 .     ___________________________________________________  STUDIES REVIEWED:  I have personally reviewed US evaluations of the left wrist and elbow which demonstrate evidence of carpal tunnel syndrome without evidence of cubital tunnel     Patient did have symptomatic relief of ulnar nerve symptoms with injection       PROCEDURES PERFORMED:  Hand/upper extremity injection: R index A1  Universal Protocol:  Consent: Verbal consent obtained.  Risks and benefits: risks, benefits and alternatives were discussed  Consent given by: patient  Time out: Immediately prior to procedure a \"time out\" was called to verify the correct " "patient, procedure, equipment, support staff and site/side marked as required.  Patient understanding: patient states understanding of the procedure being performed  Site marked: the operative site was marked  Required items: required blood products, implants, devices, and special equipment available  Patient identity confirmed: verbally with patient  Supporting Documentation  Indications: pain and therapeutic   Procedure Details  Condition:trigger finger Location: index finger - R index A1   Preparation: Patient was prepped and draped in the usual sterile fashion  Needle size: 25 G  Ultrasound guidance: no  Approach: volar  Medications administered: 1 mL lidocaine 1 %; 40 mg dexamethasone 100 mg/10 mL  Patient tolerance: patient tolerated the procedure well with no immediate complications  Dressing:  Sterile dressing applied       Hand/upper extremity injection: R long A1  Universal Protocol:  Consent: Verbal consent obtained.  Risks and benefits: risks, benefits and alternatives were discussed  Consent given by: patient  Time out: Immediately prior to procedure a \"time out\" was called to verify the correct patient, procedure, equipment, support staff and site/side marked as required.  Patient understanding: patient states understanding of the procedure being performed  Site marked: the operative site was marked  Required items: required blood products, implants, devices, and special equipment available  Patient identity confirmed: verbally with patient  Supporting Documentation  Indications: pain and therapeutic   Procedure Details  Condition:trigger finger Location: long finger - R long A1   Preparation: Patient was prepped and draped in the usual sterile fashion  Needle size: 25 G  Ultrasound guidance: no  Approach: volar  Medications administered: 1 mL lidocaine 1 %; 40 mg dexamethasone 100 mg/10 mL  Patient tolerance: patient tolerated the procedure well with no immediate complications  Dressing:  Sterile " dressing applied              _____________________________________________________    Scribe Attestation      I,:  Sherri Toussaint PA-C am acting as a scribe while in the presence of the attending physician.:       I,:  Hair Pollock MD personally performed the services described in this documentation    as scribed in my presence.:

## 2023-12-20 NOTE — PATIENT INSTRUCTIONS
What to Expect After Hand Surgery  Below are typical postoperative expectations and recommendations for our patients having hand/elbow surgery. Please understand, however, that every case and every patient are different so these recommendations are subject to change on an individual basis. Please be flexible if you are told something different on the day of surgery and understand that we do so with your best interest at heart.     Postoperative care:   Elevate your hand above your elbow for 24-48 hours after surgery to help with swelling.   Place your hand and arm over your head with motion at your shoulder three times a day.   Do NOT apply any cream/ointment/oil to your incisions including antibiotic ointment, peroxide, etc.   Do NOT soak your hands in standing water (dishwater, tubs, Jacuzzi's, pools, etc.) until given permission (typically 2-3 weeks after injury)   What to watch out for:   Increased numbness or tingling of your hand or fingers that is not relieved with elevation.   Increasing pain that is not controlled with medication.   Difficulty chewing, breathing, swallowing.   Chest pains or shortness of breath.   Fever over 101.4 degrees.   Bandages:   Please keep bandages clean and dry, you will need to cover bandages with plastic bag or cast bag when showering. Any sutures will be removed in the office, please do not try and remove these on your own. Any steri-strips will fall off on their own or we will remove them in the office as well.   For smaller procedures (carpal tunnel, trigger fingers, deQuervain's release, etc.) you will be able to remove the bandages 5-7 days from surgery. Once the bandages are removed you will be able to wash the hand and take short showers. Avoid soaking the wounds in any standing water (no dirty dishes, no pools/baths/hot tubs/ocean water, etc) until you are seen at your follow up visit.   For fractures, tendon repairs, and other larger procedures we will typically have  you keep the bandages on until we see you back in the office at your follow up visit. In some cases we may have you meet with occupational therapy before we see you back. If this is the case the therapist will remove your bandages for you and the above wound care instructions will apply.   We emphasize that you do not put anything on the surgical wounds including neosporin, lotions, oils, peroxide, etc. These can delay wound healing and open you up to infections. Bandaids are okay in some cases, but should only be in place for a short time as they can hold moisture in and break down the wound.   Motion and activity:   We encourage you to move any non-splinted fingers into a full tight fist as soon as possible after surgery unless otherwise specified by the surgical team. Hands get very stiff very quickly, so keep them moving!   Weight bearing status will depend on your surgery and will be specified in your postoperative instructions. Frequently we advise no lifting over 1-2 pounds with the operative hand, this allows you to perform activities of daily living (eating, brushing teeth/hair, etc), but also protects you from damaging the surgical site. In some cases we advise that you do not lift anything with the operative hand, but this will be specified in your instructions day of surgery.   Depending on your job and what surgery you had done some patients can return to work shortly after surgery. Typically if your job involves heavy lifting, griping, or manual labor we advise that you do not work until we see you back for your first follow up visit. These jobs carry a high risk of surgical site infections and wound healing complications  Sling:   Use of a sling will be specified in your postoperative instructions.   If you have a block done by anesthesia before surgery you will have limited use of the operative arm for around 24-48 hrs after and may require a sling to hold your arm up during that time. Once the block  wears off you may discontinue use of the sling.   Some surgeries do not require a block or a sling at all, this will be specified on day of surgery and in your postoperative instructions.   Pain medication:   We will prescribe you a one-time script of narcotic pain medications for postoperative pain, the amount will depend on what surgery was done. In addition to this we typically recommend Tylenol and Ibuprofen/naproxen for pain control, these medications work best when alternated every 3-4 hours.   Medications will vary between patients, so if you have any allergies or concerns please let us know and we can adjust our recommendations as needed.   Please let us know if you already take narcotic pain medicine regularly or if you are already established with pain management. Often times in these cases you may have signed a pain agreement with the prescribing provider and we will need to discuss with them regarding your postoperative care.   Follow-up:   Most follow up appointments are made when you schedule surgery, if you do not have a follow up by the time you schedule surgery please call the office to make an appointment. Typical follow up is 10-14 days from surgery unless otherwise specified.

## 2023-12-20 NOTE — H&P
ASSESSMENT/PLAN:      74 yo male with left recurrent carpal tunnel, left cubital tunnel, left CMC arthritis, and trigger fingers of the left index and right index and middle fingers    Done today:   - Consent signed for left carpal/cubital release and left index TFR  - Initial injections done for right index/long trigger fingers  - Repeat left CMC injection deferred due to continued symptomatic relief     Patient exhibits clinical and diagnostic evidence of recurrent left carpal tunnel syndrome and left cubital tunnel syndrome as well as left index trigger finger.  He has had some improvement in paresthesias and triggering and injections with therapy, bracing, but continues to note intermittent symptoms which worsen particularly with increased activity such as gardening.  We discussed next steps in treatment including continued conservative care versus operative intervention.  Patient wishes to proceed surgically on the left side with a left cubital tunnel release, left revision carpal tunnel release, and left index trigger finger release.  Risk benefits alternatives surgery were discussed and informed consent was signed for the same.  Postoperative protocol and expectations were reviewed and all questions answered to the best of our ability.  He will be seen 10 to 14 days following surgery for postop evaluation and suture removal  We discussed treatment in regards to the right index and long trigger fingers. Since these do seem to be the most bothersome issue today we discussed initial treatment with a steroid injection into the affected finger. This can be curative in about 70 % of cases, however if it is not there is a surgical procedure that can be done to resolve the issue. Risks, benefits, and alternatives were discussed and patient elected to proceed with initial index and long trigger finger injection today. Patient tolerated the procedure well with no immediate complications. Will follow up on date of  surgery.        The patient verbalized understanding of exam findings and treatment plan. We engaged in the shared decision-making process and treatment options were discussed at length with the patient. Surgical and conservative management discussed today along with risks and benefits.    Diagnoses and all orders for this visit:    Cubital tunnel syndrome on left  -     Case request operating room: RELEASE CUBITAL TUNNEL, RELEASE CARPAL TUNNEL REVISION, RELEASE TRIGGER FINGER LEFT INDEX; Standing  -     Basic metabolic panel; Future  -     EKG 12 lead; Future  -     Case request operating room: RELEASE CUBITAL TUNNEL, RELEASE CARPAL TUNNEL REVISION, RELEASE TRIGGER FINGER LEFT INDEX    Carpal tunnel syndrome on left  -     Case request operating room: RELEASE CUBITAL TUNNEL, RELEASE CARPAL TUNNEL REVISION, RELEASE TRIGGER FINGER LEFT INDEX; Standing  -     Basic metabolic panel; Future  -     EKG 12 lead; Future  -     Case request operating room: RELEASE CUBITAL TUNNEL, RELEASE CARPAL TUNNEL REVISION, RELEASE TRIGGER FINGER LEFT INDEX    Arthritis of carpometacarpal (CMC) joint of left thumb    Trigger finger, left index finger  -     Case request operating room: RELEASE CUBITAL TUNNEL, RELEASE CARPAL TUNNEL REVISION, RELEASE TRIGGER FINGER LEFT INDEX; Standing  -     Basic metabolic panel; Future  -     EKG 12 lead; Future  -     Case request operating room: RELEASE CUBITAL TUNNEL, RELEASE CARPAL TUNNEL REVISION, RELEASE TRIGGER FINGER LEFT INDEX    Trigger finger, right index finger  -     Hand/upper extremity injection    Trigger finger, right middle finger  -     Hand/upper extremity injection    Encounter for pre-operative laboratory testing  -     Basic metabolic panel; Future  -     EKG 12 lead; Future    Other orders  -     Nursing Communication Warmimg Interventions Implemented; Standing  -     Nursing Communication CHG bath, have staff wash entire body (neck down) per pre-op bathing protocol. Routine,  evening prior to, and day of surgery.; Standing  -     Nursing Communication Swab both nares with Povidone-Iodine solution, EXCLUDE if patient has shellfish/Iodine allergy, and replace with nasal alcohol swabstick. Routine, day of surgery, on call to OR; Standing  -     chlorhexidine (PERIDEX) 0.12 % oral rinse 15 mL  -     Void on call to OR; Standing  -     Insert peripheral IV; Standing  -     Diet NPO; Sips with meds; Standing  -     ceFAZolin (ANCEF) 2,000 mg in dextrose 5 % 100 mL IVPB          Cubital Tunnel Release:   The anatomy and physiology of cubital tunnel syndrome were discussed with the patient today in the office.  Typically, increased elbow flexion activities decrease blood flow within the intraneural spaces, resulting in a feeling of numbness, tingling, weakness, or clumsiness within the hand and fingers.  Occasionally, anatomic structures such as medial elbow osteophytes, the medial head of the triceps, were subluxing ulnar nerve may result in increased pressure or aggravation at the cubital tunnel.  Typical signs and symptoms usually include numbness and tingling within the ring and small finger, weakness with , and weakness with pinch.  Conservative treatment and includes nocturnal bracing to keep the elbow in a semi-extended position, activity modification, therapy, and avoiding excessive elbow flexion activities.  A majority of patients typically respond to conservative treatment over a period of approximately 3-6 months.  Vitamin B6 one tablet daily over the counter may helpful to reduce symptoms.  EMG/NCV testing of the ulnar nerve at the elbow is not as reliable as carpal tunnel syndrome.  Surgical intervention in the form of in situ release of the ulnar nerve at the elbow or ulnar nerve transposition may be required in up to 20% of patients. The patient has elected to undergo cubital tunnel release.  The possibility of converting to a subcutaneous or submuscular ulnar nerve  transposition depending on the nerve stability was discussed with the patient.  Typically, in the postoperative period, light activities are allowed immediately, driving is allowed when narcotic medications have stopped, and the incision may get wet after 5 days.  Heavy activities will be allowed after follow up appointment in 1-2 weeks.  While the pain within the ring and small finger of the hands generally improves rapidly, the numbness and tingling, as well as the strength, will slowly improve over a period of weeks to months.  Total recovery can take up to 18 months from the time of surgery.  Numbness and tingling near the incision, or near the medial aspect of the forearm was discussed with the patient.  The patient has an understanding of the above mentioned discussion. The risks and benefits of the procedure were explained to the patient, which include, but are not limited to: Bleeding, infection, recurrence, pain, scar, damage to tendons, damage to nerves, and damage to blood vessels, failure to give desired results and complications related to anesthesia.  These risks, along with alternative conservative treatment options, and postoperative protocols were voiced back and understood by the patient.  All questions were answered to the patient's satisfaction.  The patient agrees to comply with a standard postoperative protocol, and is willing to proceed.  Education was provided via written and auditory forms.  There were no barriers to learning. Written handouts regarding wound care, incision and scar care, and general preoperative information was provided to the patient.  Prior to surgery, the patient may be requested to stop all anti-inflammatory medications.  Prophylactic aspirin, Plavix, and Coumadin may be allowed to be continued.  Medications including vitamin E., ginkgo, and fish oil are requested to be stopped approximately one week prior to surgery.  Hypertensive medications and beta blockers, if  taken, should be continued. Vitamin B6 one tablet daily over the counter may helpful to reduce symptoms.  , Open Carpal Tunnel Release:   The anatomy and physiology of carpal tunnel syndrome was discussed with the patient today.  Increase pressure localized under the transverse carpal ligament can cause pain, numbness, tingling, or dysesthesias within the median nerve distribution as well as feelings of fatigue, clumsiness, or awkwardness.  These symptoms typically occur at night and worse in the morning upon waking.  Eventually, untreated carpal tunnel syndrome can result in weakness and permanent loss of muscle within the thenar compartment of the hand.  Treatment options were discussed with the patient.  Conservative treatment includes nocturnal resting splints to keep the nerve in a neutral position, ergonomic changes within the work or home environment, activity modification, and tendon gliding exercises.  Vitamin B6 one tablet daily over the counter may helpful to reduce symptoms.  Steroid injections within the carpal canal can help a majority of patients, however this is often self-limited in a majority of patients.  Surgical intervention to divide the transverse carpal ligament typically results in a long-lasting relief of the patient's complaints, with the recurrence rate of less than 1%. The patient has elected to undergo an open carpal tunnel release.  The palmar incision technique was discussed in the office with the patient today.   In the postoperative period, light activities are allowed immediately, driving is allowed when narcotic medication has stopped, and the bandages may be removed and incision may get wet after 2 days.  Heavy activities (lifting more than approximately 10 pounds) will be allowed after follow up appointment in 1-2 weeks.  While night symptoms (waking from sleep, pain, and discomfort in the hands) generally improves rapidly, the numbness and tingling as well as the strength will  slowly improve over weeks to months depending on the chronicity and severity of the carpal tunnel syndrome.  Pillar pain and scar discomfort were discussed with the patient which are self-limiting conditions.  The risks of bleeding and infection from the surgery are less than 1%.  Risk of recurrence is approximately 0.5%.  The risks of nerve injury or nerve damage or damage to the blood vessels is approximately 1 in 1200. The patient has an understanding of the above mentioned discussion.The risks and benefits of the procedure were explained to the patient, which include, but are not limited to: Bleeding, infection, recurrence, pain, scar, damage to tendons, damage to nerves, and damage to blood vessels, failure to give desired results and complications related to anesthesia.  These risks, along with alternative conservative treatment options, and postoperative protocols were voiced back and understood by the patient.  All questions were answered to the patient's satisfaction.  The patient agrees to comply with a standard postoperative protocol, and is willing to proceed.  Education was provided via written and auditory forms.  There were no barriers to learning. Written handouts regarding wound care, incision and scar care, and general preoperative information was provided to the patient.  Prior to surgery, the patient may be requested to stop all anti-inflammatory medications.  Prophylactic aspirin, Plavix, and Coumadin may be allowed to be continued.  Medications including vitamin E., ginkgo, and fish oil are requested to be stopped approximately one week prior to surgery.  Hypertensive medications and beta blockers, if taken, s, and Trigger Finger Release: The anatomy and physiology of trigger finger was discussed with the patient today in the office.  Edema and increased contact pressure within the flexor tendons at the A1 pulley can cause pain, crepitation, and limitation of function.  Treatment options  include resting MP blocking splints to decrease edema, oral anti-inflammatory medications, home or formal therapy exercises, up to 2 steroid injections or surgical release.  While majority of patients do respond to conservative treatment, up to 20% may require surgical release.  The patient has elected release of the trigger finger.  The patient has elected to undergo a release of the A1 pulley (trigger finger).  A small incision will be made over the palmar aspect of the hand, the tendon sheath holding the flexor tendons will be released.  In the postoperative period, light activities are allowed immediately, driving is allowed when narcotic medication has stopped, and the incision may get wet after 2 days.  Heavy activities (lifting more than approximately 10 pounds) will be allowed after the follow up appointment in 1-2 weeks.  While the pain and discomfort within the wrist typically improves rapidly, some residual discomfort may be present for up to 6 weeks.  The nodule that is typically palpable in the palmar aspect of the hand will not be removed, as this would necessitate removal of a portion of the flexor tendon, however the catching, clicking, and locking should resolve.  Approximate success rate is 98%.The risks and benefits of the procedure were explained to the patient, which include, but are not limited to: Bleeding, infection, recurrence, pain, scar, damage to tendons, damage to nerves, and damage to blood vessels, need for future surgery and complications related to anesthesia.  If bony work is done, risks also include malunion and nonunion.  These risks, along with alternative conservative treatment options, and postoperative protocols were voiced back and understood by the patient.  All questions were answered to the patient's satisfaction.  The patient agrees to comply with a standard postoperative protocol, and is willing to proceed.  Education was provided via written and auditory forms.  There  were no barriers to learning. Written handouts regarding wound care, incision and scar care, and general preoperative information, as well as risks and benefits were provided to the patient.    Follow Up:  Return for After Surgery.      To Do Next Visit:  Re-evaluation of current issue and Sutures out    ____________________________________________________________________________________________________________________________________________      CHIEF COMPLAINT:  Chief Complaint   Patient presents with    Follow-up     Patient is still having carpal tunnel issues        SUBJECTIVE:  Amando Mace is a 73 y.o. year old male who presents for follow up evaluation of left upper extremity paresthesias, multiple trigger fingers, and left CMC arthritis. He has been in therapy for the left sided paresthesias which has helped. He also states he has been wearing a brace on the left wrist and at the trigger fingers at night which helps. He has had injections to the CMC which provide good pain relief, no prior trigger finger injections.  Today he notes pain at the right index and long finger MP/A1 pulleys which is new since his last visit. There is clicking and locking at these fingers which is bothersome. Left thumb CMC is not bothersome today. Paresthesias have improved somewhat but do still bother him. Cubital tunnel injection did improve symptoms.        I have personally reviewed all the relevant PMH, PSH, SH, FH, Medications and allergies.     PAST MEDICAL HISTORY:  Past Medical History:   Diagnosis Date    Arthritis     Colon polyps     GERD (gastroesophageal reflux disease)     Hyperlipidemia     Hypertension     Seasonal allergies        PAST SURGICAL HISTORY:  Past Surgical History:   Procedure Laterality Date    BACK SURGERY      laminectomy    CARDIAC SURGERY      cardiac stent    CARPAL TUNNEL RELEASE      COLONOSCOPY      benign polyp removal    ORCHIECTOMY Left     PA ESOPHAGOGASTRODUODENOSCOPY TRANSORAL  DIAGNOSTIC N/A 8/29/2017    Procedure: EGD AND COLONOSCOPY;  Surgeon: Talat Park MD;  Location: [facility];  Service: Gastroenterology    UPPER GASTROINTESTINAL ENDOSCOPY      WRIST SURGERY         FAMILY HISTORY:  Family History   Problem Relation Age of Onset    Diabetes Mother     Hypertension Mother     Stomach cancer Father     Hypertension Father     Liver cancer Father        SOCIAL HISTORY:  Social History     Tobacco Use    Smoking status: Never    Smokeless tobacco: Never   Vaping Use    Vaping status: Never Used   Substance Use Topics    Alcohol use: Yes     Alcohol/week: 2.0 standard drinks of alcohol     Types: 2 Cans of beer per week    Drug use: No       MEDICATIONS:    Current Outpatient Medications:     acetaminophen (TYLENOL) 650 mg CR tablet, Take 650 mg by mouth daily at bedtime, Disp: , Rfl:     ascorbic acid (VITAMIN C) 500 mg tablet, Take 500 mg by mouth daily, Disp: , Rfl:     aspirin 81 MG tablet, Take 1 tablet (81 mg total) by mouth daily, Disp: 30 tablet, Rfl: 5    Cholecalciferol 2000 units TABS, Take 2,000 Units by mouth, Disp: , Rfl:     clotrimazole-betamethasone (LOTRISONE) 1-0.05 % cream, Apply topically 2 (two) times a day, Disp: 30 g, Rfl: 0    Coenzyme Q10 200 MG capsule, Take 200 mg by mouth daily, Disp: , Rfl:     Diclofenac Sodium (VOLTAREN) 1 %, Apply 2 g topically in the morning and 2 g at noon and 2 g in the evening and 2 g before bedtime., Disp: 2 g, Rfl: 0    Echinacea 125 MG CAPS, Take 1 tablet by mouth 2 (two) times a day, Disp: , Rfl:     famotidine (PEPCID) 40 MG tablet, Take 1 tablet (40 mg total) by mouth daily at bedtime as needed for heartburn, Disp: 90 tablet, Rfl: 1    FIBER SELECT GUMMIES CHEW, Chew 2 (two) times a day, Disp: , Rfl:     fluticasone (FLONASE) 50 mcg/act nasal spray, 1 spray into each nostril daily, Disp: , Rfl:     lisinopril (ZESTRIL) 10 mg tablet, Take 1 tablet (10 mg total) by mouth daily, Disp: 90 tablet, Rfl: 3    Loratadine 10 MG  "CAPS, Take by mouth if needed, Disp: , Rfl:     melatonin 3 mg, Take 1 tablet by mouth if needed, Disp: , Rfl:     meloxicam (MOBIC) 7.5 mg tablet, Take 7.5 mg by mouth if needed for moderate pain, Disp: , Rfl:     metoprolol succinate (TOPROL-XL) 25 mg 24 hr tablet, Take 12.5 mg by mouth daily, Disp: , Rfl:     multivitamin (THERAGRAN) TABS, Take 1 tablet by mouth daily, Disp: , Rfl:     simvastatin (ZOCOR) 40 mg tablet, Take 20 mg by mouth daily at bedtime, Disp: , Rfl:     tamsulosin (FLOMAX) 0.4 mg, Take 1 capsule (0.4 mg total) by mouth daily with dinner, Disp: 30 capsule, Rfl: 5    ALLERGIES:  No Known Allergies    REVIEW OF SYSTEMS:  Review of Systems   Constitutional:  Negative for chills, fatigue, fever and unexpected weight change.   HENT:  Negative for hearing loss, nosebleeds and sore throat.    Eyes:  Negative for pain, redness and visual disturbance.   Respiratory:  Negative for cough, shortness of breath and wheezing.    Cardiovascular:  Negative for chest pain, palpitations and leg swelling.   Gastrointestinal:  Negative for abdominal pain, nausea and vomiting.   Endocrine: Negative for polydipsia and polyuria.   Genitourinary:  Negative for difficulty urinating and hematuria.   Musculoskeletal:  Positive for arthralgias. Negative for joint swelling and myalgias.   Skin:  Negative for rash and wound.   Neurological:  Negative for dizziness, numbness and headaches.   Psychiatric/Behavioral:  Negative for decreased concentration, dysphoric mood and suicidal ideas. The patient is not nervous/anxious.        VITALS:  Vitals:    12/20/23 0925   BP: 135/79   Pulse: 64       LABS:  HgA1c: No results found for: \"HGBA1C\"  BMP:   Lab Results   Component Value Date    CALCIUM 9.7 10/16/2023    K 4.6 10/16/2023    CO2 28 10/16/2023     10/16/2023    BUN 15 10/16/2023    CREATININE 0.79 10/16/2023       _____________________________________________________  PHYSICAL EXAMINATION:  General: well developed and " "well nourished, alert, oriented times 3, and appears comfortable  Psychiatric: Normal  HEENT: Normocephalic, Atraumatic Trachea Midline, No torticollis  Pulmonary: No audible wheezing or respiratory distress   Cardiovascular: No pitting edema, 2+ radial pulse   Skin: No masses, erythema, lacerations, fluctation, ulcerations  Neurovascular: Sensation Intact to the Median, Ulnar, Radial Nerve, Motor Intact to the Median, Ulnar, Radial Nerve, and Pulses Intact  Musculoskeletal: Normal, except as noted in detailed exam and in HPI.      MUSCULOSKELETAL EXAMINATION:  right index and long finger:  Negative palpable nodule over the A1 pulley.  Positive tenderness to palpation over A1 pulley. Positive catching. Positive clicking.      Left Carpal Tunnel Exam:     Negative thenar atrophy. Negative phalen's test. Negative carpal tunnel compression. Positive tinels over median nerve at the wrist.  Opposition strength 5/5.  Abduction strength 5/5.       Left Ulnar Nerve Exam:     Negative intrinsic atrophy.  Negative  deformity at the elbow.   Full range of motion with flexion and extension of the elbow.   Negative ulnar nerve compression test at the elbow. Negative tinels over the ulnar nerve at the elbow.  FDP strength is 5/5 to the ring finger. FDP strength is 5/5 to the small finger.  Intrinsic strength 5/5 .     ___________________________________________________  STUDIES REVIEWED:  I have personally reviewed US evaluations of the left wrist and elbow which demonstrate evidence of carpal tunnel syndrome without evidence of cubital tunnel     Patient did have symptomatic relief of ulnar nerve symptoms with injection       PROCEDURES PERFORMED:  Hand/upper extremity injection: R index A1  Universal Protocol:  Consent: Verbal consent obtained.  Risks and benefits: risks, benefits and alternatives were discussed  Consent given by: patient  Time out: Immediately prior to procedure a \"time out\" was called to verify the correct " "patient, procedure, equipment, support staff and site/side marked as required.  Patient understanding: patient states understanding of the procedure being performed  Site marked: the operative site was marked  Required items: required blood products, implants, devices, and special equipment available  Patient identity confirmed: verbally with patient  Supporting Documentation  Indications: pain and therapeutic   Procedure Details  Condition:trigger finger Location: index finger - R index A1   Preparation: Patient was prepped and draped in the usual sterile fashion  Needle size: 25 G  Ultrasound guidance: no  Approach: volar  Medications administered: 1 mL lidocaine 1 %; 40 mg dexamethasone 100 mg/10 mL  Patient tolerance: patient tolerated the procedure well with no immediate complications  Dressing:  Sterile dressing applied       Hand/upper extremity injection: R long A1  Universal Protocol:  Consent: Verbal consent obtained.  Risks and benefits: risks, benefits and alternatives were discussed  Consent given by: patient  Time out: Immediately prior to procedure a \"time out\" was called to verify the correct patient, procedure, equipment, support staff and site/side marked as required.  Patient understanding: patient states understanding of the procedure being performed  Site marked: the operative site was marked  Required items: required blood products, implants, devices, and special equipment available  Patient identity confirmed: verbally with patient  Supporting Documentation  Indications: pain and therapeutic   Procedure Details  Condition:trigger finger Location: long finger - R long A1   Preparation: Patient was prepped and draped in the usual sterile fashion  Needle size: 25 G  Ultrasound guidance: no  Approach: volar  Medications administered: 1 mL lidocaine 1 %; 40 mg dexamethasone 100 mg/10 mL  Patient tolerance: patient tolerated the procedure well with no immediate complications  Dressing:  Sterile " dressing applied              _____________________________________________________    Scribe Attestation      I,:  Sherri Toussaint PA-C am acting as a scribe while in the presence of the attending physician.:       I,:  Hair Plolock MD personally performed the services described in this documentation    as scribed in my presence.:

## 2023-12-22 ENCOUNTER — OFFICE VISIT (OUTPATIENT)
Dept: FAMILY MEDICINE CLINIC | Facility: CLINIC | Age: 73
End: 2023-12-22
Payer: COMMERCIAL

## 2023-12-22 VITALS
HEART RATE: 58 BPM | OXYGEN SATURATION: 96 % | DIASTOLIC BLOOD PRESSURE: 80 MMHG | WEIGHT: 196.6 LBS | HEIGHT: 69 IN | BODY MASS INDEX: 29.12 KG/M2 | SYSTOLIC BLOOD PRESSURE: 122 MMHG | TEMPERATURE: 97.6 F

## 2023-12-22 DIAGNOSIS — N40.1 BENIGN PROSTATIC HYPERPLASIA WITH URINARY HESITANCY: ICD-10-CM

## 2023-12-22 DIAGNOSIS — R39.14 BENIGN PROSTATIC HYPERPLASIA WITH INCOMPLETE BLADDER EMPTYING: ICD-10-CM

## 2023-12-22 DIAGNOSIS — Z12.5 SCREENING FOR PROSTATE CANCER: ICD-10-CM

## 2023-12-22 DIAGNOSIS — N40.1 BENIGN PROSTATIC HYPERPLASIA WITH INCOMPLETE BLADDER EMPTYING: ICD-10-CM

## 2023-12-22 DIAGNOSIS — I10 ESSENTIAL HYPERTENSION: ICD-10-CM

## 2023-12-22 DIAGNOSIS — R39.11 BENIGN PROSTATIC HYPERPLASIA WITH URINARY HESITANCY: ICD-10-CM

## 2023-12-22 DIAGNOSIS — Z00.00 MEDICARE ANNUAL WELLNESS VISIT, SUBSEQUENT: Primary | ICD-10-CM

## 2023-12-22 PROCEDURE — G0439 PPPS, SUBSEQ VISIT: HCPCS | Performed by: FAMILY MEDICINE

## 2023-12-22 PROCEDURE — G0444 DEPRESSION SCREEN ANNUAL: HCPCS | Performed by: FAMILY MEDICINE

## 2023-12-22 PROCEDURE — G0442 ANNUAL ALCOHOL SCREEN 15 MIN: HCPCS | Performed by: FAMILY MEDICINE

## 2023-12-22 PROCEDURE — 99214 OFFICE O/P EST MOD 30 MIN: CPT | Performed by: FAMILY MEDICINE

## 2023-12-22 NOTE — ASSESSMENT & PLAN NOTE
I reviewed with pt. No CVAT on exam and creat stable. Pt concerned that his friend recently became septic from prostatitis. Pt denies dysuria, fever/chills or frequency. Will check Renal US with [facility] and refer to Urology for further eval.

## 2023-12-22 NOTE — PROGRESS NOTES
Assessment and Plan:     Problem List Items Addressed This Visit        Cardiovascular and Mediastinum    Essential hypertension     Well controlled. Cont present treatment. Monitor labs. Recheck 6m              Genitourinary    Benign prostatic hyperplasia with incomplete bladder emptying     I reviewed with pt. No CVAT on exam and creat stable. Pt concerned that his friend recently became septic from prostatitis. Pt denies dysuria, fever/chills or frequency. Will check Renal US with PVR and refer to Urology for further eval.          Relevant Orders    US kidney and bladder with pvr    Ambulatory Referral to Urology   Other Visit Diagnoses     Medicare annual wellness visit, subsequent    -  Primary    Screening for prostate cancer        Relevant Orders    PSA, Total Screen            Depression Screening and Follow-up Plan: Patient was screened for depression during today's encounter. They screened negative with a PHQ-2 score of 0.      Preventive health issues were discussed with patient, and age appropriate screening tests were ordered as noted in patient's After Visit Summary.  Personalized health advice and appropriate referrals for health education or preventive services given if needed, as noted in patient's After Visit Summary.     History of Present Illness:     Patient presents for a Medicare Wellness Visit    f/u multiple med issues and AWV  - pt feels well.     - pt still struggles with weak stream and feeling that he is not completely emptying his bladder. Some post void dribbling. No change with Flomax  - pt to have L CT release with hand surgery (Dr. Carvajal) in Feb.  - hips are problematic. Has hx of severe OA - has not gone to ortho yet  - pt is due to see Cardio. Pt denies CP, palpitations, lightheadedness or other CV symptoms with or without exertion  - no new GI issues. GERD stable on famotidine. Up to date with colonoscopy.   - AWV done         Patient Care Team:  Luis Lozoya,  MD as PCP - General  Luis Lozoya MD as PCP - PCP-[facility]-MD Pebbles Stephens MD David Alan Prager, MD Loveleen K Sidhu, MD as Endoscopist     Review of Systems:     Review of Systems   Constitutional: Negative.    HENT: Negative.     Eyes: Negative.    Respiratory: Negative.     Cardiovascular: Negative.    Gastrointestinal: Negative.    Endocrine: Negative.    Genitourinary:  Positive for decreased urine volume, difficulty urinating, frequency and urgency. Negative for flank pain and hematuria.   Musculoskeletal:  Positive for arthralgias (L CTS). Negative for back pain, joint swelling and myalgias.   Skin: Negative.    Allergic/Immunologic: Negative.    Neurological: Negative.    Hematological: Negative.    Psychiatric/Behavioral: Negative.          Problem List:     Patient Active Problem List   Diagnosis   • Actinic keratosis   • Chronic GERD   • Coronary atherosclerosis   • Essential hypertension   • Lipid disorder   • Low back pain   • Localized osteoarthrosis of right hip   • Trigger middle finger of left hand   • Primary osteoarthritis, left shoulder   • Hx of adenomatous colonic polyps   • Left elbow pain   • Benign prostatic hyperplasia with incomplete bladder emptying   • Tinea cruris      Past Medical and Surgical History:     Past Medical History:   Diagnosis Date   • Arthritis    • Colon polyps    • GERD (gastroesophageal reflux disease)    • Hyperlipidemia    • Hypertension    • Seasonal allergies      Past Surgical History:   Procedure Laterality Date   • BACK SURGERY      laminectomy   • CARDIAC SURGERY      cardiac stent   • CARPAL TUNNEL RELEASE     • COLONOSCOPY      benign polyp removal   • ORCHIECTOMY Left    • [place] ESOPHAGOGASTRODUODENOSCOPY TRANSORAL DIAGNOSTIC N/A 8/29/2017    Procedure: EGD AND COLONOSCOPY;  Surgeon: Talat Park MD;  [facility];  Service: Gastroenterology   • UPPER GASTROINTESTINAL ENDOSCOPY     •  WRIST SURGERY        Family History:     Family History   Problem Relation Age of Onset   • Diabetes Mother    • Hypertension Mother    • Stomach cancer Father    • Hypertension Father    • Liver cancer Father       Social History:     Social History     Socioeconomic History   • Marital status: /Civil Union     Spouse name: None   • Number of children: None   • Years of education: some college   • Highest education level: None   Occupational History   • Occupation: part time   Tobacco Use   • Smoking status: Never   • Smokeless tobacco: Never   Vaping Use   • Vaping status: Never Used   Substance and Sexual Activity   • Alcohol use: Yes     Alcohol/week: 2.0 standard drinks of alcohol     Types: 2 Cans of beer per week   • Drug use: No   • Sexual activity: Yes   Other Topics Concern   • None   Social History Narrative    Daily coffee consumption    Daily cola consumption    Daily tea consumption    Dental care regularly    Does not exercise    Patient has discussed organ donor wishes with family    Patient has living will    Pet: cat    POA in existence    Water intake adequate per day         Social Determinants of Health     Financial Resource Strain: Low Risk  (12/22/2023)    Overall Financial Resource Strain (CARDIA)    • Difficulty of Paying Living Expenses: Not hard at all   Food Insecurity: Not on file   Transportation Needs: No Transportation Needs (12/22/2023)    PRAPARE - Transportation    • Lack of Transportation (Medical): No    • Lack of Transportation (Non-Medical): No   Physical Activity: Not on file   Stress: Not on file   Social Connections: Not on file   Intimate Partner Violence: Not on file   Housing Stability: Not on file      Medications and Allergies:     Current Outpatient Medications   Medication Sig Dispense Refill   • acetaminophen (TYLENOL) 650 mg CR tablet Take 650 mg by mouth daily at bedtime     • ascorbic acid (VITAMIN C) 500 mg tablet Take 500 mg by mouth daily     • aspirin  81 MG tablet Take 1 tablet (81 mg total) by mouth daily 30 tablet 5   • Cholecalciferol 2000 units TABS Take 2,000 Units by mouth     • clotrimazole-betamethasone (LOTRISONE) 1-0.05 % cream Apply topically 2 (two) times a day 30 g 0   • Coenzyme Q10 200 MG capsule Take 200 mg by mouth daily     • Diclofenac Sodium (VOLTAREN) 1 % Apply 2 g topically in the morning and 2 g at noon and 2 g in the evening and 2 g before bedtime. 2 g 0   • Echinacea 125 MG CAPS Take 1 tablet by mouth 2 (two) times a day     • famotidine (PEPCID) 40 MG tablet Take 1 tablet (40 mg total) by mouth daily at bedtime as needed for heartburn 90 tablet 1   • FIBER SELECT GUMMIES CHEW Chew 2 (two) times a day     • fluticasone (FLONASE) 50 mcg/act nasal spray 1 spray into each nostril daily     • lisinopril (ZESTRIL) 10 mg tablet Take 1 tablet (10 mg total) by mouth daily 90 tablet 3   • melatonin 3 mg Take 1 tablet by mouth if needed     • meloxicam (MOBIC) 7.5 mg tablet Take 7.5 mg by mouth if needed for moderate pain     • metoprolol succinate (TOPROL-XL) 25 mg 24 hr tablet Take 12.5 mg by mouth daily     • multivitamin (THERAGRAN) TABS Take 1 tablet by mouth daily     • simvastatin (ZOCOR) 40 mg tablet Take 20 mg by mouth daily at bedtime     • Loratadine 10 MG CAPS Take by mouth if needed (Patient not taking: Reported on 12/22/2023)       No current facility-administered medications for this visit.     No Known Allergies   Immunizations:     Immunization History   Administered Date(s) Administered   • COVID-19 PFIZER VACCINE 0.3 ML IM 03/18/2021, 04/08/2021, 10/21/2021, 06/22/2022   • COVID-19 Pfizer Vac BIVALENT Andrew-sucrose 12 Yr+ IM 10/04/2022   • COVID-19 Pfizer mRNA vacc PF andrew-sucrose 12 yr and older (Comirnaty) 11/22/2023   • INFLUENZA 01/13/2016, 10/16/2016, 01/11/2017, 01/10/2018, 11/07/2018, 10/28/2022, 11/09/2023   • Influenza, high dose seasonal 0.7 mL 11/07/2018, 11/08/2019, 10/22/2020   • Influenza, seasonal, injectable  01/07/2014   • Pneumococcal Conjugate 13-Valent 11/07/2018   • Pneumococcal Polysaccharide PPV23 11/08/2019   • Rsv, Unspecified 12/11/2023      Health Maintenance:         Topic Date Due   • Colorectal Cancer Screening  05/14/2024   • Hepatitis C Screening  Completed     There are no preventive care reminders to display for this patient.     Medicare Screening Tests and Risk Assessments:     Amando is here for his Subsequent Wellness visit. Last Medicare Wellness visit information reviewed, patient interviewed and updates made to the record today.      Health Risk Assessment:   Patient rates overall health as good. Patient feels that their physical health rating is slightly worse. Patient is satisfied with their life. Eyesight was rated as same. Hearing was rated as same. Patient feels that their emotional and mental health rating is same. Patients states they are never, rarely angry. Patient states they are never, rarely unusually tired/fatigued. Pain experienced in the last 7 days has been some. Patient's pain rating has been 5/10. Patient states that he has experienced no weight loss or gain in last 6 months.     Depression Screening:   PHQ-2 Score: 0      Fall Risk Screening:   In the past year, patient has experienced: history of falling in past year    Number of falls: 1  Injured during fall?: No    Feels unsteady when standing or walking?: No    Worried about falling?: No      Home Safety:  Patient does not have trouble with stairs inside or outside of their home. Patient has working smoke alarms and has working carbon monoxide detector. Home safety hazards include: none.     Nutrition:   Current diet is Regular.     Medications:   Patient is currently taking over-the-counter supplements. OTC medications include: see medication list. Patient is able to manage medications.     Activities of Daily Living (ADLs)/Instrumental Activities of Daily Living (IADLs):   Walk and transfer into and out of bed and  chair?: Yes  Dress and groom yourself?: Yes    Bathe or shower yourself?: Yes    Feed yourself? Yes  Do your laundry/housekeeping?: Yes  Manage your money, pay your bills and track your expenses?: Yes  Make your own meals?: Yes    Do your own shopping?: Yes    Previous Hospitalizations:   Any hospitalizations or ED visits within the last 12 months?: No      Advance Care Planning:   Living will: Yes    Durable POA for healthcare: Yes    Advanced directive: Yes    Advanced directive counseling given: Yes      Cognitive Screening:   Provider or family/friend/caregiver concerned regarding cognition?: No    PREVENTIVE SCREENINGS      Cardiovascular Screening:    General: Screening Current      Diabetes Screening:     General: Screening Current      Colorectal Cancer Screening:     General: Screening Current      Prostate Cancer Screening:    General: Risks and Benefits Discussed    Due for: PSA      Osteoporosis Screening:    General: Screening Not Indicated      Abdominal Aortic Aneurysm (AAA) Screening:    Risk factors include: age between 65-76 yo        Lung Cancer Screening:     General: Screening Not Indicated      Hepatitis C Screening:    General: Screening Current    Screening, Brief Intervention, and Referral to Treatment (SBIRT)    Screening      AUDIT-C Screenin) How often did you have a drink containing alcohol in the past year? monthly or less  2) How many drinks did you have on a typical day when you were drinking in the past year? 1 to 2  3) How often did you have 6 or more drinks on one occasion in the past year? never    AUDIT-C Score: 1  Interpretation: Score 0-3 (male): Negative screen for alcohol misuse    Single Item Drug Screening:  How often have you used an illegal drug (including marijuana) or a prescription medication for non-medical reasons in the past year? never    Single Item Drug Screen Score: 0  Interpretation: Negative screen for possible drug use disorder    Time Spent  Time spent  "screening/evaluating the patient for alcohol misuse: 5 minutes.     Annual Depression Screening  Time spent screening and evaluating the patient for depression during today's encounter was 5 minutes.    Other Counseling Topics:   Calcium and vitamin D intake and regular weightbearing exercise.     No results found.     Physical Exam:     /80 (BP Location: Left arm, Patient Position: Sitting, Cuff Size: Adult)   Pulse 58   Temp 97.6 °F (36.4 °C)   Ht 5' 8.58\" (1.742 m)   Wt 89.2 kg (196 lb 9.6 oz)   SpO2 96%   BMI 29.39 kg/m²     Physical Exam  Vitals reviewed.   Constitutional:       Appearance: He is well-developed.   HENT:      Head: Normocephalic and atraumatic.      Right Ear: Tympanic membrane, ear canal and external ear normal.      Left Ear: Tympanic membrane, ear canal and external ear normal.      Nose: Nose normal.      Mouth/Throat:      Mouth: Mucous membranes are moist.   Eyes:      Extraocular Movements: Extraocular movements intact.      Conjunctiva/sclera: Conjunctivae normal.      Pupils: Pupils are equal, round, and reactive to light.   Neck:      Thyroid: No thyromegaly.      Vascular: No JVD.   Cardiovascular:      Rate and Rhythm: Normal rate and regular rhythm.      Pulses: Normal pulses.      Heart sounds: Normal heart sounds. No murmur heard.  Pulmonary:      Effort: Pulmonary effort is normal. No respiratory distress.      Breath sounds: Normal breath sounds. No wheezing or rales.   Abdominal:      General: Bowel sounds are normal. There is no distension.      Palpations: Abdomen is soft. There is no mass.      Tenderness: There is no abdominal tenderness.   Musculoskeletal:         General: No swelling or deformity. Normal range of motion.      Cervical back: Normal range of motion and neck supple. No tenderness. No muscular tenderness.      Right lower leg: No edema.      Left lower leg: No edema.   Lymphadenopathy:      Cervical: No cervical adenopathy.   Skin:     General: " Skin is warm.      Capillary Refill: Capillary refill takes less than 2 seconds.   Neurological:      Mental Status: He is alert and oriented to person, place, and time.      Cranial Nerves: No cranial nerve deficit.      Sensory: No sensory deficit.      Motor: No weakness or abnormal muscle tone.      Gait: Gait normal.      Comments: Minicog 5/5   Psychiatric:         Mood and Affect: Mood normal.         Behavior: Behavior normal.         Thought Content: Thought content normal.         Judgment: Judgment normal.      Comments: PHQ-2/9 Depression Screening    Little interest or pleasure in doing things: 0 - not at all  Feeling down, depressed, or hopeless: 0 - not at all  PHQ-2 Score: 0  PHQ-2 Interpretation: Negative depression screen            Luis Lozoya MD

## 2024-01-11 ENCOUNTER — APPOINTMENT (OUTPATIENT)
Dept: LAB | Facility: MEDICAL CENTER | Age: 74
End: 2024-01-11
Payer: COMMERCIAL

## 2024-01-11 ENCOUNTER — APPOINTMENT (OUTPATIENT)
Dept: LAB | Facility: AMBULARY SURGERY CENTER | Age: 74
End: 2024-01-11
Payer: COMMERCIAL

## 2024-01-11 DIAGNOSIS — Z12.5 SCREENING FOR PROSTATE CANCER: ICD-10-CM

## 2024-01-11 DIAGNOSIS — G56.02 CARPAL TUNNEL SYNDROME ON LEFT: ICD-10-CM

## 2024-01-11 DIAGNOSIS — Z01.812 ENCOUNTER FOR PRE-OPERATIVE LABORATORY TESTING: ICD-10-CM

## 2024-01-11 DIAGNOSIS — G56.22 CUBITAL TUNNEL SYNDROME ON LEFT: ICD-10-CM

## 2024-01-11 DIAGNOSIS — M65.322 TRIGGER FINGER, LEFT INDEX FINGER: ICD-10-CM

## 2024-01-11 LAB
ANION GAP SERPL CALCULATED.3IONS-SCNC: 8 MMOL/L
ATRIAL RATE: 55 BPM
BUN SERPL-MCNC: 13 MG/DL (ref 5–25)
CALCIUM SERPL-MCNC: 9.3 MG/DL (ref 8.4–10.2)
CHLORIDE SERPL-SCNC: 107 MMOL/L (ref 96–108)
CO2 SERPL-SCNC: 27 MMOL/L (ref 21–32)
CREAT SERPL-MCNC: 0.79 MG/DL (ref 0.6–1.3)
GFR SERPL CREATININE-BSD FRML MDRD: 90 ML/MIN/1.73SQ M
GLUCOSE P FAST SERPL-MCNC: 100 MG/DL (ref 65–99)
P AXIS: 51 DEGREES
POTASSIUM SERPL-SCNC: 4.5 MMOL/L (ref 3.5–5.3)
PR INTERVAL: 164 MS
PSA SERPL-MCNC: 0.31 NG/ML (ref 0–4)
QRS AXIS: 39 DEGREES
QRSD INTERVAL: 84 MS
QT INTERVAL: 416 MS
QTC INTERVAL: 397 MS
SODIUM SERPL-SCNC: 142 MMOL/L (ref 135–147)
T WAVE AXIS: 47 DEGREES
VENTRICULAR RATE: 55 BPM

## 2024-01-11 PROCEDURE — 36415 COLL VENOUS BLD VENIPUNCTURE: CPT

## 2024-01-11 PROCEDURE — G0103 PSA SCREENING: HCPCS

## 2024-01-11 PROCEDURE — 80048 BASIC METABOLIC PNL TOTAL CA: CPT

## 2024-01-16 ENCOUNTER — HOSPITAL ENCOUNTER (OUTPATIENT)
Dept: ULTRASOUND IMAGING | Facility: HOSPITAL | Age: 74
Discharge: HOME | End: 2024-01-16
Attending: FAMILY MEDICINE
Payer: COMMERCIAL

## 2024-01-16 DIAGNOSIS — N40.1 BENIGN PROSTATIC HYPERPLASIA WITH INCOMPLETE BLADDER EMPTYING: ICD-10-CM

## 2024-01-16 DIAGNOSIS — R39.14 BENIGN PROSTATIC HYPERPLASIA WITH INCOMPLETE BLADDER EMPTYING: ICD-10-CM

## 2024-01-16 PROCEDURE — 76770 US EXAM ABDO BACK WALL COMP: CPT

## 2024-01-24 ENCOUNTER — TELEPHONE (OUTPATIENT)
Dept: FAMILY MEDICINE CLINIC | Facility: CLINIC | Age: 74
End: 2024-01-24

## 2024-01-24 NOTE — TELEPHONE ENCOUNTER
----- Message from Luis Lozoya MD sent at 1/24/2024  7:05 AM EST -----  No significant post void residual. How is patient feeling?

## 2024-01-25 ENCOUNTER — TELEPHONE (OUTPATIENT)
Dept: OTHER | Facility: OTHER | Age: 74
End: 2024-01-25

## 2024-01-25 NOTE — TELEPHONE ENCOUNTER
Per Dr. Lozoya, patient was advised pf his US kidney and bladder result. Patient stated that there is no changes in his symptoms, patient has a urology appointment scheduled on 2/15/24.

## 2024-02-05 ENCOUNTER — ANESTHESIA (OUTPATIENT)
Dept: ANESTHESIOLOGY | Facility: HOSPITAL | Age: 74
End: 2024-02-05

## 2024-02-05 ENCOUNTER — ANESTHESIA EVENT (OUTPATIENT)
Dept: ANESTHESIOLOGY | Facility: HOSPITAL | Age: 74
End: 2024-02-05

## 2024-02-13 ENCOUNTER — ANESTHESIA EVENT (OUTPATIENT)
Dept: PERIOP | Facility: AMBULARY SURGERY CENTER | Age: 74
End: 2024-02-13
Payer: COMMERCIAL

## 2024-02-13 NOTE — PRE-PROCEDURE INSTRUCTIONS
Pre-Surgery Instructions:   Medication Instructions    acetaminophen (TYLENOL) 650 mg CR tablet Uses PRN- OK to take day of surgery    ascorbic acid (VITAMIN C) 500 mg tablet Stop taking 7 days prior to surgery.    aspirin 81 MG tablet Instructions provided by MD Patient was advised to ask cardiologist    Cholecalciferol 2000 units TABS Stop taking 7 days prior to surgery.    Coenzyme Q10 200 MG capsule Stop taking 7 days prior to surgery.    Diclofenac Sodium (VOLTAREN) 1 % Stop taking 7 days prior to surgery.    Echinacea 125 MG CAPS Stop taking 7 days prior to surgery.    famotidine (PEPCID) 40 MG tablet Take day of surgery.    FIBER SELECT GUMMIES CHEW Stop taking 7 days prior to surgery.    lisinopril (ZESTRIL) 10 mg tablet Hold day of surgery.    melatonin 3 mg Stop taking 7 days prior to surgery.    metoprolol succinate (TOPROL-XL) 25 mg 24 hr tablet Take day of surgery.    multivitamin (THERAGRAN) TABS Stop taking 7 days prior to surgery.    simvastatin (ZOCOR) 40 mg tablet Take day of surgery.      Medication instructions for day surgery reviewed. Please use only a sip of water to take your instructed medications. Avoid all over the counter vitamins, supplements and NSAIDS for one week prior to surgery per anesthesia guidelines. Tylenol is ok to take as needed.     You will receive a call one business day prior to surgery with an arrival time and hospital directions. If your surgery is scheduled on a Monday, the hospital will be calling you on the Friday prior to your surgery. If you have not heard from anyone by 8pm, please call the hospital supervisor through the hospital  at [phone]. ([place] [phone] or [place] [phone]).    Do not eat or drink anything after midnight the night before your surgery, including candy, mints, lifesavers, or chewing gum. Do not drink alcohol 24hrs before your surgery. Try not to smoke at least 24hrs before your surgery.       Follow the pre surgery  showering instructions as listed in the “My Surgical Experience Booklet” or otherwise provided by your surgeon's office. Do not use a blade to shave the surgical area 1 week before surgery. It is okay to use a clean electric clippers up to 24 hours before surgery. Do not apply any lotions, creams, including makeup, cologne, deodorant, or perfumes after showering on the day of your surgery. Do not use dry shampoo, hair spray, hair gel, or any type of hair products.     No contact lenses, eye make-up, or artificial eyelashes. Remove nail polish, including gel polish, and any artificial, gel, or acrylic nails if possible. Remove all jewelry including rings and body piercing jewelry.     Wear causal clothing that is easy to take on and off. Consider your type of surgery.    Keep any valuables, jewelry, piercings at home. Please bring any specially ordered equipment (sling, braces) if indicated.    Arrange for a responsible person to drive you to and from the hospital on the day of your surgery. Visitor Guidelines discussed.     Call the surgeon's office with any new illnesses, exposures, or additional questions prior to surgery.    Please reference your “My Surgical Experience Booklet” for additional information to prepare for your upcoming surgery.

## 2024-02-15 ENCOUNTER — OFFICE VISIT (OUTPATIENT)
Dept: UROLOGY | Facility: CLINIC | Age: 74
End: 2024-02-15
Payer: COMMERCIAL

## 2024-02-15 VITALS
RESPIRATION RATE: 18 BRPM | OXYGEN SATURATION: 96 % | HEIGHT: 68 IN | SYSTOLIC BLOOD PRESSURE: 130 MMHG | DIASTOLIC BLOOD PRESSURE: 78 MMHG | WEIGHT: 194.6 LBS | BODY MASS INDEX: 29.49 KG/M2 | HEART RATE: 61 BPM

## 2024-02-15 DIAGNOSIS — R39.14 BENIGN PROSTATIC HYPERPLASIA WITH INCOMPLETE BLADDER EMPTYING: Primary | ICD-10-CM

## 2024-02-15 DIAGNOSIS — N40.1 BENIGN PROSTATIC HYPERPLASIA WITH INCOMPLETE BLADDER EMPTYING: Primary | ICD-10-CM

## 2024-02-15 LAB
POST-VOID RESIDUAL VOLUME, ML POC: 0 ML
SL AMB  POCT GLUCOSE, UA: NORMAL
SL AMB LEUKOCYTE ESTERASE,UA: NORMAL
SL AMB POCT BILIRUBIN,UA: NORMAL
SL AMB POCT BLOOD,UA: NORMAL
SL AMB POCT CLARITY,UA: CLEAR
SL AMB POCT COLOR,UA: YELLOW
SL AMB POCT KETONES,UA: NORMAL
SL AMB POCT NITRITE,UA: NORMAL
SL AMB POCT PH,UA: 7
SL AMB POCT SPECIFIC GRAVITY,UA: 1
SL AMB POCT URINE PROTEIN: NORMAL
SL AMB POCT UROBILINOGEN: NORMAL

## 2024-02-15 PROCEDURE — 51798 US URINE CAPACITY MEASURE: CPT | Performed by: PHYSICIAN ASSISTANT

## 2024-02-15 PROCEDURE — 99203 OFFICE O/P NEW LOW 30 MIN: CPT | Performed by: PHYSICIAN ASSISTANT

## 2024-02-15 PROCEDURE — 81002 URINALYSIS NONAUTO W/O SCOPE: CPT | Performed by: PHYSICIAN ASSISTANT

## 2024-02-15 RX ORDER — SILODOSIN 8 MG/1
8 CAPSULE ORAL
Qty: 90 CAPSULE | Refills: 3 | Status: SHIPPED | OUTPATIENT
Start: 2024-02-15

## 2024-02-15 RX ORDER — VIT C/B6/B5/MAGNESIUM/HERB 173 50-5-6-5MG
CAPSULE ORAL
COMMUNITY

## 2024-02-15 NOTE — PROGRESS NOTES
UROLOGY CONSULTATION NOTE     Patient Identifiers: Amando Mace (MRN: [id number])  Service Requesting Consultation: Luis Lozoya MD    Service Providing Consultation:  Urology, Enrique Travis PA-C  Consults  Date of Service: 2/15/2024    Reason for Consult: Lower urinary tract symptoms    History of Present Illness:     Amando Mace is a 73 y.o. male accompanied by his wife referred from primary care for evaluation of lower urinary tract symptoms.  He reports being given a trial of tamsulosin in the past which he did not tolerate.  It did not provide any improvement in his symptoms.  He complains of perceived incomplete emptying although his PVR is 0 today.  Urine dip today is clear.    Past Medical, Past Surgical History:     Past Medical History:   Diagnosis Date    Arthritis     Colon polyps     GERD (gastroesophageal reflux disease)     Hyperlipidemia     Hypertension     Seasonal allergies    :    Past Surgical History:   Procedure Laterality Date    BACK SURGERY      laminectomy    CARDIAC SURGERY      cardiac stent    CARPAL TUNNEL RELEASE      COLONOSCOPY      benign polyp removal    ORCHIECTOMY Left     OR ESOPHAGOGASTRODUODENOSCOPY TRANSORAL DIAGNOSTIC N/A 8/29/2017    Procedure: EGD AND COLONOSCOPY;  Surgeon: Talat Park MD;  Location: [facility];  Service: Gastroenterology    UPPER GASTROINTESTINAL ENDOSCOPY      WRIST SURGERY     :    Medications, Allergies:     Current Outpatient Medications:     acetaminophen (TYLENOL) 650 mg CR tablet, Take 650 mg by mouth daily at bedtime, Disp: , Rfl:     ascorbic acid (VITAMIN C) 500 mg tablet, Take 500 mg by mouth daily, Disp: , Rfl:     aspirin 81 MG tablet, Take 1 tablet (81 mg total) by mouth daily, Disp: 30 tablet, Rfl: 5    Cholecalciferol 2000 units TABS, Take 2,000 Units by mouth, Disp: , Rfl:     Coenzyme Q10 200 MG capsule, Take 200 mg by mouth daily, Disp: , Rfl:     Diclofenac Sodium (VOLTAREN) 1 %, Apply 2 g topically  in the morning and 2 g at noon and 2 g in the evening and 2 g before bedtime., Disp: 2 g, Rfl: 0    Echinacea 125 MG CAPS, Take 1 tablet by mouth 2 (two) times a day, Disp: , Rfl:     famotidine (PEPCID) 40 MG tablet, Take 1 tablet (40 mg total) by mouth daily at bedtime as needed for heartburn, Disp: 90 tablet, Rfl: 1    FIBER SELECT GUMMIES CHEW, Chew 2 (two) times a day, Disp: , Rfl:     fluticasone (FLONASE) 50 mcg/act nasal spray, 1 spray into each nostril daily, Disp: , Rfl:     lisinopril (ZESTRIL) 10 mg tablet, Take 1 tablet (10 mg total) by mouth daily, Disp: 90 tablet, Rfl: 3    melatonin 3 mg, Take 1 tablet by mouth if needed, Disp: , Rfl:     meloxicam (MOBIC) 7.5 mg tablet, Take 7.5 mg by mouth if needed for moderate pain, Disp: , Rfl:     metoprolol succinate (TOPROL-XL) 25 mg 24 hr tablet, Take 12.5 mg by mouth daily, Disp: , Rfl:     Silodosin 8 MG CAPS, Take 1 capsule by mouth daily at bedtime, Disp: 90 capsule, Rfl: 3    simvastatin (ZOCOR) 40 mg tablet, Take 20 mg by mouth daily at bedtime, Disp: , Rfl:     Turmeric (QC Tumeric Complex) 500 MG CAPS, Take by mouth Unknown dosage tumeric with tumerol, Disp: , Rfl:     clotrimazole-betamethasone (LOTRISONE) 1-0.05 % cream, Apply topically 2 (two) times a day (Patient not taking: Reported on 2/15/2024), Disp: 30 g, Rfl: 0    Loratadine 10 MG CAPS, Take by mouth if needed (Patient not taking: Reported on 12/22/2023), Disp: , Rfl:     multivitamin (THERAGRAN) TABS, Take 1 tablet by mouth daily (Patient not taking: Reported on 2/15/2024), Disp: , Rfl:     Allergies:  No Known Allergies:    Social and Family History:   Social History:   Social History     Tobacco Use    Smoking status: Never    Smokeless tobacco: Never   Vaping Use    Vaping status: Never Used   Substance Use Topics    Alcohol use: Yes     Alcohol/week: 2.0 standard drinks of alcohol     Types: 2 Cans of beer per week    Drug use: No   .    Social History     Tobacco Use   Smoking Status  "Never   Smokeless Tobacco Never       Family History:  Family History   Problem Relation Age of Onset    Diabetes Mother     Hypertension Mother     Stomach cancer Father     Hypertension Father     Liver cancer Father    :     Review of Systems:     General: Fever, chills, or night sweats: negative  Cardiac: Negative for chest pain.    Pulmonary: Negative for shortness of breath.  Gastrointestinal: Abdominal pain negative.  Nausea, vomiting, or diarrhea negative,  Genitourinary: See HPI above.  Patient does not have hematuria.  All other systems queried were negative.    Physical Exam:   General: Patient is pleasant and in NAD. Awake and alert  /78 (BP Location: Left arm, Patient Position: Sitting, Cuff Size: Standard)   Pulse 61   Resp 18   Ht 5' 8\" (1.727 m)   Wt 88.3 kg (194 lb 9.6 oz)   SpO2 96%   BMI 29.59 kg/m²   HEENT:  Conjunctiva are clear  Constitutional:  pleasant and cooperative     no apparent distress  Cardiac: Peripheral edema: negative  Pulmonary: Non-labored breathing  Abdomen: Soft, non-tender, non-distended.  No surgical scars.  No masses, tenderness, hernias noted.    Genitourinary: Negative CVA tenderness, negative suprapubic tenderness.  Extremities:  Moves all extremities  Neurological:CNII-XII intact. No numbness or tingling. Essentially non focal neurologic exam  Psychiatric:mood affect and behavior normal    (Male): Penis circumcised, phallus normal, meatus patent.  Testicles descended into scrotum bilaterally without masses nor tenderness.  No inguinal hernias bilaterally.  DELPHINE: Prostate is enlarged at 40 grams. The prostate is not boggy. The prostate is not tender.  No nodules noted.        Labs:     Lab Results   Component Value Date    HGB 15.1 10/16/2023    HCT 42.9 10/16/2023    WBC 6.49 10/16/2023     10/16/2023   ]    Lab Results   Component Value Date    K 4.5 01/11/2024     01/11/2024    CO2 27 01/11/2024    BUN 13 01/11/2024    CREATININE 0.79 " 01/11/2024    CALCIUM 9.3 01/11/2024   ]    Imaging:   I personally reviewed the images and report of the following studies, and reviewed them with the patient:  None    ASSESSMENT:     #1.  Medically refractory BPH    PLAN:   -Recommend trial of silodosin 8 mg  -Follow-up in the office in 3 months and recheck PVR  -Discussed cystoscopy TRUS and uroflow if ineffective    Thank you for allowing me to participate in this patients’ care.  Please do not hesitate to call with any additional questions.  Enrique Travis PA-C

## 2024-02-19 PROCEDURE — NC001 PR NO CHARGE: Performed by: SURGERY

## 2024-02-19 NOTE — H&P
ASSESSMENT/PLAN:       72 yo male with left recurrent carpal tunnel, left cubital tunnel, left CMC arthritis, and trigger fingers of the left index and right index and middle fingers     Done today:   - Consent signed for left carpal/cubital release and left index TFR  - Initial injections done for right index/long trigger fingers  - Repeat left CMC injection deferred due to continued symptomatic relief      Patient exhibits clinical and diagnostic evidence of recurrent left carpal tunnel syndrome and left cubital tunnel syndrome as well as left index trigger finger.  He has had some improvement in paresthesias and triggering and injections with therapy, bracing, but continues to note intermittent symptoms which worsen particularly with increased activity such as gardening.  We discussed next steps in treatment including continued conservative care versus operative intervention.  Patient wishes to proceed surgically on the left side with a left cubital tunnel release, left revision carpal tunnel release, and left index trigger finger release.  Risk benefits alternatives surgery were discussed and informed consent was signed for the same.  Postoperative protocol and expectations were reviewed and all questions answered to the best of our ability.  He will be seen 10 to 14 days following surgery for postop evaluation and suture removal  We discussed treatment in regards to the right index and long trigger fingers. Since these do seem to be the most bothersome issue today we discussed initial treatment with a steroid injection into the affected finger. This can be curative in about 70 % of cases, however if it is not there is a surgical procedure that can be done to resolve the issue. Risks, benefits, and alternatives were discussed and patient elected to proceed with initial index and long trigger finger injection today. Patient tolerated the procedure well with no immediate complications. Will follow up on date of  surgery.           The patient verbalized understanding of exam findings and treatment plan. We engaged in the shared decision-making process and treatment options were discussed at length with the patient. Surgical and conservative management discussed today along with risks and benefits.     Diagnoses and all orders for this visit:     Cubital tunnel syndrome on left  -     Case request operating room: RELEASE CUBITAL TUNNEL, RELEASE CARPAL TUNNEL REVISION, RELEASE TRIGGER FINGER LEFT INDEX; Standing  -     Basic metabolic panel; Future  -     EKG 12 lead; Future  -     Case request operating room: RELEASE CUBITAL TUNNEL, RELEASE CARPAL TUNNEL REVISION, RELEASE TRIGGER FINGER LEFT INDEX     Carpal tunnel syndrome on left  -     Case request operating room: RELEASE CUBITAL TUNNEL, RELEASE CARPAL TUNNEL REVISION, RELEASE TRIGGER FINGER LEFT INDEX; Standing  -     Basic metabolic panel; Future  -     EKG 12 lead; Future  -     Case request operating room: RELEASE CUBITAL TUNNEL, RELEASE CARPAL TUNNEL REVISION, RELEASE TRIGGER FINGER LEFT INDEX     Arthritis of carpometacarpal (CMC) joint of left thumb     Trigger finger, left index finger  -     Case request operating room: RELEASE CUBITAL TUNNEL, RELEASE CARPAL TUNNEL REVISION, RELEASE TRIGGER FINGER LEFT INDEX; Standing  -     Basic metabolic panel; Future  -     EKG 12 lead; Future  -     Case request operating room: RELEASE CUBITAL TUNNEL, RELEASE CARPAL TUNNEL REVISION, RELEASE TRIGGER FINGER LEFT INDEX     Trigger finger, right index finger  -     Hand/upper extremity injection     Trigger finger, right middle finger  -     Hand/upper extremity injection     Encounter for pre-operative laboratory testing  -     Basic metabolic panel; Future  -     EKG 12 lead; Future     Other orders  -     Nursing Communication Warmimg Interventions Implemented; Standing  -     Nursing Communication CHG bath, have staff wash entire body (neck down) per pre-op bathing protocol.  Routine, evening prior to, and day of surgery.; Standing  -     Nursing Communication Swab both nares with Povidone-Iodine solution, EXCLUDE if patient has shellfish/Iodine allergy, and replace with nasal alcohol swabstick. Routine, day of surgery, on call to OR; Standing  -     chlorhexidine (PERIDEX) 0.12 % oral rinse 15 mL  -     Void on call to OR; Standing  -     Insert peripheral IV; Standing  -     Diet NPO; Sips with meds; Standing  -     ceFAZolin (ANCEF) 2,000 mg in dextrose 5 % 100 mL IVPB             Cubital Tunnel Release:   The anatomy and physiology of cubital tunnel syndrome were discussed with the patient today in the office.  Typically, increased elbow flexion activities decrease blood flow within the intraneural spaces, resulting in a feeling of numbness, tingling, weakness, or clumsiness within the hand and fingers.  Occasionally, anatomic structures such as medial elbow osteophytes, the medial head of the triceps, were subluxing ulnar nerve may result in increased pressure or aggravation at the cubital tunnel.  Typical signs and symptoms usually include numbness and tingling within the ring and small finger, weakness with , and weakness with pinch.  Conservative treatment and includes nocturnal bracing to keep the elbow in a semi-extended position, activity modification, therapy, and avoiding excessive elbow flexion activities.  A majority of patients typically respond to conservative treatment over a period of approximately 3-6 months.  Vitamin B6 one tablet daily over the counter may helpful to reduce symptoms.  EMG/NCV testing of the ulnar nerve at the elbow is not as reliable as carpal tunnel syndrome.  Surgical intervention in the form of in situ release of the ulnar nerve at the elbow or ulnar nerve transposition may be required in up to 20% of patients. The patient has elected to undergo cubital tunnel release.  The possibility of converting to a subcutaneous or submuscular ulnar  nerve transposition depending on the nerve stability was discussed with the patient.  Typically, in the postoperative period, light activities are allowed immediately, driving is allowed when narcotic medications have stopped, and the incision may get wet after 5 days.  Heavy activities will be allowed after follow up appointment in 1-2 weeks.  While the pain within the ring and small finger of the hands generally improves rapidly, the numbness and tingling, as well as the strength, will slowly improve over a period of weeks to months.  Total recovery can take up to 18 months from the time of surgery.  Numbness and tingling near the incision, or near the medial aspect of the forearm was discussed with the patient.  The patient has an understanding of the above mentioned discussion. The risks and benefits of the procedure were explained to the patient, which include, but are not limited to: Bleeding, infection, recurrence, pain, scar, damage to tendons, damage to nerves, and damage to blood vessels, failure to give desired results and complications related to anesthesia.  These risks, along with alternative conservative treatment options, and postoperative protocols were voiced back and understood by the patient.  All questions were answered to the patient's satisfaction.  The patient agrees to comply with a standard postoperative protocol, and is willing to proceed.  Education was provided via written and auditory forms.  There were no barriers to learning. Written handouts regarding wound care, incision and scar care, and general preoperative information was provided to the patient.  Prior to surgery, the patient may be requested to stop all anti-inflammatory medications.  Prophylactic aspirin, Plavix, and Coumadin may be allowed to be continued.  Medications including vitamin E., ginkgo, and fish oil are requested to be stopped approximately one week prior to surgery.  Hypertensive medications and beta blockers,  if taken, should be continued. Vitamin B6 one tablet daily over the counter may helpful to reduce symptoms.  , Open Carpal Tunnel Release:   The anatomy and physiology of carpal tunnel syndrome was discussed with the patient today.  Increase pressure localized under the transverse carpal ligament can cause pain, numbness, tingling, or dysesthesias within the median nerve distribution as well as feelings of fatigue, clumsiness, or awkwardness.  These symptoms typically occur at night and worse in the morning upon waking.  Eventually, untreated carpal tunnel syndrome can result in weakness and permanent loss of muscle within the thenar compartment of the hand.  Treatment options were discussed with the patient.  Conservative treatment includes nocturnal resting splints to keep the nerve in a neutral position, ergonomic changes within the work or home environment, activity modification, and tendon gliding exercises.  Vitamin B6 one tablet daily over the counter may helpful to reduce symptoms.  Steroid injections within the carpal canal can help a majority of patients, however this is often self-limited in a majority of patients.  Surgical intervention to divide the transverse carpal ligament typically results in a long-lasting relief of the patient's complaints, with the recurrence rate of less than 1%. The patient has elected to undergo an open carpal tunnel release.  The palmar incision technique was discussed in the office with the patient today.   In the postoperative period, light activities are allowed immediately, driving is allowed when narcotic medication has stopped, and the bandages may be removed and incision may get wet after 2 days.  Heavy activities (lifting more than approximately 10 pounds) will be allowed after follow up appointment in 1-2 weeks.  While night symptoms (waking from sleep, pain, and discomfort in the hands) generally improves rapidly, the numbness and tingling as well as the strength  will slowly improve over weeks to months depending on the chronicity and severity of the carpal tunnel syndrome.  Pillar pain and scar discomfort were discussed with the patient which are self-limiting conditions.  The risks of bleeding and infection from the surgery are less than 1%.  Risk of recurrence is approximately 0.5%.  The risks of nerve injury or nerve damage or damage to the blood vessels is approximately 1 in 1200. The patient has an understanding of the above mentioned discussion.The risks and benefits of the procedure were explained to the patient, which include, but are not limited to: Bleeding, infection, recurrence, pain, scar, damage to tendons, damage to nerves, and damage to blood vessels, failure to give desired results and complications related to anesthesia.  These risks, along with alternative conservative treatment options, and postoperative protocols were voiced back and understood by the patient.  All questions were answered to the patient's satisfaction.  The patient agrees to comply with a standard postoperative protocol, and is willing to proceed.  Education was provided via written and auditory forms.  There were no barriers to learning. Written handouts regarding wound care, incision and scar care, and general preoperative information was provided to the patient.  Prior to surgery, the patient may be requested to stop all anti-inflammatory medications.  Prophylactic aspirin, Plavix, and Coumadin may be allowed to be continued.  Medications including vitamin E., ginkgo, and fish oil are requested to be stopped approximately one week prior to surgery.  Hypertensive medications and beta blockers, if taken, s, and Trigger Finger Release: The anatomy and physiology of trigger finger was discussed with the patient today in the office.  Edema and increased contact pressure within the flexor tendons at the A1 pulley can cause pain, crepitation, and limitation of function.  Treatment options  include resting MP blocking splints to decrease edema, oral anti-inflammatory medications, home or formal therapy exercises, up to 2 steroid injections or surgical release.  While majority of patients do respond to conservative treatment, up to 20% may require surgical release.  The patient has elected release of the trigger finger.  The patient has elected to undergo a release of the A1 pulley (trigger finger).  A small incision will be made over the palmar aspect of the hand, the tendon sheath holding the flexor tendons will be released.  In the postoperative period, light activities are allowed immediately, driving is allowed when narcotic medication has stopped, and the incision may get wet after 2 days.  Heavy activities (lifting more than approximately 10 pounds) will be allowed after the follow up appointment in 1-2 weeks.  While the pain and discomfort within the wrist typically improves rapidly, some residual discomfort may be present for up to 6 weeks.  The nodule that is typically palpable in the palmar aspect of the hand will not be removed, as this would necessitate removal of a portion of the flexor tendon, however the catching, clicking, and locking should resolve.  Approximate success rate is 98%.The risks and benefits of the procedure were explained to the patient, which include, but are not limited to: Bleeding, infection, recurrence, pain, scar, damage to tendons, damage to nerves, and damage to blood vessels, need for future surgery and complications related to anesthesia.  If bony work is done, risks also include malunion and nonunion.  These risks, along with alternative conservative treatment options, and postoperative protocols were voiced back and understood by the patient.  All questions were answered to the patient's satisfaction.  The patient agrees to comply with a standard postoperative protocol, and is willing to proceed.  Education was provided via written and auditory forms.  There  were no barriers to learning. Written handouts regarding wound care, incision and scar care, and general preoperative information, as well as risks and benefits were provided to the patient.     Follow Up:  Return for After Surgery.        To Do Next Visit:  Re-evaluation of current issue and Sutures out     ____________________________________________________________________________________________________________________________________________        CHIEF COMPLAINT:       Chief Complaint   Patient presents with    Follow-up       Patient is still having carpal tunnel issues          SUBJECTIVE:  Amando Mace is a 73 y.o. year old male who presents for follow up evaluation of left upper extremity paresthesias, multiple trigger fingers, and left CMC arthritis. He has been in therapy for the left sided paresthesias which has helped. He also states he has been wearing a brace on the left wrist and at the trigger fingers at night which helps. He has had injections to the CMC which provide good pain relief, no prior trigger finger injections.  Today he notes pain at the right index and long finger MP/A1 pulleys which is new since his last visit. There is clicking and locking at these fingers which is bothersome. Left thumb CMC is not bothersome today. Paresthesias have improved somewhat but do still bother him. Cubital tunnel injection did improve symptoms.         I have personally reviewed all the relevant PMH, PSH, SH, FH, Medications and allergies.      PAST MEDICAL HISTORY:       Past Medical History:   Diagnosis Date    Arthritis      Colon polyps      GERD (gastroesophageal reflux disease)      Hyperlipidemia      Hypertension      Seasonal allergies           PAST SURGICAL HISTORY:        Past Surgical History:   Procedure Laterality Date    BACK SURGERY         laminectomy    CARDIAC SURGERY         cardiac stent    CARPAL TUNNEL RELEASE        COLONOSCOPY         benign polyp removal    ORCHIECTOMY Left       W[place] ESOPHAGOGASTRODUODENOSCOPY TRANSORAL DIAGNOSTIC N/A 8/29/2017     Procedure: EGD AND COLONOSCOPY;  Surgeon: Talat Park MD;  Location: [facility];  Service: Gastroenterology    UPPER GASTROINTESTINAL ENDOSCOPY        WRIST SURGERY             FAMILY HISTORY:        Family History   Problem Relation Age of Onset    Diabetes Mother      Hypertension Mother      Stomach cancer Father      Hypertension Father      Liver cancer Father           SOCIAL HISTORY:  Social History            Tobacco Use    Smoking status: Never    Smokeless tobacco: Never   Vaping Use    Vaping status: Never Used   Substance Use Topics    Alcohol use: Yes       Alcohol/week: 2.0 standard drinks of alcohol       Types: 2 Cans of beer per week    Drug use: No         MEDICATIONS:     Current Outpatient Medications:     acetaminophen (TYLENOL) 650 mg CR tablet, Take 650 mg by mouth daily at bedtime, Disp: , Rfl:     ascorbic acid (VITAMIN C) 500 mg tablet, Take 500 mg by mouth daily, Disp: , Rfl:     aspirin 81 MG tablet, Take 1 tablet (81 mg total) by mouth daily, Disp: 30 tablet, Rfl: 5    Cholecalciferol 2000 units TABS, Take 2,000 Units by mouth, Disp: , Rfl:     clotrimazole-betamethasone (LOTRISONE) 1-0.05 % cream, Apply topically 2 (two) times a day, Disp: 30 g, Rfl: 0    Coenzyme Q10 200 MG capsule, Take 200 mg by mouth daily, Disp: , Rfl:     Diclofenac Sodium (VOLTAREN) 1 %, Apply 2 g topically in the morning and 2 g at noon and 2 g in the evening and 2 g before bedtime., Disp: 2 g, Rfl: 0    Echinacea 125 MG CAPS, Take 1 tablet by mouth 2 (two) times a day, Disp: , Rfl:     famotidine (PEPCID) 40 MG tablet, Take 1 tablet (40 mg total) by mouth daily at bedtime as needed for heartburn, Disp: 90 tablet, Rfl: 1    FIBER SELECT GUMMIES CHEW, Chew 2 (two) times a day, Disp: , Rfl:     fluticasone (FLONASE) 50 mcg/act nasal spray, 1 spray into each nostril daily, Disp: , Rfl:     lisinopril (ZESTRIL) 10 mg tablet, Take 1 tablet  "(10 mg total) by mouth daily, Disp: 90 tablet, Rfl: 3    Loratadine 10 MG CAPS, Take by mouth if needed, Disp: , Rfl:     melatonin 3 mg, Take 1 tablet by mouth if needed, Disp: , Rfl:     meloxicam (MOBIC) 7.5 mg tablet, Take 7.5 mg by mouth if needed for moderate pain, Disp: , Rfl:     metoprolol succinate (TOPROL-XL) 25 mg 24 hr tablet, Take 12.5 mg by mouth daily, Disp: , Rfl:     multivitamin (THERAGRAN) TABS, Take 1 tablet by mouth daily, Disp: , Rfl:     simvastatin (ZOCOR) 40 mg tablet, Take 20 mg by mouth daily at bedtime, Disp: , Rfl:     tamsulosin (FLOMAX) 0.4 mg, Take 1 capsule (0.4 mg total) by mouth daily with dinner, Disp: 30 capsule, Rfl: 5     ALLERGIES:  No Known Allergies     REVIEW OF SYSTEMS:  Review of Systems   Constitutional:  Negative for chills, fatigue, fever and unexpected weight change.   HENT:  Negative for hearing loss, nosebleeds and sore throat.    Eyes:  Negative for pain, redness and visual disturbance.   Respiratory:  Negative for cough, shortness of breath and wheezing.    Cardiovascular:  Negative for chest pain, palpitations and leg swelling.   Gastrointestinal:  Negative for abdominal pain, nausea and vomiting.   Endocrine: Negative for polydipsia and polyuria.   Genitourinary:  Negative for difficulty urinating and hematuria.   Musculoskeletal:  Positive for arthralgias. Negative for joint swelling and myalgias.   Skin:  Negative for rash and wound.   Neurological:  Negative for dizziness, numbness and headaches.   Psychiatric/Behavioral:  Negative for decreased concentration, dysphoric mood and suicidal ideas. The patient is not nervous/anxious.          VITALS:      Vitals:     12/20/23 0925   BP: 135/79   Pulse: 64         LABS:  HgA1c: No results found for: \"HGBA1C\"  BMP:         Lab Results   Component Value Date     CALCIUM 9.7 10/16/2023     K 4.6 10/16/2023     CO2 28 10/16/2023      10/16/2023     BUN 15 10/16/2023     CREATININE 0.79 10/16/2023       "   _____________________________________________________  PHYSICAL EXAMINATION:  General: well developed and well nourished, alert, oriented times 3, and appears comfortable  Psychiatric: Normal  HEENT: Normocephalic, Atraumatic Trachea Midline, No torticollis  Pulmonary: No audible wheezing or respiratory distress   Cardiovascular: No pitting edema, 2+ radial pulse   Skin: No masses, erythema, lacerations, fluctation, ulcerations  Neurovascular: Sensation Intact to the Median, Ulnar, Radial Nerve, Motor Intact to the Median, Ulnar, Radial Nerve, and Pulses Intact  Musculoskeletal: Normal, except as noted in detailed exam and in HPI.        MUSCULOSKELETAL EXAMINATION:  right index and long finger:  Negative palpable nodule over the A1 pulley.  Positive tenderness to palpation over A1 pulley. Positive catching. Positive clicking.       Left Carpal Tunnel Exam:     Negative thenar atrophy. Negative phalen's test. Negative carpal tunnel compression. Positive tinels over median nerve at the wrist.  Opposition strength 5/5.  Abduction strength 5/5.       Left Ulnar Nerve Exam:     Negative intrinsic atrophy.  Negative  deformity at the elbow.   Full range of motion with flexion and extension of the elbow.   Negative ulnar nerve compression test at the elbow. Negative tinels over the ulnar nerve at the elbow.  FDP strength is 5/5 to the ring finger. FDP strength is 5/5 to the small finger.  Intrinsic strength 5/5 .     ___________________________________________________  STUDIES REVIEWED:  I have personally reviewed US evaluations of the left wrist and elbow which demonstrate evidence of carpal tunnel syndrome without evidence of cubital tunnel      Patient did have symptomatic relief of ulnar nerve symptoms with injection

## 2024-02-19 NOTE — ANESTHESIA PREPROCEDURE EVALUATION
"Procedure:  RELEASE CUBITAL TUNNEL (Left: Elbow)  RELEASE CARPAL TUNNEL REVISION (Left: Wrist)  RELEASE TRIGGER FINGER LEFT INDEX (Left: Hand)     - denies any chest pain, palpitations, shortness of breath, syncope, lightheadedness, seizures   - denies any recent infectious symptoms such as fevers, chills, cough   - denies taking any anticoagulation medications or any issues with bleeding, bruising, clotting    Relevant Problems   ANESTHESIA (within normal limits)      CARDIO   (+) Coronary atherosclerosis   (+) Essential hypertension      ENDO (within normal limits)      GI/HEPATIC   (+) Chronic GERD      /RENAL   (+) Benign prostatic hyperplasia with incomplete bladder emptying      GYN (within normal limits)      HEMATOLOGY (within normal limits)      MUSCULOSKELETAL   (+) Localized osteoarthrosis of right hip   (+) Low back pain   (+) Primary osteoarthritis, left shoulder      NEURO/PSYCH (within normal limits)      PULMONARY (within normal limits)      Other   (+) Left elbow pain      Lab Results   Component Value Date    WBC 6.49 10/16/2023    HGB 15.1 10/16/2023    HCT 42.9 10/16/2023    MCV 94 10/16/2023     10/16/2023     Lab Results   Component Value Date    SODIUM 142 01/11/2024    K 4.5 01/11/2024     01/11/2024    CO2 27 01/11/2024    AGAP 8 01/11/2024    BUN 13 01/11/2024    CREATININE 0.79 01/11/2024    GLUC 92 03/17/2020    GLUF 100 (H) 01/11/2024    CALCIUM 9.3 01/11/2024    AST 17 10/16/2023    ALT 16 10/16/2023    ALKPHOS 69 10/16/2023    TP 7.5 10/16/2023    TBILI 0.67 10/16/2023    EGFR 90 01/11/2024     No results found for: \"PTT\"  No results found for: \"INR\", \"PROTIME\"    Physical Exam    Airway    Mallampati score: II  TM Distance: >3 FB  Neck ROM: full     Dental   No notable dental hx     Cardiovascular  Rhythm: regular, Rate: normal, Cardiovascular exam normal    Pulmonary  Pulmonary exam normal Breath sounds clear to auscultation    Other Findings        Anesthesia " Plan  ASA Score- 2     Anesthesia Type- IV sedation with anesthesia with ASA Monitors.         Additional Monitors:     Airway Plan:     Comment: MAC with supraclavicular block.       Plan Factors-Exercise tolerance (METS): >4 METS.    Chart reviewed. EKG reviewed. Imaging results reviewed. Existing labs reviewed. Patient summary reviewed.    Patient is not a current smoker.  Patient did not smoke on day of surgery.    Obstructive sleep apnea risk education given perioperatively.        Induction- intravenous.    Postoperative Plan- Plan for postoperative opioid use.     Informed Consent- Anesthetic plan and risks discussed with patient.  I personally reviewed this patient with the CRNA. Discussed and agreed on the Anesthesia Plan with the CRNA..

## 2024-02-20 ENCOUNTER — ANESTHESIA (OUTPATIENT)
Dept: PERIOP | Facility: AMBULARY SURGERY CENTER | Age: 74
End: 2024-02-20
Payer: COMMERCIAL

## 2024-02-20 ENCOUNTER — HOSPITAL ENCOUNTER (OUTPATIENT)
Facility: AMBULARY SURGERY CENTER | Age: 74
Setting detail: OUTPATIENT SURGERY
Discharge: HOME | End: 2024-02-20
Attending: SURGERY | Admitting: SURGERY
Payer: COMMERCIAL

## 2024-02-20 VITALS
BODY MASS INDEX: 28.64 KG/M2 | HEIGHT: 68 IN | RESPIRATION RATE: 18 BRPM | WEIGHT: 189 LBS | OXYGEN SATURATION: 96 % | TEMPERATURE: 97.8 F | SYSTOLIC BLOOD PRESSURE: 138 MMHG | DIASTOLIC BLOOD PRESSURE: 86 MMHG | HEART RATE: 50 BPM

## 2024-02-20 DIAGNOSIS — Z47.89 AFTERCARE FOLLOWING SURGERY OF THE MUSCULOSKELETAL SYSTEM: ICD-10-CM

## 2024-02-20 DIAGNOSIS — M65.332 TRIGGER MIDDLE FINGER OF LEFT HAND: ICD-10-CM

## 2024-02-20 DIAGNOSIS — G56.22 CUBITAL TUNNEL SYNDROME ON LEFT: ICD-10-CM

## 2024-02-20 DIAGNOSIS — G56.02 CARPAL TUNNEL SYNDROME ON LEFT: ICD-10-CM

## 2024-02-20 DIAGNOSIS — M65.322 TRIGGER FINGER, LEFT INDEX FINGER: Primary | ICD-10-CM

## 2024-02-20 PROCEDURE — 64721 CARPAL TUNNEL SURGERY: CPT | Performed by: SURGERY

## 2024-02-20 PROCEDURE — 88304 TISSUE EXAM BY PATHOLOGIST: CPT | Performed by: PATHOLOGY

## 2024-02-20 PROCEDURE — 26055 INCISE FINGER TENDON SHEATH: CPT | Performed by: SURGERY

## 2024-02-20 PROCEDURE — 26055 INCISE FINGER TENDON SHEATH: CPT | Performed by: PHYSICIAN ASSISTANT

## 2024-02-20 PROCEDURE — 64718 REVISE ULNAR NERVE AT ELBOW: CPT | Performed by: PHYSICIAN ASSISTANT

## 2024-02-20 PROCEDURE — 26145 TENDON EXCISION PALM/FINGER: CPT | Performed by: SURGERY

## 2024-02-20 PROCEDURE — 64718 REVISE ULNAR NERVE AT ELBOW: CPT | Performed by: SURGERY

## 2024-02-20 PROCEDURE — C9290 INJ, BUPIVACAINE LIPOSOME: HCPCS | Performed by: ANESTHESIOLOGY

## 2024-02-20 PROCEDURE — 64721 CARPAL TUNNEL SURGERY: CPT | Performed by: PHYSICIAN ASSISTANT

## 2024-02-20 PROCEDURE — 26145 TENDON EXCISION PALM/FINGER: CPT | Performed by: PHYSICIAN ASSISTANT

## 2024-02-20 RX ORDER — PROPOFOL 10 MG/ML
INJECTION, EMULSION INTRAVENOUS CONTINUOUS PRN
Status: DISCONTINUED | OUTPATIENT
Start: 2024-02-20 | End: 2024-02-20

## 2024-02-20 RX ORDER — MIDAZOLAM HYDROCHLORIDE 2 MG/2ML
INJECTION, SOLUTION INTRAMUSCULAR; INTRAVENOUS AS NEEDED
Status: DISCONTINUED | OUTPATIENT
Start: 2024-02-20 | End: 2024-02-20

## 2024-02-20 RX ORDER — ONDANSETRON 2 MG/ML
4 INJECTION INTRAMUSCULAR; INTRAVENOUS ONCE AS NEEDED
Status: DISCONTINUED | OUTPATIENT
Start: 2024-02-20 | End: 2024-02-20 | Stop reason: HOSPADM

## 2024-02-20 RX ORDER — FENTANYL CITRATE 50 UG/ML
INJECTION, SOLUTION INTRAMUSCULAR; INTRAVENOUS AS NEEDED
Status: DISCONTINUED | OUTPATIENT
Start: 2024-02-20 | End: 2024-02-20

## 2024-02-20 RX ORDER — CHLORHEXIDINE GLUCONATE ORAL RINSE 1.2 MG/ML
15 SOLUTION DENTAL ONCE
Status: DISCONTINUED | OUTPATIENT
Start: 2024-02-20 | End: 2024-02-20 | Stop reason: HOSPADM

## 2024-02-20 RX ORDER — SODIUM CHLORIDE, SODIUM LACTATE, POTASSIUM CHLORIDE, CALCIUM CHLORIDE 600; 310; 30; 20 MG/100ML; MG/100ML; MG/100ML; MG/100ML
INJECTION, SOLUTION INTRAVENOUS CONTINUOUS PRN
Status: DISCONTINUED | OUTPATIENT
Start: 2024-02-20 | End: 2024-02-20

## 2024-02-20 RX ORDER — FENTANYL CITRATE/PF 50 MCG/ML
25 SYRINGE (ML) INJECTION
Status: DISCONTINUED | OUTPATIENT
Start: 2024-02-20 | End: 2024-02-20 | Stop reason: HOSPADM

## 2024-02-20 RX ORDER — HYDROCODONE BITARTRATE AND ACETAMINOPHEN 5; 325 MG/1; MG/1
1 TABLET ORAL EVERY 6 HOURS PRN
Status: DISCONTINUED | OUTPATIENT
Start: 2024-02-20 | End: 2024-02-20 | Stop reason: HOSPADM

## 2024-02-20 RX ORDER — BUPIVACAINE HYDROCHLORIDE 5 MG/ML
INJECTION, SOLUTION EPIDURAL; INTRACAUDAL
Status: COMPLETED | OUTPATIENT
Start: 2024-02-20 | End: 2024-02-20

## 2024-02-20 RX ORDER — HYDROCODONE BITARTRATE AND ACETAMINOPHEN 5; 325 MG/1; MG/1
1 TABLET ORAL EVERY 6 HOURS PRN
Qty: 18 TABLET | Refills: 0 | Status: SHIPPED | OUTPATIENT
Start: 2024-02-20 | End: 2024-03-01

## 2024-02-20 RX ORDER — DEXAMETHASONE SODIUM PHOSPHATE 10 MG/ML
INJECTION, SOLUTION INTRAMUSCULAR; INTRAVENOUS AS NEEDED
Status: DISCONTINUED | OUTPATIENT
Start: 2024-02-20 | End: 2024-02-20

## 2024-02-20 RX ORDER — MAGNESIUM HYDROXIDE 1200 MG/15ML
LIQUID ORAL AS NEEDED
Status: DISCONTINUED | OUTPATIENT
Start: 2024-02-20 | End: 2024-02-20 | Stop reason: HOSPADM

## 2024-02-20 RX ORDER — CEFAZOLIN SODIUM 2 G/50ML
2000 SOLUTION INTRAVENOUS ONCE
Status: DISCONTINUED | OUTPATIENT
Start: 2024-02-20 | End: 2024-02-20 | Stop reason: HOSPADM

## 2024-02-20 RX ADMIN — FENTANYL CITRATE 50 MCG: 50 INJECTION INTRAMUSCULAR; INTRAVENOUS at 10:01

## 2024-02-20 RX ADMIN — BUPIVACAINE HYDROCHLORIDE 10 ML: 5 INJECTION, SOLUTION EPIDURAL; INTRACAUDAL at 09:35

## 2024-02-20 RX ADMIN — FENTANYL CITRATE 25 MCG: 50 INJECTION INTRAMUSCULAR; INTRAVENOUS at 09:58

## 2024-02-20 RX ADMIN — SODIUM CHLORIDE, SODIUM LACTATE, POTASSIUM CHLORIDE, AND CALCIUM CHLORIDE: .6; .31; .03; .02 INJECTION, SOLUTION INTRAVENOUS at 09:57

## 2024-02-20 RX ADMIN — PROPOFOL 50 MCG/KG/MIN: 10 INJECTION, EMULSION INTRAVENOUS at 10:01

## 2024-02-20 RX ADMIN — BUPIVACAINE 10 ML: 13.3 INJECTION, SUSPENSION, LIPOSOMAL INFILTRATION at 09:35

## 2024-02-20 RX ADMIN — FENTANYL CITRATE 25 MCG: 50 INJECTION INTRAMUSCULAR; INTRAVENOUS at 10:06

## 2024-02-20 RX ADMIN — MIDAZOLAM 1 MG: 1 INJECTION INTRAMUSCULAR; INTRAVENOUS at 09:57

## 2024-02-20 RX ADMIN — DEXAMETHASONE SODIUM PHOSPHATE 10 MG: 10 INJECTION, SOLUTION INTRAMUSCULAR; INTRAVENOUS at 10:10

## 2024-02-20 RX ADMIN — MIDAZOLAM 1 MG: 1 INJECTION INTRAMUSCULAR; INTRAVENOUS at 09:30

## 2024-02-20 NOTE — OP NOTE
OPERATIVE REPORT  PATIENT NAME: Amando Mace  :  1950  MRN: [id number]  Pt Location: [facility]    SURGERY DATE: 24    Surgeons and Role:     * Hair Pollock MD - Primary     * Sherri Toussaint PA-C - Assisting    Pre-Op Diagnosis:  Carpal tunnel syndrome on left [G56.02]  Cubital tunnel syndrome on left [G56.22]  Trigger finger, left index finger [M65.322]    Post-Op Diagnosis Codes:     * Carpal tunnel syndrome on left [G56.02]     * Cubital tunnel syndrome on left [G56.22]     * Trigger finger, left index finger [M65.322]    Procedure(s):  RELEASE CUBITAL TUNNEL, FDS RADIAL SLIP HEMITENECTOMY (Left)  RELEASE CARPAL TUNNEL REVISION (Left)  RELEASE TRIGGER FINGER LEFT INDEX, RELEASE TRIGGER FINGER LEFT LONG (Left)    Specimen(s):  Order Name Source Comment Collection Info Order Time   TISSUE EXAM Soft Tissue, Other Radial slip FDS Collected By: Hair Pollock MD 2024 10:48 AM     Release to patient through Mychart   Immediate            Estimated Blood Loss:   Minimal    Anesthesia Type:   Regional with Sedation    IMPLANTS:  * No implants in log *    PERIOPERATIVE ANTIBIOTICS:    cefazolin, 2 grams    Tourniquet Time: 58 minutes large hemaclear           Operative Indications:  The patient has a history of Carpal Tunnel Syndrome  left with previous surgery , Cubital Tunnel Syndrome  left, and Trigger Finger  left  index finger, long finger that was recalcitrant to conservative management.  The decision was made to bring the patient to the operating room for revision Open Carpal Tunnel Release  left, Cubital Tunnel Release  left, and Trigger Finger Release  left  index finger, long finger.  Risks of the procedure were explained which include, but are not limited to bleeding; infection; damage to nerves, arteries,veins, tendons; scar; pain; need for reoperation; failure to give desired result; and risks of anaesthesia.  All questions were answered to satisfaction and they were willing  to proceed.         Operative Findings:  Hypertrophy TCL remnant   Ulnar nerve compression at osbornes fascia  A1 hypertrophy Long finger   A1 and A2 hypertrophy of index finger, requiring excision of radial slip of FDS     Complications:   None    Procedure and Technique:  After the patient, site, and procedure were identified, the patient was brought into the operating room in a supine position.  Regional anaesthesia and sedation were provided.   The  left upper extremity was then prepped and drapped in a normal, sterile, orthopedic fashion.    A large hemaclear was applied in sterile fashion.     An anterior approach to the carpal tunnel was undertaken. A 2 cm incision was made in line with the fourth digit, proximal to Oneill's line.  The skin and the subcutaneous tissue were sharply incised.  Under loupe magnification, dissection was carried down to the palmar fascia and it was incised. The transverse carpal ligament  remnant  and scar tissue were visualized and  Released distally with a #64 blade. A freer was was passed above and below the TCL in a retrograde fashion, freeing up any adhesions. A Knife Lite was used to release the proximal portion of the transverse carpal ligament under direct visualization.  Distally the superficial arch was identified.  A complete release was carried out and exploration of the carpal canal revealed no significant tenosynovitis. The median nerve and its branches were intact.      A 6 cm Incision was made with a 15 blade between medial epicondyle and olecranon.  A branch of the MABC was identified distally and protected.  The ulnar nerve was identified at the level of the cubital tunnel.  The nerve was not found to be subluxed.  The Ulnar nerve was traced 1st traced proximally.  Proximal release was performed under direct visualization  the overlying fascia was released with a scissor up to the level of the intermuscular septum which was incised.  Next dissection was taken  proximally both with a scissor and with a 64 blade.  Care was taken preserve any crossing branches of the medial antebrachial cutaneous nerve.  Decompression was taken around the olecranon, and then to the FCU fascia.  The super all facial layer of the fascia was released with a knife and the 2 bellies of the FCU spread gently with a scissor.  The deep fascia of the FCU was identified and with the nerve protected all times incised.  Release was taken just past the level of the 1st motor branch to the FCU.  Once a complete release was verified,  the Elbow was taken through a full range of motion and the nerve was found to be stable with no subluxation. After the release, it was appreciated that the nerve had been significantly constricted at the level of Wan's fascia  with flattening og the nerve   The wound was copiously irrigated and closed in a layered fashion with 3-0 monocryl  for deep dermal .        A  longitudinal  incision is made in line with the long finger overlying the A1 pulley.. Dissection was  carried down under loupe magnification. Skin and subcutaneous tissues were sharply incised. The tissue was elevated off the A1 pulley. The A1 pulley was  identified and incised. There was no retinacular cyst noted. The FDS and FDP were noted to have independent glide after release. No triggering was felt with passive flexion and extension.         A  longitudinal  incision is made in line with the index finger overlying the A1 pulley. Dissection was  carried down under loupe magnification. Skin and subcutaneous tissues were sharply incised. The tissue was elevated off the A1 pulley. The A1 pulley was  identified and incised. There was no retinacular cyst noted. Tenosynovectomy was carried out. The FDS and FDP were noted to have independent glide after tenosynovectomy. A simple release of the A1 demonstrate persistent catching of the FDS on the leading edge of the A2 pulley. A Brunner type incision was made  starting just proximal to the A1 pulley and extending just past the PIP joint being careful to avoid acute angles for skin flaps.  Full-thickness skin flaps were elevated under loupe magnification and the flexor tendon sheath identified proximally at the level a the A1 pulley.  The radial and ulnar neurovascular bundles were also identified and traced distally being careful to protect them under loupe magnification.  Once the entire flexor tendon apparatus was dissected out from A1 up to A3. The proximal A2 was vented 25% .   Next a small horizontal incision was made and the A3 pulley and small flap as elevated.  The FDP tendon was retracted out of the way and the radial slip of the FDS was identified.  This was sharply resected from its insertion with a 64 blade and gently traced out with a scissor proximally.  The FDS radial slip was then fully excised all the way down proximal to the A1 pulley under tension, resulting in a smooth FDS of half its original size.  No additional triggering was noted once the release and excision of FDS for complete.         At the completion of the procedure, hemostasis was obtained with cautery and direct pressure.  The wounds were copiously irrigated with sterile solution.  The wounds were closed with Prolene, Monocryl, and Steri-strips.  Sterile dressings were applied, including Xeroform, gauze, tweeners, webril, ACE and Sling.  Please note, all sponge, needle, and instrument counts were correct prior to closure.  Loupe magnification was utilized.  The patient tolerated the procedure well.     I was present for the entire procedure., A qualified resident physician was not available., and A physician assistant was required during the procedure for retraction, tissue handling, dissection and suturing.     The aid of Sherri LANDEROS was required in the approach dissection and handling of soft tissues with retraction and with closure during this cubital tunnel release.  Her aide was  instrumental in protecting the medial antebrachial cutaneous branches  during the approach as well as a allowing  for adequate visualization through a minimally invasive incision      Patient Disposition:  PACU     SIGNATURE: Hair Pollock MD  DATE: 02/20/24  TIME: 1:37 PM

## 2024-02-20 NOTE — ANESTHESIA POSTPROCEDURE EVALUATION
Post-Op Assessment Note    CV Status:  Stable  Pain Score: 0    Pain management: adequate       Mental Status:  Alert and awake   Hydration Status:  Stable   PONV Controlled:  None   Airway Patency:  Patent  Two or more mitigation strategies used for obstructive sleep apnea   Post Op Vitals Reviewed: Yes    No anethesia notable event occurred.    Staff: CRNA               BP      Temp      Pulse     Resp      SpO2

## 2024-02-20 NOTE — ANESTHESIA PROCEDURE NOTES
Peripheral Block    Patient location during procedure: holding area  Start time: 2/20/2024 9:35 AM  Reason for block: at surgeon's request and post-op pain management  Staffing  Performed by: Herbert Zambrano MD  Authorized by: Herbert Zambrano MD    Preanesthetic Checklist  Completed: patient identified, IV checked, site marked, risks and benefits discussed, surgical consent, monitors and equipment checked, pre-op evaluation and timeout performed  Peripheral Block  Patient position: sitting  Prep: ChloraPrep  Patient monitoring: frequent blood pressure checks, continuous pulse oximetry and heart rate  Block type: Supraclavicular  Laterality: left  Injection technique: single-shot  Procedures: ultrasound guided, Ultrasound guidance required for the procedure to increase accuracy and safety of medication placement and decrease risk of complications.  Ultrasound permanent image savedbupivacaine (PF) (MARCAINE) 0.5 % injection 20 mL - Perineural   10 mL - 2/20/2024 9:35:00 AM  bupivacaine liposomal (EXPAREL) 1.3 % injection 20 mL - Perineural   10 mL - 2/20/2024 9:35:00 AM  Needle  Needle type: Stimuplex   Needle gauge: 20 G  Needle length: 4 in  Needle localization: anatomical landmarks and ultrasound guidance  Assessment  Injection assessment: incremental injection, frequent aspiration, injected with ease, negative aspiration, negative for heart rate change, no paresthesia on injection, no symptoms of intraneural/intravenous injection and needle tip visualized at all times  Paresthesia pain: none  Post-procedure:  site cleaned  patient tolerated the procedure well with no immediate complications

## 2024-02-20 NOTE — INTERVAL H&P NOTE
H&P reviewed. After examining the patient , new left long trigger finger, will proceed with left carpal tunnel release ( revision) , left cubital tunnel release, left INDEX AND LONG trigger finger releases. Consent updated    Vitals:    02/20/24 0846   BP: 148/80   Pulse: 57   Resp: 16   Temp: (!) 97 °F (36.1 °C)   SpO2: 95%

## 2024-02-20 NOTE — DISCHARGE INSTR - AVS FIRST PAGE
Post Operative Instructions    You have had surgery on your arm today, please read and follow the information below:  Elevate your hand above your elbow during the next 24-48 hours to help with swelling.  Place your hand and arm over your head with motion at your shoulder three times a day.  Do not apply any cream/ointment/oil to your incisions including antibiotics.  Do not soak your hands in standing water (dishwater, tubs, Jacuzzi's, pools, etc.) until given permission (typically 2-3 weeks after injury)    Call the office if you notice any:  Increased numbness or tingling of your hand or fingers that is not relieved with elevation.  Increasing pain that is not controlled with medication.  Difficulty chewing, breathing, swallowing.  Chest pains or shortness of breath.  Fever over 101.4 degrees.    Bandage: Please keep bandages clean and dry. Your therapist will remove your bandage at your first therapy appointment. Once bandages are removed you may wash hands with soap and water. Short showers are okay as well, but please avoid soaking the hand as described above (ie no pools, baths, dirty dish water, hot tubs, ocean/lake water, etc). Sutures will be removed in the office at your first follow up visit, please do not remove them yourself.    Please do NOT put any topical agents on the surgical wound including neosporin, peroxide, tea tree oil, vitamin E, etc. as these can delay wound healing.    Motion: Move fingers into a fist 5 times a day, DO NOT move any splinted fingers.    Weight bearing status: Avoid heavy lifting (>5 pounds) with the extremity that was operated on until follow up appointment. Normal activities of daily living are OK.    Ice: Ice for 10 minutes every hour as needed for swelling x 24 hours.    Sling: Please use your sling while your arm is numb from the block. When your arm is FULLY awake again, you no longer need this and may use your sling as needed for comfort. While using the sling, make  sure to move your shoulder throughout the day to prevent stiffness here.     Pain medication:   Naproxen 220 mg two times a day (this is over the counter!)  *do not take this medication if you were told by your doctor that you cannot take anti-inflammatories or NSAIDS  Tylenol Extended Release 650 mg every 8 hours (this is over the counter!)   Norco/Hydrocodone one tab every 6 hours ONLY AS NEEDED for severe pain        Follow-up Appointment: 10-14 days with Dr Pollock    PLEASE CALL OCCUPATIONAL THERAPY OFFICE TO BEGIN THERAPY 5 DAYS AFTER SURGERY! [facility] office number is [phone], other locations and contact info can be found online.     Please call the office if you have any questions or concerns regarding your post-operative care.

## 2024-02-22 PROCEDURE — 88304 TISSUE EXAM BY PATHOLOGIST: CPT | Performed by: PATHOLOGY

## 2024-02-26 ENCOUNTER — EVALUATION (OUTPATIENT)
Dept: OCCUPATIONAL THERAPY | Facility: CLINIC | Age: 74
End: 2024-02-26
Payer: COMMERCIAL

## 2024-02-26 DIAGNOSIS — G56.02 CARPAL TUNNEL SYNDROME ON LEFT: ICD-10-CM

## 2024-02-26 DIAGNOSIS — Z47.89 AFTERCARE FOLLOWING SURGERY OF THE MUSCULOSKELETAL SYSTEM: Primary | ICD-10-CM

## 2024-02-26 DIAGNOSIS — M65.322 TRIGGER FINGER, LEFT INDEX FINGER: ICD-10-CM

## 2024-02-26 DIAGNOSIS — G56.22 CUBITAL TUNNEL SYNDROME ON LEFT: ICD-10-CM

## 2024-02-26 DIAGNOSIS — M65.332 TRIGGER MIDDLE FINGER OF LEFT HAND: ICD-10-CM

## 2024-02-26 PROCEDURE — 97165 OT EVAL LOW COMPLEX 30 MIN: CPT

## 2024-02-26 NOTE — PROGRESS NOTES
OT Evaluation     Today's date: 2024  Patient name: Amando Mace  : 1950  MRN: [id number]  Referring provider: Hair Pollock MD  Dx:   Encounter Diagnosis     ICD-10-CM    1. Carpal tunnel syndrome on left  G56.02 Ambulatory Referral to PT/OT Hand Therapy      2. Cubital tunnel syndrome on left  G56.22 Ambulatory Referral to PT/OT Hand Therapy      3. Trigger finger, left index finger  M65.322 Ambulatory Referral to PT/OT Hand Therapy      4. Trigger middle finger of left hand  M65.332 Ambulatory Referral to PT/OT Hand Therapy      5. Aftercare following surgery of the musculoskeletal system  Z47.89 Ambulatory Referral to PT/OT Hand Therapy                     Assessment  Assessment details: Patient presents s/p Trigger finger release of index w/ FDS Radial slip hemitenectomy, carpal tunnel release, and cubital tunnel release. Patient surgery took place on 24. Patient symptoms have been present for a few years prior to surgery. Patient has a follow up visit with orthopedics on 3/06/24. OT removed surgical bandages on this date and inspected incisions. Patient surgical incisions appear to be healing well with no sign of infection, sutures are intact. Therapist reapplied bandages to the IF prior to them leaving with the instructions to wrap the digit prior to sleeping but otherwise to leave it open to allow for movement. Patient reports no numbness and tingling in the digits. Patient presents with wrist ROM WNL. Digit ROM is decreased in the affected finger as anticipated. Elbow ROM is WNL. /pinch strength deferred as per protocol and healing timeline. Edema is present in the affected digit as anticipated. Patient was provided a custom HEP and instructed on how to complete it. See below for a more detailed assessment.   Impairments: abnormal coordination, abnormal or restricted ROM, activity intolerance, impaired physical strength and pain with function    Symptom irritability:  lowUnderstanding of Dx/Px/POC: good   Prognosis: good    Goals  STGs:    Patient will increase digit AROM in all joints by >20 degrees in 4 weeks    Patient will report decreased pain during functional movement by 1-2 grades in 4 weeks    Patient will report decreased numbness and tingling in digits 1-3 in 4 weeks    Patient will display appropriately healed incisions with sutures removed at follow up with orthopedics     Patient will demonstrate an understanding of HEP by end of initial session    LTGs:    Patient will display digit ROM WFL without triggering in 8 weeks or discharge    Patient will report the resolution of numbness and tingling in digits 1-3 in 8 weeks or discharge    Patient will report resolution of pain symptoms during functional movement in 8 weeks or discharge          Plan  Plan details: Patient presenting to OP OT services s/p Trigger finger release of index w/ FDS Radial slip hemitenectomy, carpal tunnel release, and cubital tunnel release. Patient would benefit from skilled occupational therapy services 2 times per week for 8 weeks to return to prior level of function and achieve all of their established goals. Thank you for the referral.   Patient would benefit from: custom splinting, skilled occupational therapy and OT eval  Referral necessary: No  Planned modality interventions: ultrasound, thermotherapy: hydrocollator packs and unattended electrical stimulation  Planned therapy interventions: IADL retraining, patient education, self care, manual therapy, orthotic fitting/training, strengthening, stretching, therapeutic activities, therapeutic exercise, home exercise program, functional ROM exercises and fine motor coordination training  Frequency: 2x week  Duration in visits: 10  Plan of Care beginning date: 2/26/2024  Plan of Care expiration date: 4/26/2024  Treatment plan discussed with: patient        Subjective Evaluation    History of Present Illness  Date of surgery:  "2024  Mechanism of injury: surgery  Mechanism of injury: Surgery: Trigger finger release of index w/ FDS Radial slip hemitenectomy, carpal tunnel release, and cubital tunnel release.    Subjective:  \"It feels pretty good considering\". \"I haven't had much pain\". \"It feels a little stiff\". \"It only hurts if I put pressure on it\". \"The palm of the hand and the incisions are tender\".           Recurrent probem    Quality of life: good    Patient Goals  Patient goals for therapy: decreased pain, decreased edema, increased motion, increased strength and independence with ADLs/IADLs    Pain  Current pain ratin  At best pain ratin  At worst pain ratin  Location: Volar hand, incsions, elbow  Quality: dull ache and pressure  Relieving factors: rest, relaxation and support  Exacerbated by: Bumping it, applying pressure, general AROM.  Progression: improved    Social Support    Employment status: not working  Hand dominance: right    Treatments  Previous treatment: immobilization  Current treatment: occupational therapy        Objective     Observations     Left Wrist/Hand   Positive for edema and incision.     Additional Observation Details  Incisions at index finger from A1-to distal to DIP joint, MF A1 pulley, carpal tunnel, and cubital tunnel  All incisions are healing well with no sign of infection, sutures are intact    Neurological Testing     Sensation     Wrist/Hand   Left   Intact: light touch    Right   Intact: light touch    Active Range of Motion     Left Elbow   Normal active range of motion    Right Elbow   Normal active range of motion    Left Wrist   Wrist flexion: 60 degrees   Wrist extension: 55 degrees   Radial deviation: 15 degrees   Ulnar deviation: 25 degrees     Right Wrist   Wrist flexion: 60 degrees   Wrist extension: 55 degrees   Radial deviation: 15 degrees   Ulnar deviation: 25 degrees     Left Thumb     Opposition: Opposition to Lateral aspect of 5th digit    Right Thumb "   Opposition: Full Opposition    Left Digits    Flexion   Index     MCP: 58    PIP: 40    DIP: 20  Middle     MCP: 60    PIP: 86    DIP: 66    Additional Active Range of Motion Details  R: Full composite fist  B/L Full digit Extension    Strength/Myotome Testing     Additional Strength Details  Deferred secondary to healing timeline and surgical protocol    Swelling     Left Wrist/Hand   Circumference MCP: 21.1 cm  Circumference wrist: 17.6 cm    Right Wrist/Hand   Circumference MCP: 22 cm  Circumference wrist: 17.7 cm             Precautions: Status Post Surgery   DOS: 2/20/24  Early Motion Protocol- No strengthening   Carpal tunnel syndrome on left  Cubital tunnel syndrome on left   Trigger finger, left index finger w/ a FDS Radial slip hemitenectomy       Manuals 2/26            STM (once sutures are removed)             Scar Massage (Once sutures are removed)                                       Neuro Re-Ed             MNG             NANCY                                                                              Ther Ex             HEP Digit AROM    Wrist CHRISTIANNE    Elbow AROM    MNG/NANCY            Digit AROM             Wrist AROM             Elbow AROM             Shoulder pulley             Med ball rolls             Dex balls             Digit Blocking             Wrist Maze             TB on wall             Hook fist pegs             Pencil grasps                          Ther Activity             Keypegs                                                                 Modalities             MHP (once sutures are closed)

## 2024-02-29 ENCOUNTER — OFFICE VISIT (OUTPATIENT)
Dept: OCCUPATIONAL THERAPY | Facility: CLINIC | Age: 74
End: 2024-02-29
Payer: COMMERCIAL

## 2024-02-29 DIAGNOSIS — M65.322 TRIGGER FINGER, LEFT INDEX FINGER: ICD-10-CM

## 2024-02-29 DIAGNOSIS — M65.332 TRIGGER MIDDLE FINGER OF LEFT HAND: ICD-10-CM

## 2024-02-29 DIAGNOSIS — G56.02 CARPAL TUNNEL SYNDROME ON LEFT: Primary | ICD-10-CM

## 2024-02-29 DIAGNOSIS — G56.22 CUBITAL TUNNEL SYNDROME ON LEFT: ICD-10-CM

## 2024-02-29 PROCEDURE — 97110 THERAPEUTIC EXERCISES: CPT

## 2024-02-29 PROCEDURE — 97530 THERAPEUTIC ACTIVITIES: CPT

## 2024-02-29 NOTE — PROGRESS NOTES
"Daily Note     Today's date: 2024  Patient name: Amadno Mace  : 1950  MRN: [id number]  Referring provider: Hair Pollock MD  Dx:   Encounter Diagnosis     ICD-10-CM    1. Carpal tunnel syndrome on left  G56.02       2. Cubital tunnel syndrome on left  G56.22       3. Trigger finger, left index finger  M65.322       4. Trigger middle finger of left hand  M65.332                      Subjective: \"It feels like it is moving better\".       Objective: See treatment diary below  2nd Digit PRE:  MCP: 56  PIP: 58  DIP:30    2nd Digit Post:  MCP: 70  PIP: 78  DIP: 46    Assessment: Tolerated treatment well. Patient digit AROM was increased at start of session compared to initial evaluation. Patient ROM significantly improved following therapeutic activities and AROM. Patient reports no drainage or signs of infection.       Plan: Continue per plan of care.  Progress treatment as tolerated.       Precautions: Status Post Surgery   DOS: 24  Early Motion Protocol- No strengthening   Carpal tunnel syndrome on left  Cubital tunnel syndrome on left   Trigger finger, left index finger w/ a FDS Radial slip hemitenectomy       Manuals            STM (once sutures are removed)  held           Scar Massage (Once sutures are removed)  held                                     Neuro Re-Ed                                                                                                        Ther Ex             HEP Digit AROM    Wrist CHRISTIANNE    Elbow AROM    MNG/NANCY            Digit AROM             Wrist AROM  X20 w/ dowel           Elbow AROM             Shoulder pulley             Med ball rolls  2'           Dex balls  2'           Digit Blocking  x20           Wrist Maze  x5           TB on wall             Hook fist pegs  X5 orange pegs down and back           Pencil grasps  X2 stacks                        Ther Activity             Keypegs  x1                                                           "     Modalities             MHP (once sutures are closed)

## 2024-03-05 ENCOUNTER — OFFICE VISIT (OUTPATIENT)
Dept: OCCUPATIONAL THERAPY | Facility: CLINIC | Age: 74
End: 2024-03-05
Payer: COMMERCIAL

## 2024-03-05 DIAGNOSIS — M65.332 TRIGGER MIDDLE FINGER OF LEFT HAND: ICD-10-CM

## 2024-03-05 DIAGNOSIS — M65.322 TRIGGER FINGER, LEFT INDEX FINGER: ICD-10-CM

## 2024-03-05 DIAGNOSIS — G56.02 CARPAL TUNNEL SYNDROME ON LEFT: Primary | ICD-10-CM

## 2024-03-05 DIAGNOSIS — G56.22 CUBITAL TUNNEL SYNDROME ON LEFT: ICD-10-CM

## 2024-03-05 PROCEDURE — 97110 THERAPEUTIC EXERCISES: CPT | Performed by: OCCUPATIONAL THERAPIST

## 2024-03-05 NOTE — PROGRESS NOTES
"Daily Note     Today's date: 3/5/2024  Patient name: Amando Mace  : 1950  MRN: [id number]  Referring provider: Hair Pollock MD  Dx:   Encounter Diagnosis     ICD-10-CM    1. Carpal tunnel syndrome on left  G56.02       2. Cubital tunnel syndrome on left  G56.22       3. Trigger finger, left index finger  M65.322       4. Trigger middle finger of left hand  M65.332                        Subjective: \"the swelling is getting better, it is just in that finger\"      Objective: See treatment diary below  IF flexion:  MP=52  PIP=70  DIP=50      Assessment: Tolerated treatment well. Incisions healing well with no dehiscence. Progressively improving ROM.       Plan: Continue per plan of care.  Progress treatment as tolerated.       Precautions: Status Post Surgery   DOS: 24  Early Motion Protocol- No strengthening   Carpal tunnel syndrome on left  Cubital tunnel syndrome on left   Trigger finger, left index finger w/ a FDS Radial slip hemitenectomy       Manuals  3          STM (once sutures are removed)  held           Scar Massage (Once sutures are removed)  held           MEM   5'                       Neuro Re-Ed                                                                                                        Ther Ex             HEP Digit AROM    Wrist CHRISTIANNE    Elbow AROM    MNG/NANCY            Digit AAROM   5'          Wrist AROM  X20 w/ dowel           Elbow AROM             Shoulder pulley             Med ball rolls  2' x20          Dex balls  2' 2'          Digit Blocking  x20 x20          Wrist Maze  x5 X5 small handle          TB on wall             Hook fist pegs  X5 orange pegs down and back           Pencil grasps  X2 stacks x2          Wall walking    TB 5x          Ther Activity             Keypegs  x1 x1                                                              Modalities             MHP (once sutures are closed)                               "

## 2024-03-06 ENCOUNTER — TELEPHONE (OUTPATIENT)
Dept: OBGYN CLINIC | Facility: CLINIC | Age: 74
End: 2024-03-06

## 2024-03-06 ENCOUNTER — OFFICE VISIT (OUTPATIENT)
Dept: OBGYN CLINIC | Facility: CLINIC | Age: 74
End: 2024-03-06

## 2024-03-06 VITALS — WEIGHT: 189 LBS | HEIGHT: 68 IN | BODY MASS INDEX: 28.64 KG/M2

## 2024-03-06 DIAGNOSIS — Z98.890 S/P TRIGGER FINGER RELEASE: Primary | ICD-10-CM

## 2024-03-06 PROCEDURE — 99024 POSTOP FOLLOW-UP VISIT: CPT | Performed by: SURGERY

## 2024-03-06 NOTE — PROGRESS NOTES
Assessment/Plan:  Patient ID: Amando Mace 73 y.o. male   Surgery: Release Cubital Tunnel, Fds Radial Slip Hemitenectomy of the left index finger - Left, Release Carpal Tunnel Revision - Left, and Release Trigger Finger Left Index, Release Trigger Finger Left Long - Left  Date of Surgery: 2/20/2024    15 days s/p above surgery. Sutures were removed today. He may shower normally, continuing to avoid standing water until next week to allow suture holes to close. He may start scar massage, advised to wait on using the Vit. E oil and co-co butter. He will start OT for edema control and ROM exercises, script was provided. A left thumb CMC CSI may be performed at his next visit as needed as well as repeat right index and long finger trigger finger CSI's if needed.      Follow Up:  3  week(s)    To Do Next Visit:  Re-evaluation       CHIEF COMPLAINT:  Chief Complaint   Patient presents with    Post-op     Carpal, cubital, trigger 2/20/24 here for suture removals          SUBJECTIVE:  Amando Mace is a 73 y.o. year old male who presents for follow up after Release Cubital Tunnel, Fds Radial Slip Hemitenectomy - Left, Release Carpal Tunnel Revision - Left, and Release Trigger Finger Left Index, Release Trigger Finger Left Long - Left. Today patient has Stiffness/LROM to the index finger. He notes numbness and tingling has resolved except for some residual numbness to his index finger. He is attending OT.         PHYSICAL EXAMINATION:  General: well developed and well nourished, alert, oriented times 3, and appears comfortable  Psychiatric: Normal    MUSCULOSKELETAL EXAMINATION:  Incision: clean, dry, and suture(s) intact   Surgery Site: normal, no evidence of infection   Range of Motion: As expected and Limited due to stiffness  Neurovascular status: Neuro intact, good cap refill  Activity Restrictions:  avoid standing water until next week to allow suture holes to close   Done today: Sutures out      STUDIES  REVIEWED:  No Studies to review      PROCEDURES PERFORMED:  Procedures  No Procedures performed today    Scribe Attestation      I,:  Vero Pearson am acting as a scribe while in the presence of the attending physician.:       I,:  Hair Pollock MD personally performed the services described in this documentation    as scribed in my presence.:

## 2024-03-06 NOTE — TELEPHONE ENCOUNTER
Please schedule with Dr. Pollock in 3 weeks.    Notes:  PO LT CARPAL/CUBITAL/TRIGGER FINGER  (SX 02/20 - 05/20)

## 2024-03-08 ENCOUNTER — OFFICE VISIT (OUTPATIENT)
Dept: OCCUPATIONAL THERAPY | Facility: CLINIC | Age: 74
End: 2024-03-08
Payer: COMMERCIAL

## 2024-03-08 DIAGNOSIS — Z98.890 S/P TRIGGER FINGER RELEASE: ICD-10-CM

## 2024-03-08 DIAGNOSIS — Z47.89 AFTERCARE FOLLOWING SURGERY OF THE MUSCULOSKELETAL SYSTEM: Primary | ICD-10-CM

## 2024-03-08 PROCEDURE — 97140 MANUAL THERAPY 1/> REGIONS: CPT

## 2024-03-08 PROCEDURE — 97110 THERAPEUTIC EXERCISES: CPT

## 2024-03-08 NOTE — PROGRESS NOTES
"Daily Note     Today's date: 3/8/2024  Patient name: Amando Mace  : 1950  MRN: [id number]  Referring provider: Hair Pollock MD  Dx:   Encounter Diagnosis     ICD-10-CM    1. Aftercare following surgery of the musculoskeletal system  Z47.89       2. S/P trigger finger release  Z98.890 Ambulatory Referral to PT/OT Hand Therapy                       Subjective: \"The sutures are out and it feels much better\".      Objective: See treatment diary below        Assessment: Tolerated treatment well. Sutures removed at this time. Patient surgical incisions are closed. Began gentle scar massage. Digit ROM is improving. Instructed patient on coban wrapping to decrease edema.      Plan: Continue per plan of care.  Progress treatment as tolerated.       Precautions: Status Post Surgery   DOS: 24  Early Motion Protocol- No strengthening   Carpal tunnel syndrome on left  Cubital tunnel syndrome on left   Trigger finger, left index finger w/ a FDS Radial slip hemitenectomy       Manuals 2/26 2/29 3/5 3/8         STM (once sutures are removed)  held  4'         Scar Massage (Once sutures are removed)  held  4'         MEM   5'                       Neuro Re-Ed                                                                                                        Ther Ex             HEP Digit AROM    Wrist CHRISTIANNE    Elbow AROM    MNG/NANCY            Digit AAROM   5' 5'         Wrist AROM  X20 w/ dowel           Elbow AROM             Shoulder pulley             Med ball rolls  2' x20 x20         Dex balls  2' 2' 2'         Digit Blocking  x20 x20 x20         Wrist Maze  x5 X5 small handle X5 small handle         TB on wall             Hook fist pegs  X5 orange pegs down and back  X5 orange pegs down and back         Pencil grasps  X2 stacks x2 x2         Wall walking    TB 5x Tb 5x         Ther Activity             Keypegs  x1 x1 x1                                                             Modalities           "   MHP (once sutures are closed)    5'

## 2024-03-11 ENCOUNTER — OFFICE VISIT (OUTPATIENT)
Dept: OCCUPATIONAL THERAPY | Facility: CLINIC | Age: 74
End: 2024-03-11
Payer: COMMERCIAL

## 2024-03-11 DIAGNOSIS — M65.332 TRIGGER MIDDLE FINGER OF LEFT HAND: ICD-10-CM

## 2024-03-11 DIAGNOSIS — M65.322 TRIGGER FINGER, LEFT INDEX FINGER: Primary | ICD-10-CM

## 2024-03-11 PROCEDURE — 97140 MANUAL THERAPY 1/> REGIONS: CPT

## 2024-03-11 PROCEDURE — 97110 THERAPEUTIC EXERCISES: CPT

## 2024-03-11 NOTE — PROGRESS NOTES
"Daily Note     Today's date: 3/11/2024  Patient name: Amando Mace  : 1950  MRN: [id number]  Referring provider: Hair Pollock MD  Dx:   Encounter Diagnosis     ICD-10-CM    1. Trigger finger, left index finger  M65.322       2. Trigger middle finger of left hand  M65.332                        Subjective: \"It feels better besides the swelling\".      Objective: See treatment diary below  1389-6011 MHP  3938-5392 Manual  4562-5794 unsup  6094-2180 13 TE'  [id number] unsup      Assessment: Tolerated treatment well. Patient incisions appear to be healing very well. Began gentle PROM on this date. Edema continues to be the biggest limitation.       Plan: Continue per plan of care.  Progress treatment as tolerated.       Precautions: Status Post Surgery   DOS: 24  Early Motion Protocol- No strengthening   Carpal tunnel syndrome on left  Cubital tunnel syndrome on left   Trigger finger, left index finger w/ a FDS Radial slip hemitenectomy       Manuals 2/26 2/29 3/5 3/8 3/11        STM (once sutures are removed)  held  4' 4'        Scar Massage (Once sutures are removed)  held  4' 4'        MEM   5'  2'                     Neuro Re-Ed                                                                                                        Ther Ex             HEP Digit AROM    Wrist CHRISTIANNE    Elbow AROM    MNG/NANCY            Digit AAROM   5' 5' Gentle PROM 5'        Wrist AROM  X20 w/ dowel           Elbow AROM             Shoulder pulley             Med ball rolls  2' x20 x20 x20        Dex balls  2' 2' 2' 2'        Digit Blocking  x20 x20 x20 X30        Wrist Maze  x5 X5 small handle X5 small handle X5 small handle        TB on wall     x5        Hook fist pegs  X5 orange pegs down and back  X5 orange pegs down and back X5 orange pegs down and cailin        Pencil grasps  X2 stacks x2 x2 x2        Wall walking    TB 5x Tb 5x TB 5x        Ther Activity             Keypegs  x1 x1 x1 x1                          "                                   Modalities             MHP (once sutures are closed)    5' 5'

## 2024-03-13 ENCOUNTER — OFFICE VISIT (OUTPATIENT)
Dept: OCCUPATIONAL THERAPY | Facility: CLINIC | Age: 74
End: 2024-03-13
Payer: COMMERCIAL

## 2024-03-13 DIAGNOSIS — G56.02 CARPAL TUNNEL SYNDROME ON LEFT: Primary | ICD-10-CM

## 2024-03-13 PROCEDURE — 97110 THERAPEUTIC EXERCISES: CPT

## 2024-03-13 PROCEDURE — 97140 MANUAL THERAPY 1/> REGIONS: CPT

## 2024-03-13 NOTE — PROGRESS NOTES
"Daily Note     Today's date: 3/13/2024  Patient name: Amando Mace  : 1950  MRN: [id number]  Referring provider: Hair Pollock MD  Dx:   Encounter Diagnosis     ICD-10-CM    1. Carpal tunnel syndrome on left  G56.02                        Subjective: \"It feels better besides the swelling\".      Objective: See treatment diary below  IF:  MCP: 68  PIP: 76  DIP: 56      Assessment: Tolerated treatment well. Patient incisions continue to heal very well. Minimal scar tissue formation. Continued to address edema in the digit with MEM and coban wrapping at end of session. Patient ROM is improving in all joints of the affected digit.       Plan: Continue per plan of care.  Progress treatment as tolerated.       Precautions: Status Post Surgery   DOS: 24  Early Motion Protocol- No strengthening   Carpal tunnel syndrome on left  Cubital tunnel syndrome on left   Trigger finger, left index finger w/ a FDS Radial slip hemitenectomy       Manuals 2/26 2/29 3/5 3/8 3/11 3/13       STM (once sutures are removed)  held  4' 4' 4'       Scar Massage (Once sutures are removed)  held  4' 4' 4'       MEM   5'  2' 2'                    Neuro Re-Ed                                                                                                        Ther Ex             HEP Digit AROM    Wrist CHRISTIANNE    Elbow AROM    MNG/NANCY            Digit AAROM   5' 5' Gentle PROM 5'        Wrist AROM  X20 w/ dowel           Elbow AROM             Shoulder pulley             Med ball rolls  2' x20 x20 x20 x20       Dex balls  2' 2' 2' 2' 2'       Digit Blocking  x20 x20 x20 X30 x30       Wrist Maze  x5 X5 small handle X5 small handle X5 small handle x5       TB on wall     x5 x5       Hook fist pegs  X5 orange pegs down and back  X5 orange pegs down and back X5 orange pegs down and cailin x5       Pencil grasps  X2 stacks x2 x2 x2 x2       Wall walking    TB 5x Tb 5x TB 5x TB 5x       Ther Activity             Keypegs  x1 x1 x1 x1 x1    "                                                        Modalities             MHP (once sutures are closed)    5' 5' 5'

## 2024-03-14 ENCOUNTER — TELEPHONE (OUTPATIENT)
Age: 74
End: 2024-03-14

## 2024-03-14 NOTE — TELEPHONE ENCOUNTER
03/14/24  Screened by: Tamara Jiménez    Referring Provider     Pre- Screening:     There is no height or weight on file to calculate BMI.  Has patient been referred for a routine screening Colonoscopy? yes  Is the patient between 45-75 years old? yes      Previous Colonoscopy yes   If yes:    Date: 2021 Dr North    Facility:     Reason:       Does the patient want to see a Gastroenterologist prior to their procedure OR are they having any GI symptoms? no    Has the patient been hospitalized or had abdominal surgery in the past 6 months? no    Does the patient use supplemental oxygen? no    Does the patient take Coumadin, Lovenox, Plavix, Elliquis, Xarelto, or other blood thinning medication? no    Has the patient had a stroke, cardiac event, or stent placed in the past year? no    If patient is between 45yrs - 49yrs, please advise patient that we will have to confirm benefits & coverage with their insurance company for a routine screening colonoscopy.

## 2024-03-14 NOTE — TELEPHONE ENCOUNTER
Scheduled date of colonoscopy (as of today):05/24/2024  Physician performing colonoscopy: Dr North  Location of colonoscopy: [facility]  Bowel prep reviewed with patient: [email or web address]  Instructions reviewed with patient by:sent via my email  Clearances: n/a

## 2024-03-18 ENCOUNTER — OFFICE VISIT (OUTPATIENT)
Dept: OCCUPATIONAL THERAPY | Facility: CLINIC | Age: 74
End: 2024-03-18
Payer: COMMERCIAL

## 2024-03-18 DIAGNOSIS — Z47.89 AFTERCARE FOLLOWING SURGERY OF THE MUSCULOSKELETAL SYSTEM: Primary | ICD-10-CM

## 2024-03-18 PROCEDURE — 97110 THERAPEUTIC EXERCISES: CPT

## 2024-03-18 PROCEDURE — 97140 MANUAL THERAPY 1/> REGIONS: CPT

## 2024-03-18 NOTE — PROGRESS NOTES
"Daily Note     Today's date: 3/18/2024  Patient name: Amando Mace  : 1950  MRN: [id number]  Referring provider: Hair Pollock MD  Dx:   Encounter Diagnosis     ICD-10-CM    1. Aftercare following surgery of the musculoskeletal system  Z47.89                        Subjective: \"It feels better besides the swelling\".      Objective: See treatment diary below  IF:  MCP: 80  PIP: 80  DIP: 56    Edema:  9  6.9  6.4    Assessment: Tolerated treatment well. Edema is decreasing each session. Scar tissue maintains at a minimum. Patient ROM is improving each session with the biggest limitation at this time being edema.      Plan: Continue per plan of care.  Progress treatment as tolerated.       Precautions: Status Post Surgery   DOS: 24  Early Motion Protocol- No strengthening   Carpal tunnel syndrome on left  Cubital tunnel syndrome on left   Trigger finger, left index finger w/ a FDS Radial slip hemitenectomy       Manuals 2/26 2/29 3/5 3/8 3/11 3/13 3/18      STM (once sutures are removed)  held  4' 4' 4' 4'      Scar Massage (Once sutures are removed)  held  4' 4' 4' 4'      MEM   5'  2' 2' 2'                   Neuro Re-Ed                                                                                                        Ther Ex             HEP Digit AROM    Wrist CHRISTIANNE    Elbow AROM    MNG/NANCY            Digit AAROM   5' 5' Gentle PROM 5'  Gentle PROM 8'      Wrist AROM  X20 w/ dowel           Elbow AROM             Shoulder pulley             Med ball rolls  2' x20 x20 x20 x20 2'      Dex balls  2' 2' 2' 2' 2' 2'      Digit Blocking  x20 x20 x20 X30 x30 x30      Wrist Maze  x5 X5 small handle X5 small handle X5 small handle x5 x5      TB on wall     x5 x5 x5      Hook fist pegs  X5 orange pegs down and back  X5 orange pegs down and back X5 orange pegs down and cailin x5 x5      Pencil grasps  X2 stacks x2 x2 x2 x2 x2      Wall walking    TB 5x Tb 5x TB 5x TB 5x Tb x5      Ther Activity           "   Keypegs  x1 x1 x1 x1 x1 x1                                                          Modalities             MHP (once sutures are closed)    5' 5' 5' 5'

## 2024-03-20 ENCOUNTER — OFFICE VISIT (OUTPATIENT)
Dept: OCCUPATIONAL THERAPY | Facility: CLINIC | Age: 74
End: 2024-03-20
Payer: COMMERCIAL

## 2024-03-20 DIAGNOSIS — Z47.89 AFTERCARE FOLLOWING SURGERY OF THE MUSCULOSKELETAL SYSTEM: ICD-10-CM

## 2024-03-20 DIAGNOSIS — G56.02 CARPAL TUNNEL SYNDROME ON LEFT: ICD-10-CM

## 2024-03-20 DIAGNOSIS — M65.322 TRIGGER FINGER, LEFT INDEX FINGER: ICD-10-CM

## 2024-03-20 DIAGNOSIS — Z98.890 S/P TRIGGER FINGER RELEASE: ICD-10-CM

## 2024-03-20 DIAGNOSIS — M65.332 TRIGGER MIDDLE FINGER OF LEFT HAND: Primary | ICD-10-CM

## 2024-03-20 DIAGNOSIS — G56.22 CUBITAL TUNNEL SYNDROME ON LEFT: ICD-10-CM

## 2024-03-20 PROCEDURE — 97140 MANUAL THERAPY 1/> REGIONS: CPT | Performed by: OCCUPATIONAL THERAPIST

## 2024-03-20 PROCEDURE — 97110 THERAPEUTIC EXERCISES: CPT | Performed by: OCCUPATIONAL THERAPIST

## 2024-03-20 NOTE — PROGRESS NOTES
"Daily Note     Today's date: 3/20/2024  Patient name: Amando Mace  : 1950  MRN: [id number]  Referring provider: Hair Pollock MD  Dx:   Encounter Diagnosis     ICD-10-CM    1. Trigger middle finger of left hand  M65.332       2. Trigger finger, left index finger  M65.322       3. Carpal tunnel syndrome on left  G56.02       4. S/P trigger finger release  Z98.890       5. Cubital tunnel syndrome on left  G56.22       6. Aftercare following surgery of the musculoskeletal system  Z47.89                          Subjective: \"its better but its slow\"      Objective: See treatment diary below      Assessment: Tolerated treatment well. Utilized gentle resistance to encourage tendon gliding in the IF. Passively the digit is able to fully flex, and actively he is limited by residual edema and soft tissue tightness including scar tissue. Improves with place and holds.       Plan: Continue per plan of care.  Progress treatment as tolerated.       Precautions: Status Post Surgery   DOS: 24  Early Motion Protocol- No strengthening   Carpal tunnel syndrome on left  Cubital tunnel syndrome on left   Trigger finger, left index finger w/ a FDS Radial slip hemitenectomy       Manuals 2/26 2/29 3/5 3/8 3/11 3/13 3/18 3/20     STM (once sutures are removed)  held  4' 4' 4' 4' 8'     Scar Massage (Once sutures are removed)  held  4' 4' 4' 4'      MEM   5'  2' 2' 2'                   Neuro Re-Ed                                                                                                        Ther Ex             HEP Digit AROM    Wrist CHRISTIANNE    Elbow AROM    MNG/NANCY            Digit AAROM   5' 5' Gentle PROM 5'  Gentle PROM 8' Gentle PROM 8'     Wrist AROM  X20 w/ dowel           Elbow AROM             Shoulder pulley             Med ball rolls  2' x20 x20 x20 x20 2'      Dex balls  2' 2' 2' 2' 2' 2'      Digit Blocking  x20 x20 x20 X30 x30 x30      Wrist Maze  x5 X5 small handle X5 small handle X5 small handle " x5 x5      TB on wall     x5 x5 x5      Hook fist pegs  X5 orange pegs down and back  X5 orange pegs down and back X5 orange pegs down and cailin x5 x5 x5     Pencil grasps  X2 stacks x2 x2 x2 x2 x2      Wall walking    TB 5x Tb 5x TB 5x TB 5x Tb x5 Green 5x     Place and holds         5x     Digiflex        Red isolated 10x     Extension web        Yellow 30x     Ther Activity             Keypegs  x1 x1 x1 x1 x1 x1 X1 translation                                                          Modalities             MHP (once sutures are closed)    5' 5' 5' 5' 5'

## 2024-03-25 ENCOUNTER — OFFICE VISIT (OUTPATIENT)
Dept: OBGYN CLINIC | Facility: HOSPITAL | Age: 74
End: 2024-03-25
Payer: COMMERCIAL

## 2024-03-25 VITALS — BODY MASS INDEX: 28.79 KG/M2 | HEIGHT: 68 IN | WEIGHT: 190 LBS

## 2024-03-25 DIAGNOSIS — Z98.890 S/P TRIGGER FINGER RELEASE: ICD-10-CM

## 2024-03-25 DIAGNOSIS — M18.12 ARTHRITIS OF CARPOMETACARPAL (CMC) JOINT OF LEFT THUMB: Primary | ICD-10-CM

## 2024-03-25 DIAGNOSIS — M65.321 TRIGGER FINGER, RIGHT INDEX FINGER: ICD-10-CM

## 2024-03-25 DIAGNOSIS — M65.342 TRIGGER FINGER, LEFT RING FINGER: ICD-10-CM

## 2024-03-25 DIAGNOSIS — M65.322 TRIGGER FINGER, LEFT INDEX FINGER: ICD-10-CM

## 2024-03-25 PROBLEM — G56.22 CUBITAL TUNNEL SYNDROME ON LEFT: Status: RESOLVED | Noted: 2024-02-20 | Resolved: 2024-03-25

## 2024-03-25 PROBLEM — M65.332 TRIGGER MIDDLE FINGER OF LEFT HAND: Status: RESOLVED | Noted: 2019-05-08 | Resolved: 2024-03-25

## 2024-03-25 PROBLEM — G56.02 CARPAL TUNNEL SYNDROME ON LEFT: Status: RESOLVED | Noted: 2024-02-20 | Resolved: 2024-03-25

## 2024-03-25 PROCEDURE — 99024 POSTOP FOLLOW-UP VISIT: CPT | Performed by: SURGERY

## 2024-03-25 PROCEDURE — 20600 DRAIN/INJ JOINT/BURSA W/O US: CPT | Performed by: SURGERY

## 2024-03-25 RX ADMIN — TRIAMCINOLONE ACETONIDE 20 MG: 40 INJECTION, SUSPENSION INTRA-ARTICULAR; INTRAMUSCULAR at 12:45

## 2024-03-25 RX ADMIN — BUPIVACAINE HYDROCHLORIDE 0.5 ML: 2.5 INJECTION, SOLUTION INFILTRATION; PERINEURAL at 12:45

## 2024-03-25 NOTE — PROGRESS NOTES
Assessment/Plan:  Patient ID: Amando Mace 73 y.o. male   Surgery: Release Cubital Tunnel, Fds Radial Slip Hemitenectomy - Left, Release Carpal Tunnel Revision - Left, and Release Trigger Finger Left Index, Release Trigger Finger Left Long - Left  Date of Surgery: 2/20/2024    Patient is doing well in regards to the trigger finger. Continue therapy for scar modalities and motion   Left thumb CMC is painful today, last injection was done in June. Due to acute exacerbation of chronic CMC arthritis decision was made to proceed with repeat left CMC injection today, this was tolerated well  Right index A1 is painful at times but has stopped clicking since the injection. Left ring was clicking initially after surgery but has improved     Follow Up:  6  week(s)    To Do Next Visit:         CHIEF COMPLAINT:  Chief Complaint   Patient presents with    Follow-up     Follow up from surgery on the left hand.         SUBJECTIVE:  Amando Mace is a 73 y.o. year old male who presents for follow up after Release Cubital Tunnel, Fds Radial Slip Hemitenectomy - Left, Release Carpal Tunnel Revision - Left, and Release Trigger Finger Left Index, Release Trigger Finger Left Long - Left. Today patient has some mild CMC pain on the left but notes the index finger is coming along slowly. He has been in therapy and doing well. He feels the scar tissue is limiting him more than anything. Also notes some generalized stiffness and aches in the hands with the changes in weather.       PHYSICAL EXAMINATION:  General: well developed and well nourished, alert, oriented times 3, and appears comfortable  Psychiatric: Normal    MUSCULOSKELETAL EXAMINATION:  Incision: Clean, dry, intact and healed  Surgery Site: normal, no evidence of infection   Range of Motion:  able to make composite fist, index still lacking a few degrees of motion 2/2 scar tissue   Neurovascular status: Neuro intact, good cap refill  Activity Restrictions: No restrictions    "        STUDIES REVIEWED:  No new studies to review       LABS REVIEWED:    HgA1c: No results found for: \"HGBA1C\"  BMP:   Lab Results   Component Value Date    CALCIUM 9.3 01/11/2024    K 4.5 01/11/2024    CO2 27 01/11/2024     01/11/2024    BUN 13 01/11/2024    CREATININE 0.79 01/11/2024           PROCEDURES PERFORMED:  Small joint arthrocentesis: L thumb CMC  Tyler Protocol:  Consent: Verbal consent obtained.  Risks and benefits: risks, benefits and alternatives were discussed  Consent given by: patient  Time out: Immediately prior to procedure a \"time out\" was called to verify the correct patient, procedure, equipment, support staff and site/side marked as required.  Patient understanding: patient states understanding of the procedure being performed  Site marked: the operative site was marked  Required items: required blood products, implants, devices, and special equipment available  Patient identity confirmed: verbally with patient  Supporting Documentation  Indications: pain   Procedure Details  Location: thumb - L thumb CMC  Needle size: 25 G  Ultrasound guidance: no  Approach: dorsal  Medications administered: 20 mg triamcinolone acetonide 40 mg/mL; 0.5 mL bupivacaine 0.25 %    Patient tolerance: patient tolerated the procedure well with no immediate complications  Dressing:  Sterile dressing applied             Scribe Attestation      I,:  Sherri Toussaint PA-C am acting as a scribe while in the presence of the attending physician.:       I,:  Hair Pollock MD personally performed the services described in this documentation    as scribed in my presence.:             "

## 2024-03-26 ENCOUNTER — OFFICE VISIT (OUTPATIENT)
Dept: OCCUPATIONAL THERAPY | Facility: CLINIC | Age: 74
End: 2024-03-26
Payer: COMMERCIAL

## 2024-03-26 DIAGNOSIS — G56.02 CARPAL TUNNEL SYNDROME ON LEFT: ICD-10-CM

## 2024-03-26 DIAGNOSIS — M65.322 TRIGGER FINGER, LEFT INDEX FINGER: ICD-10-CM

## 2024-03-26 DIAGNOSIS — M65.332 TRIGGER MIDDLE FINGER OF LEFT HAND: Primary | ICD-10-CM

## 2024-03-26 PROCEDURE — 97110 THERAPEUTIC EXERCISES: CPT

## 2024-03-26 RX ORDER — BUPIVACAINE HYDROCHLORIDE 2.5 MG/ML
0.5 INJECTION, SOLUTION INFILTRATION; PERINEURAL
Status: COMPLETED | OUTPATIENT
Start: 2024-03-25 | End: 2024-03-25

## 2024-03-26 RX ORDER — TRIAMCINOLONE ACETONIDE 40 MG/ML
20 INJECTION, SUSPENSION INTRA-ARTICULAR; INTRAMUSCULAR
Status: COMPLETED | OUTPATIENT
Start: 2024-03-25 | End: 2024-03-25

## 2024-03-26 NOTE — PROGRESS NOTES
Daily Note     Today's date: 3/26/2024  Patient name: Amando Mace  : 1950  MRN: [id number]  Referring provider: Hair Pollock MD  Dx:   Encounter Diagnosis     ICD-10-CM    1. Trigger middle finger of left hand  M65.332       2. Trigger finger, left index finger  M65.322       3. Carpal tunnel syndrome on left  G56.02                          Subjective: I think the swelling is getting a little bit better.      Objective: See treatment diary below      Assessment: Tolerated treatment well. IF ROM is limited by swelling, but is not painful. Held several resistive thumb ex due to getting an injection in the CMC jt.      Plan: Continue per plan of care.  Progress treatment as tolerated.       Precautions: Status Post Surgery   DOS: 24  Early Motion Protocol- No strengthening   Carpal tunnel syndrome on left  Cubital tunnel syndrome on left   Trigger finger, left index finger w/ a FDS Radial slip hemitenectomy       Manuals 2/26 2/29 3/5 3/8 3/11 3/13 3/18 3/20 3/25    STM (once sutures are removed)  held  4' 4' 4' 4' 8' 8'    Scar Massage (Once sutures are removed)  held  4' 4' 4' 4'      MEM   5'  2' 2' 2'                   Neuro Re-Ed                                                                                                        Ther Ex             HEP Digit AROM    Wrist CHRISTIANNE    Elbow AROM    MNG/NANCY            Digit AAROM   5' 5' Gentle PROM 5'  Gentle PROM 8' Gentle PROM 8' 8'    Wrist AROM  X20 w/ dowel           Elbow AROM             Shoulder pulley             Med ball rolls  2' x20 x20 x20 x20 2'      Dex balls  2' 2' 2' 2' 2' 2'  2'    Digit Blocking  x20 x20 x20 X30 x30 x30  10x    Wrist Maze  x5 X5 small handle X5 small handle X5 small handle x5 x5      TB on wall     x5 x5 x5      Hook fist pegs  X5 orange pegs down and back  X5 orange pegs down and back X5 orange pegs down and cailin x5 x5 x5 x5    Pencil grasps  X2 stacks x2 x2 x2 x2 x2      Wall walking    TB 5x Tb 5x TB 5x  TB 5x Tb x5 Green 5x TB 5x    Place and holds         5x     Digiflex        Red isolated 10x Red isol 10x    Extension web        Yellow 30x Yellow 30x    Ther Activity             Keypegs  x1 x1 x1 x1 x1 x1 X1 translation  1x                                                        Modalities             MHP (once sutures are closed)    5' 5' 5' 5' 5' 5'

## 2024-03-29 ENCOUNTER — OFFICE VISIT (OUTPATIENT)
Dept: OCCUPATIONAL THERAPY | Facility: CLINIC | Age: 74
End: 2024-03-29
Payer: COMMERCIAL

## 2024-03-29 DIAGNOSIS — G56.22 CUBITAL TUNNEL SYNDROME ON LEFT: Primary | ICD-10-CM

## 2024-03-29 PROCEDURE — 97110 THERAPEUTIC EXERCISES: CPT

## 2024-03-29 PROCEDURE — 97140 MANUAL THERAPY 1/> REGIONS: CPT

## 2024-03-29 NOTE — PROGRESS NOTES
Daily Note     Today's date: 3/29/2024  Patient name: Amando Mace  : 1950  MRN: [id number]  Referring provider: Hair Pollock MD  Dx:   Encounter Diagnosis     ICD-10-CM    1. Cubital tunnel syndrome on left  G56.22                          Subjective: Im very happy with how everything went      Objective: See treatment diary below  [id number] MHP  [id number] Manual 8  [id number] unsup  5324-8863 TE/TA 15  [id number] unsup     Assessment: Tolerated treatment well. Patient displayed a loose composite fist on this date. Scar tissue has significantly decreased. Patient opted to transitioned to a final HEP at this time as they are happy with their outcome. Patient discharged.       Plan: Continue per plan of care.  Progress treatment as tolerated.       Precautions: Status Post Surgery   DOS: 24  Early Motion Protocol- No strengthening   Carpal tunnel syndrome on left  Cubital tunnel syndrome on left   Trigger finger, left index finger w/ a FDS Radial slip hemitenectomy       Manuals 2/26 2/29 3/5 3/8 3/11 3/13 3/18 3/20 3/25 3/29   STM (once sutures are removed)  held  4' 4' 4' 4' 8' 8' 8   Scar Massage (Once sutures are removed)  held  4' 4' 4' 4'      MEM   5'  2' 2' 2'                   Neuro Re-Ed                                                                                                        Ther Ex             HEP Digit AROM    Wrist CHRISTIANNE    Elbow AROM    MNG/NANCY            Digit AAROM   5' 5' Gentle PROM 5'  Gentle PROM 8' Gentle PROM 8' 8' 8   Wrist AROM  X20 w/ dowel           Elbow AROM             Shoulder pulley             Med ball rolls  2' x20 x20 x20 x20 2'      Dex balls  2' 2' 2' 2' 2' 2'  2' 2   Digit Blocking  x20 x20 x20 X30 x30 x30  10x 10x   Wrist Maze  x5 X5 small handle X5 small handle X5 small handle x5 x5      TB on wall     x5 x5 x5      Hook fist pegs  X5 orange pegs down and back  X5 orange pegs down and back X5 orange pegs down and cailin x5 x5 x5 x5 x5   Pencil  grasps  X2 stacks x2 x2 x2 x2 x2      Wall walking    TB 5x Tb 5x TB 5x TB 5x Tb x5 Green 5x TB 5x Green 5x   Place and holds         5x     Digiflex        Red isolated 10x Red isol 10x Red isol 10x   Extension web        Yellow 30x Yellow 30x Yellow 30x   Ther Activity             Keypegs  x1 x1 x1 x1 x1 x1 X1 translation  1x 1x                                                       Modalities             MHP (once sutures are closed)    5' 5' 5' 5' 5' 5' 5

## 2024-04-17 ENCOUNTER — IOP CHECK (OUTPATIENT)
Dept: URBAN - METROPOLITAN AREA CLINIC 6 | Facility: CLINIC | Age: 74
End: 2024-04-17

## 2024-04-17 DIAGNOSIS — H40.023: ICD-10-CM

## 2024-04-17 PROCEDURE — 92083 EXTENDED VISUAL FIELD XM: CPT

## 2024-04-17 PROCEDURE — 92020 GONIOSCOPY: CPT

## 2024-04-17 PROCEDURE — 92012 INTRM OPH EXAM EST PATIENT: CPT

## 2024-04-17 ASSESSMENT — VISUAL ACUITY
OU_CC: J1+
OD_CC: 20/30-2
OS_CC: 20/30-2

## 2024-04-17 ASSESSMENT — TONOMETRY
OS_IOP_MMHG: 31
OD_IOP_MMHG: 27

## 2024-05-08 ENCOUNTER — PROCEDURE ONLY (OUTPATIENT)
Dept: URBAN - METROPOLITAN AREA CLINIC 6 | Facility: CLINIC | Age: 74
End: 2024-05-08

## 2024-05-08 DIAGNOSIS — H40.023: ICD-10-CM

## 2024-05-08 PROCEDURE — 65855 TRABECULOPLASTY LASER SURG: CPT

## 2024-05-08 ASSESSMENT — VISUAL ACUITY
OD_CC: 20/40
OS_CC: 20/25

## 2024-05-09 ENCOUNTER — OFFICE VISIT (OUTPATIENT)
Dept: OBGYN CLINIC | Facility: CLINIC | Age: 74
End: 2024-05-09

## 2024-05-09 VITALS
WEIGHT: 190 LBS | SYSTOLIC BLOOD PRESSURE: 142 MMHG | BODY MASS INDEX: 28.14 KG/M2 | HEIGHT: 69 IN | HEART RATE: 50 BPM | DIASTOLIC BLOOD PRESSURE: 86 MMHG

## 2024-05-09 DIAGNOSIS — M65.322 TRIGGER FINGER, LEFT INDEX FINGER: Primary | ICD-10-CM

## 2024-05-09 PROCEDURE — 99024 POSTOP FOLLOW-UP VISIT: CPT | Performed by: SURGERY

## 2024-05-09 NOTE — PROGRESS NOTES
"Assessment/Plan:  Patient ID: Amando Mace 73 y.o. male   Surgery: Release Cubital Tunnel, Fds Radial Slip Hemitenectomy - Left, Release Carpal Tunnel Revision - Left, and Release Trigger Finger Left Index, Release Trigger Finger Left Long - Left  Date of Surgery: 2/20/2024    At this time, his left thumb CMC is doing well. Encouraged the patient worked on his motion and use heat prior to his exercises. If he needs another cortisone injection for the let thumb CMC joint, we can always do that for him.     Follow Up:  PRN    To Do Next Visit:  Re-evaluate      CHIEF COMPLAINT:  Chief Complaint   Patient presents with    Post-op     Post op on left side. Everything going well.          SUBJECTIVE:  Amando Mace is a 73 y.o. year old male who presents for follow up after Release Cubital Tunnel, Fds Radial Slip Hemitenectomy - Left, Release Carpal Tunnel Revision - Left, and Release Trigger Finger Left Index, Release Trigger Finger Left Long - Left. Today patient has No Complaints.   Patient had a left thumb CMC joint injection on on 3/25/2024. He is p[leased with how much motion he had from OT. He no longer has numbness/tingling and fire.     PHYSICAL EXAMINATION:  General: well developed and well nourished, alert, oriented times 3, and appears comfortable  Psychiatric: Normal    MUSCULOSKELETAL EXAMINATION:  Incision: Clean, dry, intact and healed  Surgery Site: normal, no evidence of infection   Range of Motion: As expected, opposition intact, and full composite fist possible  Neurovascular status: Neuro intact, good cap refill and radial pulse 2+  Activity Restrictions: No restrictions      STUDIES REVIEWED:  No images reviewed at today's visit    LABS REVIEWED:    HgA1c: No results found for: \"HGBA1C\"  BMP:   Lab Results   Component Value Date    CALCIUM 9.3 01/11/2024    K 4.5 01/11/2024    CO2 27 01/11/2024     01/11/2024    BUN 13 01/11/2024    CREATININE 0.79 01/11/2024           PROCEDURES " PERFORMED:  Procedures  No Procedures performed today    Scribe Attestation      I,:  Debi Espinosa am acting as a scribe while in the presence of the attending physician.:       I,:  Hair Pollock MD personally performed the services described in this documentation    as scribed in my presence.:

## 2024-05-10 ENCOUNTER — ANESTHESIA (OUTPATIENT)
Dept: ANESTHESIOLOGY | Facility: HOSPITAL | Age: 74
End: 2024-05-10

## 2024-05-10 ENCOUNTER — ANESTHESIA EVENT (OUTPATIENT)
Dept: ANESTHESIOLOGY | Facility: HOSPITAL | Age: 74
End: 2024-05-10

## 2024-05-23 RX ORDER — SODIUM CHLORIDE, SODIUM LACTATE, POTASSIUM CHLORIDE, CALCIUM CHLORIDE 600; 310; 30; 20 MG/100ML; MG/100ML; MG/100ML; MG/100ML
75 INJECTION, SOLUTION INTRAVENOUS CONTINUOUS
Status: CANCELLED | OUTPATIENT
Start: 2024-05-23

## 2024-05-24 ENCOUNTER — ANESTHESIA EVENT (OUTPATIENT)
Dept: GASTROENTEROLOGY | Facility: AMBULARY SURGERY CENTER | Age: 74
End: 2024-05-24

## 2024-05-24 ENCOUNTER — ANESTHESIA (OUTPATIENT)
Dept: GASTROENTEROLOGY | Facility: AMBULARY SURGERY CENTER | Age: 74
End: 2024-05-24

## 2024-05-24 ENCOUNTER — HOSPITAL ENCOUNTER (OUTPATIENT)
Dept: GASTROENTEROLOGY | Facility: AMBULARY SURGERY CENTER | Age: 74
Setting detail: OUTPATIENT SURGERY
End: 2024-05-24
Attending: INTERNAL MEDICINE
Payer: COMMERCIAL

## 2024-05-24 VITALS
BODY MASS INDEX: 26.81 KG/M2 | HEART RATE: 54 BPM | RESPIRATION RATE: 17 BRPM | TEMPERATURE: 97 F | SYSTOLIC BLOOD PRESSURE: 126 MMHG | WEIGHT: 181 LBS | DIASTOLIC BLOOD PRESSURE: 65 MMHG | HEIGHT: 69 IN | OXYGEN SATURATION: 97 %

## 2024-05-24 DIAGNOSIS — Z86.010 HISTORY OF COLON POLYPS: ICD-10-CM

## 2024-05-24 PROBLEM — E78.5 HYPERLIPIDEMIA: Status: ACTIVE | Noted: 2024-05-24

## 2024-05-24 PROBLEM — Z98.61 HISTORY OF PTCA: Status: ACTIVE | Noted: 2024-05-24

## 2024-05-24 PROCEDURE — 45380 COLONOSCOPY AND BIOPSY: CPT | Performed by: INTERNAL MEDICINE

## 2024-05-24 PROCEDURE — 88305 TISSUE EXAM BY PATHOLOGIST: CPT | Performed by: STUDENT IN AN ORGANIZED HEALTH CARE EDUCATION/TRAINING PROGRAM

## 2024-05-24 RX ORDER — SODIUM CHLORIDE, SODIUM LACTATE, POTASSIUM CHLORIDE, CALCIUM CHLORIDE 600; 310; 30; 20 MG/100ML; MG/100ML; MG/100ML; MG/100ML
75 INJECTION, SOLUTION INTRAVENOUS CONTINUOUS
Status: DISCONTINUED | OUTPATIENT
Start: 2024-05-24 | End: 2024-05-28 | Stop reason: HOSPADM

## 2024-05-24 RX ORDER — LIDOCAINE HYDROCHLORIDE 10 MG/ML
INJECTION, SOLUTION EPIDURAL; INFILTRATION; INTRACAUDAL; PERINEURAL AS NEEDED
Status: DISCONTINUED | OUTPATIENT
Start: 2024-05-24 | End: 2024-05-24

## 2024-05-24 RX ORDER — SODIUM CHLORIDE 9 MG/ML
INJECTION, SOLUTION INTRAVENOUS CONTINUOUS PRN
Status: DISCONTINUED | OUTPATIENT
Start: 2024-05-24 | End: 2024-05-24

## 2024-05-24 RX ORDER — PROPOFOL 10 MG/ML
INJECTION, EMULSION INTRAVENOUS CONTINUOUS PRN
Status: DISCONTINUED | OUTPATIENT
Start: 2024-05-24 | End: 2024-05-24

## 2024-05-24 RX ADMIN — PROPOFOL 100 MCG/KG/MIN: 10 INJECTION, EMULSION INTRAVENOUS at 09:43

## 2024-05-24 RX ADMIN — LIDOCAINE HYDROCHLORIDE 50 MG: 10 INJECTION, SOLUTION EPIDURAL; INFILTRATION; INTRACAUDAL; PERINEURAL at 09:42

## 2024-05-24 RX ADMIN — SODIUM CHLORIDE: 0.9 INJECTION, SOLUTION INTRAVENOUS at 09:44

## 2024-05-24 RX ADMIN — PROPOFOL 100 MG: 10 INJECTION, EMULSION INTRAVENOUS at 09:42

## 2024-05-24 NOTE — ANESTHESIA PREPROCEDURE EVALUATION
Procedure:  COLONOSCOPY    Relevant Problems   CARDIO   (+) Coronary atherosclerosis   (+) Essential hypertension   (+) History of PTCA stent   (+) Hyperlipidemia      GI/HEPATIC   (+) Chronic GERD      /RENAL   (+) Benign prostatic hyperplasia with incomplete bladder emptying      MUSCULOSKELETAL   (+) Localized osteoarthrosis of right hip   (+) Low back pain   (+) Primary osteoarthritis, left shoulder        Physical Exam    Airway    Mallampati score: II  TM Distance: >3 FB  Neck ROM: full     Dental       Cardiovascular  Rhythm: regular, Rate: normal    Pulmonary   Breath sounds clear to auscultation    Other Findings        Anesthesia Plan  ASA Score- 3     Anesthesia Type- IV sedation with anesthesia with ASA Monitors.         Additional Monitors:     Airway Plan:            Plan Factors-    Chart reviewed.        Patient is not a current smoker.              Induction- intravenous.    Postoperative Plan-     Perioperative Resuscitation Plan - Level 1 - Full Code.       Informed Consent- Anesthetic plan and risks discussed with patient.  I personally reviewed this patient with the CRNA. Discussed and agreed on the Anesthesia Plan with the CRNA..

## 2024-05-24 NOTE — H&P
History and Physical - [facility]  Amando Mace 73 y.o. male MRN: [id number]        HPI: 73-year-old male with history of colon polyps.  Regular bowel movements.    Historical Information   Past Medical History:   Diagnosis Date    Arthritis     Carpal tunnel syndrome on left 02/20/2024    Colon polyps     Cubital tunnel syndrome on left 02/20/2024    GERD (gastroesophageal reflux disease)     Hyperlipidemia     Hypertension     Seasonal allergies     Trigger finger, left index finger 02/20/2024    Trigger middle finger of left hand 05/08/2019     Past Surgical History:   Procedure Laterality Date    BACK SURGERY      laminectomy    CARDIAC SURGERY      cardiac stent    CARPAL TUNNEL RELEASE      COLONOSCOPY      benign polyp removal    ORCHIECTOMY Left     IA ESOPHAGOGASTRODUODENOSCOPY TRANSORAL DIAGNOSTIC N/A 8/29/2017    Procedure: EGD AND COLONOSCOPY;  Surgeon: Talat Park MD;  [facility]: [facility];  Service: Gastroenterology    IA NEUROPLASTY &/TRANSPOS MEDIAN NRV CARPAL TUNNE Left 2/20/2024    Procedure: RELEASE CARPAL TUNNEL REVISION;  Surgeon: Hair Pollock MD;  [facility];  Service: Orthopedics    IA NEUROPLASTY &/TRANSPOSITION ULNAR NERVE ELBOW Left 2/20/2024    Procedure: RELEASE CUBITAL TUNNEL, FDS RADIAL SLIP HEMITENECTOMY;  Surgeon: Hair Pollock MD;  [facility];  Service: Orthopedics    IA TENDON SHEATH INCISION Left 2/20/2024    Procedure: RELEASE TRIGGER FINGER LEFT INDEX, RELEASE TRIGGER FINGER LEFT LONG;  Surgeon: Hair Pollock MD;  [facility];  Service: Orthopedics    UPPER GASTROINTESTINAL ENDOSCOPY      WRIST SURGERY       Social History   Social History     Substance and Sexual Activity   Alcohol Use Yes    Alcohol/week: 2.0 standard drinks of alcohol    Types: 2 Cans of beer per week     Social History     Substance and Sexual Activity   Drug Use No     Social History     Tobacco Use   Smoking Status  "Never   Smokeless Tobacco Never     Family History   Problem Relation Age of Onset    Diabetes Mother     Hypertension Mother     Stomach cancer Father     Hypertension Father     Liver cancer Father        Meds/Allergies     Not in a hospital admission.    No Known Allergies    Objective     Blood pressure 134/76, pulse 61, temperature (!) 96.8 °F (36 °C), temperature source Temporal, resp. rate 18, height 5' 9\" (1.753 m), weight 82.1 kg (181 lb), SpO2 96%.    Physical Exam:    Chest- CTA  Heart- RRR  Abdomen- NT/ND  Extremities- No edema    ASSESSMENT:     History of colon polyps    PLAN:    Colonoscopy              "

## 2024-05-28 PROCEDURE — 88305 TISSUE EXAM BY PATHOLOGIST: CPT | Performed by: STUDENT IN AN ORGANIZED HEALTH CARE EDUCATION/TRAINING PROGRAM

## 2024-05-30 ENCOUNTER — OFFICE VISIT (OUTPATIENT)
Dept: FAMILY MEDICINE CLINIC | Facility: CLINIC | Age: 74
End: 2024-05-30
Payer: COMMERCIAL

## 2024-05-30 ENCOUNTER — FOLLOW UP (OUTPATIENT)
Dept: URBAN - METROPOLITAN AREA CLINIC 6 | Facility: CLINIC | Age: 74
End: 2024-05-30

## 2024-05-30 VITALS
SYSTOLIC BLOOD PRESSURE: 118 MMHG | OXYGEN SATURATION: 97 % | TEMPERATURE: 97.4 F | BODY MASS INDEX: 28.47 KG/M2 | HEART RATE: 59 BPM | DIASTOLIC BLOOD PRESSURE: 78 MMHG | HEIGHT: 69 IN | WEIGHT: 192.2 LBS

## 2024-05-30 DIAGNOSIS — J01.00 ACUTE NON-RECURRENT MAXILLARY SINUSITIS: Primary | ICD-10-CM

## 2024-05-30 DIAGNOSIS — H40.023: ICD-10-CM

## 2024-05-30 PROCEDURE — G2211 COMPLEX E/M VISIT ADD ON: HCPCS | Performed by: FAMILY MEDICINE

## 2024-05-30 PROCEDURE — 99213 OFFICE O/P EST LOW 20 MIN: CPT | Performed by: FAMILY MEDICINE

## 2024-05-30 PROCEDURE — 92012 INTRM OPH EXAM EST PATIENT: CPT

## 2024-05-30 RX ORDER — AMOXICILLIN AND CLAVULANATE POTASSIUM 875; 125 MG/1; MG/1
1 TABLET, FILM COATED ORAL EVERY 12 HOURS SCHEDULED
Qty: 14 TABLET | Refills: 0 | Status: SHIPPED | OUTPATIENT
Start: 2024-05-30 | End: 2024-06-06

## 2024-05-30 ASSESSMENT — VISUAL ACUITY
OS_CC: 20/40
OD_CC: 20/40

## 2024-05-30 ASSESSMENT — TONOMETRY
OS_IOP_MMHG: 14
OS_IOP_MMHG: 20
OD_IOP_MMHG: 22
OD_IOP_MMHG: 23

## 2024-05-30 NOTE — PROGRESS NOTES
"Ambulatory Visit  Name: Amando Mace      : 1950      MRN: [id number]  Encounter Provider: Santos Mercado MD  Encounter Date: 2024   Encounter department: [facility]    Assessment & Plan   1. Acute non-recurrent maxillary sinusitis  -     amoxicillin-clavulanate (AUGMENTIN) 875-125 mg per tablet; Take 1 tablet by mouth every 12 (twelve) hours for 7 days  Counseled the patient regarding supportive care.  They are to call or return to the office if not improving.       History of Present Illness     Sinusitis  This is a new problem. The current episode started in the past 7 days. The problem is unchanged. There has been no fever. Associated symptoms include congestion, coughing, sinus pressure and a sore throat. Pertinent negatives include no chills, headaches or shortness of breath. Past treatments include oral decongestants. The treatment provided mild relief.       Review of Systems   Constitutional:  Negative for activity change, chills and fever.   HENT:  Positive for congestion, sinus pressure and sore throat. Negative for rhinorrhea.    Eyes:  Negative for visual disturbance.   Respiratory:  Positive for cough. Negative for shortness of breath and wheezing.    Cardiovascular:  Negative for chest pain and palpitations.   Gastrointestinal:  Negative for abdominal pain, blood in stool, constipation, diarrhea, nausea and vomiting.   Genitourinary:  Negative for dysuria.   Musculoskeletal:  Negative for arthralgias and myalgias.   Skin:  Negative for rash.   Neurological:  Negative for dizziness, weakness and headaches.   All other systems reviewed and are negative.      Objective     /78   Pulse 59   Temp (!) 97.4 °F (36.3 °C)   Ht 5' 9\" (1.753 m)   Wt 87.2 kg (192 lb 3.2 oz)   SpO2 97%   BMI 28.38 kg/m²     Physical Exam  Vitals and nursing note reviewed.   Constitutional:       General: He is not in acute distress.     Appearance: Normal appearance. " He is normal weight. He is not toxic-appearing or diaphoretic.   HENT:      Head: Normocephalic and atraumatic.      Right Ear: Tympanic membrane and ear canal normal. No middle ear effusion.      Left Ear: Tympanic membrane and ear canal normal.  No middle ear effusion.      Nose: Congestion present.      Mouth/Throat:      Mouth: Mucous membranes are moist.      Pharynx: Posterior oropharyngeal erythema present. No oropharyngeal exudate.   Cardiovascular:      Rate and Rhythm: Normal rate and regular rhythm.      Heart sounds: Normal heart sounds. No murmur heard.  Pulmonary:      Effort: Pulmonary effort is normal. No respiratory distress.      Breath sounds: Normal breath sounds. No wheezing, rhonchi or rales.   Lymphadenopathy:      Cervical: Cervical adenopathy present.   Skin:     Findings: No rash.   Neurological:      General: No focal deficit present.      Mental Status: He is alert. Mental status is at baseline.       Administrative Statements

## 2024-05-31 ENCOUNTER — OFFICE VISIT (OUTPATIENT)
Dept: UROLOGY | Facility: CLINIC | Age: 74
End: 2024-05-31
Payer: COMMERCIAL

## 2024-05-31 VITALS
DIASTOLIC BLOOD PRESSURE: 90 MMHG | OXYGEN SATURATION: 98 % | HEART RATE: 59 BPM | BODY MASS INDEX: 28.35 KG/M2 | WEIGHT: 192 LBS | SYSTOLIC BLOOD PRESSURE: 148 MMHG

## 2024-05-31 DIAGNOSIS — N40.1 BENIGN PROSTATIC HYPERPLASIA WITH INCOMPLETE BLADDER EMPTYING: Primary | ICD-10-CM

## 2024-05-31 DIAGNOSIS — R39.14 BENIGN PROSTATIC HYPERPLASIA WITH INCOMPLETE BLADDER EMPTYING: Primary | ICD-10-CM

## 2024-05-31 LAB — POST-VOID RESIDUAL VOLUME, ML POC: 0 ML

## 2024-05-31 PROCEDURE — 51798 US URINE CAPACITY MEASURE: CPT | Performed by: PHYSICIAN ASSISTANT

## 2024-05-31 PROCEDURE — 99213 OFFICE O/P EST LOW 20 MIN: CPT | Performed by: PHYSICIAN ASSISTANT

## 2024-05-31 NOTE — PROGRESS NOTES
UROLOGY PROGRESS NOTE   Patient Identifiers: Amando Mace (MRN [id number])  Date of Service: 5/31/2024    Subjective:   73-year-old man with lower urinary tract symptoms.  He was trialed on tamsulosin which he did not tolerate.  I put him on silodosin 8 mg and he is here for follow-up.    Reason for visit: BPH follow-up    Objective:     VITALS:    There were no vitals filed for this visit.        LABS:  Lab Results   Component Value Date    HGB 15.1 10/16/2023    HCT 42.9 10/16/2023    WBC 6.49 10/16/2023     10/16/2023   ]    Lab Results   Component Value Date    K 4.5 01/11/2024     01/11/2024    CO2 27 01/11/2024    BUN 13 01/11/2024    CREATININE 0.79 01/11/2024    CALCIUM 9.3 01/11/2024   ]        INPATIENT MEDS:    Current Outpatient Medications:     acetaminophen (TYLENOL) 650 mg CR tablet, Take 650 mg by mouth daily at bedtime, Disp: , Rfl:     amoxicillin-clavulanate (AUGMENTIN) 875-125 mg per tablet, Take 1 tablet by mouth every 12 (twelve) hours for 7 days, Disp: 14 tablet, Rfl: 0    ascorbic acid (VITAMIN C) 500 mg tablet, Take 500 mg by mouth daily, Disp: , Rfl:     aspirin 81 MG tablet, Take 1 tablet (81 mg total) by mouth daily, Disp: 30 tablet, Rfl: 5    Cholecalciferol 2000 units TABS, Take 2,000 Units by mouth (Patient not taking: Reported on 5/30/2024), Disp: , Rfl:     clotrimazole-betamethasone (LOTRISONE) 1-0.05 % cream, Apply topically 2 (two) times a day (Patient taking differently: Apply topically as needed), Disp: 30 g, Rfl: 0    Coenzyme Q10 200 MG capsule, Take 200 mg by mouth daily, Disp: , Rfl:     Diclofenac Sodium (VOLTAREN) 1 %, Apply 2 g topically in the morning and 2 g at noon and 2 g in the evening and 2 g before bedtime., Disp: 2 g, Rfl: 0    Echinacea 125 MG CAPS, Take 1 tablet by mouth 2 (two) times a day, Disp: , Rfl:     famotidine (PEPCID) 40 MG tablet, Take 1 tablet (40 mg total) by mouth daily at bedtime as needed for heartburn, Disp: 90 tablet, Rfl: 1     FIBER SELECT GUMMIES CHEW, Chew 2 (two) times a day, Disp: , Rfl:     fluticasone (FLONASE) 50 mcg/act nasal spray, 1 spray into each nostril daily, Disp: , Rfl:     lisinopril (ZESTRIL) 10 mg tablet, Take 1 tablet (10 mg total) by mouth daily, Disp: 90 tablet, Rfl: 3    Loratadine 10 MG CAPS, Take by mouth if needed, Disp: , Rfl:     melatonin 3 mg, Take 1 tablet by mouth if needed, Disp: , Rfl:     meloxicam (MOBIC) 7.5 mg tablet, Take 7.5 mg by mouth if needed for moderate pain, Disp: , Rfl:     metoprolol succinate (TOPROL-XL) 25 mg 24 hr tablet, Take 12.5 mg by mouth daily, Disp: , Rfl:     multivitamin (THERAGRAN) TABS, Take 1 tablet by mouth daily (Patient not taking: Reported on 5/30/2024), Disp: , Rfl:     Silodosin 8 MG CAPS, Take 1 capsule by mouth daily at bedtime, Disp: 90 capsule, Rfl: 3    simvastatin (ZOCOR) 40 mg tablet, Take 20 mg by mouth daily at bedtime, Disp: , Rfl:     Turmeric (QC Tumeric Complex) 500 MG CAPS, Take by mouth Unknown dosage tumeric with tumerol, Disp: , Rfl:       Physical Exam:   There were no vitals taken for this visit.  GEN: no acute distress    RESP: breathing comfortably with no accessory muscle use    ABD: soft, non-tender, non-distended   INCISION:    EXT: no significant peripheral edema   (Male): Penis circumcised, phallus normal, meatus patent.  Testicles descended into scrotum bilaterally without masses nor tenderness.  No inguinal hernias bilaterally.  DELPHINE: Prostate is enlarged at 35 grams. The prostate is not boggy. The prostate is not tender.  No nodules noted      RADIOLOGY:   none     Assessment:   #1 BPH    Plan:   -Follow-up 1 year for his annual exam   -  -  -

## 2024-06-17 ENCOUNTER — RA CDI HCC (OUTPATIENT)
Dept: OTHER | Facility: HOSPITAL | Age: 74
End: 2024-06-17

## 2024-06-24 ENCOUNTER — OFFICE VISIT (OUTPATIENT)
Dept: FAMILY MEDICINE CLINIC | Facility: CLINIC | Age: 74
End: 2024-06-24
Payer: COMMERCIAL

## 2024-06-24 VITALS
HEART RATE: 54 BPM | WEIGHT: 190.4 LBS | DIASTOLIC BLOOD PRESSURE: 72 MMHG | TEMPERATURE: 97.6 F | BODY MASS INDEX: 28.2 KG/M2 | HEIGHT: 69 IN | SYSTOLIC BLOOD PRESSURE: 130 MMHG | OXYGEN SATURATION: 97 %

## 2024-06-24 DIAGNOSIS — E78.2 MIXED HYPERLIPIDEMIA: ICD-10-CM

## 2024-06-24 DIAGNOSIS — M25.552 BILATERAL HIP PAIN: ICD-10-CM

## 2024-06-24 DIAGNOSIS — I25.10 ATHEROSCLEROSIS OF NATIVE CORONARY ARTERY OF NATIVE HEART WITHOUT ANGINA PECTORIS: ICD-10-CM

## 2024-06-24 DIAGNOSIS — I10 ESSENTIAL HYPERTENSION: Primary | ICD-10-CM

## 2024-06-24 DIAGNOSIS — M25.551 BILATERAL HIP PAIN: ICD-10-CM

## 2024-06-24 PROCEDURE — G2211 COMPLEX E/M VISIT ADD ON: HCPCS | Performed by: FAMILY MEDICINE

## 2024-06-24 PROCEDURE — 99214 OFFICE O/P EST MOD 30 MIN: CPT | Performed by: FAMILY MEDICINE

## 2024-06-24 NOTE — ASSESSMENT & PLAN NOTE
Xrays in 2015 showed moderate to severe OA bilat.  Pt not willing to see ortho yet, but pt is now thinking that he is getting close to wanting to do it.  Continue to monitor. Recheck 6m - earlier if worse

## 2024-06-24 NOTE — PROGRESS NOTES
Ambulatory Visit  Name: Amando Mace      : 1950      MRN: [id number]  Encounter Provider: Luis Lozoya MD  Encounter Date: 2024   Encounter department: [facility]    Assessment & Plan   1. Essential hypertension  Assessment & Plan:  Well controlled. Cont present treatment. Monitor labs. Recheck 6m    Orders:  -     CBC and differential; Future  -     Comprehensive metabolic panel; Future  -     Lipid panel; Future  2. Atherosclerosis of native coronary artery of native heart without angina pectoris  Assessment & Plan:  Pt asymptomatic.  Check labs. Continue present care. F/u with Cardio  3. Mixed hyperlipidemia  Assessment & Plan:  Check labs. Change meds if not at goal. Recheck 6m  4. Bilateral hip pain  Assessment & Plan:  Xrays in  showed moderate to severe OA bilat.  Pt not willing to see ortho yet, but pt is now thinking that he is getting close to wanting to do it.  Continue to monitor. Recheck 6m - earlier if worse       History of Present Illness     f/u multiple med issues   - pt feels well.     - pt with some mild persistent chest congestion since having a sinus infection in May. Also feels like his ears are blocked. No vertigo, or SOB. No fever/chills. PT compliant with loratadine and Flonase  - urination is a little better. Pt was seen by Uro and started on Silodosin which has helped.   - pt is up to date with Cardio.  Pt does not exercise regularly but does do a lot of yardwork. Pt denies CP, palpitations, lightheadedness or other CV symptoms with or without exertion  - L hand trigger and CTS better s/p surgical connection.   - pt continues to struggle with bilat hip pain. XR showed bilat severe OA.       Review of Systems   Constitutional: Negative.    HENT:  Positive for congestion. Negative for sinus pressure, sinus pain and sore throat.    Eyes: Negative.    Respiratory:  Positive for cough. Negative for shortness of breath.     Cardiovascular: Negative.    Gastrointestinal: Negative.    Endocrine: Negative.    Genitourinary:  Negative for decreased urine volume, difficulty urinating, flank pain, frequency, hematuria and urgency.   Musculoskeletal:  Positive for arthralgias (bilat hips). Negative for back pain, joint swelling and myalgias.   Skin: Negative.    Allergic/Immunologic: Negative.    Neurological: Negative.    Hematological: Negative.    Psychiatric/Behavioral: Negative.       Past Medical History:   Diagnosis Date   • Arthritis    • Carpal tunnel syndrome on left 02/20/2024   • Colon polyps    • Cubital tunnel syndrome on left 02/20/2024   • GERD (gastroesophageal reflux disease)    • Hyperlipidemia    • Hypertension    • Seasonal allergies    • Trigger finger, left index finger 02/20/2024   • Trigger middle finger of left hand 05/08/2019     Past Surgical History:   Procedure Laterality Date   • BACK SURGERY      laminectomy   • CARDIAC SURGERY      cardiac stent   • CARPAL TUNNEL RELEASE     • COLONOSCOPY      benign polyp removal   • ORCHIECTOMY Left    • NC ESOPHAGOGASTRODUODENOSCOPY TRANSORAL DIAGNOSTIC N/A 8/29/2017    Procedure: EGD AND COLONOSCOPY;  Surgeon: Talat Park MD;  [facility];  Service: Gastroenterology   • NC NEUROPLASTY &/TRANSPOS MEDIAN NRV CARPAL TUNNE Left 2/20/2024    Procedure: RELEASE CARPAL TUNNEL REVISION;  Surgeon: Hair Pollock MD;  [facility];  Service: Orthopedics   • NC NEUROPLASTY &/TRANSPOSITION ULNAR NERVE ELBOW Left 2/20/2024    Procedure: RELEASE CUBITAL TUNNEL, FDS RADIAL SLIP HEMITENECTOMY;  Surgeon: Hair Pollock MD;  [facility];  Service: Orthopedics   • NC TENDON SHEATH INCISION Left 2/20/2024    Procedure: RELEASE TRIGGER FINGER LEFT INDEX, RELEASE TRIGGER FINGER LEFT LONG;  Surgeon: Hair Pollock MD;  [facility];  Service: Orthopedics   • UPPER GASTROINTESTINAL ENDOSCOPY     • WRIST SURGERY       Family  History   Problem Relation Age of Onset   • Diabetes Mother    • Hypertension Mother    • Stomach cancer Father    • Hypertension Father    • Liver cancer Father      Social History     Tobacco Use   • Smoking status: Never     Passive exposure: Never   • Smokeless tobacco: Never   Vaping Use   • Vaping status: Never Used   Substance and Sexual Activity   • Alcohol use: Yes     Alcohol/week: 2.0 standard drinks of alcohol     Types: 2 Cans of beer per week   • Drug use: No   • Sexual activity: Yes     Current Outpatient Medications on File Prior to Visit   Medication Sig   • acetaminophen (TYLENOL) 650 mg CR tablet Take 650 mg by mouth daily at bedtime   • ascorbic acid (VITAMIN C) 500 mg tablet Take 500 mg by mouth daily   • aspirin 81 MG tablet Take 1 tablet (81 mg total) by mouth daily   • Cholecalciferol 2000 units TABS Take 2,000 Units by mouth   • Coenzyme Q10 200 MG capsule Take 200 mg by mouth daily   • Diclofenac Sodium (VOLTAREN) 1 % Apply 2 g topically in the morning and 2 g at noon and 2 g in the evening and 2 g before bedtime. (Patient taking differently: Apply 2 g topically as needed)   • Echinacea 125 MG CAPS Take 1 tablet by mouth 2 (two) times a day   • famotidine (PEPCID) 40 MG tablet Take 1 tablet (40 mg total) by mouth daily at bedtime as needed for heartburn   • FIBER SELECT GUMMIES CHEW Chew 2 (two) times a day   • fluticasone (FLONASE) 50 mcg/act nasal spray 1 spray into each nostril daily   • lisinopril (ZESTRIL) 10 mg tablet Take 1 tablet (10 mg total) by mouth daily   • Loratadine 10 MG CAPS Take by mouth if needed   • melatonin 3 mg Take 1 tablet by mouth if needed   • meloxicam (MOBIC) 7.5 mg tablet Take 7.5 mg by mouth if needed for moderate pain   • metoprolol succinate (TOPROL-XL) 25 mg 24 hr tablet Take 12.5 mg by mouth daily   • Silodosin 8 MG CAPS Take 1 capsule by mouth daily at bedtime   • simvastatin (ZOCOR) 40 mg tablet Take 20 mg by mouth daily at bedtime   • Turmeric (QC  "Tumeric Complex) 500 MG CAPS Take by mouth Unknown dosage tumeric with tumerol   • clotrimazole-betamethasone (LOTRISONE) 1-0.05 % cream Apply topically 2 (two) times a day (Patient not taking: Reported on 6/24/2024)   • multivitamin (THERAGRAN) TABS Take 1 tablet by mouth daily (Patient not taking: Reported on 5/30/2024)     No Known Allergies  Immunization History   Administered Date(s) Administered   • COVID-19 PFIZER VACCINE 0.3 ML IM 03/18/2021, 04/08/2021, 10/21/2021, 06/22/2022   • COVID-19 Pfizer Vac BIVALENT Andrew-sucrose 12 Yr+ IM 10/04/2022   • COVID-19 Pfizer mRNA vacc PF andrew-sucrose 12 yr and older (Comirnaty) 11/22/2023   • INFLUENZA 01/13/2016, 10/16/2016, 01/11/2017, 01/10/2018, 11/07/2018, 11/02/2021, 10/28/2022, 11/07/2022, 11/09/2023   • Influenza, high dose seasonal 0.7 mL 11/07/2018, 11/08/2019, 10/22/2020   • Influenza, seasonal, injectable 01/07/2014   • Pneumococcal Conjugate 13-Valent 11/07/2018   • Pneumococcal Polysaccharide PPV23 11/08/2019   • Respiratory Syncytial Virus Vaccine (Recombinant, Adjuvanted) 12/11/2023   • Rsv, Unspecified 12/11/2023     Objective     /72 (BP Location: Right arm, Patient Position: Sitting, Cuff Size: Adult)   Pulse (!) 54   Temp 97.6 °F (36.4 °C)   Ht 5' 9\" (1.753 m)   Wt 86.4 kg (190 lb 6.4 oz)   SpO2 97%   BMI 28.12 kg/m²     Physical Exam  Vitals reviewed.   HENT:      Head: Normocephalic.      Right Ear: Tympanic membrane, ear canal and external ear normal.      Left Ear: Tympanic membrane, ear canal and external ear normal.      Nose: Congestion (mild) present.      Mouth/Throat:      Mouth: Mucous membranes are moist.      Pharynx: No oropharyngeal exudate or posterior oropharyngeal erythema.   Eyes:      Extraocular Movements: Extraocular movements intact.      Conjunctiva/sclera: Conjunctivae normal.      Pupils: Pupils are equal, round, and reactive to light.   Cardiovascular:      Rate and Rhythm: Normal rate and regular rhythm.      " Pulses: Normal pulses.   Pulmonary:      Effort: Pulmonary effort is normal.      Breath sounds: No wheezing or rales.   Abdominal:      General: There is no distension.      Palpations: There is no mass.      Tenderness: There is no abdominal tenderness.   Musculoskeletal:         General: Tenderness (mild groing discomfort with ambulation) present. No deformity. Normal range of motion.      Cervical back: No tenderness.      Right lower leg: No edema.      Left lower leg: No edema.   Lymphadenopathy:      Cervical: No cervical adenopathy.   Skin:     General: Skin is warm.      Capillary Refill: Capillary refill takes less than 2 seconds.   Neurological:      General: No focal deficit present.      Mental Status: He is alert. He is disoriented.      Sensory: No sensory deficit.      Motor: No weakness.      Gait: Gait abnormal (mildly antalgic appearing).   Psychiatric:         Mood and Affect: Mood normal.       Administrative Statements

## 2024-07-01 ENCOUNTER — TELEMEDICINE (OUTPATIENT)
Dept: FAMILY MEDICINE CLINIC | Facility: CLINIC | Age: 74
End: 2024-07-01
Payer: COMMERCIAL

## 2024-07-01 ENCOUNTER — TELEPHONE (OUTPATIENT)
Age: 74
End: 2024-07-01

## 2024-07-01 DIAGNOSIS — U07.1 COVID: Primary | ICD-10-CM

## 2024-07-01 PROCEDURE — 99213 OFFICE O/P EST LOW 20 MIN: CPT | Performed by: FAMILY MEDICINE

## 2024-07-01 PROCEDURE — G2211 COMPLEX E/M VISIT ADD ON: HCPCS | Performed by: FAMILY MEDICINE

## 2024-07-01 RX ORDER — NIRMATRELVIR AND RITONAVIR 300-100 MG
3 KIT ORAL 2 TIMES DAILY
Qty: 30 TABLET | Refills: 0 | Status: SHIPPED | OUTPATIENT
Start: 2024-07-01 | End: 2024-07-06

## 2024-07-01 NOTE — PROGRESS NOTES
COVID-19 Outpatient Progress Note  Name: Amando Mace      : 1950      MRN: [id number]  Encounter Provider: Luis Lozoya MD  Encounter Date: 2024   Encounter department: [facility]    Assessment & Plan   1. COVID  -     nirmatrelvir & ritonavir (Paxlovid, 300/100,) tablet therapy pack; Take 3 tablets by mouth 2 (two) times a day for 5 days Take 2 nirmatrelvir tablets + 1 ritonavir tablet together per dose  Disposition:     Discussed symptom directed medication options with patient. Discussed vitamin D, vitamin C, and/or zinc supplementation with patient.   Day #3 of COVID symptoms. Start Paxlovid as directed. Hold statin and Sildosin while on med. Pt can take Vit C 500mg qd, Vit D 1000IU qd, and zinc lozenges as directed on the bottle (depends on the product), and OTC cough meds. Pt to call Wed with follow up - earlier if worse    Patient meets criteria for Paxlovid and they have been counseled appropriately regarding risks, benefits, side effects, and alternative treatment options. After discussion, patient agrees to treatment.    Possible side effects of Paxlovid?    Possible side effects of Paxlovid are:  - Liver Problems. Notify us right away if you start to experience loss of appetite, yellowing of your skin and the whites of eyes (jaundice), dark-colored urine, pale colored stools and itchy skin, stomach area (abdominal) pain.  - Resistance to HIV Medicines. If you have untreated HIV infection, Paxlovid may lead to some HIV medicines not working as well in the future.  - Other possible side effects include: altered sense of taste, diarrhea, high blood pressure, or muscle aches.    I have spent a total time of 12 minutes on the day of the encounter for this patient including discussing diagnostic results, discussing prognosis, risks and benefits of treatment options, instructions for management, patient and family education, importance of treatment  compliance, risk factor reductions, impressions, counseling/coordination of care, documenting in the medical record, reviewing/ordering tests, medicine, procedures, obtaining or reviewing history and communicating with other healthcare professionals.          Encounter provider: Luis Lozoya MD     Provider located at: [place] [address] [address] [facility] [address]     Recent Visits  Date Type Provider Dept   06/24/24 Office Visit Luis Lozoya MD [facility]   Showing recent visits within past 7 days and meeting all other requirements  Today's Visits  Date Type Provider Dept   07/01/24 Telemedicine Luis Lozoya MD [facility]   Showing today's visits and meeting all other requirements  Future Appointments  No visits were found meeting these conditions.  Showing future appointments within next 150 days and meeting all other requirements    History of Present Illness      This virtual check-in was done via [employer] and patient was informed that this is a secure, HIPAA-compliant platform. He agrees to proceed.    Patient agrees to participate in a virtual check in via telephone or video visit instead of presenting to the office to address urgent/immediate medical needs. Patient is aware this is a billable service. He acknowledged consent and understanding of privacy and security of the video platform. The patient has agreed to participate and understands they can discontinue the visit at any time.    After connecting through [employer], the patient was identified by name and date of birth. Amando Mace was informed that this was a telemedicine visit and that the exam was being conducted confidentially over secure lines. My office door was closed. No one else was in the room. Amando Mace acknowledged consent and understanding of privacy and security of the telemedicine visit. I informed the patient that I have reviewed  "his record in Epic and presented the opportunity for him to ask any questions regarding the visit today. The patient agreed to participate.     Verification of patient location:  Patient is located in the following state in which I hold an active license: [place]    Subjective:   Amando Mace is a 73 y.o. male who has been screened for COVID-19. Symptom change since last report: improving. Patient's symptoms include fever, chills, fatigue, malaise, nasal congestion, cough, shortness of breath, nausea, vomiting, myalgias and headache. Patient denies rhinorrhea, sore throat, anosmia, loss of taste, chest tightness, abdominal pain and diarrhea.     - Date of symptom onset: 6/28/2024  - Date of exposure: 6/27/2024  - Date of positive COVID-19 test: 7/1/2024. Type of test: Home antigen.     COVID-19 vaccination status: Fully vaccinated with booster    Amando has been staying home and has isolated themselves in his home. He is taking care to not share personal items and is cleaning all surfaces that are touched often, like counters, tabletops, and doorknobs using household cleaning sprays or wipes. He is wearing a mask when he leaves his room.     As above. Day #3 of COVID symptoms    No results found for: \"SARSCOV2\", \"[id number]\", \"SARSCORONAVI\", \"CORONAVIRUSR\", \"SARSCOVAG\", \"SARSCOVAGH\"    Review of Systems   Constitutional:  Positive for chills, fatigue and fever.   HENT:  Positive for congestion. Negative for rhinorrhea and sore throat.    Respiratory:  Positive for cough and shortness of breath. Negative for chest tightness.    Gastrointestinal:  Positive for nausea and vomiting. Negative for abdominal pain and diarrhea.   Musculoskeletal:  Positive for myalgias.   Neurological:  Positive for headaches.     Objective     There were no vitals taken for this visit.    Physical Exam  Constitutional:       Appearance: Normal appearance.   HENT:      Head: Normocephalic.      Nose: No congestion.      Mouth/Throat:      " Mouth: Mucous membranes are moist.   Eyes:      Conjunctiva/sclera: Conjunctivae normal.   Pulmonary:      Effort: Pulmonary effort is normal. No respiratory distress.   Musculoskeletal:      Cervical back: Normal range of motion.   Neurological:      Mental Status: He is alert and oriented to person, place, and time.

## 2024-07-01 NOTE — TELEPHONE ENCOUNTER
POSITIVE FOR COVID THIS A.M.  Had a fever of 102, now 100, slight cough, bodyaches, headache.  Wife was positive on Friday 6/28/24.  Wife wants to know how to proceed for her .  Please call at [phone].

## 2024-07-05 ENCOUNTER — TELEPHONE (OUTPATIENT)
Age: 74
End: 2024-07-05

## 2024-07-05 NOTE — PROGRESS NOTES
Daily Note     Today's date: 2019  Patient name: Karen Parikh  : 1950  MRN: [id number]  Referring provider: Toni Carlos MD  Dx:   Encounter Diagnosis     ICD-10-CM    1  Primary osteoarthritis, left shoulder M19 012                   Subjective:  Pt offers no complaints prior to treatment  Objective: See treatment diary below  Precautions: cardiac stent, hypertension      Manual                                                                                   Exercise Diary  11/21 11/26 12/3 12/10 12/17 12/23       pulleys nv 5 min 5 min 5 min 5 min 5 min       Doorway pec stretch 30"x3 30"x3 30"x3 30"x3 30"x3 30"x3       Wall slides 10" x10 10" x10 10" x10 10" x10 10" x10 10" x10       TB rows GTB 5"x20 GTB 5"x20 GTB 5"x20 GTB 5"x20 GTB 5"x20 GTB 5"x20       Tb extension nv GTB 5"x20 GTB 5"x20 GTB 5"x20 GTB 5"x20 GTB 5"x20       Towel IR stretch nv 10"x 10 10" x10 10" x10 np np       Doorway ER stretch nv 30"x3 30"x3 30"x3 30"x3 30"x3       TB ER nv RTB x20 RTB x20 RTB x20 RTB x20 RTB x20       TB IR  RTB x20 RTB x20 RTB x20 RTB x20 RTB       Supine active flex    x20 x20 x20       sidelying active abd    x20 x20 x20       sidelying ER    x20 x20 x20       Serratus punches     x20 x20                                                                                                      Modalities                                                                Assessment: Tolerated treatment well  Patient demonstrated fatigue post treatment, exhibited good technique with therapeutic exercises and would benefit from continued PT No issues or complaints throughout treatment  Plan: Continue per plan of care  Plan to d/c next visit  I will send in a request for a scooter device-this has to be done through physical medicine rehab.  I will send in a request.  Not likely to be done by the end of the month.    These mobility scooters only approve if need them for your activities of daily living which include bathing you in fixing meals

## 2024-07-05 NOTE — TELEPHONE ENCOUNTER
Ermias called in to let Dr Craft know that he is feeling a lot better today. He has one more day on his medicine and would like to know what day would he be able to go about in public. Please Advise Thank you HE did have his wife come on the line and update on her symptoms as well.

## 2024-08-07 ENCOUNTER — IOP CHECK (OUTPATIENT)
Dept: URBAN - METROPOLITAN AREA CLINIC 6 | Facility: CLINIC | Age: 74
End: 2024-08-07

## 2024-08-07 DIAGNOSIS — H40.023: ICD-10-CM

## 2024-08-07 PROCEDURE — 65855 TRABECULOPLASTY LASER SURG: CPT

## 2024-08-07 PROCEDURE — 92012 INTRM OPH EXAM EST PATIENT: CPT | Mod: 25

## 2024-08-07 ASSESSMENT — VISUAL ACUITY
OD_CC: 20/40-1
OS_CC: 20/25-2
OU_CC: J1+
OD_PH: 20/25+1

## 2024-08-07 ASSESSMENT — TONOMETRY
OS_IOP_MMHG: 17
OD_IOP_MMHG: 24
OD_IOP_MMHG: 23
OS_IOP_MMHG: 17

## 2024-08-27 ENCOUNTER — IOP CHECK (OUTPATIENT)
Dept: URBAN - METROPOLITAN AREA CLINIC 6 | Facility: CLINIC | Age: 74
End: 2024-08-27

## 2024-08-27 DIAGNOSIS — H40.023: ICD-10-CM

## 2024-08-27 PROCEDURE — 92012 INTRM OPH EXAM EST PATIENT: CPT

## 2024-08-27 ASSESSMENT — TONOMETRY
OS_IOP_MMHG: 15
OD_IOP_MMHG: 12

## 2024-08-27 ASSESSMENT — VISUAL ACUITY
OS_CC: 20/40
OD_CC: 20/40+2
OU_CC: J1+
OS_PH: 20/25
OD_PH: 20/25

## 2024-10-28 ENCOUNTER — ESTABLISHED COMPREHENSIVE EXAM (OUTPATIENT)
Dept: URBAN - METROPOLITAN AREA CLINIC 6 | Facility: CLINIC | Age: 74
End: 2024-10-28

## 2024-10-28 DIAGNOSIS — H40.023: ICD-10-CM

## 2024-10-28 DIAGNOSIS — H25.813: ICD-10-CM

## 2024-10-28 DIAGNOSIS — H16.223: ICD-10-CM

## 2024-10-28 DIAGNOSIS — H04.123: ICD-10-CM

## 2024-10-28 PROCEDURE — 92014 COMPRE OPH EXAM EST PT 1/>: CPT

## 2024-10-28 PROCEDURE — 92133 CPTRZD OPH DX IMG PST SGM ON: CPT

## 2024-10-28 PROCEDURE — 92202 OPSCPY EXTND ON/MAC DRAW: CPT

## 2024-10-28 ASSESSMENT — TONOMETRY
OD_IOP_MMHG: 18
OS_IOP_MMHG: 23
OD_IOP_MMHG: 19
OS_IOP_MMHG: 17

## 2024-10-28 ASSESSMENT — VISUAL ACUITY
OD_PH: 20/25
OS_CC: 20/25
OU_CC: J1+
OD_CC: 20/40-1

## 2024-10-30 ENCOUNTER — APPOINTMENT (OUTPATIENT)
Dept: LAB | Facility: MEDICAL CENTER | Age: 74
End: 2024-10-30
Payer: COMMERCIAL

## 2024-10-30 DIAGNOSIS — I10 ESSENTIAL HYPERTENSION: ICD-10-CM

## 2024-10-30 LAB
ALBUMIN SERPL BCG-MCNC: 4.3 G/DL (ref 3.5–5)
ALP SERPL-CCNC: 75 U/L (ref 34–104)
ALT SERPL W P-5'-P-CCNC: 16 U/L (ref 7–52)
ANION GAP SERPL CALCULATED.3IONS-SCNC: 5 MMOL/L (ref 4–13)
AST SERPL W P-5'-P-CCNC: 18 U/L (ref 13–39)
BASOPHILS # BLD AUTO: 0.06 THOUSANDS/ΜL (ref 0–0.1)
BASOPHILS NFR BLD AUTO: 1 % (ref 0–1)
BILIRUB SERPL-MCNC: 0.72 MG/DL (ref 0.2–1)
BUN SERPL-MCNC: 15 MG/DL (ref 5–25)
CALCIUM SERPL-MCNC: 9.3 MG/DL (ref 8.4–10.2)
CHLORIDE SERPL-SCNC: 106 MMOL/L (ref 96–108)
CHOLEST SERPL-MCNC: 127 MG/DL
CO2 SERPL-SCNC: 28 MMOL/L (ref 21–32)
CREAT SERPL-MCNC: 0.89 MG/DL (ref 0.6–1.3)
EOSINOPHIL # BLD AUTO: 0.35 THOUSAND/ΜL (ref 0–0.61)
EOSINOPHIL NFR BLD AUTO: 5 % (ref 0–6)
ERYTHROCYTE [DISTWIDTH] IN BLOOD BY AUTOMATED COUNT: 13.2 % (ref 11.6–15.1)
GFR SERPL CREATININE-BSD FRML MDRD: 85 ML/MIN/1.73SQ M
GLUCOSE P FAST SERPL-MCNC: 96 MG/DL (ref 65–99)
HCT VFR BLD AUTO: 45.7 % (ref 36.5–49.3)
HDLC SERPL-MCNC: 53 MG/DL
HGB BLD-MCNC: 15.2 G/DL (ref 12–17)
IMM GRANULOCYTES # BLD AUTO: 0.02 THOUSAND/UL (ref 0–0.2)
IMM GRANULOCYTES NFR BLD AUTO: 0 % (ref 0–2)
LDLC SERPL CALC-MCNC: 55 MG/DL (ref 0–100)
LYMPHOCYTES # BLD AUTO: 1.14 THOUSANDS/ΜL (ref 0.6–4.47)
LYMPHOCYTES NFR BLD AUTO: 15 % (ref 14–44)
MCH RBC QN AUTO: 32.1 PG (ref 26.8–34.3)
MCHC RBC AUTO-ENTMCNC: 33.3 G/DL (ref 31.4–37.4)
MCV RBC AUTO: 97 FL (ref 82–98)
MONOCYTES # BLD AUTO: 0.75 THOUSAND/ΜL (ref 0.17–1.22)
MONOCYTES NFR BLD AUTO: 10 % (ref 4–12)
NEUTROPHILS # BLD AUTO: 5.32 THOUSANDS/ΜL (ref 1.85–7.62)
NEUTS SEG NFR BLD AUTO: 69 % (ref 43–75)
NONHDLC SERPL-MCNC: 74 MG/DL
NRBC BLD AUTO-RTO: 0 /100 WBCS
PLATELET # BLD AUTO: 209 THOUSANDS/UL (ref 149–390)
PMV BLD AUTO: 10.3 FL (ref 8.9–12.7)
POTASSIUM SERPL-SCNC: 4.7 MMOL/L (ref 3.5–5.3)
PROT SERPL-MCNC: 6.9 G/DL (ref 6.4–8.4)
RBC # BLD AUTO: 4.73 MILLION/UL (ref 3.88–5.62)
SODIUM SERPL-SCNC: 139 MMOL/L (ref 135–147)
TRIGL SERPL-MCNC: 97 MG/DL
WBC # BLD AUTO: 7.64 THOUSAND/UL (ref 4.31–10.16)

## 2024-10-30 PROCEDURE — 80061 LIPID PANEL: CPT

## 2024-10-30 PROCEDURE — 36415 COLL VENOUS BLD VENIPUNCTURE: CPT

## 2024-10-30 PROCEDURE — 85025 COMPLETE CBC W/AUTO DIFF WBC: CPT

## 2024-10-30 PROCEDURE — 80053 COMPREHEN METABOLIC PANEL: CPT

## 2024-12-16 ENCOUNTER — RA CDI HCC (OUTPATIENT)
Dept: OTHER | Facility: HOSPITAL | Age: 74
End: 2024-12-16

## 2024-12-30 ENCOUNTER — OFFICE VISIT (OUTPATIENT)
Dept: FAMILY MEDICINE CLINIC | Facility: CLINIC | Age: 74
End: 2024-12-30
Payer: COMMERCIAL

## 2024-12-30 ENCOUNTER — APPOINTMENT (OUTPATIENT)
Dept: RADIOLOGY | Facility: MEDICAL CENTER | Age: 74
End: 2024-12-30
Payer: COMMERCIAL

## 2024-12-30 VITALS
SYSTOLIC BLOOD PRESSURE: 136 MMHG | OXYGEN SATURATION: 97 % | DIASTOLIC BLOOD PRESSURE: 86 MMHG | BODY MASS INDEX: 28.88 KG/M2 | HEART RATE: 63 BPM | WEIGHT: 195 LBS | TEMPERATURE: 97.3 F | HEIGHT: 69 IN

## 2024-12-30 DIAGNOSIS — M25.551 PAIN OF RIGHT HIP: ICD-10-CM

## 2024-12-30 DIAGNOSIS — Z00.00 MEDICARE ANNUAL WELLNESS VISIT, SUBSEQUENT: Primary | ICD-10-CM

## 2024-12-30 DIAGNOSIS — I10 ESSENTIAL HYPERTENSION: ICD-10-CM

## 2024-12-30 DIAGNOSIS — E78.2 MIXED HYPERLIPIDEMIA: ICD-10-CM

## 2024-12-30 DIAGNOSIS — I25.10 ATHEROSCLEROSIS OF NATIVE CORONARY ARTERY OF NATIVE HEART WITHOUT ANGINA PECTORIS: ICD-10-CM

## 2024-12-30 PROCEDURE — G0439 PPPS, SUBSEQ VISIT: HCPCS | Performed by: FAMILY MEDICINE

## 2024-12-30 PROCEDURE — 73522 X-RAY EXAM HIPS BI 3-4 VIEWS: CPT

## 2024-12-30 PROCEDURE — 99214 OFFICE O/P EST MOD 30 MIN: CPT | Performed by: FAMILY MEDICINE

## 2024-12-30 PROCEDURE — G0444 DEPRESSION SCREEN ANNUAL: HCPCS | Performed by: FAMILY MEDICINE

## 2024-12-30 NOTE — ASSESSMENT & PLAN NOTE
Overdue to see Cardio.  No new complaints and exam remains unremarkable. F.u with Cardio.  Recheck 6m

## 2024-12-30 NOTE — PROGRESS NOTES
Name: Amando Mace      : 1950      MRN: [id number]  Encounter Provider: Luis Lozoya MD  Encounter Date: 2024   Encounter department: [facility] Plan  Medicare annual wellness visit, subsequent         Pain of right hip  I reviewed with pt. Exam suggests worsening arthritis, and pt feels that it is time to discuss replacement.  Check XR. Refer to ortho  Orders:  •  XR hips bilateral 3-4 vw w pelvis if performed; Future  •  Ambulatory Referral to Orthopedic Surgery; Future    Essential hypertension  Well controlled. Cont present treatment. Monitor labs. Recheck 6m         Mixed hyperlipidemia  LDL at goal on labs done this past summer. Continue present care.  F/u with Cardio       Atherosclerosis of native coronary artery of native heart without angina pectoris  Overdue to see Cardio.  No new complaints and exam remains unremarkable. F.u with Cardio.  Recheck 6m          Preventive health issues were discussed with patient, and age appropriate screening tests were ordered as noted in patient's After Visit Summary. Personalized health advice and appropriate referrals for health education or preventive services given if needed, as noted in patient's After Visit Summary.    History of Present Illness     f/u multiple med issues and AWV  - pt states that he is doing well  - pt notes that he is having increased R hip pain.  Has seen ortho in the past, had injections, and has been to PT, but now thinks that he should get it replaced.  XR in  showed mod-severe bilat OA.  - pt is due to see Cardio.  Pt denies CP, palpitations, lightheadedness or other CV symptoms with or without exertion  - pt denies any new GI or  complaints. Pt up to date with Uro. Silodosin still effective  - AWV done       Patient Care Team:  Luis Lozoya MD as PCP - General  Luis Lozoya MD as PCP - PCP-[facility] Attributed-Ritesh Lehman  MD Pebbles Prado MD David Alan Prager, MD Loveleen K Sidhu, MD as Endoscopist    Review of Systems   Constitutional: Negative.    HENT: Negative.     Eyes: Negative.    Respiratory: Negative.     Cardiovascular: Negative.    Gastrointestinal: Negative.    Endocrine: Negative.    Genitourinary:  Positive for frequency. Negative for difficulty urinating and urgency.   Musculoskeletal:  Positive for arthralgias and gait problem. Negative for back pain and myalgias.   Skin: Negative.    Allergic/Immunologic: Negative.    Hematological: Negative.    Psychiatric/Behavioral: Negative.       Medical History Reviewed by provider this encounter:  Tobacco  Allergies  Meds  Problems  Med Hx  Surg Hx  Fam Hx       Annual Wellness Visit Questionnaire   Amando is here for his Subsequent Wellness visit. Last Medicare Wellness visit information reviewed, patient interviewed and updates made to the record today.      Health Risk Assessment:   Patient rates overall health as good. Patient feels that their physical health rating is same. Patient is satisfied with their life. Eyesight was rated as slightly worse. Hearing was rated as same. Patient feels that their emotional and mental health rating is slightly worse. Patients states they are never, rarely angry. Patient states they are sometimes unusually tired/fatigued. Pain experienced in the last 7 days has been some. Patient's pain rating has been 4/10. Patient states that he has experienced no weight loss or gain in last 6 months.     Depression Screening:   PHQ-2 Score: 2      Fall Risk Screening:   In the past year, patient has experienced: no history of falling in past year      Home Safety:  Patient does not have trouble with stairs inside or outside of their home. Patient has working smoke alarms and has working carbon monoxide detector. Home safety hazards include: none.     Nutrition:   Current diet is Regular.     Medications:   Patient is currently  taking over-the-counter supplements. OTC medications include: see medication list. Patient is able to manage medications.     Activities of Daily Living (ADLs)/Instrumental Activities of Daily Living (IADLs):   Walk and transfer into and out of bed and chair?: Yes  Dress and groom yourself?: Yes    Bathe or shower yourself?: Yes    Feed yourself? Yes  Do your laundry/housekeeping?: Yes  Manage your money, pay your bills and track your expenses?: Yes  Make your own meals?: Yes    Do your own shopping?: Yes    Previous Hospitalizations:   Any hospitalizations or ED visits within the last 12 months?: Yes    How many hospitalizations have you had in the last year?: 1-2    Hospitalization Comments: Hand surgery    Advance Care Planning:   Living will: Yes    Durable POA for healthcare: Yes    Advanced directive: Yes    Advanced directive counseling given: Yes      Cognitive Screening:   Provider or family/friend/caregiver concerned regarding cognition?: No    PREVENTIVE SCREENINGS      Cardiovascular Screening:    General: Screening Not Indicated and History Lipid Disorder      Diabetes Screening:     General: Screening Current      Colorectal Cancer Screening:     General: Screening Current      Prostate Cancer Screening:    General: Screening Current      Osteoporosis Screening:    General: Screening Not Indicated      Abdominal Aortic Aneurysm (AAA) Screening:    Risk factors include: age between 65-76 yo        Lung Cancer Screening:     General: Screening Not Indicated      Hepatitis C Screening:    General: Screening Current    Screening, Brief Intervention, and Referral to Treatment (SBIRT)    Screening  Typical number of drinks in a day: 3  Typical number of drinks in a week: 4  Interpretation: Low risk drinking behavior.    AUDIT-C Screenin) How often did you have a drink containing alcohol in the past year? 2 to 3 times a week  2) How many drinks did you have on a typical day when you were drinking in the  "past year? 1 to 2  3) How often did you have 6 or more drinks on one occasion in the past year? never    AUDIT-C Score: 3  Interpretation: Score 0-3 (male): Negative screen for alcohol misuse    Single Item Drug Screening:  How often have you used an illegal drug (including marijuana) or a prescription medication for non-medical reasons in the past year? never    Single Item Drug Screen Score: 0  Interpretation: Negative screen for possible drug use disorder    Time Spent  Time spent screening/evaluating the patient for alcohol misuse: 1 minutes.     Annual Depression Screening  Time spent screening and evaluating the patient for depression during today's encounter was 5 minutes.    Other Counseling Topics:   Regular weightbearing exercise and calcium and vitamin D intake.     Social Drivers of Health     Financial Resource Strain: Low Risk  (12/22/2023)    Overall Financial Resource Strain (CARDIA)    • Difficulty of Paying Living Expenses: Not hard at all   Food Insecurity: No Food Insecurity (12/29/2024)    Hunger Vital Sign    • Worried About Running Out of Food in the Last Year: Never true    • Ran Out of Food in the Last Year: Never true   Transportation Needs: No Transportation Needs (12/29/2024)    PRAPARE - Transportation    • Lack of Transportation (Medical): No    • Lack of Transportation (Non-Medical): No   Housing Stability: Low Risk  (12/29/2024)    Housing Stability Vital Sign    • Unable to Pay for Housing in the Last Year: No    • Number of Times Moved in the Last Year: 0    • Homeless in the Last Year: No   Utilities: Not At Risk (12/29/2024)    [facility] Utilities    • Threatened with loss of utilities: No     No results found.    Objective   /86 (BP Location: Left arm, Patient Position: Sitting)   Pulse 63   Temp (!) 97.3 °F (36.3 °C)   Ht 5' 9\" (1.753 m)   Wt 88.5 kg (195 lb)   SpO2 97%   BMI 28.80 kg/m²     Physical Exam  Vitals reviewed.   Constitutional:       Appearance: He is " well-developed.   HENT:      Head: Normocephalic and atraumatic.      Right Ear: Tympanic membrane, ear canal and external ear normal.      Left Ear: Tympanic membrane, ear canal and external ear normal.      Nose: Nose normal.      Mouth/Throat:      Mouth: Mucous membranes are moist.   Eyes:      Extraocular Movements: Extraocular movements intact.      Conjunctiva/sclera: Conjunctivae normal.      Pupils: Pupils are equal, round, and reactive to light.   Neck:      Thyroid: No thyromegaly.      Vascular: No JVD.   Cardiovascular:      Rate and Rhythm: Normal rate and regular rhythm.      Pulses: Normal pulses.      Heart sounds: Normal heart sounds. No murmur heard.  Pulmonary:      Effort: Pulmonary effort is normal. No respiratory distress.      Breath sounds: Normal breath sounds. No wheezing or rales.   Abdominal:      General: Bowel sounds are normal. There is no distension.      Palpations: Abdomen is soft. There is no mass.      Tenderness: There is no abdominal tenderness.   Musculoskeletal:         General: Tenderness present. No swelling or deformity. Normal range of motion.      Cervical back: Normal range of motion and neck supple. No tenderness. No muscular tenderness.      Right lower leg: No edema.      Left lower leg: No edema.      Comments: R hip discomfort with abduction and internal rotation.  Decreased range of motion noted on exam.  Greater trochanter nontender.   Lymphadenopathy:      Cervical: No cervical adenopathy.   Skin:     General: Skin is warm.      Capillary Refill: Capillary refill takes less than 2 seconds.   Neurological:      Mental Status: He is alert and oriented to person, place, and time.      Cranial Nerves: No cranial nerve deficit.      Sensory: No sensory deficit.      Motor: No weakness or abnormal muscle tone.      Gait: Gait normal.   Psychiatric:         Mood and Affect: Mood normal.         Behavior: Behavior normal.         Thought Content: Thought content normal.          Judgment: Judgment normal.      Comments: PHQ-2/9 Depression Screening    Little interest or pleasure in doing things: 1 - several days  Feeling down, depressed, or hopeless: 1 - several days  PHQ-2 Score: 2  PHQ-2 Interpretation: Negative depression screen

## 2024-12-30 NOTE — PATIENT INSTRUCTIONS
Medicare Preventive Visit Patient Instructions  Thank you for completing your Welcome to Medicare Visit or Medicare Annual Wellness Visit today. Your next wellness visit will be due in one year (12/31/2025).  The screening/preventive services that you may require over the next 5-10 years are detailed below. Some tests may not apply to you based off risk factors and/or age. Screening tests ordered at today's visit but not completed yet may show as past due. Also, please note that scanned in results may not display below.  Preventive Screenings:  Service Recommendations Previous Testing/Comments   Colorectal Cancer Screening  Colonoscopy    Fecal Occult Blood Test (FOBT)/Fecal Immunochemical Test (FIT)  Fecal DNA/Cologuard Test  Flexible Sigmoidoscopy Age: 45-75 years old   Colonoscopy: every 10 years (May be performed more frequently if at higher risk)  OR  FOBT/FIT: every 1 year  OR  Cologuard: every 3 years  OR  Sigmoidoscopy: every 5 years  Screening may be recommended earlier than age 45 if at higher risk for colorectal cancer. Also, an individualized decision between you and your healthcare provider will decide whether screening between the ages of 76-85 would be appropriate. Colonoscopy: 05/24/2024  FOBT/FIT: Not on file  Cologuard: Not on file  Sigmoidoscopy: Not on file    Screening Current     Prostate Cancer Screening Individualized decision between patient and health care provider in men between ages of 55-69   Medicare will cover every 12 months beginning on the day after your 50th birthday PSA: 0.31 ng/mL     Screening Current     Hepatitis C Screening Once for adults born between 1945 and 1965  More frequently in patients at high risk for Hepatitis C Hep C Antibody: 01/13/2022    Screening Current   Diabetes Screening 1-2 times per year if you're at risk for diabetes or have pre-diabetes Fasting glucose: 96 mg/dL (10/30/2024)  A1C: No results in last 5 years (No results in last 5 years)  Screening  Current   Cholesterol Screening Once every 5 years if you don't have a lipid disorder. May order more often based on risk factors. Lipid panel: 10/30/2024  Screening Not Indicated  History Lipid Disorder      Other Preventive Screenings Covered by Medicare:  Abdominal Aortic Aneurysm (AAA) Screening: covered once if your at risk. You're considered to be at risk if you have a family history of AAA or a male between the age of 65-75 who smoking at least 100 cigarettes in your lifetime.  Lung Cancer Screening: covers low dose CT scan once per year if you meet all of the following conditions: (1) Age 55-77; (2) No signs or symptoms of lung cancer; (3) Current smoker or have quit smoking within the last 15 years; (4) You have a tobacco smoking history of at least 20 pack years (packs per day x number of years you smoked); (5) You get a written order from a healthcare provider.  Glaucoma Screening: covered annually if you're considered high risk: (1) You have diabetes OR (2) Family history of glaucoma OR (3)  aged 50 and older OR (4)  American aged 65 and older  Osteoporosis Screening: covered every 2 years if you meet one of the following conditions: (1) Have a vertebral abnormality; (2) On glucocorticoid therapy for more than 3 months; (3) Have primary hyperparathyroidism; (4) On osteoporosis medications and need to assess response to drug therapy.  HIV Screening: covered annually if you're between the age of 15-65. Also covered annually if you are younger than 15 and older than 65 with risk factors for HIV infection. For pregnant patients, it is covered up to 3 times per pregnancy.    Immunizations:  Immunization Recommendations   Influenza Vaccine Annual influenza vaccination during flu season is recommended for all persons aged >= 6 months who do not have contraindications   Pneumococcal Vaccine   * Pneumococcal conjugate vaccine = PCV13 (Prevnar 13), PCV15 (Vaxneuvance), PCV20 (Prevnar 20)  *  Pneumococcal polysaccharide vaccine = PPSV23 (Pneumovax) Adults 19-65 yo with certain risk factors or if 65+ yo  If never received any pneumonia vaccine: recommend Prevnar 20 (PCV20)  Give PCV20 if previously received 1 dose of PCV13 or PPSV23   Hepatitis B Vaccine 3 dose series if at intermediate or high risk (ex: diabetes, end stage renal disease, liver disease)   Respiratory syncytial virus (RSV) Vaccine - COVERED BY MEDICARE PART D  * RSVPreF3 (Arexvy) CDC recommends that adults 60 years of age and older may receive a single dose of RSV vaccine using shared clinical decision-making (SCDM)   Tetanus (Td) Vaccine - COST NOT COVERED BY MEDICARE PART B Following completion of primary series, a booster dose should be given every 10 years to maintain immunity against tetanus. Td may also be given as tetanus wound prophylaxis.   Tdap Vaccine - COST NOT COVERED BY MEDICARE PART B Recommended at least once for all adults. For pregnant patients, recommended with each pregnancy.   Shingles Vaccine (Shingrix) - COST NOT COVERED BY MEDICARE PART B  2 shot series recommended in those 19 years and older who have or will have weakened immune systems or those 50 years and older     Health Maintenance Due:      Topic Date Due   • Colorectal Cancer Screening  05/23/2029   • Hepatitis C Screening  Completed     Immunizations Due:  There are no preventive care reminders to display for this patient.  Advance Directives   What are advance directives?  Advance directives are legal documents that state your wishes and plans for medical care. These plans are made ahead of time in case you lose your ability to make decisions for yourself. Advance directives can apply to any medical decision, such as the treatments you want, and if you want to donate organs.   What are the types of advance directives?  There are many types of advance directives, and each state has rules about how to use them. You may choose a combination of any of the  following:  Living will:  This is a written record of the treatment you want. You can also choose which treatments you do not want, which to limit, and which to stop at a certain time. This includes surgery, medicine, IV fluid, and tube feedings.   Durable power of  for healthcare (DPAHC):  This is a written record that states who you want to make healthcare choices for you when you are unable to make them for yourself. This person, called a proxy, is usually a family member or a friend. You may choose more than 1 proxy.  Do not resuscitate (DNR) order:  A DNR order is used in case your heart stops beating or you stop breathing. It is a request not to have certain forms of treatment, such as CPR. A DNR order may be included in other types of advance directives.  Medical directive:  This covers the care that you want if you are in a coma, near death, or unable to make decisions for yourself. You can list the treatments you want for each condition. Treatment may include pain medicine, surgery, blood transfusions, dialysis, IV or tube feedings, and a ventilator (breathing machine).  Values history:  This document has questions about your views, beliefs, and how you feel and think about life. This information can help others choose the care that you would choose.  Why are advance directives important?  An advance directive helps you control your care. Although spoken wishes may be used, it is better to have your wishes written down. Spoken wishes can be misunderstood, or not followed. Treatments may be given even if you do not want them. An advance directive may make it easier for your family to make difficult choices about your care.   Weight Management   Why it is important to manage your weight:  Being overweight increases your risk of health conditions such as heart disease, high blood pressure, type 2 diabetes, and certain types of cancer. It can also increase your risk for osteoarthritis, sleep apnea, and  other respiratory problems. Aim for a slow, steady weight loss. Even a small amount of weight loss can lower your risk of health problems.  How to lose weight safely:  A safe and healthy way to lose weight is to eat fewer calories and get regular exercise. You can lose up about 1 pound a week by decreasing the number of calories you eat by 500 calories each day.   Healthy meal plan for weight management:  A healthy meal plan includes a variety of foods, contains fewer calories, and helps you stay healthy. A healthy meal plan includes the following:  Eat whole-grain foods more often.  A healthy meal plan should contain fiber. Fiber is the part of grains, fruits, and vegetables that is not broken down by your body. Whole-grain foods are healthy and provide extra fiber in your diet. Some examples of whole-grain foods are whole-wheat breads and pastas, oatmeal, brown rice, and bulgur.  Eat a variety of vegetables every day.  Include dark, leafy greens such as spinach, kale, anabell greens, and mustard greens. Eat yellow and orange vegetables such as carrots, sweet potatoes, and winter squash.   Eat a variety of fruits every day.  Choose fresh or canned fruit (canned in its own juice or light syrup) instead of juice. Fruit juice has very little or no fiber.  Eat low-fat dairy foods.  Drink fat-free (skim) milk or 1% milk. Eat fat-free yogurt and low-fat cottage cheese. Try low-fat cheeses such as mozzarella and other reduced-fat cheeses.  Choose meat and other protein foods that are low in fat.  Choose beans or other legumes such as split peas or lentils. Choose fish, skinless poultry (chicken or turkey), or lean cuts of red meat (beef or pork). Before you cook meat or poultry, cut off any visible fat.   Use less fat and oil.  Try baking foods instead of frying them. Add less fat, such as margarine, sour cream, regular salad dressing and mayonnaise to foods. Eat fewer high-fat foods. Some examples of high-fat foods  include french fries, doughnuts, ice cream, and cakes.  Eat fewer sweets.  Limit foods and drinks that are high in sugar. This includes candy, cookies, regular soda, and sweetened drinks.  Exercise:  Exercise at least 30 minutes per day on most days of the week. Some examples of exercise include walking, biking, dancing, and swimming. You can also fit in more physical activity by taking the stairs instead of the elevator or parking farther away from stores. Ask your healthcare provider about the best exercise plan for you.      © Copyright [employer] 2018 Information is for End User's use only and may not be sold, redistributed or otherwise used for commercial purposes. All illustrations and images included in CareNotes® are the copyrighted property of [employer]M., Inc. or [employer]

## 2025-01-02 ENCOUNTER — RESULTS FOLLOW-UP (OUTPATIENT)
Dept: FAMILY MEDICINE CLINIC | Facility: CLINIC | Age: 75
End: 2025-01-02

## 2025-01-06 ENCOUNTER — CLINICAL SUPPORT (OUTPATIENT)
Dept: FAMILY MEDICINE CLINIC | Facility: CLINIC | Age: 75
End: 2025-01-06
Payer: COMMERCIAL

## 2025-01-06 DIAGNOSIS — R05.8 PRODUCTIVE COUGH: Primary | ICD-10-CM

## 2025-01-06 DIAGNOSIS — R68.89 FLU-LIKE SYMPTOMS: Primary | ICD-10-CM

## 2025-01-06 LAB
SARS-COV-2 AG UPPER RESP QL IA: NEGATIVE
SL AMB POCT RAPID FLU A: NORMAL
SL AMB POCT RAPID FLU B: NORMAL
VALID CONTROL: NORMAL

## 2025-01-06 PROCEDURE — 87811 SARS-COV-2 COVID19 W/OPTIC: CPT

## 2025-01-06 PROCEDURE — 87804 INFLUENZA ASSAY W/OPTIC: CPT

## 2025-01-06 RX ORDER — AZITHROMYCIN 250 MG/1
TABLET, FILM COATED ORAL
Qty: 6 TABLET | Refills: 0 | Status: SHIPPED | OUTPATIENT
Start: 2025-01-06 | End: 2025-01-10

## 2025-01-06 RX ORDER — BENZONATATE 200 MG/1
200 CAPSULE ORAL 3 TIMES DAILY PRN
Qty: 30 CAPSULE | Refills: 0 | Status: SHIPPED | OUTPATIENT
Start: 2025-01-06

## 2025-01-21 DIAGNOSIS — N40.1 BENIGN PROSTATIC HYPERPLASIA WITH INCOMPLETE BLADDER EMPTYING: ICD-10-CM

## 2025-01-21 DIAGNOSIS — R39.14 BENIGN PROSTATIC HYPERPLASIA WITH INCOMPLETE BLADDER EMPTYING: ICD-10-CM

## 2025-01-21 RX ORDER — SILODOSIN 8 MG/1
1 CAPSULE ORAL
Qty: 90 CAPSULE | Refills: 1 | Status: SHIPPED | OUTPATIENT
Start: 2025-01-21

## 2025-02-04 ENCOUNTER — OFFICE VISIT (OUTPATIENT)
Dept: OBGYN CLINIC | Facility: CLINIC | Age: 75
End: 2025-02-04
Payer: COMMERCIAL

## 2025-02-04 VITALS — BODY MASS INDEX: 28.14 KG/M2 | HEIGHT: 69 IN | WEIGHT: 190 LBS

## 2025-02-04 DIAGNOSIS — M16.11 PRIMARY OSTEOARTHRITIS OF ONE HIP, RIGHT: Primary | ICD-10-CM

## 2025-02-04 DIAGNOSIS — M16.12 PRIMARY OSTEOARTHRITIS OF ONE HIP, LEFT: ICD-10-CM

## 2025-02-04 DIAGNOSIS — M25.551 PAIN OF RIGHT HIP: ICD-10-CM

## 2025-02-04 PROCEDURE — 99214 OFFICE O/P EST MOD 30 MIN: CPT | Performed by: ORTHOPAEDIC SURGERY

## 2025-02-04 RX ORDER — ACETAMINOPHEN 325 MG/1
975 TABLET ORAL ONCE
OUTPATIENT
Start: 2025-02-04 | End: 2025-02-04

## 2025-02-04 RX ORDER — SODIUM CHLORIDE, SODIUM LACTATE, POTASSIUM CHLORIDE, CALCIUM CHLORIDE 600; 310; 30; 20 MG/100ML; MG/100ML; MG/100ML; MG/100ML
125 INJECTION, SOLUTION INTRAVENOUS CONTINUOUS
OUTPATIENT
Start: 2025-02-04

## 2025-02-04 RX ORDER — CHLORHEXIDINE GLUCONATE ORAL RINSE 1.2 MG/ML
15 SOLUTION DENTAL ONCE
OUTPATIENT
Start: 2025-02-04 | End: 2025-02-04

## 2025-02-04 RX ORDER — ASCORBIC ACID 500 MG
500 TABLET ORAL 2 TIMES DAILY
Qty: 60 TABLET | Refills: 1 | Status: SHIPPED | OUTPATIENT
Start: 2025-02-04

## 2025-02-04 RX ORDER — CHLORHEXIDINE GLUCONATE 40 MG/ML
SOLUTION TOPICAL DAILY PRN
OUTPATIENT
Start: 2025-02-04

## 2025-02-04 RX ORDER — FOLIC ACID 1 MG/1
1 TABLET ORAL DAILY
Qty: 30 TABLET | Refills: 1 | Status: SHIPPED | OUTPATIENT
Start: 2025-02-04

## 2025-02-04 RX ORDER — FERROUS SULFATE 324(65)MG
324 TABLET, DELAYED RELEASE (ENTERIC COATED) ORAL
Qty: 60 TABLET | Refills: 1 | Status: SHIPPED | OUTPATIENT
Start: 2025-02-04

## 2025-02-04 NOTE — PROGRESS NOTES
Assessment:  Assessment & Plan  Pain of right hip    Orders:    Ambulatory Referral to Orthopedic Surgery    Primary osteoarthritis of one hip, right    The benefits and purpose of the operation and or procedure have been explained to me in language I understand by Dr. Santiago as well the risks, alternatives and complications pertaining to the above procedure/surgery which include but is not limited to : infections, deep vein thrombosis, blood clots to the lungs, wound healing problems, complications, for extensive blood loss, including shock, possible death, leg length discrepancy, limp hip dislocations, fracture, loss of limb, persistent pain, failure of hardware/implant, damage of blood vessels and or nerves, heart attack, stroke and potential need for further surgery/revision surgery.    He wishes to proceed with a right anterior total hip arthroplasty same day at [facility].   He has no hx of DVT/PE and will be on baby aspirin 81mg bid for 5 weeks post op. Then he can drop to his normal dose.   He will be wrapped from toe to thigh for post op swelling.   He will be evaluated by PT prior to leaving to go home and will be in outpatient PT.   He will be seen 2-3 weeks post op for incision check, new xrays and may be on ambulatory devices.   By 3 months post op he will be off any ambulatory devices.      Primary osteoarthritis of one hip, left      Xrays of the bilateral hips were reviewed from 12/30/2024 that noted severe OA of bilateral hip joints.     To do next visit:  Return for Follow up post op.    The above stated was discussed in layman's terms and the patient expressed understanding.  All questions were answered to the patient's satisfaction.       Scribe Attestation      I,:  Debi Espinosa am acting as a scribe while in the presence of the attending physician.:       I,:  Pebbles Santiago MD personally performed the services described in this documentation    as scribed in my presence.:                Subjective:   Amando Mace is a 74 y.o. male who presents today for consultation for his right hip pain referred by his PCP, Dr. Luis Lozoya. He was seen approx. 10 years ago for his hips where he treated conservatively with PT x2. He has pain in the groin at night, worse when he sleeps on the contralateral side. IT can wake him up from sleep. He has slept with a pillow between the legs. He has started limping over the last year but can't tell if he is short.   He has difficulty getting socks on but struggles.       Review of systems negative unless otherwise specified in HPI  Review of Systems   Constitutional:  Negative for chills, fever and unexpected weight change.   HENT:  Negative for hearing loss, nosebleeds and sore throat.    Eyes:  Negative for pain, redness and visual disturbance.   Respiratory:  Negative for cough, shortness of breath and wheezing.    Cardiovascular:  Negative for chest pain, palpitations and leg swelling.   Gastrointestinal:  Negative for abdominal pain, nausea and vomiting.   Endocrine: Negative for polydipsia and polyuria.   Genitourinary:  Negative for dysuria and hematuria.   Musculoskeletal:  Positive for arthralgias, gait problem and myalgias.   Skin:  Negative for rash and wound.   Neurological:  Negative for dizziness, light-headedness and headaches.   Psychiatric/Behavioral:  Negative for decreased concentration, dysphoric mood and suicidal ideas. The patient is not nervous/anxious.        Past Medical History:   Diagnosis Date    Arthritis     Carpal tunnel syndrome on left 02/20/2024    Colon polyps     Cubital tunnel syndrome on left 02/20/2024    GERD (gastroesophageal reflux disease)     Hyperlipidemia     Hypertension     Seasonal allergies     Trigger finger, left index finger 02/20/2024    Trigger middle finger of left hand 05/08/2019       Past Surgical History:   Procedure Laterality Date    BACK SURGERY      laminectomy    CARDIAC SURGERY       cardiac stent    CARPAL TUNNEL RELEASE      COLONOSCOPY      benign polyp removal    ORCHIECTOMY Left     CO ESOPHAGOGASTRODUODENOSCOPY TRANSORAL DIAGNOSTIC N/A 8/29/2017    Procedure: EGD AND COLONOSCOPY;  Surgeon: Talat Park MD;  [facility];  Service: Gastroenterology    CO NEUROPLASTY &/TRANSPOS MEDIAN NRV CARPAL TUNNE Left 2/20/2024    Procedure: RELEASE CARPAL TUNNEL REVISION;  Surgeon: Hair Pollock MD;  [facility];  Service: Orthopedics    CO NEUROPLASTY &/TRANSPOSITION ULNAR NERVE ELBOW Left 2/20/2024    Procedure: RELEASE CUBITAL TUNNEL, FDS RADIAL SLIP HEMITENECTOMY;  Surgeon: Hair Pollock MD;  [facility];  Service: Orthopedics    CO TENDON SHEATH INCISION Left 2/20/2024    Procedure: RELEASE TRIGGER FINGER LEFT INDEX, RELEASE TRIGGER FINGER LEFT LONG;  Surgeon: Hair Pollock MD;  [facility];  Service: Orthopedics    UPPER GASTROINTESTINAL ENDOSCOPY      WRIST SURGERY         Family History   Problem Relation Age of Onset    Diabetes Mother     Hypertension Mother     Stomach cancer Father     Hypertension Father     Liver cancer Father     Cancer Father        Social History     Occupational History    Occupation: part time   Tobacco Use    Smoking status: Never     Passive exposure: Never    Smokeless tobacco: Never   Vaping Use    Vaping status: Never Used   Substance and Sexual Activity    Alcohol use: Yes     Alcohol/week: 2.0 standard drinks of alcohol     Types: 1 Glasses of wine, 1 Cans of beer per week    Drug use: No    Sexual activity: Yes     Partners: Female         Current Outpatient Medications:     acetaminophen (TYLENOL) 650 mg CR tablet, Take 650 mg by mouth daily at bedtime, Disp: , Rfl:     ascorbic acid (VITAMIN C) 500 mg tablet, Take 500 mg by mouth daily, Disp: , Rfl:     aspirin 81 MG tablet, Take 1 tablet (81 mg total) by mouth daily, Disp: 30 tablet, Rfl: 5    benzonatate (TESSALON) 200 MG capsule, Take 1  capsule (200 mg total) by mouth 3 (three) times a day as needed for cough, Disp: 30 capsule, Rfl: 0    Cholecalciferol 2000 units TABS, Take 2,000 Units by mouth, Disp: , Rfl:     clotrimazole-betamethasone (LOTRISONE) 1-0.05 % cream, Apply topically 2 (two) times a day, Disp: 30 g, Rfl: 0    Coenzyme Q10 200 MG capsule, Take 200 mg by mouth daily, Disp: , Rfl:     Diclofenac Sodium (VOLTAREN) 1 %, Apply 2 g topically in the morning and 2 g at noon and 2 g in the evening and 2 g before bedtime., Disp: 2 g, Rfl: 0    Echinacea 125 MG CAPS, Take 1 tablet by mouth 2 (two) times a day, Disp: , Rfl:     famotidine (PEPCID) 40 MG tablet, Take 1 tablet (40 mg total) by mouth daily at bedtime as needed for heartburn, Disp: 90 tablet, Rfl: 1    FIBER SELECT GUMMIES CHEW, Chew 2 (two) times a day, Disp: , Rfl:     fluticasone (FLONASE) 50 mcg/act nasal spray, 1 spray into each nostril daily, Disp: , Rfl:     lisinopril (ZESTRIL) 10 mg tablet, Take 1 tablet (10 mg total) by mouth daily, Disp: 90 tablet, Rfl: 3    Loratadine 10 MG CAPS, Take by mouth if needed, Disp: , Rfl:     melatonin 3 mg, Take 1 tablet by mouth if needed, Disp: , Rfl:     meloxicam (MOBIC) 7.5 mg tablet, Take 7.5 mg by mouth if needed for moderate pain, Disp: , Rfl:     metoprolol succinate (TOPROL-XL) 25 mg 24 hr tablet, Take 12.5 mg by mouth daily, Disp: , Rfl:     multivitamin (THERAGRAN) TABS, Take 1 tablet by mouth daily (Patient not taking: Reported on 5/30/2024), Disp: , Rfl:     Silodosin 8 MG CAPS, TAKE ONE CAPSULE BY MOUTH AT BEDTIME, Disp: 90 capsule, Rfl: 1    simvastatin (ZOCOR) 40 mg tablet, Take 20 mg by mouth daily at bedtime, Disp: , Rfl:     Turmeric (QC Tumeric Complex) 500 MG CAPS, Take by mouth Unknown dosage tumeric with tumerol, Disp: , Rfl:     No Known Allergies       There were no vitals filed for this visit.    Body mass index is 28.06 kg/m².  Wt Readings from Last 3 Encounters:   02/04/25 86.2 kg (190 lb)   12/30/24 88.5 kg (195  "lb)   06/24/24 86.4 kg (190 lb 6.4 oz)       Objective:                    Right Hip Exam     Tenderness   The patient is experiencing tenderness in the anterior (groin).    Range of Motion   Flexion:  80   External rotation:  40   Right hip internal rotation: -10 degrees.     Muscle Strength   Abduction: 5/5   Adduction: 5/5   Flexion: 5/5     Tests   LUIS EDUARDO: positive    Other   Erythema: absent  Sensation: normal  Pulse: present    Comments:  Calf is soft and non tender  + FADIR  +Stinchfield       Left Hip Exam     Tenderness   The patient is experiencing no tenderness.     Range of Motion   Flexion:  90   External rotation:  30   Internal rotation: 20     Muscle Strength   Abduction: 5/5   Adduction: 5/5   Flexion: 5/5     Tests   LUIS EDUARDO: positive    Other   Erythema: absent  Sensation: normal  Pulse: present    Comments:  Calf is soft and non tender        Bilateral, patient is neurologically and vascularly intact    Diagnostics, reviewed and taken today if performed as documented:    None performed      The attending physician has personally reviewed the pertinent films in PACS and interpretation is as follows:  Xrays of the bilateral hips/pelvis 3-4 views from 12/30/2024: severe end stage OA of bilateral hip joints with large osteophyte formation with subchondral sclerosing     Procedures, if performed today:    Procedures    None performed      Portions of the record may have been created with voice recognition software.  Occasional wrong word or \"sound a like\" substitutions may have occurred due to the inherent limitations of voice recognition software.  Read the chart carefully and recognize, using context, where substitutions have occurred.  "

## 2025-02-05 DIAGNOSIS — M16.11 PRIMARY OSTEOARTHRITIS OF ONE HIP, RIGHT: Primary | ICD-10-CM

## 2025-02-05 RX ORDER — MUPIROCIN 20 MG/G
OINTMENT TOPICAL
Qty: 22 G | Refills: 0 | Status: SHIPPED | OUTPATIENT
Start: 2025-02-05

## 2025-02-24 ENCOUNTER — TELEPHONE (OUTPATIENT)
Age: 75
End: 2025-02-24

## 2025-02-24 NOTE — TELEPHONE ENCOUNTER
Caller: Patient     Doctor: Dr. Pollock    Reason for call:     Patient had surgery on left trigger finger  (SX 2/20-5/20/24) he was prescribed a soft thumb brace for his left hand.  It has worn and he is asking for a new brace for his left hand.  He states his wife, Ami has an office visit this week on Thursday at 11:00 am with Dr Guadarrama and is asking if she can pick this up on Thursday.  Please advise the patient.    Call back#: Cell # [phone]

## 2025-02-24 NOTE — TELEPHONE ENCOUNTER
Unfortunately insurance will only cover a brace every 2-3 years. I recommend he order  the same brace from [employer] ( [employer]) as it is cheaper than the self pay price through the office.

## 2025-02-25 ENCOUNTER — TELEPHONE (OUTPATIENT)
Dept: OBGYN CLINIC | Facility: HOSPITAL | Age: 75
End: 2025-02-25

## 2025-02-25 NOTE — TELEPHONE ENCOUNTER
Preoperative Elective Admission Assessment- spoke to patient     EKG/LAB/MRSA SWAB/CXR date: going week of 03/03     Living Situation:    Who does pt live with: spouse  What kind of home: multi-level  How do they enter the home: front  How many levels in home: 2  # of steps to enter home: 2  # of steps to second floor: 15  Are there handrails: Yes  Are there landings: Yes  Sleeping arrangement: first/entry floor initially then to second floor   Where is Bathroom: first and second floor   Where is the tub or shower: tub shower first floor, walk in shower second floor   Toilet height? Concerns for low toilets - low toilet        First Floor Setup:   Is there a bathroom: Yes  Where would pt sleep: recliner     DME: ordering RW; RTS; shower bench. Instructed to bring RW dos  We discussed clearing pathways in the home and making sure there is accessibly to use the walker, for example, removing throw rugs.      Patient's Current Level of Function: Ambulates: Independently    Post-op Caregiver: spouse  Caregiver Name and phone number for Inpatient discharge needs: Ami- wife [phone]  Currently receive any HHC/aides/community supports: No     Post-op Transport: spouse  To/from hospital: spouse  To/from PT 2-3x/week: spouse  Uses community transport now: No     Outpatient Physical Therapy Site:  Site: Bath- wants to switch location to [place]- will send to [facility] to schedule   pre and post-op appts scheduled? No     Medication Management: self  Preferred Pharmacy for Post-op Medications: [id number] - [place] - [address] [[id number]]   Blood Management Vitamin Regimen: Pt confirms taking as prescribed- will start 30 days pre op   Post-op anticoagulant: to be determined by surgical team postoperatively  Has Bactroban for 5 days preop: Yes  Educated on Preoperative Bathing Instructions, and use of Soap for 5 days before surgery.      DC Plan: Pt plans to be discharged home      Barriers  to DC identified preoperatively: none identified    BMI: 28.06    Patient Education:  Pt educated on post-op pain, early mobilization (POD0), LOS goals, OP PT goals, and preoperative bathing. Patient educated that our goal is to appropriately discharge patient based off their post-op function while striving to maintain maximal independence. The goal is to discharge patient to home and for them to attend outpatient physical therapy.    Assigned to care team? Yes

## 2025-03-01 LAB
DME PARACHUTE DELIVERY DATE ACTUAL: NORMAL
DME PARACHUTE DELIVERY DATE REQUESTED: NORMAL
DME PARACHUTE ITEM DESCRIPTION: NORMAL
DME PARACHUTE ORDER STATUS: NORMAL
DME PARACHUTE SUPPLIER NAME: NORMAL
DME PARACHUTE SUPPLIER PHONE: NORMAL

## 2025-03-03 ENCOUNTER — RA CDI HCC (OUTPATIENT)
Dept: OTHER | Facility: HOSPITAL | Age: 75
End: 2025-03-03

## 2025-03-03 NOTE — PROGRESS NOTES
HCC coding opportunities       Chart reviewed, no opportunity found: CHART REVIEWED, NO OPPORTUNITY FOUND   This is a reminder to address (resolve/update/assess) ALL HCC (risk adjustment) codes as found on active problem list for 2025 as patient scores reset to zero JENNIFER.     Patients Insurance     Medicare Insurance: [employer]

## 2025-03-04 NOTE — ASSESSMENT & PLAN NOTE
Stent to LAD 2009  F/b LV cardiology seen 1/7/2025=stable  Does not have cardiac clearance scheduled

## 2025-03-04 NOTE — PROGRESS NOTES
Internal Medicine Pre-Operative Evaluation:     Reason for Visit: Pre-operative Evaluation for Risk Stratification and Optimization    Patient ID: Amando Mace is a 74 y.o. male.     Case: [id number] Date/Time: 03/31/25 1210   Procedure: ARTHROPLASTY HIP TOTAL ANTERIOR (Right: Hip)   Anesthesia type: Choice   Diagnosis: Primary osteoarthritis of one hip, right [M16.11]   Pre-op diagnosis: Primary osteoarthritis of one hip, right [M16.11]   Location:  OR ROOM 01 / [facility]   Surgeons: Pebbles Santiago MD         Recommendations to Proceed withSurgery    Patient is considered to be Low risk for Medium risk procedure.     After evaluation and discussion with patient with emphasis that all surgery has some degree of inherent risk it is acknowledged by patient this risk is Acceptable.    Patient is optimized and may proceed with planned procedure.     Assessment    Pre-operative Medical Evaluation for planned surgery  Recommendations as listed in PLAN section below  Contact surgical nurse  navigator with any questions regarding preoperative plan or schedule.      Assessment & Plan  Preoperative clearance    Localized osteoarthrosis of right hip  Failed outpatient conservative measures  Electing to undergo surgical procedure as stated above    Essential hypertension  Stable   Refer to PAT instructions regarding medication administration the morning of surgery    History of PTCA stent  Stent to LAD 2009  F/b LV cardiology seen 1/7/2025=stable  Does not have cardiac clearance scheduled  Prediabetes  Hgb A1c   Lab Results   Component Value Date    HGBA1C 6.0 (H) 03/05/2025     Recommend following DM diet             Plan:     1. Further preoperative workup as follows:   - none no further testing required may proceed with surgery    2. Preoperative Medication Management Review performed by PAT nursing  NO 3.13.2025    3. Patient requires further consultation with:   No Consults  Required    4. Discharge Planning / Barriers to Discharge  none identified - patients has post discharge therapy plan in place, transportation arranged for discharge day, adequate family support at home to assist with discharge to home.        Subjective:           History of Present Illness:     Amando Mace is a 74 y.o. male who presents to the office today for a preoperative consultation at the request of surgeon. The patient understands this is an elective procedure and not emergent. They are electing to undergo planned procedure with an understanding that all surgery has inherent risk. They have worked with their surgeon and failed conservative treatment measures. Today they present for preoperative risk assessment and recommendations for optimization in preparation for surgery.    Pt seen in center for perioperative medicine for upcoming proposed surgery. They have failed previous conservative measures and have elected surgical intervention.     Pt meets presurgical lab and BMI optimization goals.    Pt has a h/o stent in 2009 and is f/b cardiology, was seen in January and was stable. No active anginal symptoms      Amando Mace has an IN HOSPITAL cardiac risk of RCI RISK CLASS II (1 risk factor, risk of major cardiac compl. appr. 1.3%) based on RCRI calculator    Cardiac Risk Estimation: per the Revised Cardiac Risk Index (Circ. 100:1043, 1999),         Pre-op Exam    Previous history of bleeding disorders or clots?: No  Previous Anesthesia reaction?: No  Prolonged steroid use in the last 6 months?: No    Assessment of Cardiac Risk:   - Unstable or severe angina or MI in the last 6 weeks or history of stent placement in the last year?: No   - Decompensated heart failure (e.g. New onset heart failure, NYHA  Class IV heart failure, or worsening existing heart failure)?: No  - Significant arrhythmias such as high grade AV block, symptomatic ventricular arrhythmia, newly recognized ventricular tachycardia,  "supraventricular tachycardia with resting heart rate >100, or symptomatic bradycardia?: No  - Severe heart valve disease including aortic stenosis or symptomatic mitral stenosis?: No      Pre-operative Risk Factors:  Elevated-risk surgery: No    History of cerebrovascular disease: No    History of ischemic heart disease: Yes    Pre-operative treatment with insulin: No  Pre-operative creatinine >2 mg/dL: No    History of congestive heart failure: No    Duke Activity Status Index (DASI):   DASI Total Score: 18.95  METs: 5.1        ROS: No TIA's or unusual headaches, no dysphagia.  No prolonged cough. No dyspnea or chest pain on exertion.  No abdominal pain, change in bowel habits, black or bloody stools.  No urinary tract or BPH symptoms.  Positive reported pain in arthritic joint. Positive difficulty with gait. No skin rashes or issues.      Objective:    /83 (BP Location: Right arm, Patient Position: Sitting, Cuff Size: Standard)   Pulse 60   Ht 5' 9\" (1.753 m)   Wt 88.9 kg (196 lb)   SpO2 97%   BMI 28.94 kg/m²       General Appearance: no distress, conversive  HEENT: PERRLA, conjuctiva normal; oropharynx clear; mucous membranes moist;   Neck:  Supple, no lymphadenopathy or thyromegaly  Lungs: breath sounds normal, normal respiratory effort, no retractions, expiratory effort normal  CV: normal heart sounds S1/S2, PMI normal   ABD: soft non tender, +BS x4  EXT: DP pulses intact, no lymphadenopathy, no edema  Skin: normal turgor, normal texture, no rash  Psych: affect normal, mood normal  Neuro: AAOx3        The following portions of the patient's history were reviewed and updated as appropriate: allergies, current medications, past family history, past medical history, past social history, past surgical history and problem list.     Past History:       Past Medical History:   Diagnosis Date    Arthritis     Carpal tunnel syndrome on left 02/20/2024    Colon polyps     Cubital tunnel syndrome on left " 02/20/2024    GERD (gastroesophageal reflux disease)     Hyperlipidemia     Hypertension     Seasonal allergies     Trigger finger, left index finger 02/20/2024    Trigger middle finger of left hand 05/08/2019    Past Surgical History:   Procedure Laterality Date    BACK SURGERY      laminectomy    CARDIAC SURGERY      cardiac stent    CARPAL TUNNEL RELEASE      COLONOSCOPY      benign polyp removal    ORCHIECTOMY Left     CT ESOPHAGOGASTRODUODENOSCOPY TRANSORAL DIAGNOSTIC N/A 8/29/2017    Procedure: EGD AND COLONOSCOPY;  Surgeon: Talat Park MD;  [facility];  Service: Gastroenterology    CT NEUROPLASTY &/TRANSPOS MEDIAN NRV CARPAL TUNNE Left 2/20/2024    Procedure: RELEASE CARPAL TUNNEL REVISION;  Surgeon: Hair Pollock MD;  [facility];  Service: Orthopedics    CT NEUROPLASTY &/TRANSPOSITION ULNAR NERVE ELBOW Left 2/20/2024    Procedure: RELEASE CUBITAL TUNNEL, FDS RADIAL SLIP HEMITENECTOMY;  Surgeon: Hair Pollock MD;  [facility];  Service: Orthopedics    CT TENDON SHEATH INCISION Left 2/20/2024    Procedure: RELEASE TRIGGER FINGER LEFT INDEX, RELEASE TRIGGER FINGER LEFT LONG;  Surgeon: Hair Pollock MD;  [facility];  Service: Orthopedics    UPPER GASTROINTESTINAL ENDOSCOPY      WRIST SURGERY            Social History     Tobacco Use    Smoking status: Never     Passive exposure: Never    Smokeless tobacco: Never   Vaping Use    Vaping status: Never Used   Substance Use Topics    Alcohol use: Yes     Alcohol/week: 2.0 standard drinks of alcohol     Types: 1 Glasses of wine, 1 Cans of beer per week    Drug use: No     Family History   Problem Relation Age of Onset    Diabetes Mother     Hypertension Mother     Stomach cancer Father     Hypertension Father     Liver cancer Father     Cancer Father           Allergies:     No Known Allergies     Current Medications:     Current Outpatient Medications   Medication Instructions    acetaminophen  "(TYLENOL) 650 mg, Daily at bedtime    ascorbic acid (VITAMIN C) 500 mg, Daily    aspirin 81 mg, Oral, Daily    Cholecalciferol 2,000 Units    clotrimazole-betamethasone (LOTRISONE) 1-0.05 % cream Topical, 2 times daily    Coenzyme Q10 200 mg, Daily    Diclofenac Sodium (VOLTAREN) 2 g, Topical, 4 times daily    Echinacea 125 MG CAPS 1 tablet, 2 times daily    famotidine (PEPCID) 40 mg, Oral, Daily at bedtime PRN    ferrous sulfate 324 mg, Oral, Daily before breakfast    FIBER SELECT GUMMIES CHEW 2 times daily    fluticasone (FLONASE) 50 mcg/act nasal spray 1 spray, Daily    folic acid (FOLVITE) 1 mg, Oral, Daily    lisinopril (ZESTRIL) 10 mg, Oral, Daily    melatonin 3 mg 1 tablet, As needed    meloxicam (MOBIC) 7.5 mg, As needed    metoprolol succinate (TOPROL-XL) 12.5 mg, Daily    mupirocin (BACTROBAN) 2 % ointment Apply to bilateral nares twice daily 5 days prior to total joint arthroplasty    Silodosin 8 MG CAPS 1 capsule, Oral, Daily at bedtime    simvastatin (ZOCOR) 20 mg, Daily at bedtime    Turmeric (QC Tumeric Complex) 500 MG CAPS Take by mouth Unknown dosage tumeric with tumerol           PRE-OP WORKSHEET DATA    Assessment of Pre-Operative Risks     MLJ Quality Hard Stops:    BMI (<40) : Estimated body mass index is 28.94 kg/m² as calculated from the following:    Height as of this encounter: 5' 9\" (1.753 m).    Weight as of this encounter: 88.9 kg (196 lb).    Hgb ( >11):   Lab Results   Component Value Date    HGB 14.5 03/05/2025    HGB 15.2 10/30/2024    HGB 15.1 10/16/2023       HbA1c (<7.5) :   Lab Results   Component Value Date    HGBA1C 6.0 (H) 03/05/2025       GFR (>60) (Less then 45 = Nephrology consult):    Lab Results   Component Value Date    EGFR 85 03/05/2025    EGFR 85 10/30/2024    EGFR 90 01/11/2024            Pre-Op Data Reviewed:       Laboratory Results: I have personally reviewed the pertinent reports    EKG: I personally reviewed and interpreted available tracings in the electronic " medical record    Encounter Date: 03/05/25   EKG 12 lead   Result Value    Ventricular Rate 59    Atrial Rate 59    DE Interval 188    QRSD Interval 82    QT Interval 422    QTC Interval 418    P Axis 49    QRS Axis 31    T Wave Axis 39    Narrative    Sinus bradycardia with Premature atrial complexes  Septal infarct (cited on or before 11-Jan-2024)  Abnormal ECG  When compared with ECG of 11-Jan-2024 10:48,  Premature atrial complexes are now Present  Confirmed by Court Vernon ([id number]) on 3/6/2025 8:47:09 AM       OLD RECORDS: reviewed old records in the chart review section if EHR on day of visit.    Previous cardiopulmonary studies within the past year:  Echocardiogram: no   Cardiac Catheterization: no  Stress Test: no      Time of visit including pre-visit chart review, visit and post-visit coordination of plan and care , review of pre-surgical lab work, preparation and time spent documenting note in electronic medical record, time spent face-to-face in physical examination answering patient questions by care team 45 minutes             [facility]

## 2025-03-04 NOTE — PATIENT INSTRUCTIONS
Take metoprolol the morning of surgery    BEFORE SURGERY    Contact your surgical nurse navigator or surgical provider with any questions regarding preoperative plan or schedule.  Stop all over the counter supplements, herbal, naturopathic  medications for 1 week prior to surgery UNLESS prescribed by your surgeon  Hold NSAIDS (i.e. advil, alleve, motrin, ibuprofen, celebrex) minimum 5 days prior to surgery  Follow presurgical medication instructions provided by preadmission nursing team reviewed during your presurgery phone call  Strategies for optimizing your surgery through breathing exercises, nutrition and physical activity can be found at [email or web address]  Call [phone] with any presurgical concerns or medications questions or use the messaging feature in your [facility] alan to contact your provider    AFTER SURGERY    Recommend using Tylenol ( acetaminophen ) 1000 mg every eight hours during the first week post discharge along with icing the area for 20 mins every 3-4 hours while awake can be helpful in reducing your need for post operative opioid use. This opioid sparing plan can be used along side your surgeons pain plan.  Use stool softener over the counter (colace) daily after surgery during the first 1-2 weeks to avoid post operative constipation issues  If no bowel movement within 3 days after surgery then use over the counter Miralax in addition to your stool softener   If cleared by your surgical team for activity then early and often walking is encouraged and can be important in prevention of post surgical blood clots. Additionally spend as much time out of bed as possible and allowed by your surgical team  Use your incentive spirometer twice per hour in the first seven days after surgery to help prevent post surgery lung complications and infections  It is very important you follow the instructions from your surgeon regarding any medications for after surgery blood clot prevention. Compliance with  these medications or interventions is very important.  Call [phone] with any post discharge concerns or medical issues or use the messaging feature in your [facility] alan to contact your provider

## 2025-03-04 NOTE — H&P (VIEW-ONLY)
Internal Medicine Pre-Operative Evaluation:     Reason for Visit: Pre-operative Evaluation for Risk Stratification and Optimization    Patient ID: Amando Mace is a 74 y.o. male.     Case: [id number] Date/Time: 03/31/25 1210   Procedure: ARTHROPLASTY HIP TOTAL ANTERIOR (Right: Hip)   Anesthesia type: Choice   Diagnosis: Primary osteoarthritis of one hip, right [M16.11]   Pre-op diagnosis: Primary osteoarthritis of one hip, right [M16.11]   Location:  OR ROOM 01 / [facility]   Surgeons: Pebbles Santiago MD         Recommendations to Proceed withSurgery    Patient is considered to be Low risk for Medium risk procedure.     After evaluation and discussion with patient with emphasis that all surgery has some degree of inherent risk it is acknowledged by patient this risk is Acceptable.    Patient is optimized and may proceed with planned procedure.     Assessment    Pre-operative Medical Evaluation for planned surgery  Recommendations as listed in PLAN section below  Contact surgical nurse  navigator with any questions regarding preoperative plan or schedule.      Assessment & Plan  Preoperative clearance    Localized osteoarthrosis of right hip  Failed outpatient conservative measures  Electing to undergo surgical procedure as stated above    Essential hypertension  Stable   Refer to PAT instructions regarding medication administration the morning of surgery    History of PTCA stent  Stent to LAD 2009  F/b LV cardiology seen 1/7/2025=stable  Does not have cardiac clearance scheduled  Prediabetes  Hgb A1c   Lab Results   Component Value Date    HGBA1C 6.0 (H) 03/05/2025     Recommend following DM diet             Plan:     1. Further preoperative workup as follows:   - none no further testing required may proceed with surgery    2. Preoperative Medication Management Review performed by PAT nursing  NO 3.13.2025    3. Patient requires further consultation with:   No Consults  Required    4. Discharge Planning / Barriers to Discharge  none identified - patients has post discharge therapy plan in place, transportation arranged for discharge day, adequate family support at home to assist with discharge to home.        Subjective:           History of Present Illness:     Amando Mace is a 74 y.o. male who presents to the office today for a preoperative consultation at the request of surgeon. The patient understands this is an elective procedure and not emergent. They are electing to undergo planned procedure with an understanding that all surgery has inherent risk. They have worked with their surgeon and failed conservative treatment measures. Today they present for preoperative risk assessment and recommendations for optimization in preparation for surgery.    Pt seen in center for perioperative medicine for upcoming proposed surgery. They have failed previous conservative measures and have elected surgical intervention.     Pt meets presurgical lab and BMI optimization goals.    Pt has a h/o stent in 2009 and is f/b cardiology, was seen in January and was stable. No active anginal symptoms      Amando Mace has an IN HOSPITAL cardiac risk of RCI RISK CLASS II (1 risk factor, risk of major cardiac compl. appr. 1.3%) based on RCRI calculator    Cardiac Risk Estimation: per the Revised Cardiac Risk Index (Circ. 100:1043, 1999),         Pre-op Exam    Previous history of bleeding disorders or clots?: No  Previous Anesthesia reaction?: No  Prolonged steroid use in the last 6 months?: No    Assessment of Cardiac Risk:   - Unstable or severe angina or MI in the last 6 weeks or history of stent placement in the last year?: No   - Decompensated heart failure (e.g. New onset heart failure, NYHA  Class IV heart failure, or worsening existing heart failure)?: No  - Significant arrhythmias such as high grade AV block, symptomatic ventricular arrhythmia, newly recognized ventricular tachycardia,  "supraventricular tachycardia with resting heart rate >100, or symptomatic bradycardia?: No  - Severe heart valve disease including aortic stenosis or symptomatic mitral stenosis?: No      Pre-operative Risk Factors:  Elevated-risk surgery: No    History of cerebrovascular disease: No    History of ischemic heart disease: Yes    Pre-operative treatment with insulin: No  Pre-operative creatinine >2 mg/dL: No    History of congestive heart failure: No    [facility] Activity Status Index (DASI):   DASI Total Score: 18.95  METs: 5.1        ROS: No TIA's or unusual headaches, no dysphagia.  No prolonged cough. No dyspnea or chest pain on exertion.  No abdominal pain, change in bowel habits, black or bloody stools.  No urinary tract or BPH symptoms.  Positive reported pain in arthritic joint. Positive difficulty with gait. No skin rashes or issues.      Objective:    /83 (BP Location: Right arm, Patient Position: Sitting, Cuff Size: Standard)   Pulse 60   Ht 5' 9\" (1.753 m)   Wt 88.9 kg (196 lb)   SpO2 97%   BMI 28.94 kg/m²       General Appearance: no distress, conversive  HEENT: PERRLA, conjuctiva normal; oropharynx clear; mucous membranes moist;   Neck:  Supple, no lymphadenopathy or thyromegaly  Lungs: breath sounds normal, normal respiratory effort, no retractions, expiratory effort normal  CV: normal heart sounds S1/S2, PMI normal   ABD: soft non tender, +BS x4  EXT: DP pulses intact, no lymphadenopathy, no edema  Skin: normal turgor, normal texture, no rash  Psych: affect normal, mood normal  Neuro: AAOx3        The following portions of the patient's history were reviewed and updated as appropriate: allergies, current medications, past family history, past medical history, past social history, past surgical history and problem list.     Past History:       Past Medical History:   Diagnosis Date    Arthritis     Carpal tunnel syndrome on left 02/20/2024    Colon polyps     Cubital tunnel syndrome on left " 02/20/2024    GERD (gastroesophageal reflux disease)     Hyperlipidemia     Hypertension     Seasonal allergies     Trigger finger, left index finger 02/20/2024    Trigger middle finger of left hand 05/08/2019    Past Surgical History:   Procedure Laterality Date    BACK SURGERY      laminectomy    CARDIAC SURGERY      cardiac stent    CARPAL TUNNEL RELEASE      COLONOSCOPY      benign polyp removal    ORCHIECTOMY Left     CT ESOPHAGOGASTRODUODENOSCOPY TRANSORAL DIAGNOSTIC N/A 8/29/2017    Procedure: EGD AND COLONOSCOPY;  Surgeon: Talat Park MD;  [facility];  Service: Gastroenterology    CT NEUROPLASTY &/TRANSPOS MEDIAN NRV CARPAL TUNNE Left 2/20/2024    Procedure: RELEASE CARPAL TUNNEL REVISION;  Surgeon: Hair Pollock MD;  [facility];  Service: Orthopedics    CT NEUROPLASTY &/TRANSPOSITION ULNAR NERVE ELBOW Left 2/20/2024    Procedure: RELEASE CUBITAL TUNNEL, FDS RADIAL SLIP HEMITENECTOMY;  Surgeon: Hair Pollock MD;  [facility];  Service: Orthopedics    CT TENDON SHEATH INCISION Left 2/20/2024    Procedure: RELEASE TRIGGER FINGER LEFT INDEX, RELEASE TRIGGER FINGER LEFT LONG;  Surgeon: Hair Pollock MD;  [facility];  Service: Orthopedics    UPPER GASTROINTESTINAL ENDOSCOPY      WRIST SURGERY            Social History     Tobacco Use    Smoking status: Never     Passive exposure: Never    Smokeless tobacco: Never   Vaping Use    Vaping status: Never Used   Substance Use Topics    Alcohol use: Yes     Alcohol/week: 2.0 standard drinks of alcohol     Types: 1 Glasses of wine, 1 Cans of beer per week    Drug use: No     Family History   Problem Relation Age of Onset    Diabetes Mother     Hypertension Mother     Stomach cancer Father     Hypertension Father     Liver cancer Father     Cancer Father           Allergies:     No Known Allergies     Current Medications:     Current Outpatient Medications   Medication Instructions    acetaminophen  "(TYLENOL) 650 mg, Daily at bedtime    ascorbic acid (VITAMIN C) 500 mg, Daily    aspirin 81 mg, Oral, Daily    Cholecalciferol 2,000 Units    clotrimazole-betamethasone (LOTRISONE) 1-0.05 % cream Topical, 2 times daily    Coenzyme Q10 200 mg, Daily    Diclofenac Sodium (VOLTAREN) 2 g, Topical, 4 times daily    Echinacea 125 MG CAPS 1 tablet, 2 times daily    famotidine (PEPCID) 40 mg, Oral, Daily at bedtime PRN    ferrous sulfate 324 mg, Oral, Daily before breakfast    FIBER SELECT GUMMIES CHEW 2 times daily    fluticasone (FLONASE) 50 mcg/act nasal spray 1 spray, Daily    folic acid (FOLVITE) 1 mg, Oral, Daily    lisinopril (ZESTRIL) 10 mg, Oral, Daily    melatonin 3 mg 1 tablet, As needed    meloxicam (MOBIC) 7.5 mg, As needed    metoprolol succinate (TOPROL-XL) 12.5 mg, Daily    mupirocin (BACTROBAN) 2 % ointment Apply to bilateral nares twice daily 5 days prior to total joint arthroplasty    Silodosin 8 MG CAPS 1 capsule, Oral, Daily at bedtime    simvastatin (ZOCOR) 20 mg, Daily at bedtime    Turmeric (QC Tumeric Complex) 500 MG CAPS Take by mouth Unknown dosage tumeric with tumerol           PRE-OP WORKSHEET DATA    Assessment of Pre-Operative Risks     MLJ Quality Hard Stops:    BMI (<40) : Estimated body mass index is 28.94 kg/m² as calculated from the following:    Height as of this encounter: 5' 9\" (1.753 m).    Weight as of this encounter: 88.9 kg (196 lb).    Hgb ( >11):   Lab Results   Component Value Date    HGB 14.5 03/05/2025    HGB 15.2 10/30/2024    HGB 15.1 10/16/2023       HbA1c (<7.5) :   Lab Results   Component Value Date    HGBA1C 6.0 (H) 03/05/2025       GFR (>60) (Less then 45 = Nephrology consult):    Lab Results   Component Value Date    EGFR 85 03/05/2025    EGFR 85 10/30/2024    EGFR 90 01/11/2024            Pre-Op Data Reviewed:       Laboratory Results: I have personally reviewed the pertinent reports    EKG: I personally reviewed and interpreted available tracings in the electronic " medical record    Encounter Date: 03/05/25   EKG 12 lead   Result Value    Ventricular Rate 59    Atrial Rate 59    OR Interval 188    QRSD Interval 82    QT Interval 422    QTC Interval 418    P Axis 49    QRS Axis 31    T Wave Axis 39    Narrative    Sinus bradycardia with Premature atrial complexes  Septal infarct (cited on or before 11-Jan-2024)  Abnormal ECG  When compared with ECG of 11-Jan-2024 10:48,  Premature atrial complexes are now Present  Confirmed by Court Vernon ([id number]) on 3/6/2025 8:47:09 AM       OLD RECORDS: reviewed old records in the chart review section if EHR on day of visit.    Previous cardiopulmonary studies within the past year:  Echocardiogram: no   Cardiac Catheterization: no  Stress Test: no      Time of visit including pre-visit chart review, visit and post-visit coordination of plan and care , review of pre-surgical lab work, preparation and time spent documenting note in electronic medical record, time spent face-to-face in physical examination answering patient questions by care team 45 minutes             [facility]

## 2025-03-05 ENCOUNTER — APPOINTMENT (OUTPATIENT)
Dept: LAB | Facility: AMBULARY SURGERY CENTER | Age: 75
End: 2025-03-05
Payer: COMMERCIAL

## 2025-03-05 ENCOUNTER — LAB REQUISITION (OUTPATIENT)
Dept: LAB | Facility: HOSPITAL | Age: 75
End: 2025-03-05
Payer: COMMERCIAL

## 2025-03-05 DIAGNOSIS — M16.11 UNILATERAL PRIMARY OSTEOARTHRITIS, RIGHT HIP: ICD-10-CM

## 2025-03-05 DIAGNOSIS — M16.11 PRIMARY OSTEOARTHRITIS OF ONE HIP, RIGHT: ICD-10-CM

## 2025-03-05 LAB
ABO GROUP BLD: NORMAL
ALBUMIN SERPL BCG-MCNC: 4.3 G/DL (ref 3.5–5)
ALP SERPL-CCNC: 72 U/L (ref 34–104)
ALT SERPL W P-5'-P-CCNC: 14 U/L (ref 7–52)
ANION GAP SERPL CALCULATED.3IONS-SCNC: 8 MMOL/L (ref 4–13)
APTT PPP: 32 SECONDS (ref 23–34)
AST SERPL W P-5'-P-CCNC: 20 U/L (ref 13–39)
BASOPHILS # BLD AUTO: 0.05 THOUSANDS/ÂΜL (ref 0–0.1)
BASOPHILS NFR BLD AUTO: 1 % (ref 0–1)
BILIRUB SERPL-MCNC: 0.69 MG/DL (ref 0.2–1)
BLD GP AB SCN SERPL QL: NEGATIVE
BUN SERPL-MCNC: 12 MG/DL (ref 5–25)
CALCIUM SERPL-MCNC: 9.6 MG/DL (ref 8.4–10.2)
CHLORIDE SERPL-SCNC: 107 MMOL/L (ref 96–108)
CO2 SERPL-SCNC: 27 MMOL/L (ref 21–32)
CREAT SERPL-MCNC: 0.9 MG/DL (ref 0.6–1.3)
EOSINOPHIL # BLD AUTO: 0.32 THOUSAND/ÂΜL (ref 0–0.61)
EOSINOPHIL NFR BLD AUTO: 5 % (ref 0–6)
ERYTHROCYTE [DISTWIDTH] IN BLOOD BY AUTOMATED COUNT: 13.2 % (ref 11.6–15.1)
EST. AVERAGE GLUCOSE BLD GHB EST-MCNC: 126 MG/DL
GFR SERPL CREATININE-BSD FRML MDRD: 85 ML/MIN/1.73SQ M
GLUCOSE P FAST SERPL-MCNC: 93 MG/DL (ref 65–99)
HBA1C MFR BLD: 6 %
HCT VFR BLD AUTO: 42.5 % (ref 36.5–49.3)
HGB BLD-MCNC: 14.5 G/DL (ref 12–17)
IMM GRANULOCYTES # BLD AUTO: 0.02 THOUSAND/UL (ref 0–0.2)
IMM GRANULOCYTES NFR BLD AUTO: 0 % (ref 0–2)
INR PPP: 1.01 (ref 0.85–1.19)
LYMPHOCYTES # BLD AUTO: 1.4 THOUSANDS/ÂΜL (ref 0.6–4.47)
LYMPHOCYTES NFR BLD AUTO: 22 % (ref 14–44)
MCH RBC QN AUTO: 31.9 PG (ref 26.8–34.3)
MCHC RBC AUTO-ENTMCNC: 34.1 G/DL (ref 31.4–37.4)
MCV RBC AUTO: 93 FL (ref 82–98)
MONOCYTES # BLD AUTO: 0.56 THOUSAND/ÂΜL (ref 0.17–1.22)
MONOCYTES NFR BLD AUTO: 9 % (ref 4–12)
NEUTROPHILS # BLD AUTO: 3.96 THOUSANDS/ÂΜL (ref 1.85–7.62)
NEUTS SEG NFR BLD AUTO: 63 % (ref 43–75)
NRBC BLD AUTO-RTO: 0 /100 WBCS
PLATELET # BLD AUTO: 206 THOUSANDS/UL (ref 149–390)
PMV BLD AUTO: 9.4 FL (ref 8.9–12.7)
POTASSIUM SERPL-SCNC: 4.9 MMOL/L (ref 3.5–5.3)
PROT SERPL-MCNC: 7 G/DL (ref 6.4–8.4)
PROTHROMBIN TIME: 13.6 SECONDS (ref 12.3–15)
RBC # BLD AUTO: 4.55 MILLION/UL (ref 3.88–5.62)
RH BLD: POSITIVE
SODIUM SERPL-SCNC: 142 MMOL/L (ref 135–147)
SPECIMEN EXPIRATION DATE: NORMAL
WBC # BLD AUTO: 6.31 THOUSAND/UL (ref 4.31–10.16)

## 2025-03-05 PROCEDURE — 85610 PROTHROMBIN TIME: CPT

## 2025-03-05 PROCEDURE — 86900 BLOOD TYPING SEROLOGIC ABO: CPT | Performed by: ORTHOPAEDIC SURGERY

## 2025-03-05 PROCEDURE — 36415 COLL VENOUS BLD VENIPUNCTURE: CPT

## 2025-03-05 PROCEDURE — 87081 CULTURE SCREEN ONLY: CPT

## 2025-03-05 PROCEDURE — 86901 BLOOD TYPING SEROLOGIC RH(D): CPT | Performed by: ORTHOPAEDIC SURGERY

## 2025-03-05 PROCEDURE — 83036 HEMOGLOBIN GLYCOSYLATED A1C: CPT

## 2025-03-05 PROCEDURE — 93005 ELECTROCARDIOGRAM TRACING: CPT

## 2025-03-05 PROCEDURE — 85025 COMPLETE CBC W/AUTO DIFF WBC: CPT

## 2025-03-05 PROCEDURE — 80053 COMPREHEN METABOLIC PANEL: CPT

## 2025-03-05 PROCEDURE — 85730 THROMBOPLASTIN TIME PARTIAL: CPT

## 2025-03-05 PROCEDURE — 86850 RBC ANTIBODY SCREEN: CPT | Performed by: ORTHOPAEDIC SURGERY

## 2025-03-06 PROBLEM — R73.03 PREDIABETES: Status: ACTIVE | Noted: 2025-03-06

## 2025-03-06 LAB
ATRIAL RATE: 59 BPM
MRSA NOSE QL CULT: NORMAL
P AXIS: 49 DEGREES
PR INTERVAL: 188 MS
QRS AXIS: 31 DEGREES
QRSD INTERVAL: 82 MS
QT INTERVAL: 422 MS
QTC INTERVAL: 418 MS
T WAVE AXIS: 39 DEGREES
VENTRICULAR RATE: 59 BPM

## 2025-03-06 PROCEDURE — 93010 ELECTROCARDIOGRAM REPORT: CPT | Performed by: INTERNAL MEDICINE

## 2025-03-06 NOTE — ASSESSMENT & PLAN NOTE
Hgb A1c   Lab Results   Component Value Date    HGBA1C 6.0 (H) 03/05/2025     Recommend following DM diet

## 2025-03-12 ENCOUNTER — OFFICE VISIT (OUTPATIENT)
Age: 75
End: 2025-03-12
Payer: COMMERCIAL

## 2025-03-12 VITALS
HEART RATE: 60 BPM | SYSTOLIC BLOOD PRESSURE: 144 MMHG | DIASTOLIC BLOOD PRESSURE: 83 MMHG | HEIGHT: 69 IN | BODY MASS INDEX: 29.03 KG/M2 | WEIGHT: 196 LBS | OXYGEN SATURATION: 97 %

## 2025-03-12 DIAGNOSIS — M16.11 PRIMARY OSTEOARTHRITIS OF ONE HIP, RIGHT: ICD-10-CM

## 2025-03-12 DIAGNOSIS — M16.11 LOCALIZED OSTEOARTHROSIS OF RIGHT HIP: ICD-10-CM

## 2025-03-12 DIAGNOSIS — I10 ESSENTIAL HYPERTENSION: ICD-10-CM

## 2025-03-12 DIAGNOSIS — Z98.61 HISTORY OF PTCA: ICD-10-CM

## 2025-03-12 DIAGNOSIS — Z01.818 PREOPERATIVE CLEARANCE: Primary | ICD-10-CM

## 2025-03-12 DIAGNOSIS — R73.03 PREDIABETES: ICD-10-CM

## 2025-03-12 PROCEDURE — 99214 OFFICE O/P EST MOD 30 MIN: CPT | Performed by: NURSE PRACTITIONER

## 2025-03-12 RX ORDER — CETIRIZINE HYDROCHLORIDE 10 MG/1
10 TABLET ORAL
COMMUNITY

## 2025-03-13 NOTE — PRE-PROCEDURE INSTRUCTIONS
Pre-Surgery Instructions:   Medication Instructions    acetaminophen (TYLENOL) 650 mg CR tablet Uses PRN- OK to take day of surgery    ascorbic acid (VITAMIN C) 500 mg tablet Hold day of surgery.    aspirin 81 MG tablet Take day of surgery.    Cannabidiol POWD Avoid 1 week prior to surgery      cetirizine (ZyrTEC) 10 mg tablet Do not take day of Surgery; usually taken at night     Cholecalciferol 2000 units TABS Avoid 1 week prior to surgery      Coenzyme Q10 200 MG capsule Avoid 1 week prior to surgery      Diclofenac Sodium (VOLTAREN) 1 % Do not take day of surgery; continue as prescribed excluding DOS      Echinacea 125 MG CAPS Avoid 1 week prior to surgery      famotidine (PEPCID) 40 MG tablet Do not take day of Surgery; usually taken at night     ferrous sulfate 324 (65 Fe) mg Do not take day of surgery; continue as prescribed excluding DOS     FIBER SELECT GUMMIES CHEW Avoid 1 week prior to surgery      fluticasone (FLONASE) 50 mcg/act nasal spray Take Day of surgery, unless normally taken at night     folic acid (FOLVITE) 1 mg tablet Do not take day of surgery; continue as prescribed excluding DOS     lisinopril (ZESTRIL) 10 mg tablet Do not take day of surgery; continue as prescribed excluding DOS     melatonin 3 mg Avoid 1 week prior to surgery      meloxicam (MOBIC) 7.5 mg tablet Avoid 1 week prior to surgery      metoprolol succinate (TOPROL-XL) 25 mg 24 hr tablet Do not take day of Surgery; usually taken at night     mupirocin (BACTROBAN) 2 % ointment Use DOS per [facility] protocol    Silodosin 8 MG CAPS Do not take day of Surgery; usually taken at night     simvastatin (ZOCOR) 40 mg tablet Do not take day of Surgery; usually taken at night     Turmeric (QC Tumeric Complex) 500 MG CAPS Avoid 1 week prior to surgery      Medication instructions for day of surgery reviewed. Please take all instructed medications with only a sip of water.       You will receive a call one business day prior to surgery with an  arrival time and hospital directions. If your surgery is scheduled on a Monday, the hospital will be calling you on the Friday prior to your surgery. If you have not heard from anyone by 8pm, please call the hospital supervisor through the hospital  at [phone]. (Sudeep [phone] or Paola [phone]).    Do not eat or drink anything after midnight the night before your surgery, including candy, mints, lifesavers, or chewing gum. Do not drink alcohol 24hrs before your surgery. Try not to smoke at least 24hrs before your surgery.       Follow the pre surgery showering instructions as listed in the “My Surgical Experience Booklet” or otherwise provided by your surgeon's office. Do not use a blade to shave the surgical area 1 week before surgery. It is okay to use a clean electric clippers up to 24 hours before surgery. Do not apply any lotions, creams, including makeup, cologne, deodorant, or perfumes after showering on the day of your surgery. Do not use dry shampoo, hair spray, hair gel, or any type of hair products.     No contact lenses, eye make-up, or artificial eyelashes. Remove nail polish, including gel polish, and any artificial, gel, or acrylic nails if possible. Remove all jewelry including rings and body piercing jewelry.     Wear causal clothing that is easy to take on and off. Consider your type of surgery.    Keep any valuables, jewelry, piercings at home. Please bring any specially ordered equipment (sling, braces) if indicated.    Arrange for a responsible person to drive you to and from the hospital on the day of your surgery. Please confirm the visitor policy for the day of your procedure when you receive your phone call with an arrival time.     Call the surgeon's office with any new illnesses, exposures, or additional questions prior to surgery.    Please reference your “My Surgical Experience Booklet” for additional information to prepare for your upcoming surgery.     See  Geriatric Assessment below...  Cognitive Assessment:   CAM:   TUG <15 sec:  Falls (last 6 months): no  Hand  score:  -Attempt 1:  -Attempt 2:  -Attempt 3:  Lorenzo Total Score: 21  PHQ- 9 Depression Scale:4  Nutrition Assessment Score:14  METS:8.33  Incentive Spirometry Level:   Health goals:  -What are your overall health goals? (quit smoking, wt. loss, rest, decrease stress)  Increase mobility, stamina and strength  Decrease pain  -What brings you strength? (family, friends, Baptism, health)  Family   -What activities are important to you? (exercise, reading, travel, work)  Gardening

## 2025-03-24 ENCOUNTER — EVALUATION (OUTPATIENT)
Dept: PHYSICAL THERAPY | Facility: MEDICAL CENTER | Age: 75
End: 2025-03-24
Payer: COMMERCIAL

## 2025-03-24 DIAGNOSIS — M16.11 PRIMARY OSTEOARTHRITIS OF ONE HIP, RIGHT: ICD-10-CM

## 2025-03-24 PROCEDURE — 97161 PT EVAL LOW COMPLEX 20 MIN: CPT | Performed by: PHYSICAL THERAPIST

## 2025-03-24 PROCEDURE — 97110 THERAPEUTIC EXERCISES: CPT | Performed by: PHYSICAL THERAPIST

## 2025-03-24 NOTE — PROGRESS NOTES
PT Evaluation     Today's date: 3/24/2025  Patient name: Amando Mace  : 1950  MRN: [id number]  Referring provider: Pebbles Santiago MD  Dx:   Encounter Diagnosis     ICD-10-CM    1. Primary osteoarthritis of one hip, right  M16.11 Ambulatory referral to Physical Therapy          Start Time: 1410  Stop Time: 1500  Total time in clinic (min): 50 minutes    Assessment  Impairments: abnormal gait, abnormal muscle tone, abnormal or restricted ROM, abnormal movement, activity intolerance, impaired balance, impaired physical strength, lacks appropriate home exercise program, pain with function, weight-bearing intolerance, participation limitations, activity limitations and endurance  Symptom irritability: moderate    Assessment details: Pt is a 74 y.o. male who presents to OP PT pre-operatively for R MARGOT to be completed on 3/31/25.  Pt presents today with increased R hip pain, decreased R hip ROM, decreased LE strength, impaired gait mechanics and decreased activity tolerance.  These impairments limit the patient from participating in prolonged ambulation at [facility], decreased tolerance to completing stairs at home and decreased tolerance to participating in hobbies such as gardening and fishing.  Discussed DOS and patient's questions were answered. Mobility/ROM and strength results in objective findings. Balance/Gait assessment (including Timed Up & Go) reviewed. Virtual Home Assessment was reviewed with patient. Home Preparation Checklist was reviewed with patient including identification of care partner and encouragement of single level set-up. Post-operative pain management expectations discussed. Post-operative gait training for level ground, stairs, and car transfers was performed. Patient demonstrated competence with immediate post-operative home exercise program. Pt was encouraged to reach out to PT if there is any questions, pain or adverse symptom production and encouraged to DC the activity.  All  other questions and concerns were addressed to patient satisfaction.  I believe this patient is a good candidate for and will benefit from skilled physical therapy post operatively for manual therapy to the R hip, ROM exercises, strengthening exercises and mechanics training to improve limitations and assist the patient to return to PLOF.  Thank you for the opportunity to participate in Amando Mace's care.    Positive Prognostic Indicators: desire to improve    Negative Prognostic Indicators: chronicity of symptoms.       Understanding of Dx/Px/POC: good     Prognosis: good    Goals  STGs: 4 weeks  1) Pt will have SPR decrease of 2 units at rest  2) pt will have improved R hip flexion strength to 4+/5 in order to improve tolerance to cleaning items when ambulating and reducing risk of falls.  3) pt will have improved foto score of 10 points    LTGs: 8 weeks  1) pt will be independent with HEP by D/C  2) pt will be independent with symptom management by D/C  3) pt will subjectively report improved tolerance to going up and down his flight of stairs by at least 50% in order to demonstrate improved activity tolerance by DC.       Plan  Patient would benefit from: skilled physical therapy  Planned modality interventions: cryotherapy    Planned therapy interventions: joint mobilization, manual therapy, neuromuscular re-education, patient/caregiver education, postural training, strengthening, stretching, therapeutic activities, therapeutic exercise, home exercise program, gait training, functional ROM exercises, balance/weight bearing training and balance    Frequency: 1-2x week  Duration in weeks: 12  Plan of Care beginning date: 3/24/2025  Plan of Care expiration date: 6/16/2025  Treatment plan discussed with: patient        Subjective Evaluation    History of Present Illness  Mechanism of injury: DOO:  JULIAN:      Subjective Comments: pt reports that he has been in and out a PT a couple times due to hip pain. Reports  "that his pain usually returns. Pt is planning on R anterior MARGOT on 3/31/25. Currently, the hip is \"sore\". Reports that he is getting around ok, but he has rosanna with every step and is limping. Reports that he is also getting R knee pain and R lower leg pain along with hip pain. Reports that he wants top take care of this problem before causing more issues. Pt is using voltaren for knee pain.  Denies N&T in the legs. Denies falls.     Pain   Rest: 5-6/10   Best: 2-3/10   Worst: 6-7/10    Relieving Factors: feels better moving versus sitting.     Exacerbating Factors: doing yard work, long car rides    Sleeping: pain will keep him up at night    Home Set-up: scanned in     ADLs: independent    Work/Hobbies: yard work, fishing, gardening    Previous Treatment: PT, injections,     Goals:  wants to decrease pain with and without activity               Objective     Passive Range of Motion   Left Hip   Flexion: 110 degrees   Abduction: 15 degrees   External rotation (90/90): 30 degrees   Internal rotation (90/90): 0 degrees     Right Hip   Flexion: 90 degrees   Abduction: 20 degrees   External rotation (90/90): 30 degrees   Internal rotation (90/90): 0 degrees     Strength/Myotome Testing     Left Hip   Planes of Motion   Flexion: 4  Abduction: 4+  Adduction: 5    Right Hip   Planes of Motion   Flexion: 4  Abduction: 4  Adduction: 5    Left Knee   Flexion: 4+  Extension: 4+    Right Knee   Flexion: 4+  Extension: 4+    Left Ankle/Foot   Dorsiflexion: 5  Plantar flexion: 5    Right Ankle/Foot   Dorsiflexion: 5  Plantar flexion: 5    Ambulation     Ambulation: Level Surfaces   Ambulation without assistive device: independent    Ambulation: Stairs   Ascend stairs: independent  Pattern: reciprocal  Railings: one rail  Descend stairs: independent  Pattern: reciprocal  Railings: one rail    Additional Stairs Ambulation Details  Reviewed proper form post operatively with railing and SPC    Observational Gait   Gait: antalgic and " asymmetric   Decreased walking speed, stride length and right stance time.     Functional Assessment        Comments  TU.28 seconds  5x STS: 12.78 seconds             Precautions:   Patient Active Problem List   Diagnosis    Actinic keratosis    Chronic GERD    Coronary atherosclerosis    Essential hypertension    Lipid disorder    Low back pain    Localized osteoarthrosis of right hip    Primary osteoarthritis, left shoulder    Hx of adenomatous colonic polyps    Benign prostatic hyperplasia with incomplete bladder emptying    Tinea cruris    Trigger finger, right index finger    History of PTCA stent    Hyperlipidemia    Bilateral hip pain    Prediabetes     Past Medical History:   Diagnosis Date    Arthritis     Carpal tunnel syndrome on left 2024    Colon polyps     Cubital tunnel syndrome on left 2024    GERD (gastroesophageal reflux disease)     Hyperlipidemia     Hypertension     PONV (postoperative nausea and vomiting)     Postoperative urinary retention     Seasonal allergies     Trigger finger, left index finger 2024    Trigger middle finger of left hand 2019     Past Surgical History:   Procedure Laterality Date    BACK SURGERY      laminectomy: L5-S1    CARDIAC SURGERY      cardiac stent    CARPAL TUNNEL RELEASE      COLONOSCOPY      benign polyp removal    ORCHIECTOMY Left     NC ESOPHAGOGASTRODUODENOSCOPY TRANSORAL DIAGNOSTIC N/A 2017    Procedure: EGD AND COLONOSCOPY;  Surgeon: Talat Park MD;  [facility];  Service: Gastroenterology    NC NEUROPLASTY &/TRANSPOS MEDIAN NRV CARPAL TUNNE Left 2024    Procedure: RELEASE CARPAL TUNNEL REVISION;  Surgeon: Hair Pollock MD;  [facility];  Service: Orthopedics    NC NEUROPLASTY &/TRANSPOSITION ULNAR NERVE ELBOW Left 2024    Procedure: RELEASE CUBITAL TUNNEL, FDS RADIAL SLIP HEMITENECTOMY;  Surgeon: Hair Pollock MD;  [facility];  Service: Orthopedics    NC  TENDON SHEATH INCISION Left 02/20/2024    Procedure: RELEASE TRIGGER FINGER LEFT INDEX, RELEASE TRIGGER FINGER LEFT LONG;  Surgeon: Hair Pollock MD;  Location: [facility];  Service: Orthopedics    UPPER GASTROINTESTINAL ENDOSCOPY      WRIST SURGERY       Eval/Re-Eval POC Expires Auth #/ Referral # Total Visits Start Date Expiration Date Extension Info Visits Limitation   3/24 6/16                                                               1 2 3 4 5 6   3/24        7 8 9 10 11 12           13 14 15 16 17 18           19 20 21 22 23 24           25 26 27 28 29 30                 Manuals 3/24                                                                Neuro Re-Ed                                                                                                        Ther Ex             HEP Reviewed and scanned into chart                                                                                                       Ther Activity                                       Gait Training                                       Modalities

## 2025-03-27 ENCOUNTER — APPOINTMENT (OUTPATIENT)
Dept: RADIOLOGY | Facility: HOSPITAL | Age: 75
End: 2025-03-27
Payer: COMMERCIAL

## 2025-03-27 ENCOUNTER — TELEPHONE (OUTPATIENT)
Dept: OBGYN CLINIC | Facility: HOSPITAL | Age: 75
End: 2025-03-27

## 2025-03-27 ENCOUNTER — ANESTHESIA EVENT (OUTPATIENT)
Dept: PERIOP | Facility: HOSPITAL | Age: 75
End: 2025-03-27
Payer: COMMERCIAL

## 2025-03-27 ENCOUNTER — HOSPITAL ENCOUNTER (OUTPATIENT)
Facility: HOSPITAL | Age: 75
Setting detail: OUTPATIENT SURGERY
Discharge: HOME | End: 2025-03-27
Attending: ORTHOPAEDIC SURGERY | Admitting: ORTHOPAEDIC SURGERY
Payer: COMMERCIAL

## 2025-03-27 ENCOUNTER — ANESTHESIA (OUTPATIENT)
Dept: PERIOP | Facility: HOSPITAL | Age: 75
End: 2025-03-27
Payer: COMMERCIAL

## 2025-03-27 VITALS
OXYGEN SATURATION: 95 % | TEMPERATURE: 97.6 F | RESPIRATION RATE: 14 BRPM | DIASTOLIC BLOOD PRESSURE: 77 MMHG | BODY MASS INDEX: 28.42 KG/M2 | HEART RATE: 64 BPM | WEIGHT: 191.9 LBS | SYSTOLIC BLOOD PRESSURE: 123 MMHG | HEIGHT: 69 IN

## 2025-03-27 DIAGNOSIS — Z96.641 S/P TOTAL RIGHT HIP ARTHROPLASTY: Primary | ICD-10-CM

## 2025-03-27 PROBLEM — R11.2 PONV (POSTOPERATIVE NAUSEA AND VOMITING): Status: ACTIVE | Noted: 2025-03-27

## 2025-03-27 PROBLEM — Z98.890 PONV (POSTOPERATIVE NAUSEA AND VOMITING): Status: ACTIVE | Noted: 2025-03-27

## 2025-03-27 PROCEDURE — C1776 JOINT DEVICE (IMPLANTABLE): HCPCS | Performed by: ORTHOPAEDIC SURGERY

## 2025-03-27 PROCEDURE — 27130 TOTAL HIP ARTHROPLASTY: CPT | Performed by: PHYSICIAN ASSISTANT

## 2025-03-27 PROCEDURE — 73501 X-RAY EXAM HIP UNI 1 VIEW: CPT

## 2025-03-27 PROCEDURE — 97167 OT EVAL HIGH COMPLEX 60 MIN: CPT

## 2025-03-27 PROCEDURE — 97116 GAIT TRAINING THERAPY: CPT

## 2025-03-27 PROCEDURE — 97535 SELF CARE MNGMENT TRAINING: CPT

## 2025-03-27 PROCEDURE — 97162 PT EVAL MOD COMPLEX 30 MIN: CPT

## 2025-03-27 PROCEDURE — 0054T BONE SRGRY CMPTR FLUOR IMAGE: CPT | Performed by: ORTHOPAEDIC SURGERY

## 2025-03-27 PROCEDURE — 27130 TOTAL HIP ARTHROPLASTY: CPT | Performed by: ORTHOPAEDIC SURGERY

## 2025-03-27 DEVICE — PINNACLE GRIPTION ACETABULAR SHELL SECTOR 56MM OD
Type: IMPLANTABLE DEVICE | Site: HIP | Status: FUNCTIONAL
Brand: PINNACLE GRIPTION

## 2025-03-27 DEVICE — ACTIS DUOFIX HIP PROSTHESIS (FEMORAL STEM 12/14 TAPER CEMENTLESS SIZE 7 HIGH COLLAR)  CE
Type: IMPLANTABLE DEVICE | Site: HIP | Status: FUNCTIONAL
Brand: ACTIS

## 2025-03-27 DEVICE — PINNACLE HIP SOLUTIONS ALTRX POLYETHYLENE ACETABULAR LINER NEUTRAL 36MM ID 56MM OD
Type: IMPLANTABLE DEVICE | Site: HIP | Status: FUNCTIONAL
Brand: PINNACLE ALTRX

## 2025-03-27 DEVICE — M-SPEC METAL FEMORAL HEAD 12/14 TAPER DIAMETER 36MM +1.5: Type: IMPLANTABLE DEVICE | Site: HIP | Status: FUNCTIONAL

## 2025-03-27 RX ORDER — MORPHINE SULFATE 10 MG/ML
INJECTION, SOLUTION INTRAMUSCULAR; INTRAVENOUS AS NEEDED
Status: DISCONTINUED | OUTPATIENT
Start: 2025-03-27 | End: 2025-03-27 | Stop reason: HOSPADM

## 2025-03-27 RX ORDER — CHLORHEXIDINE GLUCONATE 40 MG/ML
SOLUTION TOPICAL DAILY PRN
Status: DISCONTINUED | OUTPATIENT
Start: 2025-03-27 | End: 2025-03-27 | Stop reason: HOSPADM

## 2025-03-27 RX ORDER — LIDOCAINE HYDROCHLORIDE 10 MG/ML
INJECTION, SOLUTION EPIDURAL; INFILTRATION; INTRACAUDAL; PERINEURAL AS NEEDED
Status: DISCONTINUED | OUTPATIENT
Start: 2025-03-27 | End: 2025-03-27

## 2025-03-27 RX ORDER — HYDROMORPHONE HCL/PF 1 MG/ML
0.5 SYRINGE (ML) INJECTION
Status: DISCONTINUED | OUTPATIENT
Start: 2025-03-27 | End: 2025-03-27 | Stop reason: HOSPADM

## 2025-03-27 RX ORDER — ENOXAPARIN SODIUM 100 MG/ML
40 INJECTION SUBCUTANEOUS DAILY
Status: CANCELLED | OUTPATIENT
Start: 2025-03-27

## 2025-03-27 RX ORDER — ACETAMINOPHEN 325 MG/1
975 TABLET ORAL ONCE
Status: COMPLETED | OUTPATIENT
Start: 2025-03-27 | End: 2025-03-27

## 2025-03-27 RX ORDER — EPHEDRINE SULFATE 50 MG/ML
INJECTION INTRAVENOUS AS NEEDED
Status: DISCONTINUED | OUTPATIENT
Start: 2025-03-27 | End: 2025-03-27

## 2025-03-27 RX ORDER — CEFAZOLIN SODIUM 2 G/50ML
2000 SOLUTION INTRAVENOUS ONCE
Status: COMPLETED | OUTPATIENT
Start: 2025-03-27 | End: 2025-03-27

## 2025-03-27 RX ORDER — FENTANYL CITRATE 50 UG/ML
INJECTION, SOLUTION INTRAMUSCULAR; INTRAVENOUS AS NEEDED
Status: DISCONTINUED | OUTPATIENT
Start: 2025-03-27 | End: 2025-03-27

## 2025-03-27 RX ORDER — OXYCODONE HYDROCHLORIDE 5 MG/1
5 TABLET ORAL EVERY 6 HOURS PRN
Qty: 30 TABLET | Refills: 0 | Status: SHIPPED | OUTPATIENT
Start: 2025-03-27 | End: 2025-04-06

## 2025-03-27 RX ORDER — OXYCODONE HYDROCHLORIDE 5 MG/1
5 TABLET ORAL EVERY 4 HOURS PRN
Status: DISCONTINUED | OUTPATIENT
Start: 2025-03-27 | End: 2025-03-27 | Stop reason: HOSPADM

## 2025-03-27 RX ORDER — ONDANSETRON 2 MG/ML
INJECTION INTRAMUSCULAR; INTRAVENOUS AS NEEDED
Status: DISCONTINUED | OUTPATIENT
Start: 2025-03-27 | End: 2025-03-27

## 2025-03-27 RX ORDER — LIDOCAINE HYDROCHLORIDE AND EPINEPHRINE 10; 10 MG/ML; UG/ML
INJECTION, SOLUTION INFILTRATION; PERINEURAL AS NEEDED
Status: DISCONTINUED | OUTPATIENT
Start: 2025-03-27 | End: 2025-03-27 | Stop reason: HOSPADM

## 2025-03-27 RX ORDER — HYDROMORPHONE HCL/PF 1 MG/ML
0.5 SYRINGE (ML) INJECTION EVERY 2 HOUR PRN
Status: DISCONTINUED | OUTPATIENT
Start: 2025-03-27 | End: 2025-03-27 | Stop reason: HOSPADM

## 2025-03-27 RX ORDER — ONDANSETRON 2 MG/ML
4 INJECTION INTRAMUSCULAR; INTRAVENOUS ONCE AS NEEDED
Status: DISCONTINUED | OUTPATIENT
Start: 2025-03-27 | End: 2025-03-27 | Stop reason: HOSPADM

## 2025-03-27 RX ORDER — PROMETHAZINE HYDROCHLORIDE 25 MG/ML
25 INJECTION, SOLUTION INTRAMUSCULAR; INTRAVENOUS ONCE AS NEEDED
Status: DISCONTINUED | OUTPATIENT
Start: 2025-03-27 | End: 2025-03-27 | Stop reason: HOSPADM

## 2025-03-27 RX ORDER — CEFAZOLIN SODIUM 1 G/50ML
1000 SOLUTION INTRAVENOUS EVERY 8 HOURS
Status: CANCELLED | OUTPATIENT
Start: 2025-03-27 | End: 2025-03-28

## 2025-03-27 RX ORDER — OXYCODONE HYDROCHLORIDE 5 MG/1
10 TABLET ORAL EVERY 4 HOURS PRN
Status: DISCONTINUED | OUTPATIENT
Start: 2025-03-27 | End: 2025-03-27 | Stop reason: HOSPADM

## 2025-03-27 RX ORDER — DOCUSATE SODIUM 100 MG/1
100 CAPSULE, LIQUID FILLED ORAL 2 TIMES DAILY
Qty: 10 CAPSULE | Refills: 0 | Status: SHIPPED | OUTPATIENT
Start: 2025-03-27

## 2025-03-27 RX ORDER — PHENYLEPHRINE HCL IN 0.9% NACL 1 MG/10 ML
SYRINGE (ML) INTRAVENOUS AS NEEDED
Status: DISCONTINUED | OUTPATIENT
Start: 2025-03-27 | End: 2025-03-27

## 2025-03-27 RX ORDER — TRANEXAMIC ACID 10 MG/ML
1000 INJECTION, SOLUTION INTRAVENOUS ONCE
Status: COMPLETED | OUTPATIENT
Start: 2025-03-27 | End: 2025-03-27

## 2025-03-27 RX ORDER — DEXAMETHASONE SODIUM PHOSPHATE 10 MG/ML
INJECTION, SOLUTION INTRAMUSCULAR; INTRAVENOUS AS NEEDED
Status: DISCONTINUED | OUTPATIENT
Start: 2025-03-27 | End: 2025-03-27

## 2025-03-27 RX ORDER — CHLORHEXIDINE GLUCONATE ORAL RINSE 1.2 MG/ML
15 SOLUTION DENTAL ONCE
Status: COMPLETED | OUTPATIENT
Start: 2025-03-27 | End: 2025-03-27

## 2025-03-27 RX ORDER — SODIUM CHLORIDE, SODIUM LACTATE, POTASSIUM CHLORIDE, CALCIUM CHLORIDE 600; 310; 30; 20 MG/100ML; MG/100ML; MG/100ML; MG/100ML
125 INJECTION, SOLUTION INTRAVENOUS CONTINUOUS
Status: DISCONTINUED | OUTPATIENT
Start: 2025-03-27 | End: 2025-03-27 | Stop reason: HOSPADM

## 2025-03-27 RX ORDER — DOCUSATE SODIUM 100 MG/1
100 CAPSULE, LIQUID FILLED ORAL 2 TIMES DAILY
Status: CANCELLED | OUTPATIENT
Start: 2025-03-27

## 2025-03-27 RX ORDER — ONDANSETRON 2 MG/ML
4 INJECTION INTRAMUSCULAR; INTRAVENOUS EVERY 6 HOURS PRN
Status: DISCONTINUED | OUTPATIENT
Start: 2025-03-27 | End: 2025-03-27 | Stop reason: HOSPADM

## 2025-03-27 RX ORDER — CALCIUM CARBONATE 500 MG/1
1000 TABLET, CHEWABLE ORAL DAILY PRN
Status: CANCELLED | OUTPATIENT
Start: 2025-03-27

## 2025-03-27 RX ORDER — ASPIRIN 81 MG/1
81 TABLET ORAL 2 TIMES DAILY
Qty: 70 TABLET | Refills: 0 | Status: SHIPPED | OUTPATIENT
Start: 2025-03-27

## 2025-03-27 RX ORDER — FENTANYL CITRATE/PF 50 MCG/ML
50 SYRINGE (ML) INJECTION
Status: DISCONTINUED | OUTPATIENT
Start: 2025-03-27 | End: 2025-03-27 | Stop reason: HOSPADM

## 2025-03-27 RX ORDER — PROPOFOL 10 MG/ML
INJECTION, EMULSION INTRAVENOUS CONTINUOUS PRN
Status: DISCONTINUED | OUTPATIENT
Start: 2025-03-27 | End: 2025-03-27

## 2025-03-27 RX ORDER — ONDANSETRON 4 MG/1
4 TABLET, FILM COATED ORAL EVERY 8 HOURS PRN
Qty: 5 TABLET | Refills: 0 | Status: SHIPPED | OUTPATIENT
Start: 2025-03-27

## 2025-03-27 RX ORDER — SENNOSIDES 8.6 MG
650 CAPSULE ORAL EVERY 8 HOURS PRN
Qty: 30 TABLET | Refills: 0 | Status: SHIPPED | OUTPATIENT
Start: 2025-03-27 | End: 2025-04-26

## 2025-03-27 RX ORDER — METHOCARBAMOL 500 MG/1
500 TABLET, FILM COATED ORAL 3 TIMES DAILY PRN
Qty: 30 TABLET | Refills: 0 | Status: SHIPPED | OUTPATIENT
Start: 2025-03-27

## 2025-03-27 RX ORDER — ACETAMINOPHEN 325 MG/1
975 TABLET ORAL EVERY 8 HOURS
Status: DISCONTINUED | OUTPATIENT
Start: 2025-03-27 | End: 2025-03-27 | Stop reason: HOSPADM

## 2025-03-27 RX ORDER — SODIUM CHLORIDE, SODIUM LACTATE, POTASSIUM CHLORIDE, CALCIUM CHLORIDE 600; 310; 30; 20 MG/100ML; MG/100ML; MG/100ML; MG/100ML
75 INJECTION, SOLUTION INTRAVENOUS CONTINUOUS
Status: DISCONTINUED | OUTPATIENT
Start: 2025-03-27 | End: 2025-03-27 | Stop reason: HOSPADM

## 2025-03-27 RX ORDER — MIDAZOLAM HYDROCHLORIDE 2 MG/2ML
INJECTION, SOLUTION INTRAMUSCULAR; INTRAVENOUS AS NEEDED
Status: DISCONTINUED | OUTPATIENT
Start: 2025-03-27 | End: 2025-03-27

## 2025-03-27 RX ADMIN — FENTANYL CITRATE 25 MCG: 50 INJECTION, SOLUTION INTRAMUSCULAR; INTRAVENOUS at 07:41

## 2025-03-27 RX ADMIN — FENTANYL CITRATE 25 MCG: 50 INJECTION, SOLUTION INTRAMUSCULAR; INTRAVENOUS at 08:48

## 2025-03-27 RX ADMIN — Medication 50 MCG: at 08:00

## 2025-03-27 RX ADMIN — MIDAZOLAM HYDROCHLORIDE 1 MG: 1 INJECTION, SOLUTION INTRAMUSCULAR; INTRAVENOUS at 07:26

## 2025-03-27 RX ADMIN — LIDOCAINE HYDROCHLORIDE 50 MG: 10 INJECTION, SOLUTION EPIDURAL; INFILTRATION; INTRACAUDAL at 07:41

## 2025-03-27 RX ADMIN — FENTANYL CITRATE 25 MCG: 50 INJECTION, SOLUTION INTRAMUSCULAR; INTRAVENOUS at 07:49

## 2025-03-27 RX ADMIN — MEPIVACAINE HYDROCHLORIDE 3 ML: 15 INJECTION, SOLUTION EPIDURAL; INFILTRATION at 07:38

## 2025-03-27 RX ADMIN — SODIUM CHLORIDE, SODIUM LACTATE, POTASSIUM CHLORIDE, AND CALCIUM CHLORIDE: .6; .31; .03; .02 INJECTION, SOLUTION INTRAVENOUS at 09:01

## 2025-03-27 RX ADMIN — CHLORHEXIDINE GLUCONATE 0.12% ORAL RINSE 15 ML: 1.2 LIQUID ORAL at 07:06

## 2025-03-27 RX ADMIN — MIDAZOLAM HYDROCHLORIDE 1 MG: 1 INJECTION, SOLUTION INTRAMUSCULAR; INTRAVENOUS at 07:39

## 2025-03-27 RX ADMIN — Medication 50 MCG: at 08:41

## 2025-03-27 RX ADMIN — OXYCODONE 5 MG: 5 TABLET ORAL at 12:04

## 2025-03-27 RX ADMIN — DEXAMETHASONE SODIUM PHOSPHATE 10 MG: 10 INJECTION, SOLUTION INTRAMUSCULAR; INTRAVENOUS at 07:49

## 2025-03-27 RX ADMIN — ONDANSETRON 4 MG: 2 INJECTION INTRAMUSCULAR; INTRAVENOUS at 07:26

## 2025-03-27 RX ADMIN — EPHEDRINE SULFATE 5 MG: 50 INJECTION, SOLUTION INTRAVENOUS at 08:07

## 2025-03-27 RX ADMIN — CEFAZOLIN SODIUM 2000 MG: 2 SOLUTION INTRAVENOUS at 07:26

## 2025-03-27 RX ADMIN — EPHEDRINE SULFATE 5 MG: 50 INJECTION, SOLUTION INTRAVENOUS at 08:46

## 2025-03-27 RX ADMIN — SODIUM CHLORIDE, SODIUM LACTATE, POTASSIUM CHLORIDE, AND CALCIUM CHLORIDE 125 ML/HR: .6; .31; .03; .02 INJECTION, SOLUTION INTRAVENOUS at 07:22

## 2025-03-27 RX ADMIN — TRANEXAMIC ACID 1000 MG: 10 INJECTION, SOLUTION INTRAVENOUS at 07:39

## 2025-03-27 RX ADMIN — PROPOFOL 80 MCG/KG/MIN: 10 INJECTION, EMULSION INTRAVENOUS at 07:41

## 2025-03-27 RX ADMIN — Medication 50 MCG: at 08:44

## 2025-03-27 RX ADMIN — FENTANYL CITRATE 25 MCG: 50 INJECTION, SOLUTION INTRAMUSCULAR; INTRAVENOUS at 08:58

## 2025-03-27 RX ADMIN — ACETAMINOPHEN 975 MG: 325 TABLET, FILM COATED ORAL at 07:06

## 2025-03-27 RX ADMIN — Medication 50 MCG: at 08:09

## 2025-03-27 RX ADMIN — EPHEDRINE SULFATE 5 MG: 50 INJECTION, SOLUTION INTRAVENOUS at 07:57

## 2025-03-27 NOTE — ANESTHESIA POSTPROCEDURE EVALUATION
Post-Op Assessment Note    CV Status:  Stable  Pain Score: 0    Pain management: adequate       Mental Status:  Sleepy and arousable   PONV Controlled:  None   Airway Patency:  Patent     Post Op Vitals Reviewed: Yes    No anethesia notable event occurred.    Staff: CRNA           Last Filed PACU Vitals:  Vitals Value Taken Time   Temp 97.9    Pulse 64 03/27/25 0919   /53    Resp 13 03/27/25 0919   SpO2 97 % 03/27/25 0919   Vitals shown include unfiled device data.

## 2025-03-27 NOTE — INTERVAL H&P NOTE
H&P reviewed. After examining the patient I find no changes in the patients condition since the H&P had been written.    Vitals:    03/27/25 0703   BP: 146/80   Pulse: (!) 53   Resp: 17   Temp: 97.6 °F (36.4 °C)   SpO2: 100%   Patient seen and examined  General: No apparent distress  CV: S1-S2  Abdomen: Soft  Chest: No audible wheezing  Right lower extremity: No abrasions open wounds; pain with logrolling; neurologically vastly intact distally  To the operating room for right anterior total hip arthroplasty  Pros and cons of operative and nonoperative intervention discussed with patient  We will follow-up postoperatively

## 2025-03-27 NOTE — OP NOTE
[id number]                                                                                                        Amando Mace [facility] OR MAIN    PreOp Dx: right hip DJD    Postop Dx: right hip DJD    Procedure: right anterior total hip arthroplasty    Surgeon: Pebbles Santiago MD    Assistants: Richard Alcala PA-C, C.Servian ATC/OTC    No qualified resident was available for this case.  An assistant was needed for all portions of this case including prepping and draping the patient as well as prepping and final implantation of the femoral and acetabular components as well as closure of the operative site.    Fluids:     EBL:     Drains: none    Specimens: none    Complications: none    Condition: stable,Transferred to postanesthesia care unit.    Implants: [email or web address]      Acetabulum size 56    Acetabular poly 56/36 neutral    Femur Actis, size 7 High Offset    Femoral head size 36/+1.5    74 y.o. male , presents at this, time, secondary to failed conservative treatment of right hip DJD for right anterior total hip arthroplasty    The patient was told of all the pros and cons of operative and nonoperative intervention. Some of the complications of operative intervention include infection, bleeding, scarring, nerve injury, vascular injury,leg length discrepancy, hip dislocation, continued pain, decreased range of motion, DVT, PE death, loss of limb, fracture, need for further surgery, not obtain an results. The patient wished. Patient did consent for operative intervention for this pathology.  Patient was brought to the operating room. Patient was anesthetized as anesthesia team. Patient was prepped and draped in normal sterile fashion. After this was done, we did conduct a time out to make sure correct. Patient was in the room, prepped marked and draped. Implants were in the room, Rep. For the implants were present,  DVT prophylaxis and antibiotics were dressed.  An anterior approach was used. Incision was made 2 cm lateral and 1 cm distal to the ASIS and extended this incision distally. After going through skin, fatty tissue, fascia ferdinand was identified and incised. The fascial layer Was incised going towards the ASIS And extending it distally to incorporate our entire incision. We were able to go through the internervous planes until we were able to identify the anterior perforating vessels. Once this was done with able to obtain hemostasis. Able to excise the pre-capsular, fatty tissue. After this was done, we were able to do all releases, which included, the inferior portion of the femoral neck going towards the lesser trochanteric region, the iliocapsularis, and also, the piriformis recess.  We then were able to make our femoral neck cut. Femoral head was removed. Labrum was excised. Fatty tissue within the acetabulum was also excised. At this, time. We did remain to be appropriate theta angle and anteversion. We did place a trial acetabular component, and with the aid of radiographic imaging. It was noted that all was appropriate. At this, time, we were able to place, our true acetabular component. Our theta angle and anteversion were noted to be appropriate. At this time, we are able to place, our true acetabular poly-ethylene.  At this, time. We did release on the medial aspect of the greater trochanteric region. We externally rotated. The femur as well as extending at the hip and adducting the femur to obtain appropriate visualization of the proximal femur. We were able to use our box osteotome, canal finder, and rasp in order to make sure there, we avoided any varus, placement of our implant. We did broach to appropriate size, making sure we had correct version. Calcar planer was then used. Trial femoral neck and head were placed. Patient was placed through provocative ranges of motion to make sure that stability was  obtained. Radiographic imaging confirmed appropriate leg lengths. At this time. We did remove a trial femoral neck and head. After the hip was dislocated. All broach handle was placed within a trial femoral component to make sure that the implant was still stable. Once this was confirmed, we did remove the trial femoral component.  Copious irrigation was used at the operative site. True femoral implant was placed. Trial femoral head was placed once more in order to confirm appropriate leg lengths and appropriate stability. Once as noted to be appropriate, we placed a true femoral head. We did check for stability, leg lengths and final radiographs were taken. Irrigation was used once more at the operative site. Fascial layer was reapproximated with barbed sutures. 2-0 Vicryl sutures were used for the subcutaneous layer.  Monocryl sutures were utilized for the subcuticular closure.  The wounds were cleaned and dried. Exofin sealant was used to augment the closure. Mepilex dressing was placed. Patient was subsequently awakened noted to be in stable condition and transferred to postanesthesia care unit.    The Electronic Payment and Services (EPS) system was utilized in this case for:  Presurgical planning x-ray  Intraoperative use of the navigation system  Intraoperative use of fluoroscopy and saved images

## 2025-03-27 NOTE — DISCHARGE INSTR - AVS FIRST PAGE
Discharge Instructions - Orthopedics  Amando Mace 74 y.o. male MRN: [id number]  Unit/Bed#: [facility] OR MAIN    Weight Bearing Status:                                           Weight Bearing as tolerated to the right lower extremity.  Anterior total hip precautions    DVT prophylaxis:  Complete course of Aspirin 81 mg twice daily x 35 days from date of surgery as directed    Pain:  Start oxycodone 5 mg every 6 hrs after last dose taken after surgery for 1 day  Transition to oxycodone 5 mg every 6 hours as needed    Showering Instructions:   May shower 5 days from date of surgery with operative dressing intact  Do not soak your incision(Tubs, Jacuzzi's, pools, etc)    Dressing Instructions:   Keep dressing/incision clean and intact until follow up appointment.    Do not apply any creams or ointments/lotions to your incisions including antibiotic ointment, peroxide    Driving Instructions:  No driving until cleared by Orthopaedic Surgery.    PT/OT:  Continue PT/OT on outpatient basis as directed    Appt Instructions:   If you do not have your appointment, please call the clinic at [phone]  Otherwise followup as scheduled    Contact the office sooner if you experience any increased numbness/tingling in the extremities.

## 2025-03-27 NOTE — CASE MANAGEMENT
Case Management Discharge Planning Note    Patient name Amando Mace  Location OR POOL/OR POOL MRN [id number]  : 1950 Date 3/27/2025       Current Admission Date: 3/27/2025  Current Admission Diagnosis:S/P total right hip arthroplasty   Patient Active Problem List    Diagnosis Date Noted Date Diagnosed    PONV (postoperative nausea and vomiting) 2025     S/P total right hip arthroplasty 2025     Prediabetes 2025     Bilateral hip pain 2024     History of PTCA stent 2024     Hyperlipidemia 2024     Trigger finger, right index finger 2024     Tinea cruris 2022     Benign prostatic hyperplasia with incomplete bladder emptying 2021     Hx of adenomatous colonic polyps 2020     Primary osteoarthritis, left shoulder 2019     Actinic keratosis 2017     Chronic GERD 2017     Lipid disorder 2016     Low back pain 2015     Localized osteoarthrosis of right hip 2013     Essential hypertension 10/19/2012     Coronary atherosclerosis 10/17/2012       LOS (days): 0  Geometric Mean LOS (GMLOS) (days):   Days to GMLOS:     OBJECTIVE:     Current admission status: Outpatient Surgery   Preferred Pharmacy:   [id number] - [address] [address]  Phone: [phone] Fax: [phone]    [facility] [place], [place] - [address]  [address]  [facility] [address]  Phone: [phone] Fax: [phone]    Primary Care Provider: Luis Lozoya MD    Primary Insurance: [employer]  Secondary Insurance:     DISCHARGE DETAILS:    DME Referral Provided  Referral made for DME?: Yes  DME referral completed for the following items:: Bedside Commode  DME Supplier Name:: [facility]

## 2025-03-27 NOTE — ANESTHESIA POSTPROCEDURE EVALUATION
Post-Op Assessment Note    CV Status:  Stable    Pain management: adequate       Hydration Status:  Stable   Airway Patency:  Patent     Post Op Vitals Reviewed: Yes    No anethesia notable event occurred.    Staff: Anesthesiologist           Last Filed PACU Vitals:  Vitals Value Taken Time   Temp 97.6 °F (36.4 °C) 03/27/25 1300   Pulse 68 03/27/25 1312   /77 03/27/25 1300   Resp 14 03/27/25 1300   SpO2 95 % 03/27/25 1312   Vitals shown include unfiled device data.    Modified Pao:     Vitals Value Taken Time   Activity 2 03/27/25 1300   Respiration 2 03/27/25 1300   Circulation 2 03/27/25 1300   Consciousness 2 03/27/25 1300   Oxygen Saturation 2 03/27/25 1300     Modified Pao Score: 10

## 2025-03-27 NOTE — OCCUPATIONAL THERAPY NOTE
Occupational Therapy Evaluation &  Treat     Patient Name: Amando Mace  Today's Date: 3/27/2025  Problem List  Principal Problem:    S/P total right hip arthroplasty  Active Problems:    PONV (postoperative nausea and vomiting)    Past Medical History  Past Medical History:   Diagnosis Date    Arthritis     Carpal tunnel syndrome on left 02/20/2024    Colon polyps     Cubital tunnel syndrome on left 02/20/2024    GERD (gastroesophageal reflux disease)     Hyperlipidemia     Hypertension     PONV (postoperative nausea and vomiting)     Postoperative urinary retention     Seasonal allergies     Trigger finger, left index finger 02/20/2024    Trigger middle finger of left hand 05/08/2019     Past Surgical History  Past Surgical History:   Procedure Laterality Date    BACK SURGERY      laminectomy: L5-S1    CARDIAC SURGERY      cardiac stent    CARPAL TUNNEL RELEASE      COLONOSCOPY      benign polyp removal    ORCHIECTOMY Left     TN ESOPHAGOGASTRODUODENOSCOPY TRANSORAL DIAGNOSTIC N/A 08/29/2017    Procedure: EGD AND COLONOSCOPY;  Surgeon: Talat Park MD;  [facility];  Service: Gastroenterology    TN NEUROPLASTY &/TRANSPOS MEDIAN NRV CARPAL TUNNE Left 02/20/2024    Procedure: RELEASE CARPAL TUNNEL REVISION;  Surgeon: Hair Pollock MD;  [facility];  Service: Orthopedics    TN NEUROPLASTY &/TRANSPOSITION ULNAR NERVE ELBOW Left 02/20/2024    Procedure: RELEASE CUBITAL TUNNEL, FDS RADIAL SLIP HEMITENECTOMY;  Surgeon: Hair Pollock MD;  [facility];  Service: Orthopedics    TN TENDON SHEATH INCISION Left 02/20/2024    Procedure: RELEASE TRIGGER FINGER LEFT INDEX, RELEASE TRIGGER FINGER LEFT LONG;  Surgeon: Hair Pollock MD;  [facility];  Service: Orthopedics    UPPER GASTROINTESTINAL ENDOSCOPY      WRIST SURGERY             03/27/25 1311   OT Last Visit   OT Visit Date 03/27/25   Note Type   Note type Evaluation  (& Treat)   Additional  Comments Pt is a 73 y/o M s/p R MARGOT  anterior approach POD #0.   Pain Assessment   Pain Assessment Tool 0-10   Pain Score 1   Pain Location/Orientation Orientation: Right;Location: Hip   Pain Onset/Description Onset: Ongoing   Hospital Pain Intervention(s) Repositioned   Restrictions/Precautions   Weight Bearing Precautions Per Order Yes   RLE Weight Bearing Per Order WBAT   Other Precautions Fall Risk;THR   Home Living   Type of Home House   Home Layout Two level;Stairs to enter with rails;Bed/bath upstairs   Bathroom Shower/Tub   (Pt has bathrooms on 1st and 2nd floor. First floor bathroom has tub and raised toilet seat. 2nd floor has shower and standard toilet.)   Bathroom Equipment Shower chair   Bathroom Accessibility Accessible   Home Equipment Walker;Cane  (Pt was using cane at baseline.)   Prior Function   Level of [place] Independent with ADLs;Independent with functional mobility;Independent with IADLS   Lives With Spouse   Vocational Retired   Subjective   Subjective Pt received in supine. Pt agreeable to session.   ADL   Eating Assistance 7  Independent   Grooming Assistance 7  Independent   UB Bathing Assistance 5  Supervision/Setup   LB Bathing Assistance 5  Supervision/Setup   UB Dressing Assistance 5  Supervision/Setup   LB Dressing Assistance 5  Supervision/Setup   Toileting Assistance  5  Supervision/Setup   Bed Mobility   Supine to Sit 5  Supervision   Additional items Verbal cues   Additional Comments Pt remained seated EOB by end of session.   Transfers   Sit to Stand 5  Supervision   Additional items Verbal cues   Stand to Sit 5  Supervision   Additional items Verbal cues   Functional Mobility   Functional Mobility 5  Supervision   Additional Comments RW   Balance   Static Sitting Good   Dynamic Sitting Fair +   Static Standing Fair +   Dynamic Standing Fair +   Activity Tolerance   Activity Tolerance Patient tolerated treatment well   Medical Staff Made Aware PT Minh SCHUMACHER Assessment    RUE Assessment WFL   LUE Assessment   LUE Assessment WFL   Cognition   Overall Cognitive Status WFL   Arousal/Participation Alert   Attention Within functional limits   Orientation Level Oriented X4   Memory Within functional limits   Following Commands Follows all commands and directions without difficulty   Assessment   Assessment Pt is a 74 y.o. male seen for OT evaluation at [facility], admitted 3/27/2025 w/ S/P total right hip arthroplasty.  Comorbidities affecting pt's functional performance at time of assessment include: low back pain, L shoulder OA, essential HTN, chronic GERD, etc (see chart).  Prior to admission, pt was living with wife in a house with steps to manage.  Pt was I w/  ADLS and IADLS, (+) drove, & required cane PTA. Upon evaluation, pt appears to be performing below baseline functional status. Pt currently requires sup for bed mobility, sup for functional mobility/transfers, sup for UB ADLs and sup for LB ADLS 2* the following deficits impacting occupational performance: weakness, decreased strength, and decreased balance. Full objective findings from OT assessment regarding body systems outlined above. Personal factors affecting pt at time of IE include:steps to enter environment, difficulty performing ADLS, difficulty performing IADLS , and decreased functional mobility . These impairments, as well as risk for falls  limit pt's ability to safely engage in all baseline areas of occupation and mobility. Pt educated this encounter re: ADL strategies and transfers.  This evaluation required an extensive review of medical and/or therapy records and additional review of physical, cognitive and psychosocial history related to functional performance. Based upon functional performance deficits and assessments, this evaluation has been identified as a high complexity evaluation.  At this time, OT recommendations at time of discharge is home with no OT needs.   Goals   Patient Goals Pt  wishes to get home   Plan   OT Frequency Eval only   Discharge Recommendation   Rehab Resource Intensity Level, OT No post-acute rehabilitation needs   Equipment Recommended Bedside commode   Commode Type   (for upstairs bathroom.)   Additional Comments  The patient's raw score on the AM-PAC Daily Activity inpatient short form is 24, standardized score is 57.54, greater than 39.4. Patients at this level are likely to benefit from DC to home. However please refer to therapist recommendation for discharge planning given other factors that may influence destination.  excessively. Thin liquids removed from tray and placed on window sill, RN notified.   -[facility] Daily Activity Inpatient   Lower Body Dressing 4   Bathing 4   Toileting 4   Upper Body Dressing 4   Grooming 4   Eating 4   Daily Activity Raw Score 24   Daily Activity Standardized Score (Calc for Raw Score >=11) 57.54   -PAC Applied Cognition Inpatient   Following a Speech/Presentation 4   Understanding Ordinary Conversation 4   Taking Medications 4   Remembering Where Things Are Placed or Put Away 4   Remembering List of 4-5 Errands 4   Taking Care of Complicated Tasks 4   Applied Cognition Raw Score 24   Applied Cognition Standardized Score 62.21   Additional Treatment Session   Start Time 1250   End Time 1311   Treatment Assessment Pt performed functional mobility into bathroom with supervision with RW. Performed toilet transfer with VC for hand placement and body mechanics. Pt then performed toileting with supervision followed by hand washing at sink with supervision with RW in place. Performed additional functional mobility x 100 feet with RW with supervision. Perfomed car transfer simulation with supervision with VC for hand placement. Pt then returned to bedside with supervision. Pt educated on LB ADL dressing strategies. Donned underwear/pants with supervision. Reviewed use of appropriate DME for safe ADL mgmt. Pt remained seated EOB. NAD. All needs  met.   End of Consult   Education Provided Family or social support of family present for education by provider;Yes   Patient Position at End of Consult Seated edge of bed;All needs within reach   Nurse Communication Nurse aware of consult             Nicci Cook OTR/MEGAN

## 2025-03-27 NOTE — TELEPHONE ENCOUNTER
Caller: Ermias    Doctor: Dr. Santiago    Reason for call: Patient is asking it says 5 days he can shower but asking when would the 5th day, I advised I think they count from the day of surgery on.  Ermias also is asking if he can use the solution he was given before surgery, I advised I would ask but I think just soap and water.    Advised I would send this to the nurses to call him back.     Call back#: [phone]

## 2025-03-27 NOTE — PHYSICAL THERAPY NOTE
PHYSICAL THERAPY Evaluation and Treatment  DATE: 03/27/25  TIME: 1555-9785    NAME:  Amando Mace  AGE:   74 y.o.  Mrn:   [id number]  Length Of Stay: 0    ADMIT DX:  Primary osteoarthritis of one hip, right [M16.11]    Past Medical History:   Diagnosis Date    Arthritis     Carpal tunnel syndrome on left 02/20/2024    Colon polyps     Cubital tunnel syndrome on left 02/20/2024    GERD (gastroesophageal reflux disease)     Hyperlipidemia     Hypertension     PONV (postoperative nausea and vomiting)     Postoperative urinary retention     Seasonal allergies     Trigger finger, left index finger 02/20/2024    Trigger middle finger of left hand 05/08/2019     Past Surgical History:   Procedure Laterality Date    BACK SURGERY      laminectomy: L5-S1    CARDIAC SURGERY      cardiac stent    CARPAL TUNNEL RELEASE      COLONOSCOPY      benign polyp removal    ORCHIECTOMY Left     VT ESOPHAGOGASTRODUODENOSCOPY TRANSORAL DIAGNOSTIC N/A 08/29/2017    Procedure: EGD AND COLONOSCOPY;  Surgeon: Talat Park MD;  [facility];  Service: Gastroenterology    VT NEUROPLASTY &/TRANSPOS MEDIAN NRV CARPAL TUNNE Left 02/20/2024    Procedure: RELEASE CARPAL TUNNEL REVISION;  Surgeon: Hair Pollock MD;  [facility];  Service: Orthopedics    VT NEUROPLASTY &/TRANSPOSITION ULNAR NERVE ELBOW Left 02/20/2024    Procedure: RELEASE CUBITAL TUNNEL, FDS RADIAL SLIP HEMITENECTOMY;  Surgeon: Hair Pollock MD;  [facility];  Service: Orthopedics    VT TENDON SHEATH INCISION Left 02/20/2024    Procedure: RELEASE TRIGGER FINGER LEFT INDEX, RELEASE TRIGGER FINGER LEFT LONG;  Surgeon: Hair Pollock MD;  [facility];  Service: Orthopedics    UPPER GASTROINTESTINAL ENDOSCOPY      WRIST SURGERY          03/27/25 1232   PT Last Visit   PT Visit Date 03/27/25   Note Type   Note type Evaluation   Additional Comments 75 y/o presents for elective right anterior MARGOT.   Pain Assessment   Pain  Assessment Tool 0-10   Pain Score 1   Pain Location/Orientation Orientation: Right;Location: Hip   Hospital Pain Intervention(s) Repositioned;Ambulation/increased activity;Rest   Restrictions/Precautions   Weight Bearing Precautions Per Order Yes   RLE Weight Bearing Per Order WBAT  (anterior hip precaution)   Other Precautions WBS;THR;Fall Risk;Pain   Home Living   Additional Comments Pt lives with wife in 2-level house, 2 BRIGHT (no rail).  full flight of stairs to access bedroom/full bathroom, left handrail.  flat bed.  raised toilet.  DME: RW, SPC   Prior Function   Comments Prior to admission: independent with ADL, IADLs, drive (+), ambulates with SPC.   General   Family/Caregiver Present No   Cognition   Overall Cognitive Status WFL   Arousal/Participation Alert   Orientation Level Oriented X4   Subjective   Subjective pt reports pain is well controlled.  No reported adverse symptoms   RUE Assessment   RUE Assessment WFL   LUE Assessment   LUE Assessment WFL   RLE Assessment   RLE Assessment WFL   LLE Assessment   LLE Assessment WFL   Coordination   Movements are Fluid and Coordinated 1   Sensation WFL   Bed Mobility   Supine to Sit 6  Modified independent   Additional items HOB elevated;Bedrails   Transfers   Sit to Stand 5  Supervision   Additional items Assist x 1;Bedrails;Increased time required;Armrests;Verbal cues   Stand to Sit 5  Supervision   Additional items Assist x 1;Bedrails;Armrests;Increased time required;Verbal cues   Ambulation/Elevation   Gait Assistance 5  Supervision   Additional items Assist x 1;Verbal cues;Tactile cues   Assistive Device Rolling walker   Distance 25'+125'   Stair Management Assistance 5  Supervision   Additional items Assist x 1;Verbal cues;Tactile cues   Stair Management Technique One rail L;With cane;With walker   Number of Stairs 5   Ambulation/Elevation Additional Comments Pt quickly progressed to reciprocal gait pattern.  infrequent cues for posture and walker  management.  Pt ascend/descend 1 curb step with RW, and ascended/descended 4 stairs with left rail+SPC   Balance   Static Sitting Good   Dynamic Sitting Good   Static Standing Fair +  (RW)   Dynamic Standing Fair +  (RW)   Ambulatory Fair +  (RW)   Endurance Deficit   Endurance Deficit No   Activity Tolerance   Activity Tolerance Patient tolerated treatment well   Medical Staff Made Aware collaborated with OT   Assessment   Prognosis Excellent   Problem List Decreased mobility;Pain;Orthopedic restrictions   Assessment Orders for PT eval and treat received.  Pt exhibits physical deficits noted in problem list above.  Deficits listed contribute to functional limitations that are significant from the patient PLOF and include: unsafe/inefficient ambulation, fall risk, and unable to safely manage stairs/steps/ramp    Additional considerations affecting pt's discharge planning: Significant amount of stairs to manage in order to access living quarters    During today's session, formal PT evaluation performed.  Initiated session with supine HEP before transferring to sitting and standing with walker.  Vitals and symptoms closely monitored.  Initiated standing HEP and educated on walker utilization.  Progressed ambulation and stair training as able.  Pt displaying appropriate ability to manage basic transfers, ambulation, and stairs in order to return home.  Recommending following up with OP PT.    The -PAC Mobility Score was used to assist in determining pt safety w/ mobility/self care & appropriate d/c recommendations, see above for scores. Patient's clinical presentation is evolving due to significant acute change in functional independence, significant need for care assistance compared to baseline, recent surgery/procedure, and ongoing medical management needs.     From a PT/mobility standpoint given the above findings, DC recommendation is level: III (Minimum Rehab Resource Intensity)    Considering the patient's  PLOF, co-morbidities, acute functional limitations, functional outcome measures, and/or goal to progress functional independence; this patient would benefit from skilled Physical Therapy intervention in the acute care setting.   Barriers to Discharge None   Goals   Patient Goals return to [facility] Expiration Date 04/06/25   Short Term Goal #1 1: Pt will perform supine<>sit transfer on flat bed, mod I.  2: Pt will perform stand pivot transfer with RW, mod I.  3: Pt will ambulate 150' with reciprocal gait and appropriate pace using RW, mod I. 4: Pt will perform written HEP, mod I. 5: Pt will ascend/descend a curb step and/or stairs require to mobilize around the the patient's home with RW/rails, Sup A.   Plan   Treatment/Interventions Functional transfer training;LE strengthening/ROM;Elevations;Therapeutic exercise;Endurance training;Patient/family training;Equipment eval/education;Bed mobility;Gait training   PT Frequency Twice a day   Discharge Recommendation   Rehab Resource Intensity Level, PT III (Minimum Resource Intensity)   AM-PAC Basic Mobility Inpatient   Turning in Flat Bed Without Bedrails 4   Lying on Back to Sitting on Edge of Flat Bed Without Bedrails 4   Moving Bed to Chair 3   Standing Up From Chair Using Arms 3   Walk in Room 3   Climb 3-5 Stairs With Railing 3   Basic Mobility Inpatient Raw Score 20   Basic Mobility Standardized Score 43.99   [facility] Highest Level Of Mobility   -HLM Goal 6: Walk 10 steps or more   -HLM Achieved 7: Walk 25 feet or more   Additional Treatment Session   Start Time 1240   End Time 1258   Treatment Assessment Discussed at length about pain management strategies, postural corrections, proper use of RW, gait/stair training corrections, importance of gaining AROM, benefit of frequent tolerable physical activity/exercise, and fall risk precautions.  Provided caregiver training and tips as appropriate and needed. Pt responded well to HEP with improved ROM,  strength, and pain tolerance.  Pt progressing ambulation and stair training appropriately.   Exercises   Heelslides Supine;10 reps;AROM;Right   Hip Abduction Supine;10 reps;AROM;Right   Hip Adduction Supine;10 reps;AROM;Right   Ankle Pumps Standing;10 reps;AROM;Bilateral  (RW)   Marching Standing;10 reps;AROM;Right;Left  (RW)   Bridging Supine;10 reps;AROM;Bilateral   End of Consult   Patient Position at End of Consult Seated edge of bed;All needs within reach   The patient's AM-PAC Basic Mobility Inpatient Short Form Raw Score is 20. A Raw score of greater than or equal to 16 suggests the patient may benefit from discharge to home. Please also refer to the recommendation of the Physical Therapist for safe discharge planning.    Luis Handy, PT, DPT  [place] Licensure #[id number]

## 2025-03-27 NOTE — ANESTHESIA PREPROCEDURE EVALUATION
Procedure:  ARTHROPLASTY HIP TOTAL ANTERIOR (Right: Hip)    Relevant Problems   ANESTHESIA   (+) PONV (postoperative nausea and vomiting)      CARDIO   (+) Coronary atherosclerosis   (+) Essential hypertension   (+) History of PTCA stent (Denies CP/SOB since stenting)   (+) Hyperlipidemia   (-) Angina at rest (HCC)   (-) Angina of effort (HCC)   (-) Chest pain   (-) DAVIS (dyspnea on exertion)      ENDO   (-) Diabetes mellitus type 1 (HCC)   (-) Diabetes mellitus, type 2 (HCC)      GI/HEPATIC   (+) Chronic GERD      /RENAL   (+) Benign prostatic hyperplasia with incomplete bladder emptying   (-) Chronic kidney disease      MUSCULOSKELETAL   (+) Localized osteoarthrosis of right hip   (+) Low back pain   (+) Primary osteoarthritis, left shoulder      NEURO/PSYCH   (-) CVA (cerebral vascular accident) (HCC)   (-) Seizures (HCC)      PULMONARY   (-) Asthma   (-) Chronic obstructive pulmonary disease (HCC)   (-) Sleep apnea        Physical Exam    Airway    Mallampati score: II  TM Distance: >3 FB  Neck ROM: full     Dental   No notable dental hx     Cardiovascular      Pulmonary      Other Findings        Anesthesia Plan  ASA Score- 3     Anesthesia Type- spinal with ASA Monitors.         Additional Monitors:     Airway Plan:            Plan Factors-Exercise tolerance (METS): >4 METS.    Chart reviewed.  Imaging results reviewed. Existing labs reviewed. Patient summary reviewed.                  Induction- intravenous.    Postoperative Plan- Plan for postoperative opioid use. Planned trial extubation        Informed Consent- Anesthetic plan and risks discussed with patient.  I personally reviewed this patient with the CRNA. Discussed and agreed on the Anesthesia Plan with the CRNA..      NPO Status:  Vitals Value Taken Time   Date of last liquid 03/27/25 03/27/25 0658   Time of last liquid 0530 03/27/25 0658   Date of last solid 03/26/25 03/27/25 0658   Time of last solid 2130 03/27/25 0658

## 2025-03-27 NOTE — TELEPHONE ENCOUNTER
Called and spoke with Ermias and wife.     I advised he can shower on April 1st, which will be 5 days post op. He can use standard soap and water to wash. I informed him to keep the mepilex dressing in place while showering, as it is a waterproof dressing and can come off at post op appt. I instructed if it starts to lift, or water gets under the dressing, to remove it- pat dry, and leave open to air. I informed him that PT can also evaluate and change dressing if needed.     Patient states that his DOS has been moved up, and is asking for a sooner post op appt. I will forward to SC to discuss.

## 2025-03-27 NOTE — ANESTHESIA PROCEDURE NOTES
Spinal Block    Staffing  Performed by: Lauren Corral CRNA  Authorized by: Berhane Carlin MD

## 2025-03-27 NOTE — TELEPHONE ENCOUNTER
Hello,    Please advise if a forced appointment can be accommodated for the patient:    Call back #: [phone]    Reason for appointment: Patient's surgery was originally suppose to be on 03/31/25 and PO on 04/18/25. His surgery with Dr. Santiago was moved to today 03/27/25 so his PO should be moved up around 04/10/25. Can someone help him get in with Richard Sooner?     Requested doctor and/or location: [facility]      Thank you.

## 2025-03-28 ENCOUNTER — TELEPHONE (OUTPATIENT)
Dept: OBGYN CLINIC | Facility: HOSPITAL | Age: 75
End: 2025-03-28

## 2025-03-28 NOTE — TELEPHONE ENCOUNTER
"Patient contacted for a postoperative follow up assessment. Patient reports doing  \"pretty well\". Patient states current pain level of a  2/10  (mostly soreness/stiffness around surgery site) when sitting and 4/10 when walking with RW.  Patient denies increase in swelling and dressing is clean, dry and intact. Patient is icing the site regularly.     We reviewed patients AVS medication list. Patient is taking Robaxin 500mg TID PRN, Oxycodone 5mg PRN, ASA 81mg BID, Colace 100mg BID. Recommended patient take Tylenol 1000mg every 8 hours to help with pain management. Patient has not yet had a BM but is passing gas.  Discussed adding prune juice to regimen.     Patient denies nausea, vomiting, abdominal pain, chest pain, shortness of breath, fever, dizziness and calf pain. Patient confirmed post-op appointment with PT 03/31 and  surgeon on 04/18 .Patient does not have any other questions or concerns at this time. Pt was encouraged to call with any questions, concerns or issues.    "

## 2025-03-28 NOTE — TELEPHONE ENCOUNTER
S/w pt and explained that his p/o appt is at the 3 week raz, which is where it should be for a hip replacement.  Pt verbalized understanding.

## 2025-03-29 ENCOUNTER — HOSPITAL ENCOUNTER (EMERGENCY)
Facility: HOSPITAL | Age: 75
Discharge: HOME | End: 2025-03-30
Attending: EMERGENCY MEDICINE
Payer: COMMERCIAL

## 2025-03-29 ENCOUNTER — APPOINTMENT (EMERGENCY)
Dept: CT IMAGING | Facility: HOSPITAL | Age: 75
End: 2025-03-29
Payer: COMMERCIAL

## 2025-03-29 ENCOUNTER — NURSE TRIAGE (OUTPATIENT)
Dept: OTHER | Facility: OTHER | Age: 75
End: 2025-03-29

## 2025-03-29 VITALS
SYSTOLIC BLOOD PRESSURE: 154 MMHG | OXYGEN SATURATION: 97 % | RESPIRATION RATE: 18 BRPM | HEART RATE: 67 BPM | TEMPERATURE: 99.8 F | DIASTOLIC BLOOD PRESSURE: 72 MMHG

## 2025-03-29 DIAGNOSIS — R60.0 LOCALIZED EDEMA: ICD-10-CM

## 2025-03-29 DIAGNOSIS — Z96.641 S/P TOTAL RIGHT HIP ARTHROPLASTY: ICD-10-CM

## 2025-03-29 DIAGNOSIS — M25.451 SWELLING OF JOINT OF PELVIC REGION OR THIGH, RIGHT: Primary | ICD-10-CM

## 2025-03-29 DIAGNOSIS — R79.89 ELEVATED D-DIMER: ICD-10-CM

## 2025-03-29 DIAGNOSIS — R58 ECCHYMOSIS: ICD-10-CM

## 2025-03-29 LAB
ABO GROUP BLD: NORMAL
ALBUMIN SERPL BCG-MCNC: 4.1 G/DL (ref 3.5–5)
ALP SERPL-CCNC: 60 U/L (ref 34–104)
ALT SERPL W P-5'-P-CCNC: 16 U/L (ref 7–52)
ANION GAP SERPL CALCULATED.3IONS-SCNC: 10 MMOL/L (ref 4–13)
APTT PPP: 30 SECONDS (ref 23–34)
AST SERPL W P-5'-P-CCNC: 40 U/L (ref 13–39)
BASOPHILS # BLD AUTO: 0.07 THOUSANDS/ÂΜL (ref 0–0.1)
BASOPHILS NFR BLD AUTO: 1 % (ref 0–1)
BILIRUB SERPL-MCNC: 0.55 MG/DL (ref 0.2–1)
BLD GP AB SCN SERPL QL: NEGATIVE
BUN SERPL-MCNC: 14 MG/DL (ref 5–25)
CALCIUM SERPL-MCNC: 8.9 MG/DL (ref 8.4–10.2)
CHLORIDE SERPL-SCNC: 106 MMOL/L (ref 96–108)
CO2 SERPL-SCNC: 22 MMOL/L (ref 21–32)
CREAT SERPL-MCNC: 0.81 MG/DL (ref 0.6–1.3)
CRP SERPL QL: 114.2 MG/L
EOSINOPHIL # BLD AUTO: 0.2 THOUSAND/ÂΜL (ref 0–0.61)
EOSINOPHIL NFR BLD AUTO: 2 % (ref 0–6)
ERYTHROCYTE [DISTWIDTH] IN BLOOD BY AUTOMATED COUNT: 13.3 % (ref 11.6–15.1)
GFR SERPL CREATININE-BSD FRML MDRD: 88 ML/MIN/1.73SQ M
GLUCOSE SERPL-MCNC: 147 MG/DL (ref 65–140)
HCT VFR BLD AUTO: 36.8 % (ref 36.5–49.3)
HGB BLD-MCNC: 12.6 G/DL (ref 12–17)
IMM GRANULOCYTES # BLD AUTO: 0.03 THOUSAND/UL (ref 0–0.2)
IMM GRANULOCYTES NFR BLD AUTO: 0 % (ref 0–2)
INR PPP: 1.01 (ref 0.85–1.19)
LYMPHOCYTES # BLD AUTO: 1.74 THOUSANDS/ÂΜL (ref 0.6–4.47)
LYMPHOCYTES NFR BLD AUTO: 18 % (ref 14–44)
MCH RBC QN AUTO: 31.8 PG (ref 26.8–34.3)
MCHC RBC AUTO-ENTMCNC: 34.2 G/DL (ref 31.4–37.4)
MCV RBC AUTO: 93 FL (ref 82–98)
MONOCYTES # BLD AUTO: 1.16 THOUSAND/ÂΜL (ref 0.17–1.22)
MONOCYTES NFR BLD AUTO: 12 % (ref 4–12)
NEUTROPHILS # BLD AUTO: 6.56 THOUSANDS/ÂΜL (ref 1.85–7.62)
NEUTS SEG NFR BLD AUTO: 67 % (ref 43–75)
NRBC BLD AUTO-RTO: 0 /100 WBCS
PLATELET # BLD AUTO: 191 THOUSANDS/UL (ref 149–390)
PMV BLD AUTO: 9.8 FL (ref 8.9–12.7)
POTASSIUM SERPL-SCNC: 4.1 MMOL/L (ref 3.5–5.3)
PROT SERPL-MCNC: 6.8 G/DL (ref 6.4–8.4)
PROTHROMBIN TIME: 13.7 SECONDS (ref 12.3–15)
RBC # BLD AUTO: 3.96 MILLION/UL (ref 3.88–5.62)
RH BLD: POSITIVE
SODIUM SERPL-SCNC: 138 MMOL/L (ref 135–147)
SPECIMEN EXPIRATION DATE: NORMAL
WBC # BLD AUTO: 9.76 THOUSAND/UL (ref 4.31–10.16)

## 2025-03-29 PROCEDURE — 86901 BLOOD TYPING SEROLOGIC RH(D): CPT | Performed by: EMERGENCY MEDICINE

## 2025-03-29 PROCEDURE — 36415 COLL VENOUS BLD VENIPUNCTURE: CPT | Performed by: EMERGENCY MEDICINE

## 2025-03-29 PROCEDURE — 73701 CT LOWER EXTREMITY W/DYE: CPT

## 2025-03-29 PROCEDURE — 85025 COMPLETE CBC W/AUTO DIFF WBC: CPT | Performed by: EMERGENCY MEDICINE

## 2025-03-29 PROCEDURE — 85379 FIBRIN DEGRADATION QUANT: CPT | Performed by: EMERGENCY MEDICINE

## 2025-03-29 PROCEDURE — 80053 COMPREHEN METABOLIC PANEL: CPT | Performed by: EMERGENCY MEDICINE

## 2025-03-29 PROCEDURE — 86850 RBC ANTIBODY SCREEN: CPT | Performed by: EMERGENCY MEDICINE

## 2025-03-29 PROCEDURE — 86900 BLOOD TYPING SEROLOGIC ABO: CPT | Performed by: EMERGENCY MEDICINE

## 2025-03-29 PROCEDURE — 85610 PROTHROMBIN TIME: CPT | Performed by: EMERGENCY MEDICINE

## 2025-03-29 PROCEDURE — 99285 EMERGENCY DEPT VISIT HI MDM: CPT | Performed by: EMERGENCY MEDICINE

## 2025-03-29 PROCEDURE — 86140 C-REACTIVE PROTEIN: CPT | Performed by: EMERGENCY MEDICINE

## 2025-03-29 PROCEDURE — 99284 EMERGENCY DEPT VISIT MOD MDM: CPT

## 2025-03-29 PROCEDURE — 85730 THROMBOPLASTIN TIME PARTIAL: CPT | Performed by: EMERGENCY MEDICINE

## 2025-03-29 RX ADMIN — IOHEXOL 100 ML: 350 INJECTION, SOLUTION INTRAVENOUS at 23:23

## 2025-03-29 NOTE — TELEPHONE ENCOUNTER
"FOLLOW UP: follow up with patient     REASON FOR CONVERSATION: Post-op Problem    SYMPTOMS:  Wife called in due to fever of 100.6, tightness to upper right thigh, seems more swollen then yesterday     OTHER: On call provider advised to go to ED for DVT evaluation    DISPOSITION: Go to ED Now (overriding See HPC Within 4 Hours (Or PCP Triage))      Reason for Disposition   Fever > 100.4 F (38.0 C)    Answer Assessment - Initial Assessment Questions  1. SYMPTOM: \"What's the main symptom you're concerned about?\" (e.g., pain, fever, vomiting)        Fever of 100.6 and firmness to the upper thigh and around dressing is more swollen then yesterday.     2. ONSET: \"When did fever/swelling  start?\"        This afternoon     3. SURGERY: \"What surgery did you have?\"        Total right hip     4. DATE of SURGERY: \"When was the surgery?\"         3/27/25    5. ANESTHESIA: \"What type of anesthesia did you have?\" (e.g., general, spinal, epidural, local)        Unknown     6. DRAINS: \"Were any drains place in or around the wound?\" (e.g., Hemovac, Oscar-Diego, Penrose)    None         7. PAIN: \"Is there any pain?\" If Yes, ask: \"How bad is it?\"  (Scale 1-10; or mild, moderate, severe)        Pain is at a 5-6 out of 10     8. FEVER: \"Do you have a fever?\" If Yes, ask: \"What is your temperature, how was it measured, and when did it start?\"        100.6    9. VOMITING: \"Is there any vomiting?\" If Yes, ask: \"How many times?\"        None     10. BLEEDING: \"Is there any bleeding?\" If Yes, ask: \"How much?\" and \"Where?\"    None           11. OTHER SYMPTOMS: \"Do you have any other symptoms?\" (e.g., drainage from wound, painful urination, constipation)          None    Protocols used: Post-Op Symptoms and Questions-Adult-AH    "

## 2025-03-30 ENCOUNTER — HOSPITAL ENCOUNTER (EMERGENCY)
Dept: VASCULAR ULTRASOUND | Facility: HOSPITAL | Age: 75
Discharge: HOME | End: 2025-03-30
Payer: COMMERCIAL

## 2025-03-30 DIAGNOSIS — R60.0 LOCALIZED EDEMA: ICD-10-CM

## 2025-03-30 DIAGNOSIS — M25.451 SWELLING OF JOINT OF PELVIC REGION OR THIGH, RIGHT: ICD-10-CM

## 2025-03-30 DIAGNOSIS — Z96.641 S/P TOTAL RIGHT HIP ARTHROPLASTY: ICD-10-CM

## 2025-03-30 DIAGNOSIS — R79.89 ELEVATED D-DIMER: ICD-10-CM

## 2025-03-30 LAB — D DIMER PPP FEU-MCNC: 1.77 UG/ML FEU

## 2025-03-30 PROCEDURE — 96374 THER/PROPH/DIAG INJ IV PUSH: CPT

## 2025-03-30 PROCEDURE — 93971 EXTREMITY STUDY: CPT

## 2025-03-30 PROCEDURE — 93971 EXTREMITY STUDY: CPT | Performed by: SURGERY

## 2025-03-30 PROCEDURE — 96372 THER/PROPH/DIAG INJ SC/IM: CPT

## 2025-03-30 RX ORDER — HYDROMORPHONE HCL/PF 1 MG/ML
0.5 SYRINGE (ML) INJECTION ONCE
Refills: 0 | Status: DISCONTINUED | OUTPATIENT
Start: 2025-03-30 | End: 2025-03-30

## 2025-03-30 RX ORDER — ENOXAPARIN SODIUM 100 MG/ML
1 INJECTION SUBCUTANEOUS ONCE
Status: COMPLETED | OUTPATIENT
Start: 2025-03-30 | End: 2025-03-30

## 2025-03-30 RX ORDER — HYDROMORPHONE HCL IN WATER/PF 6 MG/30 ML
0.2 PATIENT CONTROLLED ANALGESIA SYRINGE INTRAVENOUS ONCE
Status: COMPLETED | OUTPATIENT
Start: 2025-03-30 | End: 2025-03-30

## 2025-03-30 RX ADMIN — ENOXAPARIN SODIUM 90 MG: 100 INJECTION SUBCUTANEOUS at 02:37

## 2025-03-30 RX ADMIN — HYDROMORPHONE HYDROCHLORIDE 0.2 MG: 0.2 INJECTION, SOLUTION INTRAMUSCULAR; INTRAVENOUS; SUBCUTANEOUS at 00:09

## 2025-03-30 NOTE — DISCHARGE INSTRUCTIONS
As we discussed, in order to fully evaluate for the possibility of a blood clot/DVT (deep venous thrombosis) you will need an ultrasound study to look at the veins, which we cannot obtain overnight but you can return in the morning for the study with the radiology department.  I placed an order for the study and left a message with the radiology department.  You should call the central scheduling department at [phone] this morning after 8 AM, and they will direct you to the radiology department and the appointment time for your ultrasound.

## 2025-03-30 NOTE — ED PROVIDER NOTES
Time reflects when diagnosis was documented in both MDM as applicable and the Disposition within this note       Time User Action Codes Description Comment    3/30/2025  2:28 AM Marin Hansen [M25.451] Swelling of joint of pelvic region or thigh, right     3/30/2025  2:29 AM Marin Hansen [Z96.641] S/P total right hip arthroplasty     3/30/2025  2:29 AM TyroneMarin [R79.89] Elevated d-dimer     3/30/2025  2:35 AM Marin Hansen [R60.0] Localized edema     3/30/2025  2:35 AM Marin Hansen [R58] Ecchymosis           ED Disposition       ED Disposition   Discharge    Condition   Stable    Date/Time   Sun Mar 30, 2025  2:40 AM    Comment   Amando Mace discharge to home/self care.                   Assessment & Plan       Medical Decision Making  75 y/o male with hx of HTN, HLD, and recent right hip arthroplasty (3/27/2025, with Dr. Santiago) presents to the ED for evaluation of fever and increased right thigh pain, swelling, and bruising after his hip replacement 2 days ago.  The patient is accompanied by his wife, who assists with providing history.  The patient states that over the last day he has noticed slightly increased swelling and bruising of his right thigh near his right hip replacement surgical site, as well as slightly increased pain.  He has been able to control his pain with the oxycodone and methocarbamol that were prescribed by his orthopedic surgeon.  His wife noted that he had a fever this afternoon, with Tmax 100.6 °F.  She gave him acetaminophen for the fever and called the orthopedic specialist on-call line, noting that the nurse I discussed with the orthopedist on-call and they were advised to bring the patient to the hospital for evaluation.  He has been ambulatory since the surgery with assistance of a walker.  He denies any recent falls, numbness, or weakness.  He denies any history of blood clots.  He takes aspirin 81 mg daily, however since his surgery his  orthopedic surgeon advised him to take an additional aspirin 81 mg daily (a total of 162 mg daily).    Vital signs reviewed. S/P right hip arthroplasty.  Surgical site/dressing appear clean, dry, and intact.  No surrounding erythema, induration, or fluctuance.  Mild swelling and tenderness on palpation with surrounding ecchymosis over the right anterolateral thigh, see image attached.  No distal lower extremity edema.  No calf tenderness.  No palpable cords.  Neurovascular intact distally.  See physical exam documentation for full exam findings.  Differential diagnosis includes but is not limited to expected postoperative changes, hematoma, seroma, abscess, and/or DVT.  Will begin evaluation with labs and CT imaging.  See ED course for independent interpretation of results. He has a mildly elevated CRP, however this can be typical in the postoperative setting.  Normal WBC, normal hemoglobin.  CT scan shows postoperative changes, no other findings, no fluid collections.  Unable to perform duplex ultrasound to evaluate for DVT at this time, due to no ultrasound technician being available overnight.  D-dimer obtained and is slightly elevated, so DVT remains in the differential.  I discussed these findings with the patient and his wife.  My plan is to have the patient return in the morning for a right lower extremity duplex study.  I discussed with orthopedic surgeon on-call, Dr. Mejia, who agrees with the plan of care and the patient is cleared for receiving a dose of Lovenox in the ED prior to discharge.  Lovenox ordered and administered.  I placed order for right lower extremity duplex study and instructed the patient and his wife to call the central scheduling phone number for specifics regarding when to return to the hospital in the morning for this study.  They verbalized understanding and agreement with the plan of care. I discussed all findings, treatment, red flags/return precautions, and outpatient follow-up  and the patient/family understand and agree. Stable for discharge.    Amount and/or Complexity of Data Reviewed  Labs: ordered. Decision-making details documented in ED Course.  Radiology: ordered. Decision-making details documented in ED Course.    Risk  Prescription drug management.        ED Course as of 03/30/25 0242   Sun Mar 30, 2025   0106 CT lower extremity w contrast right  Expected postoperative changes of recent right hip arthroplasty without evidence of complication.   0220 D-Dimer, Quant(!): 1.77   0220 Protime-INR  All WNL   0220 APTT  All WNL   0220 CBC and differential  Normal WBC, hemoglobin, hematocrit, and platelet count.       Medications   iohexol (OMNIPAQUE) 350 MG/ML injection (SINGLE-DOSE) 100 mL (100 mL Intravenous Given 3/29/25 2323)   HYDROmorphone HCl (DILAUDID) injection 0.2 mg (0.2 mg Intravenous Given 3/30/25 0009)   enoxaparin (LOVENOX) subcutaneous injection 90 mg (90 mg Subcutaneous Given 3/30/25 0237)       ED Risk Strat Scores                            SBIRT 22yo+      Flowsheet Row Most Recent Value   Initial Alcohol Screen: US AUDIT-C     1. How often do you have a drink containing alcohol? 0 Filed at: 03/29/2025 2037   2. How many drinks containing alcohol do you have on a typical day you are drinking?  0 Filed at: 03/29/2025 2037   3b. FEMALE Any Age, or MALE 65+: How often do you have 4 or more drinks on one occassion? 0 Filed at: 03/29/2025 2037   Audit-C Score 0 Filed at: 03/29/2025 2037   CRAIG: How many times in the past year have you...    Used an illegal drug or used a prescription medication for non-medical reasons? Never Filed at: 03/29/2025 2037                            History of Present Illness       Chief Complaint   Patient presents with    Fever     Patient arrives to the Ed with complaint of fevers, right leg pain, swelling  to the right thigh. Patient had hip surgery on thursday. Last dose of tylenol taken at 1930        Past Medical History:   Diagnosis  Date    Arthritis     Carpal tunnel syndrome on left 02/20/2024    Colon polyps     Cubital tunnel syndrome on left 02/20/2024    GERD (gastroesophageal reflux disease)     Hyperlipidemia     Hypertension     PONV (postoperative nausea and vomiting)     Postoperative urinary retention     Seasonal allergies     Trigger finger, left index finger 02/20/2024    Trigger middle finger of left hand 05/08/2019      Past Surgical History:   Procedure Laterality Date    BACK SURGERY      laminectomy: L5-S1    CARDIAC SURGERY      cardiac stent    CARPAL TUNNEL RELEASE      COLONOSCOPY      benign polyp removal    ORCHIECTOMY Left     TN ARTHRP ACETBLR/PROX FEM PROSTC AGRFT/ALGRFT Right 3/27/2025    Procedure: ARTHROPLASTY HIP TOTAL ANTERIOR;  Surgeon: Pebbles Santiago MD;  Location: [facility];  Service: Orthopedics    TN ESOPHAGOGASTRODUODENOSCOPY TRANSORAL DIAGNOSTIC N/A 08/29/2017    Procedure: EGD AND COLONOSCOPY;  Surgeon: Talat Park MD;  Location: [facility];  Service: Gastroenterology    TN NEUROPLASTY &/TRANSPOS MEDIAN NRV CARPAL TUNNE Left 02/20/2024    Procedure: RELEASE CARPAL TUNNEL REVISION;  Surgeon: Hair Pollock MD;  [facility];  Service: Orthopedics    TN NEUROPLASTY &/TRANSPOSITION ULNAR NERVE ELBOW Left 02/20/2024    Procedure: RELEASE CUBITAL TUNNEL, FDS RADIAL SLIP HEMITENECTOMY;  Surgeon: Hair Pollock MD;  [facility];  Service: Orthopedics    TN TENDON SHEATH INCISION Left 02/20/2024    Procedure: RELEASE TRIGGER FINGER LEFT INDEX, RELEASE TRIGGER FINGER LEFT LONG;  Surgeon: Hair Pollock MD;  [facility];  Service: Orthopedics    UPPER GASTROINTESTINAL ENDOSCOPY      WRIST SURGERY        Family History   Problem Relation Age of Onset    Diabetes Mother     Hypertension Mother     Stomach cancer Father     Hypertension Father     Liver cancer Father     Cancer Father       Social History     Tobacco Use    Smoking status: Never      Passive exposure: Never    Smokeless tobacco: Never   Vaping Use    Vaping status: Never Used   Substance Use Topics    Alcohol use: Yes     Alcohol/week: 2.0 standard drinks of alcohol     Types: 1 Glasses of wine, 1 Cans of beer per week    Drug use: No      E-Cigarette/Vaping    E-Cigarette Use Never User       E-Cigarette/Vaping Substances    Nicotine No     THC No     CBD No     Flavoring No     Other No     Unknown No       I have reviewed and agree with the history as documented.     73 y/o male with hx of HTN, HLD, and recent right hip arthroplasty (3/27/2025, with Dr. Santiago) presents to the ED for evaluation of fever and increased right thigh pain, swelling, and bruising after his hip replacement 2 days ago.  The patient is accompanied by his wife, who assists with providing history.  The patient states that over the last day he has noticed slightly increased swelling and bruising of his right thigh near his right hip replacement surgical site, as well as slightly increased pain.  He has been able to control his pain with the oxycodone and methocarbamol that were prescribed by his orthopedic surgeon.  His wife noted that he had a fever this afternoon, with Tmax 100.6 °F.  She gave him acetaminophen for the fever and called the orthopedic specialist on-call line, noting that the nurse I discussed with the orthopedist on-call and they were advised to bring the patient to the hospital for evaluation.  He has been ambulatory since the surgery with assistance of a walker.  He denies any recent falls, numbness, or weakness.  He denies any history of blood clots.  He takes aspirin 81 mg daily, however since his surgery his orthopedic surgeon advised him to take an additional aspirin 81 mg daily (a total of 162 mg daily).        Review of Systems   Constitutional:  Positive for fever. Negative for chills.   HENT:  Negative for congestion, rhinorrhea and sore throat.    Respiratory:  Negative for cough and  shortness of breath.    Cardiovascular:  Negative for chest pain and palpitations.   Gastrointestinal:  Negative for abdominal pain, diarrhea, nausea and vomiting.   Genitourinary:  Negative for dysuria and hematuria.   Musculoskeletal:  Negative for back pain and neck pain.        Right thigh bruising and swelling, see HPI   Neurological:  Negative for weakness, light-headedness, numbness and headaches.   All other systems reviewed and are negative.          Objective       ED Triage Vitals [03/29/25 2033]   Temperature Pulse Blood Pressure Respirations SpO2 Patient Position - Orthostatic VS   99.8 °F (37.7 °C) 67 154/72 18 97 % Lying      Temp Source Heart Rate Source BP Location FiO2 (%) Pain Score    Oral Monitor Right arm -- 3      Vitals      Date and Time Temp Pulse SpO2 Resp BP Pain Score FACES Pain Rating User   03/29/25 2033 99.8 °F (37.7 °C) 67 97 % 18 154/72 3 -- JALIL            Physical Exam  Vitals and nursing note reviewed.   Constitutional:       General: He is not in acute distress.     Appearance: Normal appearance. He is normal weight.   HENT:      Head: Normocephalic and atraumatic.      Right Ear: External ear normal.      Left Ear: External ear normal.      Nose: Nose normal.      Mouth/Throat:      Mouth: Mucous membranes are moist.      Pharynx: Oropharynx is clear. No oropharyngeal exudate or posterior oropharyngeal erythema.   Eyes:      Extraocular Movements: Extraocular movements intact.      Conjunctiva/sclera: Conjunctivae normal.      Pupils: Pupils are equal, round, and reactive to light.   Cardiovascular:      Rate and Rhythm: Normal rate and regular rhythm.      Pulses: Normal pulses.      Heart sounds: Normal heart sounds.   Pulmonary:      Effort: Pulmonary effort is normal. No respiratory distress.      Breath sounds: Normal breath sounds. No wheezing or rales.   Abdominal:      General: Abdomen is flat. Bowel sounds are normal. There is no distension.      Palpations: Abdomen is  soft.      Tenderness: There is no abdominal tenderness. There is no right CVA tenderness, left CVA tenderness or guarding.   Musculoskeletal:         General: Swelling and tenderness present. Normal range of motion.      Cervical back: Normal range of motion and neck supple. No tenderness.      Comments: S/P right hip arthroplasty.  Surgical site/dressing appear clean, dry, and intact.  No surrounding erythema, induration, or fluctuance.  Mild swelling and tenderness on palpation with surrounding ecchymosis over the right anterolateral thigh, see image attached.  No distal lower extremity edema.  No calf tenderness.  No palpable cords.  Neurovascular intact distally.   Skin:     General: Skin is warm and dry.   Neurological:      General: No focal deficit present.      Mental Status: He is alert and oriented to person, place, and time.      Sensory: No sensory deficit.      Motor: No weakness.           Results Reviewed       Procedure Component Value Units Date/Time    D-dimer, quantitative [[id number]]  (Abnormal) Collected: 03/29/25 2112    Lab Status: Final result Specimen: Blood from Arm, Right Updated: 03/30/25 0208     D-Dimer, Quant 1.77 ug/ml FEU     Narrative:      In the evaluation for possible pulmonary embolism, in the appropriate (Well's Score of 4 or less) patient, the age adjusted d-dimer cutoff for this patient can be calculated as:    Age x 0.01 (in ug/mL) for Age-adjusted D-dimer exclusion threshold for a patient over 50 years.    Comprehensive metabolic panel [[id number]]  (Abnormal) Collected: 03/29/25 2112    Lab Status: Final result Specimen: Blood from Arm, Right Updated: 03/29/25 2139     Sodium 138 mmol/L      Potassium 4.1 mmol/L      Chloride 106 mmol/L      CO2 22 mmol/L      ANION GAP 10 mmol/L      BUN 14 mg/dL      Creatinine 0.81 mg/dL      Glucose 147 mg/dL      Calcium 8.9 mg/dL      AST 40 U/L      ALT 16 U/L      Alkaline Phosphatase 60 U/L      Total Protein 6.8 g/dL      Albumin  4.1 g/dL      Total Bilirubin 0.55 mg/dL      eGFR 88 ml/min/1.73sq m     Narrative:      [employer] guidelines for Chronic Kidney Disease (CKD):     Stage 1 with normal or high GFR (GFR > 90 mL/min/1.73 square meters)    Stage 2 Mild CKD (GFR = 60-89 mL/min/1.73 square meters)    Stage 3A Moderate CKD (GFR = 45-59 mL/min/1.73 square meters)    Stage 3B Moderate CKD (GFR = 30-44 mL/min/1.73 square meters)    Stage 4 Severe CKD (GFR = 15-29 mL/min/1.73 square meters)    Stage 5 End Stage CKD (GFR <15 mL/min/1.73 square meters)  Note: GFR calculation is accurate only with a steady state creatinine    C-reactive protein [[id number]]  (Abnormal) Collected: 03/29/25 2112    Lab Status: Final result Specimen: Blood from Arm, Right Updated: 03/29/25 2139     .2 mg/L     Protime-INR [[id number]]  (Normal) Collected: 03/29/25 2112    Lab Status: Final result Specimen: Blood from Arm, Right Updated: 03/29/25 2137     Protime 13.7 seconds      INR 1.01    Narrative:      INR Therapeutic Range    Indication                                             INR Range      Atrial Fibrillation                                               2.0-3.0  Hypercoagulable State                                    2.0.2.3  Left Ventricular Asist Device                            2.0-3.0  Mechanical Heart Valve                                  -    Aortic(with afib, MI, embolism, HF, LA enlargement,    and/or coagulopathy)                                     2.0-3.0 (2.5-3.5)     Mitral                                                             2.5-3.5  Prosthetic/Bioprosthetic Heart Valve               2.0-3.0  Venous thromboembolism (VTE: VT, PE        2.0-3.0    APTT [[id number]]  (Normal) Collected: 03/29/25 2112    Lab Status: Final result Specimen: Blood from Arm, Right Updated: 03/29/25 2137     PTT 30 seconds     CBC and differential [[id number]] Collected: 03/29/25 2112    Lab Status: Final result Specimen:  Blood from Arm, Right Updated: 03/29/25 2123     WBC 9.76 Thousand/uL      RBC 3.96 Million/uL      Hemoglobin 12.6 g/dL      Hematocrit 36.8 %      MCV 93 fL      MCH 31.8 pg      MCHC 34.2 g/dL      RDW 13.3 %      MPV 9.8 fL      Platelets 191 Thousands/uL      nRBC 0 /100 WBCs      Segmented % 67 %      Immature Grans % 0 %      Lymphocytes % 18 %      Monocytes % 12 %      Eosinophils Relative 2 %      Basophils Relative 1 %      Absolute Neutrophils 6.56 Thousands/µL      Absolute Immature Grans 0.03 Thousand/uL      Absolute Lymphocytes 1.74 Thousands/µL      Absolute Monocytes 1.16 Thousand/µL      Eosinophils Absolute 0.20 Thousand/µL      Basophils Absolute 0.07 Thousands/µL             CT lower extremity w contrast right   Final Interpretation by Ritu Hunter MD (03/30 0101)      Expected postoperative changes of recent right hip arthroplasty without evidence of complication.         Workstation performed: [id number]         VAS VENOUS DUPLEX -LOWER LIMB UNILATERAL    (Results Pending)       Procedures    ED Medication and Procedure Management   Prior to Admission Medications   Prescriptions Last Dose Informant Patient Reported? Taking?   Cannabidiol POWD   Yes No   Sig: Apply topically daily as needed Topical CBD ointment   Cholecalciferol 2000 units TABS  Self Yes No   Sig: Take 2,000 Units by mouth   Coenzyme Q10 200 MG capsule  Self Yes No   Sig: Take 200 mg by mouth daily   Echinacea 125 MG CAPS  Self Yes No   Sig: Take 1 tablet by mouth 2 (two) times a day   FIBER SELECT GUMMIES CHEW  Self Yes No   Sig: Chew 2 (two) times a day   Silodosin 8 MG CAPS  Self No No   Sig: TAKE ONE CAPSULE BY MOUTH AT BEDTIME   Turmeric (QC Tumeric Complex) 500 MG CAPS  Self Yes No   Sig: Take by mouth Unknown dosage tumeric with tumerol   acetaminophen (TYLENOL) 650 mg CR tablet   No No   Sig: Take 1 tablet (650 mg total) by mouth every 8 (eight) hours as needed for mild pain   ascorbic acid (VITAMIN C) 500 mg tablet   Self Yes No   Sig: Take 500 mg by mouth daily   aspirin (ECOTRIN LOW STRENGTH) 81 mg EC tablet   No No   Sig: Take 1 tablet (81 mg total) by mouth 2 (two) times a day Take 1(one) 81 mg tablet twice daily x 35 days after total joint arthroplasty   docusate sodium (COLACE) 100 mg capsule   No No   Sig: Take 1 capsule (100 mg total) by mouth 2 (two) times a day   famotidine (PEPCID) 40 MG tablet  Self No No   Sig: Take 1 tablet (40 mg total) by mouth daily at bedtime as needed for heartburn   ferrous sulfate 324 (65 Fe) mg   No No   Sig: Take 1 tablet (324 mg total) by mouth daily before breakfast   fluticasone (FLONASE) 50 mcg/act nasal spray  Self Yes No   Si spray into each nostril daily   folic acid (FOLVITE) 1 mg tablet   No No   Sig: Take 1 tablet (1 mg total) by mouth daily   lisinopril (ZESTRIL) 10 mg tablet  Self No No   Sig: Take 1 tablet (10 mg total) by mouth daily   melatonin 3 mg  Self Yes No   Sig: Take 1 tablet by mouth if needed   meloxicam (MOBIC) 7.5 mg tablet  Self Yes No   Sig: Take 7.5 mg by mouth if needed for moderate pain   methocarbamol (ROBAXIN) 500 mg tablet   No No   Sig: Take 1 tablet (500 mg total) by mouth 3 (three) times a day as needed for muscle spasms   metoprolol succinate (TOPROL-XL) 25 mg 24 hr tablet  Self Yes No   Sig: Take 12.5 mg by mouth in the evening. Take before meals   ondansetron (ZOFRAN) 4 mg tablet   No No   Sig: Take 1 tablet (4 mg total) by mouth every 8 (eight) hours as needed for nausea or vomiting   oxyCODONE (ROXICODONE) 5 immediate release tablet   No No   Sig: Take 1 tablet (5 mg total) by mouth every 6 (six) hours as needed for severe pain for up to 10 days Take 1 tablets every 6 hrs as needed for pain control Max Daily Amount: 20 mg   simvastatin (ZOCOR) 40 mg tablet  Self Yes No   Sig: Take 20 mg by mouth in the evening. Take before meals      Facility-Administered Medications: None     Patient's Medications   Discharge Prescriptions    No medications on  file     Outpatient Discharge Orders   VAS VENOUS DUPLEX -LOWER LIMB UNILATERAL   Standing Status: Future Standing Exp. Date: 03/30/29     ED SEPSIS DOCUMENTATION   Time reflects when diagnosis was documented in both MDM as applicable and the Disposition within this note       Time User Action Codes Description Comment    3/30/2025  2:28 AM Marin Hansen [M25.451] Swelling of joint of pelvic region or thigh, right     3/30/2025  2:29 AM Marin Hansen [Z96.641] S/P total right hip arthroplasty     3/30/2025  2:29 AM Marin Hansen [R79.89] Elevated d-dimer     3/30/2025  2:35 AM Marin Hansen [R60.0] Localized edema     3/30/2025  2:35 AM Marin Hansen [R58] Ecchymosis                  Marin Hansen MD  03/30/25 0434

## 2025-03-31 ENCOUNTER — OFFICE VISIT (OUTPATIENT)
Dept: PHYSICAL THERAPY | Facility: MEDICAL CENTER | Age: 75
End: 2025-03-31
Payer: COMMERCIAL

## 2025-03-31 DIAGNOSIS — M16.11 PRIMARY OSTEOARTHRITIS OF ONE HIP, RIGHT: Primary | ICD-10-CM

## 2025-03-31 PROCEDURE — 97110 THERAPEUTIC EXERCISES: CPT | Performed by: PHYSICAL THERAPIST

## 2025-03-31 PROCEDURE — 97164 PT RE-EVAL EST PLAN CARE: CPT | Performed by: PHYSICAL THERAPIST

## 2025-03-31 PROCEDURE — 97140 MANUAL THERAPY 1/> REGIONS: CPT | Performed by: PHYSICAL THERAPIST

## 2025-03-31 NOTE — PROGRESS NOTES
PT Re-Evaluation     Today's date: 3/31/2025  Patient name: Amando Mace  : 1950  MRN: [id number]  Referring provider: Pebbles Santiago MD  Dx:   Encounter Diagnosis     ICD-10-CM    1. Primary osteoarthritis of one hip, right  M16.11           Start Time: 1445  Stop Time: 1530  Total time in clinic (min): 45 minutes    Assessment  Impairments: abnormal gait, abnormal muscle tone, abnormal or restricted ROM, abnormal movement, activity intolerance, impaired balance, impaired physical strength, lacks appropriate home exercise program, pain with function, weight-bearing intolerance, participation limitations, activity limitations and endurance  Symptom irritability: moderate    Assessment details: Pt is a 74 y.o. male who presents to OP PT post-operatively for R MARGOT  completed on 3/31/25.  Pt presents today with increased R hip pain, decreased R hip ROM, decreased LE strength, impaired gait mechanics and decreased activity tolerance.  These impairments limit the patient from participating in prolonged ambulation at [facility], decreased tolerance to completing stairs at home and decreased tolerance to participating in hobbies such as gardening and fishing.  Incision was observed this session with water proof bandage still applied and good fitting. No drainage observed on the bandage so no dressing change was completed. Pt was educated about symptoms to expect following surgery including bruising, swelling and some warmth. Pt and spouse were educated to continue to monitor symptoms and reach out to surgeon if concerned. HEP completed in clinic with good form and tolerance. Increased education provided on symptoms to expect with exercise and adverse symptoms to be aware of and when to DC exercise. Reviewed proper stair mechanics, signs and symptoms of infection and post operative precautions. I believe this patient is a good candidate for and will benefit from skilled physical therapy post operatively for manual  therapy to the R hip, ROM exercises, strengthening exercises and mechanics training to improve limitations and assist the patient to return to PLOF.  Thank you for the opportunity to participate in Amando Mace's care.           Understanding of Dx/Px/POC: good     Prognosis: good    Goals  STGs: 4 weeks  1) Pt will have SPR decrease of 2 units at rest- not met  2) pt will have improved R hip flexion strength to 4+/5 in order to improve tolerance to cleaning items when ambulating and reducing risk of falls.- not met  3) pt will have improved foto score of 10 points- not met    LTGs: 8 weeks  1) pt will be independent with HEP by D/C- not met  2) pt will be independent with symptom management by D/C- not met  3) pt will subjectively report improved tolerance to going up and down his flight of stairs by at least 50% in order to demonstrate improved activity tolerance by DC.-not met       Plan  Patient would benefit from: skilled physical therapy  Planned modality interventions: cryotherapy    Planned therapy interventions: joint mobilization, manual therapy, neuromuscular re-education, patient/caregiver education, postural training, strengthening, stretching, therapeutic activities, therapeutic exercise, home exercise program, gait training, functional ROM exercises, balance/weight bearing training and balance    Frequency: 1-2x week  Duration in weeks: 12  Plan of Care beginning date: 3/24/2025  Plan of Care expiration date: 6/16/2025  Treatment plan discussed with: patient        Subjective Evaluation    History of Present Illness  Mechanism of injury: DOO:  JULIAN:      Subjective Comments: pt reports that today he is doing well compared to last couple days. Reports that he noted increased pain, swelling, and was running a low fever. He called MD and was instructed to go to ED for evaluation. Pt was cleared. (-) blood clot. Reports some bruising today (improved since yesterday). Does have some pain and swelling  still.    Pain   Rest: 5/10   Best: 2-3/10   Worst: 7/10          Objective     Passive Range of Motion   Left Hip   Flexion: 110 degrees   Abduction: 15 degrees   External rotation (90/90): 30 degrees   Internal rotation (90/90): 0 degrees     Right Hip   Flexion: 90 degrees   Abduction: 30 degrees     Strength/Myotome Testing     Left Hip   Planes of Motion   Flexion: 4  Abduction: 4+  Adduction: 4+    Right Hip   Planes of Motion   Flexion: 3  Abduction: 4  Adduction: 4    Left Knee   Flexion: 4+  Extension: 4+    Right Knee   Flexion: 4+  Extension: 4+    Left Ankle/Foot   Dorsiflexion: 5  Plantar flexion: 5    Right Ankle/Foot   Dorsiflexion: 5  Plantar flexion: 5    Ambulation     Ambulation: Level Surfaces   Ambulation with assistive device: independent    Additional Level Surfaces Ambulation Details  RW    Ambulation: Stairs     Additional Stairs Ambulation Details  Reviewed stair mechanics with patient    Observational Gait   Gait: antalgic and asymmetric   Decreased walking speed, stride length and right stance time.     Additional Observational Gait Details  Good heel toe mechanics.     Functional Assessment        Comments  TU.28 seconds--> 25 seconds (walker)  5x STS: 12.78 seconds             Precautions:   Patient Active Problem List   Diagnosis    Actinic keratosis    Chronic GERD    Coronary atherosclerosis    Essential hypertension    Lipid disorder    Low back pain    Localized osteoarthrosis of right hip    Primary osteoarthritis, left shoulder    Hx of adenomatous colonic polyps    Benign prostatic hyperplasia with incomplete bladder emptying    Tinea cruris    Trigger finger, right index finger    History of PTCA stent    Hyperlipidemia    Bilateral hip pain    Prediabetes    PONV (postoperative nausea and vomiting)    S/P total right hip arthroplasty     Past Medical History:   Diagnosis Date    Arthritis     Carpal tunnel syndrome on left 2024    Colon polyps     Cubital tunnel  syndrome on left 02/20/2024    GERD (gastroesophageal reflux disease)     Hyperlipidemia     Hypertension     PONV (postoperative nausea and vomiting)     Postoperative urinary retention     Seasonal allergies     Trigger finger, left index finger 02/20/2024    Trigger middle finger of left hand 05/08/2019     Past Surgical History:   Procedure Laterality Date    BACK SURGERY      laminectomy: L5-S1    CARDIAC SURGERY      cardiac stent    CARPAL TUNNEL RELEASE      COLONOSCOPY      benign polyp removal    ORCHIECTOMY Left     [place] ARTHRP ACETBLR/PROX FEM PROSTC AGRFT/ALGRFT Right 3/27/2025    Procedure: ARTHROPLASTY HIP TOTAL ANTERIOR;  Surgeon: Pebbles Santiago MD;  [facility];  Service: Orthopedics    [place] ESOPHAGOGASTRODUODENOSCOPY TRANSORAL DIAGNOSTIC N/A 08/29/2017    Procedure: EGD AND COLONOSCOPY;  Surgeon: Talat Park MD;  [facility];  Service: Gastroenterology    [place] NEUROPLASTY &/TRANSPOS MEDIAN NRV CARPAL TUNNE Left 02/20/2024    Procedure: RELEASE CARPAL TUNNEL REVISION;  Surgeon: Hair Pollock MD;  [facility];  Service: Orthopedics    [place] NEUROPLASTY &/TRANSPOSITION ULNAR NERVE ELBOW Left 02/20/2024    Procedure: RELEASE CUBITAL TUNNEL, FDS RADIAL SLIP HEMITENECTOMY;  Surgeon: Hair Pollock MD;  [facility];  Service: Orthopedics    NC TENDON SHEATH INCISION Left 02/20/2024    Procedure: RELEASE TRIGGER FINGER LEFT INDEX, RELEASE TRIGGER FINGER LEFT LONG;  Surgeon: Hair Pollock MD;  [facility];  Service: Orthopedics    UPPER GASTROINTESTINAL ENDOSCOPY      WRIST SURGERY       Eval/Re-Eval POC Expires Auth #/ Referral # Total Visits Start Date Expiration Date Extension Info Visits Limitation   3/24 6/16                                                               1 2 3 4 5 6   3/24 3/31 RE       7 8 9 10 11 12           13 14 15 16 17 18           19 20 21 22 23 24           25 26 27 28 29 30               Pt 1:1 from  "[id number]  Manuals 3/24 3/31           PROM  R hip                                                  Neuro Re-Ed             QS  5\" x10           GS  5\" x10           LAQ  X10 ea.           Supine hip abd             Supine hip add                                       Ther Ex             Heel slides  X10 strap           Calf stretch  30\"x3           Ankle pumps  educated           HR             Side stepping             marching                                       Ther Activity                                       Gait Training                                       Modalities                                            "

## 2025-04-03 ENCOUNTER — OFFICE VISIT (OUTPATIENT)
Dept: PHYSICAL THERAPY | Facility: MEDICAL CENTER | Age: 75
End: 2025-04-03
Payer: COMMERCIAL

## 2025-04-03 ENCOUNTER — APPOINTMENT (OUTPATIENT)
Dept: PHYSICAL THERAPY | Facility: MEDICAL CENTER | Age: 75
End: 2025-04-03
Payer: COMMERCIAL

## 2025-04-03 DIAGNOSIS — M16.11 PRIMARY OSTEOARTHRITIS OF ONE HIP, RIGHT: Primary | ICD-10-CM

## 2025-04-03 PROCEDURE — 97140 MANUAL THERAPY 1/> REGIONS: CPT | Performed by: PHYSICAL THERAPIST

## 2025-04-03 NOTE — PROGRESS NOTES
Daily Note     Today's date: 4/3/2025  Patient name: Amando Mace  : 1950  MRN: [id number]  Referring provider: Pebbles Santiago MD  Dx:   Encounter Diagnosis     ICD-10-CM    1. Primary osteoarthritis of one hip, right  M16.11           Start Time: 1400  Stop Time: 1445  Total time in clinic (min): 45 minutes    Subjective: pt reports that he continues to have increased R hip pain and swelling. Bruising is traveling down leg.       Objective: See treatment diary below      Assessment: Tolerated treatment well.  Incision area was inspected with no concerning findings. Very light bruising of the area. Increased swelling of the hip noted. No drainage noted on the bandage. Pt completed all documented exercises with good form. Pt demonstrated appropriate amounts of post exercise fatigue. Progressed pt today with additional exercises with good tolerance. Pt reports that he has been sleeping on his sides. Repots that he puts a pillow between his legs but also notes that he has been laying on his R hip. Pt was educated about the dangers of this and how this may be what is increasing his symptoms. Py was strongly discouraged from participating in this and encouraged to sleep on back and ice his hip frequently. No progression to HEP was noted due to reports of increased soreness. We will continue to monitor and progress as tolerated.    Patient would benefit from continued PT      Plan: Continue per plan of care.      Precautions:   Patient Active Problem List   Diagnosis    Actinic keratosis    Chronic GERD    Coronary atherosclerosis    Essential hypertension    Lipid disorder    Low back pain    Localized osteoarthrosis of right hip    Primary osteoarthritis, left shoulder    Hx of adenomatous colonic polyps    Benign prostatic hyperplasia with incomplete bladder emptying    Tinea cruris    Trigger finger, right index finger    History of PTCA stent    Hyperlipidemia    Bilateral hip pain    Prediabetes     PONV (postoperative nausea and vomiting)    S/P total right hip arthroplasty     Past Medical History:   Diagnosis Date    Arthritis     Carpal tunnel syndrome on left 02/20/2024    Colon polyps     Cubital tunnel syndrome on left 02/20/2024    GERD (gastroesophageal reflux disease)     Hyperlipidemia     Hypertension     PONV (postoperative nausea and vomiting)     Postoperative urinary retention     Seasonal allergies     Trigger finger, left index finger 02/20/2024    Trigger middle finger of left hand 05/08/2019     Past Surgical History:   Procedure Laterality Date    BACK SURGERY      laminectomy: L5-S1    CARDIAC SURGERY      cardiac stent    CARPAL TUNNEL RELEASE      COLONOSCOPY      benign polyp removal    ORCHIECTOMY Left     [place]I ARTHRP ACETBLR/PROX FEM PROSTC AGRFT/ALGRFT Right 3/27/2025    Procedure: ARTHROPLASTY HIP TOTAL ANTERIOR;  Surgeon: Pebbles Santiago MD;  [facility];  Service: Orthopedics    WI ESOPHAGOGASTRODUODENOSCOPY TRANSORAL DIAGNOSTIC N/A 08/29/2017    Procedure: EGD AND COLONOSCOPY;  Surgeon: Talat Park MD;  [facility];  Service: Gastroenterology    WI NEUROPLASTY &/TRANSPOS MEDIAN NRV CARPAL TUNNE Left 02/20/2024    Procedure: RELEASE CARPAL TUNNEL REVISION;  Surgeon: Hair Pollock MD;  [facility];  Service: Orthopedics    WI NEUROPLASTY &/TRANSPOSITION ULNAR NERVE ELBOW Left 02/20/2024    Procedure: RELEASE CUBITAL TUNNEL, FDS RADIAL SLIP HEMITENECTOMY;  Surgeon: Hair Pollock MD;  [facility];  Service: Orthopedics    WI TENDON SHEATH INCISION Left 02/20/2024    Procedure: RELEASE TRIGGER FINGER LEFT INDEX, RELEASE TRIGGER FINGER LEFT LONG;  Surgeon: Hair Pollock MD;  [facility];  Service: Orthopedics    UPPER GASTROINTESTINAL ENDOSCOPY      WRIST SURGERY       Eval/Re-Eval POC Expires Auth #/ Referral # Total Visits Start Date Expiration Date Extension Info Visits Limitation   3/24 6/16             "                                                   1 2 3 4 5 6   3/24 3/31 RE 4/3      7 8 9 10 11 12           13 14 15 16 17 18           19 20 21 22 23 24           25 26 27 28 29 30               Pt 1:1 from [phone]  Manuals 3/24 3/31 4/3          PROM  R hip RK R hip                                                 Neuro Re-Ed             QS  5\" x10 5\" x10          GS  5\" x10 5\" x10          LAQ  X10 ea. #2 2x10          Supine hip abd   Rtb x10          Supine hip add   5\" x10                                    Ther Ex             Heel slides  X10 strap X10 strap30\"x3          Calf stretch  30\"x3 30\"x3          Ankle pumps  educated           HR   x20          Side stepping   3 laps          marching   NV                                    Ther Activity                                       Gait Training                                       Modalities                                            "

## 2025-04-07 ENCOUNTER — OFFICE VISIT (OUTPATIENT)
Dept: PHYSICAL THERAPY | Facility: MEDICAL CENTER | Age: 75
End: 2025-04-07
Payer: COMMERCIAL

## 2025-04-07 DIAGNOSIS — M16.11 PRIMARY OSTEOARTHRITIS OF ONE HIP, RIGHT: Primary | ICD-10-CM

## 2025-04-07 PROCEDURE — 97140 MANUAL THERAPY 1/> REGIONS: CPT | Performed by: PHYSICAL THERAPIST

## 2025-04-07 PROCEDURE — 97110 THERAPEUTIC EXERCISES: CPT | Performed by: PHYSICAL THERAPIST

## 2025-04-07 PROCEDURE — 97112 NEUROMUSCULAR REEDUCATION: CPT | Performed by: PHYSICAL THERAPIST

## 2025-04-07 NOTE — PROGRESS NOTES
Daily Note     Today's date: 2025  Patient name: Amando Mace  : 1950  MRN: [id number]  Referring provider: Pebbles Santiago MD  Dx:   Encounter Diagnosis     ICD-10-CM    1. Primary osteoarthritis of one hip, right  M16.11           Start Time: 1500  Stop Time: 1543  Total time in clinic (min): 43 minutes    Subjective: pt reports that over the  last 24-36 hours he has had a lot of improvement in swelling and pain. He continues to have pain but this is improving.       Objective: See treatment diary below      Assessment: Tolerated treatment well. Pt completed all documented exercises with good form. Pt demonstrated appropriate amounts of post exercise fatigue. Progressed pt today with LE strengthening and gait training exercises. Pt was educated about continuing use of SPC in house as long as he feels comfortable. Recommended that he still use walker for prolonged ambulation for safety and symptom management. HEP progressed. We will continue to monitor and progress as tolerated.    Patient would benefit from continued PT      Plan: Continue per plan of care.      Precautions:   Patient Active Problem List   Diagnosis    Actinic keratosis    Chronic GERD    Coronary atherosclerosis    Essential hypertension    Lipid disorder    Low back pain    Localized osteoarthrosis of right hip    Primary osteoarthritis, left shoulder    Hx of adenomatous colonic polyps    Benign prostatic hyperplasia with incomplete bladder emptying    Tinea cruris    Trigger finger, right index finger    History of PTCA stent    Hyperlipidemia    Bilateral hip pain    Prediabetes    PONV (postoperative nausea and vomiting)    S/P total right hip arthroplasty     Past Medical History:   Diagnosis Date    Arthritis     Carpal tunnel syndrome on left 2024    Colon polyps     Cubital tunnel syndrome on left 2024    GERD (gastroesophageal reflux disease)     Hyperlipidemia     Hypertension     PONV (postoperative  nausea and vomiting)     Postoperative urinary retention     Seasonal allergies     Trigger finger, left index finger 02/20/2024    Trigger middle finger of left hand 05/08/2019     Past Surgical History:   Procedure Laterality Date    BACK SURGERY      laminectomy: L5-S1    CARDIAC SURGERY      cardiac stent    CARPAL TUNNEL RELEASE      COLONOSCOPY      benign polyp removal    ORCHIECTOMY Left     [place] ARTHRP ACETBLR/PROX FEM PROSTC AGRFT/ALGRFT Right 3/27/2025    Procedure: ARTHROPLASTY HIP TOTAL ANTERIOR;  Surgeon: Pebbles Santiago MD;  [facility];  Service: Orthopedics    [place] ESOPHAGOGASTRODUODENOSCOPY TRANSORAL DIAGNOSTIC N/A 08/29/2017    Procedure: EGD AND COLONOSCOPY;  Surgeon: Talat Park MD;  [facility];  Service: Gastroenterology    VA NEUROPLASTY &/TRANSPOS MEDIAN NRV CARPAL TUNNE Left 02/20/2024    Procedure: RELEASE CARPAL TUNNEL REVISION;  Surgeon: Hair Pollock MD;  [facility];  Service: Orthopedics    VA NEUROPLASTY &/TRANSPOSITION ULNAR NERVE ELBOW Left 02/20/2024    Procedure: RELEASE CUBITAL TUNNEL, FDS RADIAL SLIP HEMITENECTOMY;  Surgeon: Hair Pollock MD;  [facility];  Service: Orthopedics    VA TENDON SHEATH INCISION Left 02/20/2024    Procedure: RELEASE TRIGGER FINGER LEFT INDEX, RELEASE TRIGGER FINGER LEFT LONG;  Surgeon: Hair Pollock MD;  [facility];  Service: Orthopedics    UPPER GASTROINTESTINAL ENDOSCOPY      WRIST SURGERY       Eval/Re-Eval POC Expires Auth #/ Referral # Total Visits Start Date Expiration Date Extension Info Visits Limitation   3/24 6/16                                                               1 2 3 4 5 6   3/24 3/31 RE 4/3 4/7     7 8 9 10 11 12           13 14 15 16 17 18           19 20 21 22 23 24           25 26 27 28 29 30               Pt 1:1 from [phone]  Manuals 3/24 3/31 4/3 4/7         PROM  R hip RK R hip RK R hip                                                Neuro  "Re-Ed             QS  5\" x10 5\" x10 HEP         GS  5\" x10 5\" x10 HEP         LAQ  X10 ea. #2 2x10 #3 2x10         Supine hip abd   Rtb x10 Gtb 2x10         Supine hip add   5\" x10 5\" x20         bridges    2x10                      Ther Ex             Heel slides  X10 strap X10 strap30\"x3 X20 w/ strap assist         Calf stretch  30\"x3 30\"x3          Ankle pumps  educated           HR   x20 x20         Side stepping   3 laps 5 laps         marching   NV X10 RLE         HS curls    #3 2x10         Standing hip abd    2x10 ea.                                                Ther Activity                                       Gait Training                                       Modalities             CP    Post session                             "

## 2025-04-11 ENCOUNTER — OFFICE VISIT (OUTPATIENT)
Dept: PHYSICAL THERAPY | Facility: MEDICAL CENTER | Age: 75
End: 2025-04-11
Payer: COMMERCIAL

## 2025-04-11 DIAGNOSIS — M16.11 PRIMARY OSTEOARTHRITIS OF ONE HIP, RIGHT: Primary | ICD-10-CM

## 2025-04-11 PROCEDURE — 97110 THERAPEUTIC EXERCISES: CPT | Performed by: PHYSICAL THERAPIST

## 2025-04-11 PROCEDURE — 97140 MANUAL THERAPY 1/> REGIONS: CPT | Performed by: PHYSICAL THERAPIST

## 2025-04-11 PROCEDURE — 97112 NEUROMUSCULAR REEDUCATION: CPT | Performed by: PHYSICAL THERAPIST

## 2025-04-11 NOTE — PROGRESS NOTES
Daily Note     Today's date: 2025  Patient name: Amando Mace  : 1950  MRN: [id number]  Referring provider: Pebbles Santiago MD  Dx:   Encounter Diagnosis     ICD-10-CM    1. Primary osteoarthritis of one hip, right  M16.11           Start Time: 1045  Stop Time: 1145  Total time in clinic (min): 60 minutes    Subjective: pt reports that he is very sore this morning. States that he was feeling good the past couple days. Did not notice any complications following LV. Reports at he may have done more than usual yesterday.        Objective: See treatment diary below      Assessment: Tolerated treatment well. Pt completed all exercises well with good tolerance and performance. PROM completed with improved motion and tolerance. Progressed pt is LE strengthening and gait training with good form and no complaints of symptoms. CP applied post session for symptom management. Pt encouraged to continue with HEP at this time as tolerated. We will continue to monitor and progress as tolerated.  Patient would benefit from continued PT      Plan: Continue per plan of care.      Precautions:   Patient Active Problem List   Diagnosis    Actinic keratosis    Chronic GERD    Coronary atherosclerosis    Essential hypertension    Lipid disorder    Low back pain    Localized osteoarthrosis of right hip    Primary osteoarthritis, left shoulder    Hx of adenomatous colonic polyps    Benign prostatic hyperplasia with incomplete bladder emptying    Tinea cruris    Trigger finger, right index finger    History of PTCA stent    Hyperlipidemia    Bilateral hip pain    Prediabetes    PONV (postoperative nausea and vomiting)    S/P total right hip arthroplasty     Past Medical History:   Diagnosis Date    Arthritis     Carpal tunnel syndrome on left 2024    Colon polyps     Cubital tunnel syndrome on left 2024    GERD (gastroesophageal reflux disease)     Hyperlipidemia     Hypertension     PONV (postoperative nausea  and vomiting)     Postoperative urinary retention     Seasonal allergies     Trigger finger, left index finger 02/20/2024    Trigger middle finger of left hand 05/08/2019     Past Surgical History:   Procedure Laterality Date    BACK SURGERY      laminectomy: L5-S1    CARDIAC SURGERY      cardiac stent    CARPAL TUNNEL RELEASE      COLONOSCOPY      benign polyp removal    ORCHIECTOMY Left     [place] ARTHRP ACETBLR/PROX FEM PROSTC AGRFT/ALGRFT Right 3/27/2025    Procedure: ARTHROPLASTY HIP TOTAL ANTERIOR;  Surgeon: Pebbles Santiago MD;  [facility];  Service: Orthopedics    PA ESOPHAGOGASTRODUODENOSCOPY TRANSORAL DIAGNOSTIC N/A 08/29/2017    Procedure: EGD AND COLONOSCOPY;  Surgeon: Talat Park MD;  [facility];  Service: Gastroenterology    PA NEUROPLASTY &/TRANSPOS MEDIAN NRV CARPAL TUNNE Left 02/20/2024    Procedure: RELEASE CARPAL TUNNEL REVISION;  Surgeon: Hair Pollock MD;  [facility];  Service: Orthopedics    PA NEUROPLASTY &/TRANSPOSITION ULNAR NERVE ELBOW Left 02/20/2024    Procedure: RELEASE CUBITAL TUNNEL, FDS RADIAL SLIP HEMITENECTOMY;  Surgeon: Hair Pollock MD;  [facility];  Service: Orthopedics    PA TENDON SHEATH INCISION Left 02/20/2024    Procedure: RELEASE TRIGGER FINGER LEFT INDEX, RELEASE TRIGGER FINGER LEFT LONG;  Surgeon: Hair Pollock MD;  [facility];  Service: Orthopedics    UPPER GASTROINTESTINAL ENDOSCOPY      WRIST SURGERY       Eval/Re-Eval POC Expires Auth #/ Referral # Total Visits Start Date Expiration Date Extension Info Visits Limitation   3/24 6/16                                                               1 2 3 4 5 6   3/24 3/31 RE 4/3 4/7 4/11 FOTO   7 8 9 10 11 12           13 14 15 16 17 18           19 20 21 22 23 24           25 26 27 28 29 30               Pt 1:1 from [id number] and [id number]  Manuals 3/24 3/31 4/3 4/7 4/11        PROM  R hip RK R hip RK R hip RK R hip                            "                    Neuro Re-Ed             QS  5\" x10 5\" x10 HEP         GS  5\" x10 5\" x10 HEP         LAQ  X10 ea. #2 2x10 #3 2x10 #3 2x10        Supine hip abd   Rtb x10 Gtb 2x10 Gtb 2x10        Supine hip add   5\" x10 5\" x20 5\" x20        bridges    2x10 2x10                     Ther Ex             Heel slides  X10 strap X10 strap30\"x3 X20 w/ strap assist         Calf stretch  30\"x3 30\"x3          Ankle pumps  educated           HR   x20 x20 x20        Side stepping   3 laps 5 laps         marching   NV X10 RLE Hold NV        HS curls    #3 2x10 #3 2x10        Standing hip abd    2x10 ea. 2x10 ea.        Step ups     6\" x20        Mini squats     2x10        hurdles     Low 5 laps                                               Ther Activity                                       Gait Training                                       Modalities             CP    Post session Psot session                              "

## 2025-04-13 DIAGNOSIS — M16.11 PRIMARY OSTEOARTHRITIS OF ONE HIP, RIGHT: ICD-10-CM

## 2025-04-14 ENCOUNTER — OFFICE VISIT (OUTPATIENT)
Dept: PHYSICAL THERAPY | Facility: MEDICAL CENTER | Age: 75
End: 2025-04-14
Payer: COMMERCIAL

## 2025-04-14 DIAGNOSIS — M16.11 PRIMARY OSTEOARTHRITIS OF ONE HIP, RIGHT: Primary | ICD-10-CM

## 2025-04-14 PROCEDURE — 97140 MANUAL THERAPY 1/> REGIONS: CPT | Performed by: PHYSICAL THERAPIST

## 2025-04-14 PROCEDURE — 97112 NEUROMUSCULAR REEDUCATION: CPT | Performed by: PHYSICAL THERAPIST

## 2025-04-14 PROCEDURE — 97110 THERAPEUTIC EXERCISES: CPT | Performed by: PHYSICAL THERAPIST

## 2025-04-14 RX ORDER — FOLIC ACID 1 MG/1
1000 TABLET ORAL DAILY
Qty: 30 TABLET | Refills: 1 | Status: SHIPPED | OUTPATIENT
Start: 2025-04-14

## 2025-04-14 NOTE — PROGRESS NOTES
Daily Note     Today's date: 2025  Patient name: Amando Mace  : 1950  MRN: [id number]  Referring provider: Pebbles Santiago MD  Dx:   Encounter Diagnosis     ICD-10-CM    1. Primary osteoarthritis of one hip, right  M16.11           Start Time: 1442  Stop Time: 1530  Total time in clinic (min): 48 minutes    Subjective: pt reports that he is doing alright. Reports that he continues to have increased R hip soreness. Reports that he is doing stairs at home and is also doing more than usual.       Objective: See treatment diary below      Assessment: Tolerated treatment well. Continued LE strengthening with good tolerance. Pt was able to completed hurdles without UE support. Pt notes that when he first arises from sitting, he noted instability that will improve with a couple seconds so he is still using his cane, which I agree with. Pt was educated about activity levels and soreness levels and encouraged to be mindful of his activity levels and increase rest breaks if needed.  Patient would benefit from continued PT      Plan: Continue per plan of care.      Precautions:   Patient Active Problem List   Diagnosis    Actinic keratosis    Chronic GERD    Coronary atherosclerosis    Essential hypertension    Lipid disorder    Low back pain    Localized osteoarthrosis of right hip    Primary osteoarthritis, left shoulder    Hx of adenomatous colonic polyps    Benign prostatic hyperplasia with incomplete bladder emptying    Tinea cruris    Trigger finger, right index finger    History of PTCA stent    Hyperlipidemia    Bilateral hip pain    Prediabetes    PONV (postoperative nausea and vomiting)    S/P total right hip arthroplasty     Past Medical History:   Diagnosis Date    Arthritis     Carpal tunnel syndrome on left 2024    Colon polyps     Cubital tunnel syndrome on left 2024    GERD (gastroesophageal reflux disease)     Hyperlipidemia     Hypertension     PONV (postoperative nausea and  vomiting)     Postoperative urinary retention     Seasonal allergies     Trigger finger, left index finger 02/20/2024    Trigger middle finger of left hand 05/08/2019     Past Surgical History:   Procedure Laterality Date    BACK SURGERY      laminectomy: L5-S1    CARDIAC SURGERY      cardiac stent    CARPAL TUNNEL RELEASE      COLONOSCOPY      benign polyp removal    ORCHIECTOMY Left     DE ARTHRP ACETBLR/PROX FEM PROSTC AGRFT/ALGRFT Right 3/27/2025    Procedure: ARTHROPLASTY HIP TOTAL ANTERIOR;  Surgeon: Pebbles Santiago MD;  [facility];  Service: Orthopedics    DE ESOPHAGOGASTRODUODENOSCOPY TRANSORAL DIAGNOSTIC N/A 08/29/2017    Procedure: EGD AND COLONOSCOPY;  Surgeon: Talat Park MD;  [facility];  Service: Gastroenterology    DE NEUROPLASTY &/TRANSPOS MEDIAN NRV CARPAL TUNNE Left 02/20/2024    Procedure: RELEASE CARPAL TUNNEL REVISION;  Surgeon: Hair Pollock MD;  [facility];  Service: Orthopedics    DE NEUROPLASTY &/TRANSPOSITION ULNAR NERVE ELBOW Left 02/20/2024    Procedure: RELEASE CUBITAL TUNNEL, FDS RADIAL SLIP HEMITENECTOMY;  Surgeon: Hair Pollock MD;  [facility];  Service: Orthopedics    DE TENDON SHEATH INCISION Left 02/20/2024    Procedure: RELEASE TRIGGER FINGER LEFT INDEX, RELEASE TRIGGER FINGER LEFT LONG;  Surgeon: Hair Pollock MD;  [facility];  Service: Orthopedics    UPPER GASTROINTESTINAL ENDOSCOPY      WRIST SURGERY       Eval/Re-Eval POC Expires Auth #/ Referral # Total Visits Start Date Expiration Date Extension Info Visits Limitation   3/24 6/16                                                               1 2 3 4 5 6   3/24 3/31 RE 4/3 4/7 4/11 FOTO   7 8 9 10 11 12           13 14 15 16 17 18           19 20 21 22 23 24           25 26 27 28 29 30               Pt 1:1 from 1  Manuals 3/24 3/31 4/3 4/7 4/11 4/14       PROM  R hip RK R hip RK R hip RK R hip RK R hip                                         "      Neuro Re-Ed             QS  5\" x10 5\" x10 HEP         GS  5\" x10 5\" x10 HEP         LAQ  X10 ea. #2 2x10 #3 2x10 #3 2x10 #4 2x10       Supine hip abd   Rtb x10 Gtb 2x10 Gtb 2x10 Btb 2x10l       Supine hip add   5\" x10 5\" x20 5\" x20 5\" x20       bridges    2x10 2x10 2x10                    Ther Ex             Heel slides  X10 strap X10 strap30\"x3 X20 w/ strap assist         Calf stretch  30\"x3 30\"x3          Ankle pumps  educated           HR   x20 x20 x20 x20       Side stepping   3 laps 5 laps         marching   NV X10 RLE Hold NV        HS curls    #3 2x10 #3 2x10 #4 2x10       Standing hip abd    2x10 ea. 2x10 ea. 2x10 ea.       Step ups     6\" x20 6\" 2x10 RLE fwd and lat       Mini squats     2x10 2x10       hurdles     Low 5 laps Low no UE 5 laps                                              Ther Activity                                       Gait Training                                       Modalities             CP    Post session Psot session                                "

## 2025-04-17 ENCOUNTER — OFFICE VISIT (OUTPATIENT)
Dept: PHYSICAL THERAPY | Facility: MEDICAL CENTER | Age: 75
End: 2025-04-17
Payer: COMMERCIAL

## 2025-04-17 DIAGNOSIS — M16.11 PRIMARY OSTEOARTHRITIS OF ONE HIP, RIGHT: Primary | ICD-10-CM

## 2025-04-17 PROCEDURE — 97112 NEUROMUSCULAR REEDUCATION: CPT | Performed by: PHYSICAL THERAPIST

## 2025-04-17 PROCEDURE — 97140 MANUAL THERAPY 1/> REGIONS: CPT | Performed by: PHYSICAL THERAPIST

## 2025-04-17 PROCEDURE — 97110 THERAPEUTIC EXERCISES: CPT | Performed by: PHYSICAL THERAPIST

## 2025-04-17 NOTE — PROGRESS NOTES
Daily Note     Today's date: 2025  Patient name: Amando Mace  : 1950  MRN: [id number]  Referring provider: Pebbles Santiago MD  Dx:   Encounter Diagnosis     ICD-10-CM    1. Primary osteoarthritis of one hip, right  M16.11                      Subjective: pt reports that he was walking when he sneezed mid stride and noted increased R hip pain. Noted increased soreness the remainder of the day continuing into today however notes it is improving.       Objective: See treatment diary below      Assessment: Tolerated treatment well.  PROM completed and tolerated well STM was completed to the R glute med (area of complaints) with good tolerance. Reduction of exercise intensity was completed this session with increased rest breaks; overall tolerated well. Pt was able to ambulate in clinic without AD for short distances with good form and tolerance. Pt was educated to continue to use SPC secondary to increased symptoms. Pt was asked to continue with HEP as tolerate but increased rest breaks if needed. Pt was asked to continue to ice hip with increased soreness. We will continue to monitor and progress as tolerated.  Patient would benefit from continued PT      Plan: Continue per plan of care.      Precautions:   Patient Active Problem List   Diagnosis    Actinic keratosis    Chronic GERD    Coronary atherosclerosis    Essential hypertension    Lipid disorder    Low back pain    Localized osteoarthrosis of right hip    Primary osteoarthritis, left shoulder    Hx of adenomatous colonic polyps    Benign prostatic hyperplasia with incomplete bladder emptying    Tinea cruris    Trigger finger, right index finger    History of PTCA stent    Hyperlipidemia    Bilateral hip pain    Prediabetes    PONV (postoperative nausea and vomiting)    S/P total right hip arthroplasty     Past Medical History:   Diagnosis Date    Arthritis     Carpal tunnel syndrome on left 2024    Colon polyps     Cubital tunnel  syndrome on left 02/20/2024    GERD (gastroesophageal reflux disease)     Hyperlipidemia     Hypertension     PONV (postoperative nausea and vomiting)     Postoperative urinary retention     Seasonal allergies     Trigger finger, left index finger 02/20/2024    Trigger middle finger of left hand 05/08/2019     Past Surgical History:   Procedure Laterality Date    BACK SURGERY      laminectomy: L5-S1    CARDIAC SURGERY      cardiac stent    CARPAL TUNNEL RELEASE      COLONOSCOPY      benign polyp removal    ORCHIECTOMY Left     WI ARTHRP ACETBLR/PROX FEM PROSTC AGRFT/ALGRFT Right 3/27/2025    Procedure: ARTHROPLASTY HIP TOTAL ANTERIOR;  Surgeon: Pebbles Santiago MD;  [facility];  Service: Orthopedics    WI ESOPHAGOGASTRODUODENOSCOPY TRANSORAL DIAGNOSTIC N/A 08/29/2017    Procedure: EGD AND COLONOSCOPY;  Surgeon: Talat Park MD;  [facility];  Service: Gastroenterology    WI NEUROPLASTY &/TRANSPOS MEDIAN NRV CARPAL TUNNE Left 02/20/2024    Procedure: RELEASE CARPAL TUNNEL REVISION;  Surgeon: Hair Pollock MD;  [facility];  Service: Orthopedics    WI NEUROPLASTY &/TRANSPOSITION ULNAR NERVE ELBOW Left 02/20/2024    Procedure: RELEASE CUBITAL TUNNEL, FDS RADIAL SLIP HEMITENECTOMY;  Surgeon: Hair Pollock MD;  [facility];  Service: Orthopedics    WI TENDON SHEATH INCISION Left 02/20/2024    Procedure: RELEASE TRIGGER FINGER LEFT INDEX, RELEASE TRIGGER FINGER LEFT LONG;  Surgeon: Hair Pollock MD;  [facility];  Service: Orthopedics    UPPER GASTROINTESTINAL ENDOSCOPY      WRIST SURGERY       Eval/Re-Eval POC Expires Auth #/ Referral # Total Visits Start Date Expiration Date Extension Info Visits Limitation   3/24 6/16                                                               1 2 3 4 5 6   3/24 3/31 RE 4/3 4/7 4/11 4/17 FOTO   7 8 9 10 11 12           13 14 15 16 17 18           19 20 21 22 23 24           25 26 27 28 29 30          "      Pt 1:1 from [id number]  Manuals 3/24 3/31 4/3 4/7 4/11 4/14 4/17      PROM  R hip RK R hip RK R hip RK R hip RK R hip RK R hip      STM R glute med       RK                                Neuro Re-Ed             QS  5\" x10 5\" x10 HEP         GS  5\" x10 5\" x10 HEP         LAQ  X10 ea. #2 2x10 #3 2x10 #3 2x10 #4 2x10       Supine hip abd   Rtb x10 Gtb 2x10 Gtb 2x10 Btb 2x10l Btb 2x10      Supine hip add   5\" x10 5\" x20 5\" x20 5\" x20 5\" x20      bridges    2x10 2x10 2x10 2x10                   Ther Ex             Heel slides  X10 strap X10 strap30\"x3 X20 w/ strap assist         Calf stretch  30\"x3 30\"x3          Ankle pumps  educated           HR   x20 x20 x20 x20 x20      Side stepping   3 laps 5 laps   5 laps      marching   NV X10 RLE Hold NV        HS curls    #3 2x10 #3 2x10 #4 2x10       Standing hip abd    2x10 ea. 2x10 ea. 2x10 ea. 2x10 ea.      Step ups     6\" x20 6\" 2x10 RLE fwd and lat       Mini squats     2x10 2x10 2x10      hurdles     Low 5 laps Low no UE 5 laps       bike       5'                                Ther Activity                                       Gait Training                                       Modalities             CP    Post session Psot session  Post session                                "

## 2025-04-18 ENCOUNTER — OFFICE VISIT (OUTPATIENT)
Dept: OBGYN CLINIC | Facility: CLINIC | Age: 75
End: 2025-04-18

## 2025-04-18 ENCOUNTER — APPOINTMENT (OUTPATIENT)
Dept: RADIOLOGY | Facility: AMBULARY SURGERY CENTER | Age: 75
End: 2025-04-18
Attending: PHYSICIAN ASSISTANT
Payer: COMMERCIAL

## 2025-04-18 VITALS — WEIGHT: 187 LBS | BODY MASS INDEX: 27.7 KG/M2 | HEIGHT: 69 IN

## 2025-04-18 DIAGNOSIS — Z96.641 S/P HIP REPLACEMENT, RIGHT: ICD-10-CM

## 2025-04-18 DIAGNOSIS — Z96.641 S/P HIP REPLACEMENT, RIGHT: Primary | ICD-10-CM

## 2025-04-18 DIAGNOSIS — Z96.641 S/P TOTAL RIGHT HIP ARTHROPLASTY: ICD-10-CM

## 2025-04-18 PROCEDURE — 99024 POSTOP FOLLOW-UP VISIT: CPT | Performed by: PHYSICIAN ASSISTANT

## 2025-04-18 PROCEDURE — 73502 X-RAY EXAM HIP UNI 2-3 VIEWS: CPT

## 2025-04-18 RX ORDER — METHOCARBAMOL 500 MG/1
500 TABLET, FILM COATED ORAL 3 TIMES DAILY PRN
Qty: 30 TABLET | Refills: 0 | Status: SHIPPED | OUTPATIENT
Start: 2025-04-18

## 2025-04-18 RX ORDER — MELOXICAM 7.5 MG/1
7.5 TABLET ORAL AS NEEDED
Qty: 30 TABLET | Refills: 0 | Status: SHIPPED | OUTPATIENT
Start: 2025-04-18

## 2025-04-18 NOTE — ASSESSMENT & PLAN NOTE
Orders:    methocarbamol (ROBAXIN) 500 mg tablet; Take 1 tablet (500 mg total) by mouth 3 (three) times a day as needed for muscle spasms

## 2025-04-18 NOTE — PROGRESS NOTES
"Name: Amando Mace      : 1950      MRN: [id number]  Encounter Provider: Richard Alcala PA-C  Encounter Date: 2025   Encounter department: [facility] [place]  :  Assessment & Plan  S/P hip replacement, right  Weight bearing as tolerated right Lower extremity  Physical therapy  Dental antibiotic prophylaxis recommended  Pain medication as needed  Follow-up in 2 months and repeat x-rays right hip   Orders:    XR hip/pelv 2-3 vws right if performed; Future    meloxicam (MOBIC) 7.5 mg tablet; Take 1 tablet (7.5 mg total) by mouth if needed for moderate pain    S/P total right hip arthroplasty    Orders:    methocarbamol (ROBAXIN) 500 mg tablet; Take 1 tablet (500 mg total) by mouth 3 (three) times a day as needed for muscle spasms        History of Present Illness        Objective   Ht 5' 9\" (1.753 m)   Wt 84.8 kg (187 lb)   BMI 27.62 kg/m²      74 y.o.male presents for 3 weeks postoperative visit status post Right Anterior MARGOT. Patient denies any chest pain, shortness or breath or calf pain.  Patient is taking aspirin for DVT prophylaxis.  Pain is well controlled with medication.  Patient states he did have a fever of 100.6 and swelling of his leg for which he presented to the emergency department 2 days after surgery as a negative workup states he is at significant movements and swelling.  He is doing well physical therapy is ambulate with assistance of a cane.  States his fever resolved soon after states his swelling is significantly improved.    Arthroplasty Hip Total Anterior - Right  3/27/2025    Review of Systems  Review of systems negative unless otherwise specified in HPI    Past Medical History  Past Medical History:  No date: Arthritis  2024: Carpal tunnel syndrome on left  No date: Colon polyps  2024: Cubital tunnel syndrome on left  No date: GERD (gastroesophageal reflux disease)  No date: Hyperlipidemia  No date: Hypertension  No date: " PONV (postoperative nausea and vomiting)  No date: Postoperative urinary retention  No date: Seasonal allergies  02/20/2024: Trigger finger, left index finger  05/08/2019: Trigger middle finger of left hand    Past Surgical History  Past Surgical History:  No date: BACK SURGERY      Comment:  laminectomy: L5-S1  No date: CARDIAC SURGERY      Comment:  cardiac stent  No date: CARPAL TUNNEL RELEASE  No date: COLONOSCOPY      Comment:  benign polyp removal  No date: ORCHIECTOMY; Left  3/27/2025: DE ARTHRP ACETBLR/PROX FEM PROSTC AGRFT/ALGRFT; Right      Comment:  Procedure: ARTHROPLASTY HIP TOTAL ANTERIOR;  Surgeon:                Pebbles Santiago MD;  [facility];  Service:                Orthopedics  08/29/2017: DE ESOPHAGOGASTRODUODENOSCOPY TRANSORAL DIAGNOSTIC; N/A      Comment:  Procedure: EGD AND COLONOSCOPY;  Surgeon: Talat Park MD;  [facility];  Service:                Gastroenterology  02/20/2024: DE NEUROPLASTY &/TRANSPOS MEDIAN NRV CARPAL TUNNE; Left      Comment:  Procedure: RELEASE CARPAL TUNNEL REVISION;  Surgeon:                Hair Pollock MD;  [facility];                 Service: Orthopedics  02/20/2024: DE NEUROPLASTY &/TRANSPOSITION ULNAR NERVE ELBOW; Left      Comment:  Procedure: RELEASE CUBITAL TUNNEL, FDS RADIAL SLIP                HEMITENECTOMY;  Surgeon: Hair Pollock MD;                 [facility];  Service: Orthopedics  02/20/2024: DE TENDON SHEATH INCISION; Left      Comment:  Procedure: RELEASE TRIGGER FINGER LEFT INDEX, RELEASE                TRIGGER FINGER LEFT LONG;  Surgeon: Hair Pollock MD;  [facility];  Service: Orthopedics  No date: UPPER GASTROINTESTINAL ENDOSCOPY  No date: WRIST SURGERY    Current Medications  Current Outpatient Medications on File Prior to Visit:  acetaminophen (TYLENOL) 650 mg CR tablet, Take 1 tablet (650 mg total) by mouth every 8 (eight) hours  as needed for mild pain, Disp: 30 tablet, Rfl: 0  ascorbic acid (VITAMIN C) 500 mg tablet, Take 500 mg by mouth daily, Disp: , Rfl:   aspirin (ECOTRIN LOW STRENGTH) 81 mg EC tablet, Take 1 tablet (81 mg total) by mouth 2 (two) times a day Take 1(one) 81 mg tablet twice daily x 35 days after total joint arthroplasty, Disp: 70 tablet, Rfl: 0  Cannabidiol POWD, Apply topically daily as needed Topical CBD ointment, Disp: , Rfl:   Cholecalciferol 2000 units TABS, Take 2,000 Units by mouth, Disp: , Rfl:   Coenzyme Q10 200 MG capsule, Take 200 mg by mouth daily, Disp: , Rfl:   docusate sodium (COLACE) 100 mg capsule, Take 1 capsule (100 mg total) by mouth 2 (two) times a day, Disp: 10 capsule, Rfl: 0  Echinacea 125 MG CAPS, Take 1 tablet by mouth 2 (two) times a day, Disp: , Rfl:   famotidine (PEPCID) 40 MG tablet, Take 1 tablet (40 mg total) by mouth daily at bedtime as needed for heartburn, Disp: 90 tablet, Rfl: 1  ferrous sulfate 324 (65 Fe) mg, Take 1 tablet (324 mg total) by mouth daily before breakfast, Disp: 60 tablet, Rfl: 1  FIBER SELECT GUMMIES CHEW, Chew 2 (two) times a day, Disp: , Rfl:   fluticasone (FLONASE) 50 mcg/act nasal spray, 1 spray into each nostril daily, Disp: , Rfl:   folic acid (FOLVITE) 1 mg tablet, TAKE 1 TABLET BY MOUTH EVERY DAY, Disp: 30 tablet, Rfl: 1  lisinopril (ZESTRIL) 10 mg tablet, Take 1 tablet (10 mg total) by mouth daily, Disp: 90 tablet, Rfl: 3  melatonin 3 mg, Take 1 tablet by mouth if needed, Disp: , Rfl:   metoprolol succinate (TOPROL-XL) 25 mg 24 hr tablet, Take 12.5 mg by mouth in the evening. Take before meals, Disp: , Rfl:   Silodosin 8 MG CAPS, TAKE ONE CAPSULE BY MOUTH AT BEDTIME, Disp: 90 capsule, Rfl: 1  simvastatin (ZOCOR) 40 mg tablet, Take 20 mg by mouth in the evening. Take before meals, Disp: , Rfl:   Turmeric (QC Tumeric Complex) 500 MG CAPS, Take by mouth Unknown dosage tumeric with tumerol, Disp: , Rfl:   [DISCONTINUED] methocarbamol (ROBAXIN) 500 mg tablet, Take  1 tablet (500 mg total) by mouth 3 (three) times a day as needed for muscle spasms, Disp: 30 tablet, Rfl: 0  ondansetron (ZOFRAN) 4 mg tablet, Take 1 tablet (4 mg total) by mouth every 8 (eight) hours as needed for nausea or vomiting (Patient not taking: Reported on 4/18/2025), Disp: 5 tablet, Rfl: 0  [DISCONTINUED] meloxicam (MOBIC) 7.5 mg tablet, Take 7.5 mg by mouth if needed for moderate pain (Patient not taking: Reported on 4/18/2025), Disp: , Rfl:     No current facility-administered medications on file prior to visit.      Recent Labs (HCT,HGB,PT,INR,ESR,CRP,GLU,HgA1C)  0       Lab                      Value               Date/Time                  HCT                      36.8                03/29/2025 2112            HGB                      12.6                03/29/2025 2112            WBC                      9.76                03/29/2025 2112            INR                      1.01                03/29/2025 2112            CRP                      114.2 (H)           03/29/2025 2112            HGBA1C                   6.0 (H)             03/05/2025 0948               Body mass index is 27.62 kg/m².Wt Readings from Last 3 Encounters:  04/18/25 : 84.8 kg (187 lb)  03/27/25 : 87 kg (191 lb 14.4 oz)  03/12/25 : 88.9 kg (196 lb)      Physical exam   General: Awake, Alert, Oriented   Eyes: Pupils equal, round and reactive to light    Heart: regular rate and rhythm   Lungs: No audible wheezing   Abdomen: soft  Right Lower extremity      Incision well approximated without erythema no tenderness to palpation   Active hip flexion to 90 degrees   Full knee extension   Active ankle dorsal and plantarflexion without pain, calf nontender palpation   Sensation lateral femoral cutaneous nerve distribution intact, sensation intact   Distal pulses present      Imaging  X rays of the Right hip reviewed.  X rays reveal well located, excellently aligned right total hip arthroplasty without evidence of loosening or osseous  abnormality.    Assessment:  Status post 3 weeks Right MARGOT anterior approach    Plan:  Weight bearing as tolerated right Lower extremity  Physical therapy  Dental antibiotic prophylaxis recommended  Pain medication as needed  Follow-up in 2 months and repeat x-rays right hip

## 2025-04-21 ENCOUNTER — OFFICE VISIT (OUTPATIENT)
Dept: PHYSICAL THERAPY | Facility: MEDICAL CENTER | Age: 75
End: 2025-04-21
Payer: COMMERCIAL

## 2025-04-21 DIAGNOSIS — M16.11 PRIMARY OSTEOARTHRITIS OF ONE HIP, RIGHT: Primary | ICD-10-CM

## 2025-04-21 PROCEDURE — 97110 THERAPEUTIC EXERCISES: CPT | Performed by: PHYSICAL THERAPIST

## 2025-04-21 PROCEDURE — 97140 MANUAL THERAPY 1/> REGIONS: CPT | Performed by: PHYSICAL THERAPIST

## 2025-04-21 NOTE — PROGRESS NOTES
Daily Note     Today's date: 2025  Patient name: Amando Mace  : 1950  MRN: [id number]  Referring provider: Pebbles Santiago MD  Dx:   Encounter Diagnosis     ICD-10-CM    1. Primary osteoarthritis of one hip, right  M16.11           Start Time: 1354  Stop Time: 1442  Total time in clinic (min): 48 minutes    Subjective: pt reports that he is doing well. Reports that he completed f/u with surgeon noting doing well. Pt began taking anti-inflammatory with improvement in overall symptoms.       Objective: See treatment diary below      Assessment: Tolerated treatment well. PROM completed with improved ROM noted. Pt ambulated without device this session with good form, balance and mechanics. Progressed LE strengthening with good tolerance. Pt encouraged to continue HEP as well as pain management strategies. We will continue to monitor and progress as tolerated.   Patient would benefit from continued PT      Plan: Continue per plan of care.      Precautions:   Patient Active Problem List   Diagnosis    Actinic keratosis    Chronic GERD    Coronary atherosclerosis    Essential hypertension    Lipid disorder    Low back pain    Localized osteoarthrosis of right hip    Primary osteoarthritis, left shoulder    Hx of adenomatous colonic polyps    Benign prostatic hyperplasia with incomplete bladder emptying    Tinea cruris    Trigger finger, right index finger    History of PTCA stent    Hyperlipidemia    Bilateral hip pain    Prediabetes    PONV (postoperative nausea and vomiting)    S/P total right hip arthroplasty     Past Medical History:   Diagnosis Date    Arthritis     Carpal tunnel syndrome on left 2024    Colon polyps     Cubital tunnel syndrome on left 2024    GERD (gastroesophageal reflux disease)     Hyperlipidemia     Hypertension     PONV (postoperative nausea and vomiting)     Postoperative urinary retention     Seasonal allergies     Trigger finger, left index finger 2024  "   Trigger middle finger of left hand 05/08/2019     Past Surgical History:   Procedure Laterality Date    BACK SURGERY      laminectomy: L5-S1    CARDIAC SURGERY      cardiac stent    CARPAL TUNNEL RELEASE      COLONOSCOPY      benign polyp removal    ORCHIECTOMY Left     IL ARTHRP ACETBLR/PROX FEM PROSTC AGRFT/ALGRFT Right 3/27/2025    Procedure: ARTHROPLASTY HIP TOTAL ANTERIOR;  Surgeon: Pebbles Santiago MD;  [facility];  Service: Orthopedics    IL ESOPHAGOGASTRODUODENOSCOPY TRANSORAL DIAGNOSTIC N/A 08/29/2017    Procedure: EGD AND COLONOSCOPY;  Surgeon: Talat Park MD;  [facility];  Service: Gastroenterology    IL NEUROPLASTY &/TRANSPOS MEDIAN NRV CARPAL TUNNE Left 02/20/2024    Procedure: RELEASE CARPAL TUNNEL REVISION;  Surgeon: Hair Pollock MD;  [facility];  Service: Orthopedics    IL NEUROPLASTY &/TRANSPOSITION ULNAR NERVE ELBOW Left 02/20/2024    Procedure: RELEASE CUBITAL TUNNEL, FDS RADIAL SLIP HEMITENECTOMY;  Surgeon: Hair Pollock MD;  [facility];  Service: Orthopedics    IL TENDON SHEATH INCISION Left 02/20/2024    Procedure: RELEASE TRIGGER FINGER LEFT INDEX, RELEASE TRIGGER FINGER LEFT LONG;  Surgeon: Hair Pollock MD;  [facility];  Service: Orthopedics    UPPER GASTROINTESTINAL ENDOSCOPY      WRIST SURGERY       Eval/Re-Eval POC Expires Auth #/ Referral # Total Visits Start Date Expiration Date Extension Info Visits Limitation   3/24 6/16                                                               1 2 3 4 5 6   3/24 3/31 RE 4/3 4/7 4/11 4/17 FOTO   7 8 9 10 11 12   4/21        13 14 15 16 17 18           19 20 21 22 23 24           25 26 27 28 29 30               Pt 1:1 from 154-209 and 224- 242  Manuals 3/24 3/31 4/3 4/7 4/11 4/14 4/17 4/21     PROM  R hip RK R hip RK R hip RK R hip RK R hip RK R hip RK R hip     STM R glute med       RK                                Neuro Re-Ed             QS  5\" x10 5\" x10 " "HEP         GS  5\" x10 5\" x10 HEP         LAQ  X10 ea. #2 2x10 #3 2x10 #3 2x10 #4 2x10       Supine hip abd   Rtb x10 Gtb 2x10 Gtb 2x10 Btb 2x10l Btb 2x10 Blk 2x10     Supine hip add   5\" x10 5\" x20 5\" x20 5\" x20 5\" x20      bridges    2x10 2x10 2x10 2x10 2x10     SLS        15\" x3 ea.     SLR        2x5 PT assisted                  Ther Ex             Heel slides  X10 strap X10 strap30\"x3 X20 w/ strap assist         Calf stretch  30\"x3 30\"x3          Ankle pumps  educated           HR   x20 x20 x20 x20 x20 x20     Side stepping   3 laps 5 laps   5 laps 5 laps at table rtb     marching   NV X10 RLE Hold NV   #2 x20 ea.     HS curls    #3 2x10 #3 2x10 #4 2x10  2x10 #2     Standing hip abd    2x10 ea. 2x10 ea. 2x10 ea. 2x10 ea. 2x10 ea.     Step ups     6\" x20 6\" 2x10 RLE fwd and lat  6\" 2x10 RLE fwd and lat     Mini squats     2x10 2x10 2x10 2x10     hurdles     Low 5 laps Low no UE 5 laps       bike       5' 5'                               Ther Activity                                       Gait Training                                       Modalities             CP    Post session Psot session  Post session Post session                                 "

## 2025-04-24 ENCOUNTER — OFFICE VISIT (OUTPATIENT)
Dept: OBGYN CLINIC | Facility: CLINIC | Age: 75
End: 2025-04-24
Payer: COMMERCIAL

## 2025-04-24 VITALS — BODY MASS INDEX: 27.7 KG/M2 | HEIGHT: 69 IN | WEIGHT: 187 LBS

## 2025-04-24 DIAGNOSIS — M65.341 TRIGGER RING FINGER OF RIGHT HAND: ICD-10-CM

## 2025-04-24 DIAGNOSIS — M18.12 ARTHRITIS OF CARPOMETACARPAL (CMC) JOINT OF LEFT THUMB: Primary | ICD-10-CM

## 2025-04-24 PROCEDURE — 20550 NJX 1 TENDON SHEATH/LIGAMENT: CPT | Performed by: SURGERY

## 2025-04-24 PROCEDURE — 99213 OFFICE O/P EST LOW 20 MIN: CPT | Performed by: SURGERY

## 2025-04-24 PROCEDURE — 20600 DRAIN/INJ JOINT/BURSA W/O US: CPT | Performed by: SURGERY

## 2025-04-24 RX ORDER — LIDOCAINE HYDROCHLORIDE 10 MG/ML
1 INJECTION, SOLUTION INFILTRATION; PERINEURAL
Status: COMPLETED | OUTPATIENT
Start: 2025-04-24 | End: 2025-04-24

## 2025-04-24 RX ORDER — BETAMETHASONE SODIUM PHOSPHATE AND BETAMETHASONE ACETATE 3; 3 MG/ML; MG/ML
3 INJECTION, SUSPENSION INTRA-ARTICULAR; INTRALESIONAL; INTRAMUSCULAR; SOFT TISSUE
Status: COMPLETED | OUTPATIENT
Start: 2025-04-24 | End: 2025-04-24

## 2025-04-24 RX ORDER — DEXAMETHASONE SODIUM PHOSPHATE 10 MG/ML
40 INJECTION, SOLUTION INTRAMUSCULAR; INTRAVENOUS
Status: COMPLETED | OUTPATIENT
Start: 2025-04-24 | End: 2025-04-24

## 2025-04-24 RX ORDER — BUPIVACAINE HYDROCHLORIDE 2.5 MG/ML
0.5 INJECTION, SOLUTION INFILTRATION; PERINEURAL
Status: COMPLETED | OUTPATIENT
Start: 2025-04-24 | End: 2025-04-24

## 2025-04-24 RX ADMIN — BUPIVACAINE HYDROCHLORIDE 0.5 ML: 2.5 INJECTION, SOLUTION INFILTRATION; PERINEURAL at 09:30

## 2025-04-24 RX ADMIN — DEXAMETHASONE SODIUM PHOSPHATE 40 MG: 10 INJECTION, SOLUTION INTRAMUSCULAR; INTRAVENOUS at 09:30

## 2025-04-24 RX ADMIN — LIDOCAINE HYDROCHLORIDE 1 ML: 10 INJECTION, SOLUTION INFILTRATION; PERINEURAL at 09:30

## 2025-04-24 RX ADMIN — BETAMETHASONE SODIUM PHOSPHATE AND BETAMETHASONE ACETATE 3 MG: 3; 3 INJECTION, SUSPENSION INTRA-ARTICULAR; INTRALESIONAL; INTRAMUSCULAR; SOFT TISSUE at 09:30

## 2025-04-24 NOTE — PROGRESS NOTES
ASSESSMENT/PLAN:      Assessment & Plan  Arthritis of carpometacarpal (CMC) joint of left thumb  Due to acute exacerbation of chronic left thumb CMC arthritis, the decision was made to proceed with a left thumb CMC CSI using Betamethasone as his previous CMC CSI was not as long lasting as it has been in the past.   Left thumb CMC CSI using Betamethasone was performed in the office without complication. Post injection protocol/expectations were reviewed.   The CSI may be repeated on a 3 month basis as needed.   Orders:    Small joint arthrocentesis: L thumb CMC    Trigger ring finger of right hand  The etiology of a trigger finger was discussed along with treatment options.   The decision was made to proceed with an initial right ring trigger finger CSI.   Right ring trigger finger CSI was performed in the office without complication, post injection protocol/expectations were reviewed.   The CSI may be repeated in 6 weeks time if the first CSI was beneficial.   If the CSI was not beneficial, a trigger finger release may be discussed.   Follow up in 6 weeks time, may cancel if doing well.   Orders:    Hand/upper extremity injection: R ring A1      The patient verbalized understanding of exam findings and treatment plan. We engaged in the shared decision-making process and treatment options were discussed at length with the patient. Surgical and conservative management discussed today along with risks and benefits.    Diagnoses and all orders for this visit:    Arthritis of carpometacarpal (CMC) joint of left thumb  -     Small joint arthrocentesis: L thumb CMC    Trigger ring finger of right hand  -     Hand/upper extremity injection: R ring A1      Follow Up:  Return in about 6 weeks (around 6/5/2025).      To Do Next Visit:  Re-evaluation of current issue    ____________________________________________________________________________________________________________________________________________      CHIEF  COMPLAINT:  Chief Complaint   Patient presents with    Follow-up     Cmc patient would like a different steroid        SUBJECTIVE:  Amando Mace is a 74 y.o. year old RHD male who presents to the office for left thumb pain and right ring finger catching and locking. He underwent a left thumb CMC CSI on 3/25/24, which was partially beneficial for him. He feels the CSI was helpful for a few months and not as long lasting as his previous injections. He notes pain to the base of his left thumb which will worsen when opening/twisting door knobs. He also notes right ring finger has been clicking and locking since around the winter but did worsen over the past 2 months or so. He is able to flick open the finger but does have to use his other hand at times.        I have personally reviewed all the relevant PMH, PSH, SH, FH, Medications and allergies.     PAST MEDICAL HISTORY:  Past Medical History:   Diagnosis Date    Arthritis     Carpal tunnel syndrome on left 02/20/2024    Colon polyps     Cubital tunnel syndrome on left 02/20/2024    GERD (gastroesophageal reflux disease)     Hyperlipidemia     Hypertension     PONV (postoperative nausea and vomiting)     Postoperative urinary retention     Seasonal allergies     Trigger finger, left index finger 02/20/2024    Trigger middle finger of left hand 05/08/2019       PAST SURGICAL HISTORY:  Past Surgical History:   Procedure Laterality Date    BACK SURGERY      laminectomy: L5-S1    CARDIAC SURGERY      cardiac stent    CARPAL TUNNEL RELEASE      COLONOSCOPY      benign polyp removal    ORCHIECTOMY Left     IA ARTHRP ACETBLR/PROX FEM PROSTC AGRFT/ALGRFT Right 3/27/2025    Procedure: ARTHROPLASTY HIP TOTAL ANTERIOR;  Surgeon: Pebbles Santiago MD;  Location:  MAIN OR;  Service: Orthopedics    IA ESOPHAGOGASTRODUODENOSCOPY TRANSORAL DIAGNOSTIC N/A 08/29/2017    Procedure: EGD AND COLONOSCOPY;  Surgeon: Talat Park MD;  [facility];  Service:  Gastroenterology    OH NEUROPLASTY &/TRANSPOS MEDIAN NRV CARPAL TUNNE Left 02/20/2024    Procedure: RELEASE CARPAL TUNNEL REVISION;  Surgeon: Hair Pollock MD;  [facility];  Service: Orthopedics    OH NEUROPLASTY &/TRANSPOSITION ULNAR NERVE ELBOW Left 02/20/2024    Procedure: RELEASE CUBITAL TUNNEL, FDS RADIAL SLIP HEMITENECTOMY;  Surgeon: Hair Pollock MD;  [facility];  Service: Orthopedics    OH TENDON SHEATH INCISION Left 02/20/2024    Procedure: RELEASE TRIGGER FINGER LEFT INDEX, RELEASE TRIGGER FINGER LEFT LONG;  Surgeon: Hair Pollock MD;  [facility];  Service: Orthopedics    UPPER GASTROINTESTINAL ENDOSCOPY      WRIST SURGERY         FAMILY HISTORY:  Family History   Problem Relation Age of Onset    Diabetes Mother     Hypertension Mother     Stomach cancer Father     Hypertension Father     Liver cancer Father     Cancer Father        SOCIAL HISTORY:  Social History     Tobacco Use    Smoking status: Never     Passive exposure: Never    Smokeless tobacco: Never   Vaping Use    Vaping status: Never Used   Substance Use Topics    Alcohol use: Yes     Alcohol/week: 2.0 standard drinks of alcohol     Types: 1 Glasses of wine, 1 Cans of beer per week    Drug use: No       MEDICATIONS:    Current Outpatient Medications:     acetaminophen (TYLENOL) 650 mg CR tablet, Take 1 tablet (650 mg total) by mouth every 8 (eight) hours as needed for mild pain, Disp: 30 tablet, Rfl: 0    ascorbic acid (VITAMIN C) 500 mg tablet, Take 500 mg by mouth daily, Disp: , Rfl:     aspirin (ECOTRIN LOW STRENGTH) 81 mg EC tablet, Take 1 tablet (81 mg total) by mouth 2 (two) times a day Take 1(one) 81 mg tablet twice daily x 35 days after total joint arthroplasty, Disp: 70 tablet, Rfl: 0    Cannabidiol POWD, Apply topically daily as needed Topical CBD ointment, Disp: , Rfl:     Cholecalciferol 2000 units TABS, Take 2,000 Units by mouth, Disp: , Rfl:     Coenzyme Q10 200 MG  capsule, Take 200 mg by mouth daily, Disp: , Rfl:     docusate sodium (COLACE) 100 mg capsule, Take 1 capsule (100 mg total) by mouth 2 (two) times a day, Disp: 10 capsule, Rfl: 0    Echinacea 125 MG CAPS, Take 1 tablet by mouth 2 (two) times a day, Disp: , Rfl:     famotidine (PEPCID) 40 MG tablet, Take 1 tablet (40 mg total) by mouth daily at bedtime as needed for heartburn, Disp: 90 tablet, Rfl: 1    ferrous sulfate 324 (65 Fe) mg, Take 1 tablet (324 mg total) by mouth daily before breakfast, Disp: 60 tablet, Rfl: 1    FIBER SELECT GUMMIES CHEW, Chew 2 (two) times a day, Disp: , Rfl:     fluticasone (FLONASE) 50 mcg/act nasal spray, 1 spray into each nostril daily, Disp: , Rfl:     folic acid (FOLVITE) 1 mg tablet, TAKE 1 TABLET BY MOUTH EVERY DAY, Disp: 30 tablet, Rfl: 1    lisinopril (ZESTRIL) 10 mg tablet, Take 1 tablet (10 mg total) by mouth daily, Disp: 90 tablet, Rfl: 3    melatonin 3 mg, Take 1 tablet by mouth if needed, Disp: , Rfl:     meloxicam (MOBIC) 7.5 mg tablet, Take 1 tablet (7.5 mg total) by mouth if needed for moderate pain, Disp: 30 tablet, Rfl: 0    methocarbamol (ROBAXIN) 500 mg tablet, Take 1 tablet (500 mg total) by mouth 3 (three) times a day as needed for muscle spasms, Disp: 30 tablet, Rfl: 0    metoprolol succinate (TOPROL-XL) 25 mg 24 hr tablet, Take 12.5 mg by mouth in the evening. Take before meals, Disp: , Rfl:     Silodosin 8 MG CAPS, TAKE ONE CAPSULE BY MOUTH AT BEDTIME, Disp: 90 capsule, Rfl: 1    simvastatin (ZOCOR) 40 mg tablet, Take 20 mg by mouth in the evening. Take before meals, Disp: , Rfl:     Turmeric (QC Tumeric Complex) 500 MG CAPS, Take by mouth Unknown dosage tumeric with tumerol, Disp: , Rfl:     ondansetron (ZOFRAN) 4 mg tablet, Take 1 tablet (4 mg total) by mouth every 8 (eight) hours as needed for nausea or vomiting (Patient not taking: Reported on 4/24/2025), Disp: 5 tablet, Rfl: 0    ALLERGIES:  No Known Allergies    REVIEW OF SYSTEMS:  Review of Systems    Constitutional:  Negative for chills, fever and unexpected weight change.   HENT:  Negative for hearing loss, nosebleeds and sore throat.    Eyes:  Negative for pain, redness and visual disturbance.   Respiratory:  Negative for cough, shortness of breath and wheezing.    Cardiovascular:  Negative for chest pain, palpitations and leg swelling.   Gastrointestinal:  Negative for abdominal pain, nausea and vomiting.   Endocrine: Negative for polydipsia and polyuria.   Genitourinary:  Negative for difficulty urinating and hematuria.   Musculoskeletal:  Positive for arthralgias. Negative for joint swelling and myalgias.   Skin:  Negative for rash and wound.   Neurological:  Negative for dizziness, numbness and headaches.   Psychiatric/Behavioral:  Negative for decreased concentration, dysphoric mood and suicidal ideas. The patient is not nervous/anxious.        VITALS:  There were no vitals filed for this visit.      _____________________________________________________  PHYSICAL EXAMINATION:  General: well developed and well nourished, alert, oriented times 3, and appears comfortable  Psychiatric: Normal  HEENT: Normocephalic, Atraumatic Trachea Midline, No torticollis  Pulmonary: No audible wheezing or respiratory distress   Cardiovascular: No pitting edema, 2+ radial pulse   Abdominal/GI: abdomen non tender, non distended   Skin: No masses, erythema, lacerations, fluctation, ulcerations  Neurovascular: Sensation Intact to the Median, Ulnar, Radial Nerve, Motor Intact to the Median, Ulnar, Radial Nerve, and Pulses Intact  Musculoskeletal: Normal, except as noted in detailed exam and in HPI.      MUSCULOSKELETAL EXAMINATION:    left CMC Exam:  No adduction contracture  No hyperextension deformity of MCP joint  Positive localized tenderness over radial and dorsal aspect of thumb (CMC joint)  Grind test is Positive for pain and Positive for crepitus  Metacarpal load shift test positive   No triggering or tenderness over  "the A1 pulley    right ring finger:  Negative palpable nodule over the A1 pulley.  Negative tenderness to palpation over A1 pulley. Positive catching. Positive clicking.  Active triggering in the office today.     _______________________________________________    STUDIES REVIEWED:  I have personally reviewed and interpreted  AP lateral and oblique radiographs of left hand which demonstrate severe CMC arthrosis.      LABS REVIEWED:    HgA1c:   Lab Results   Component Value Date    HGBA1C 6.0 (H) 03/05/2025     BMP:   Lab Results   Component Value Date    CALCIUM 8.9 03/29/2025    K 4.1 03/29/2025    CO2 22 03/29/2025     03/29/2025    BUN 14 03/29/2025    CREATININE 0.81 03/29/2025             PROCEDURES PERFORMED:  Hand/upper extremity injection: R ring A1  Universal Protocol:  procedure performed by consultantConsent: Verbal consent obtained. Written consent not obtained.  Risks and benefits: risks, benefits and alternatives were discussed  Consent given by: patient  Time out: Immediately prior to procedure a \"time out\" was called to verify the correct patient, procedure, equipment, support staff and site/side marked as required.  Patient understanding: patient states understanding of the procedure being performed  Site marked: the operative site was marked  Patient identity confirmed: verbally with patient  Supporting Documentation  Indications: pain   Procedure Details  Condition:trigger finger Location: ring finger - R ring A1   Preparation: Patient was prepped and draped in the usual sterile fashion  Needle size: 25 G  Ultrasound guidance: no  Medications administered: 1 mL lidocaine 1 %; 40 mg dexamethasone 100 mg/10 mL  Patient tolerance: patient tolerated the procedure well with no immediate complications  Dressing:  Sterile dressing applied       Small joint arthrocentesis: L thumb CMC  Elsie Protocol:  procedure performed by consultantConsent: Verbal consent obtained. Written consent not " "obtained.  Risks and benefits: risks, benefits and alternatives were discussed  Consent given by: patient  Time out: Immediately prior to procedure a \"time out\" was called to verify the correct patient, procedure, equipment, support staff and site/side marked as required.  Patient understanding: patient states understanding of the procedure being performed  Site marked: the operative site was marked  Patient identity confirmed: verbally with patient  Supporting Documentation  Indications: pain   Procedure Details  Location: thumb - L thumb CMC  Preparation: Patient was prepped and draped in the usual sterile fashion  Needle size: 25 G  Ultrasound guidance: no  Medications administered: 0.5 mL bupivacaine 0.25 %; 1 mL lidocaine 1 %; 3 mg betamethasone acetate-betamethasone sodium phosphate 6 (3-3) mg/mL    Patient tolerance: patient tolerated the procedure well with no immediate complications  Dressing:  Sterile dressing applied        -  _____________________________________________________      Scribe Attestation      I,:  Vero Pearson MA am acting as a scribe while in the presence of the attending physician.:       I,:  Hair Pollock MD personally performed the services described in this documentation    as scribed in my presence.:                 "

## 2025-04-25 ENCOUNTER — OFFICE VISIT (OUTPATIENT)
Dept: PHYSICAL THERAPY | Facility: MEDICAL CENTER | Age: 75
End: 2025-04-25
Payer: COMMERCIAL

## 2025-04-25 DIAGNOSIS — M16.11 PRIMARY OSTEOARTHRITIS OF ONE HIP, RIGHT: Primary | ICD-10-CM

## 2025-04-25 PROCEDURE — 97110 THERAPEUTIC EXERCISES: CPT | Performed by: PHYSICAL THERAPIST

## 2025-04-25 PROCEDURE — 97112 NEUROMUSCULAR REEDUCATION: CPT | Performed by: PHYSICAL THERAPIST

## 2025-04-25 NOTE — PROGRESS NOTES
Daily Note     Today's date: 2025  Patient name: Amando Mace  : 1950  MRN: [id number]  Referring provider: Pebbles Santiago MD  Dx:   Encounter Diagnosis     ICD-10-CM    1. Primary osteoarthritis of one hip, right  M16.11             Start Time: 48  Stop Time: 1030  Total time in clinic (min): 42 minutes    Subjective: Pt reports doing well and has been walking more consistently without his cane. He still has some pin point pain in his glut but this has been getting better.      Objective: See treatment diary below      Assessment: Tolerated treatment well. Patient demonstrated an increased ability to perform he SLR with little PT assistance. He tolerated progressions with the theraband well and he demonstrated great balance during the single leg stance with minimal pain. We will continue to monitor and progress as tolerated.   Patient would benefit from continued PT      Plan: Continue per plan of care.      Precautions:   Patient Active Problem List   Diagnosis    Actinic keratosis    Chronic GERD    Coronary atherosclerosis    Essential hypertension    Lipid disorder    Low back pain    Localized osteoarthrosis of right hip    Primary osteoarthritis, left shoulder    Hx of adenomatous colonic polyps    Benign prostatic hyperplasia with incomplete bladder emptying    Tinea cruris    Trigger finger, right index finger    History of PTCA stent    Hyperlipidemia    Bilateral hip pain    Prediabetes    PONV (postoperative nausea and vomiting)    S/P total right hip arthroplasty     Past Medical History:   Diagnosis Date    Arthritis     Carpal tunnel syndrome on left 2024    Colon polyps     Cubital tunnel syndrome on left 2024    GERD (gastroesophageal reflux disease)     Hyperlipidemia     Hypertension     PONV (postoperative nausea and vomiting)     Postoperative urinary retention     Seasonal allergies     Trigger finger, left index finger 2024    Trigger middle finger of  "left hand 05/08/2019     Past Surgical History:   Procedure Laterality Date    BACK SURGERY      laminectomy: L5-S1    CARDIAC SURGERY      cardiac stent    CARPAL TUNNEL RELEASE      COLONOSCOPY      benign polyp removal    ORCHIECTOMY Left     MO ARTHRP ACETBLR/PROX FEM PROSTC AGRFT/ALGRFT Right 3/27/2025    Procedure: ARTHROPLASTY HIP TOTAL ANTERIOR;  Surgeon: Pebbles Santiago MD;  Location[facility];  Service: Orthopedics    MO ESOPHAGOGASTRODUODENOSCOPY TRANSORAL DIAGNOSTIC N/A 08/29/2017    Procedure: EGD AND COLONOSCOPY;  Surgeon: Talat Park MD;  [facility];  Service: Gastroenterology    MO NEUROPLASTY &/TRANSPOS MEDIAN NRV CARPAL TUNNE Left 02/20/2024    Procedure: RELEASE CARPAL TUNNEL REVISION;  Surgeon: Hair Pollock MD;  [facility];  Service: Orthopedics    MO NEUROPLASTY &/TRANSPOSITION ULNAR NERVE ELBOW Left 02/20/2024    Procedure: RELEASE CUBITAL TUNNEL, FDS RADIAL SLIP HEMITENECTOMY;  Surgeon: Hair Pollock MD;  [facility];  Service: Orthopedics    MO TENDON SHEATH INCISION Left 02/20/2024    Procedure: RELEASE TRIGGER FINGER LEFT INDEX, RELEASE TRIGGER FINGER LEFT LONG;  Surgeon: Hair Pollock MD;  [facility];  Service: Orthopedics    UPPER GASTROINTESTINAL ENDOSCOPY      WRIST SURGERY       Eval/Re-Eval POC Expires Auth #/ Referral # Total Visits Start Date Expiration Date Extension Info Visits Limitation   3/24 6/16                                                               1 2 3 4 5 6   3/24 3/31 RE 4/3 4/7 4/11 4/17 FOTO   7 8 9 10 11 12   4/21 4/25       13 14 15 16 17 18           19 20 21 22 23 24           25 26 27 28 29 30               Pt 1:1 whole session  Manuals 3/24 3/31 4/3 4/7 4/11 4/14 4/17 4/21 4/25    PROM  R hip RK R hip RK R hip RK R hip RK R hip RK R hip RK R hip CB R hip    STM R glute med       RK  CB                              Neuro Re-Ed             QS  5\" x10 5\" x10 HEP         GS  5\" x10 " "5\" x10 HEP         LAQ  X10 ea. #2 2x10 #3 2x10 #3 2x10 #4 2x10       Supine hip abd   Rtb x10 Gtb 2x10 Gtb 2x10 Btb 2x10 Btb 2x10 Blk 2x10 BLK 2x    Supine hip add   5\" x10 5\" x20 5\" x20 5\" x20 5\" x20      bridges    2x10 2x10 2x10 2x10 2x10 2x10    SLS        15\" x3 ea. 15\" x3 ea    SLR        2x5 PT assisted 3x5 PT assisted                 Ther Ex             Heel slides  X10 strap X10 strap30\"x3 X20 w/ strap assist         Calf stretch  30\"x3 30\"x3          Ankle pumps  educated           HR   x20 x20 x20 x20 x20 x20 x20    Side stepping   3 laps 5 laps   5 laps 5 laps at table rtb 5 laps at table rtb    marching   NV X10 RLE Hold NV   #2 x20 ea. 2x20 with RTB    HS curls    #3 2x10 #3 2x10 #4 2x10  2x10 #2     Standing hip abd    2x10 ea. 2x10 ea. 2x10 ea. 2x10 ea. 2x10 ea. 2x10 ea. RTB around knees    Step ups     6\" x20 6\" 2x10 RLE fwd and lat  6\" 2x10 RLE fwd and lat 6\" 2x10 RLE fwd and lat    Mini squats     2x10 2x10 2x10 2x10 2x10    hurdles     Low 5 laps Low no UE 5 laps   Low no UE 5 laps    bike       5' 5' 5'                              Ther Activity                                       Gait Training                                       Modalities             CP    Post session Psot session  Post session Post session                                 "

## 2025-04-28 ENCOUNTER — OFFICE VISIT (OUTPATIENT)
Dept: PHYSICAL THERAPY | Facility: MEDICAL CENTER | Age: 75
End: 2025-04-28
Payer: COMMERCIAL

## 2025-04-28 DIAGNOSIS — M16.11 PRIMARY OSTEOARTHRITIS OF ONE HIP, RIGHT: Primary | ICD-10-CM

## 2025-04-28 PROCEDURE — 97530 THERAPEUTIC ACTIVITIES: CPT | Performed by: PHYSICAL THERAPIST

## 2025-04-28 PROCEDURE — 97110 THERAPEUTIC EXERCISES: CPT | Performed by: PHYSICAL THERAPIST

## 2025-04-28 NOTE — PROGRESS NOTES
PT Re-Evaluation     Today's date: 2025  Patient name: Amando Mace  : 1950  MRN: [id number]  Referring provider: Pebbles Santiago MD  Dx:   Encounter Diagnosis     ICD-10-CM    1. Primary osteoarthritis of one hip, right  M16.11           Start Time: 1415  Stop Time: 1500  Total time in clinic (min): 45 minutes    Assessment  Impairments: abnormal gait, abnormal muscle tone, abnormal or restricted ROM, abnormal movement, activity intolerance, impaired balance, impaired physical strength, lacks appropriate home exercise program, pain with function, weight-bearing intolerance, participation limitations, activity limitations and endurance  Symptom irritability: moderate    Assessment details: Pt is a 74 y.o. male who has completed 9 PT sessions to this date.  The patient has made improvements in decreased SPR, improved R hip ROM, improved RLE strength dn improved activity tolerance.  However, the patient continues to have increased difficulty with periods of increased pain which limits activity tolerance.  Discussion was had with patient about typical time frame for healing, typical symptoms to expect especially with increased activity and importance of ice and medication management directed by MD/surgeon.  I believe this patient will continue to benefit from skilled PT for continued PROM to the R hip. LE strengthening exercises, LE ROM exercises, gait and balance exercises and mechanics training in order to maximize functional capacity and assist the patient to improved QOL.            Understanding of Dx/Px/POC: good     Prognosis: good    Goals  STGs: 4 weeks  1) Pt will have SPR decrease of 2 units at rest-  met  2) pt will have improved R hip flexion strength to 4+/5 in order to improve tolerance to cleaning items when ambulating and reducing risk of falls.- met  3) pt will have improved foto score of 10 points- part met    LTGs: 8 weeks  1) pt will be independent with HEP by D/C- part met  2)  pt will be independent with symptom management by D/C- part met  3) pt will subjectively report improved tolerance to going up and down his flight of stairs by at least 50% in order to demonstrate improved activity tolerance by DC.- met       Plan  Patient would benefit from: skilled physical therapy  Planned modality interventions: cryotherapy    Planned therapy interventions: joint mobilization, manual therapy, neuromuscular re-education, patient/caregiver education, postural training, strengthening, stretching, therapeutic activities, therapeutic exercise, home exercise program, gait training, functional ROM exercises, balance/weight bearing training and balance    Frequency: 1-2x week  Duration in weeks: 12  Plan of Care beginning date: 3/24/2025  Plan of Care expiration date: 6/16/2025  Treatment plan discussed with: patient        Subjective Evaluation    History of Present Illness  Mechanism of injury: DOO:  JULIAN:      Subjective Comments: pt reports that he is doing well. Reports that he notes improved ROM, improved walking tolerance and improved strength. Has DC using cane. Continues to have periods of increased pain most notably following periods of still ness (first thing in the morning and getting up from a chair)    Pain   Rest: 2/10   Best: 2/10   Worst: 7/10          Objective     Passive Range of Motion   Left Hip   Flexion: 110 degrees   Abduction: 15 degrees   External rotation (90/90): 30 degrees   Internal rotation (90/90): 0 degrees     Right Hip   Flexion: 95 degrees   Abduction: 35 degrees     Strength/Myotome Testing     Left Hip   Planes of Motion   Flexion: 4+  Abduction: 4+  Adduction: 5    Right Hip   Planes of Motion   Flexion: 4+  Abduction: 4+  Adduction: 5    Left Knee   Flexion: 5  Extension: 5    Right Knee   Flexion: 5  Extension: 5    Left Ankle/Foot   Dorsiflexion: 5  Plantar flexion: 5    Right Ankle/Foot   Dorsiflexion: 5  Plantar flexion: 5    Ambulation     Ambulation: Level  Surfaces   Ambulation with assistive device: independent    Additional Level Surfaces Ambulation Details  RW    Ambulation: Stairs     Additional Stairs Ambulation Details  Reviewed stair mechanics with patient    Observational Gait   Gait: antalgic and asymmetric   Decreased walking speed, stride length and right stance time.     Additional Observational Gait Details  Good heel toe mechanics.     Functional Assessment        Comments  TU.28 seconds--> 25 seconds (walker)--> 8.13 seconds no device  5x STS: 12.78 seconds--> 10.89 no arms             Precautions:   Patient Active Problem List   Diagnosis    Actinic keratosis    Chronic GERD    Coronary atherosclerosis    Essential hypertension    Lipid disorder    Low back pain    Localized osteoarthrosis of right hip    Primary osteoarthritis, left shoulder    Hx of adenomatous colonic polyps    Benign prostatic hyperplasia with incomplete bladder emptying    Tinea cruris    Trigger finger, right index finger    History of PTCA stent    Hyperlipidemia    Bilateral hip pain    Prediabetes    PONV (postoperative nausea and vomiting)    S/P total right hip arthroplasty     Past Medical History:   Diagnosis Date    Arthritis     Carpal tunnel syndrome on left 2024    Colon polyps     Cubital tunnel syndrome on left 2024    GERD (gastroesophageal reflux disease)     Hyperlipidemia     Hypertension     PONV (postoperative nausea and vomiting)     Postoperative urinary retention     Seasonal allergies     Trigger finger, left index finger 2024    Trigger middle finger of left hand 2019     Past Surgical History:   Procedure Laterality Date    BACK SURGERY      laminectomy: L5-S1    CARDIAC SURGERY      cardiac stent    CARPAL TUNNEL RELEASE      COLONOSCOPY      benign polyp removal    ORCHIECTOMY Left     CA ARTHRP ACETBLR/PROX FEM PROSTC AGRFT/ALGRFT Right 3/27/2025    Procedure: ARTHROPLASTY HIP TOTAL ANTERIOR;  Surgeon: Pebbles Santiago MD;  " Location: [facility];  Service: Orthopedics    CA ESOPHAGOGASTRODUODENOSCOPY TRANSORAL DIAGNOSTIC N/A 08/29/2017    Procedure: EGD AND COLONOSCOPY;  Surgeon: Talat Park MD;  [facility]: [facility];  Service: Gastroenterology    CA NEUROPLASTY &/TRANSPOS MEDIAN NRV CARPAL TUNNE Left 02/20/2024    Procedure: RELEASE CARPAL TUNNEL REVISION;  Surgeon: Hair Pollock MD;  [facility];  Service: Orthopedics    CA NEUROPLASTY &/TRANSPOSITION ULNAR NERVE ELBOW Left 02/20/2024    Procedure: RELEASE CUBITAL TUNNEL, FDS RADIAL SLIP HEMITENECTOMY;  Surgeon: Hair Pollock MD;  [facility];  Service: Orthopedics    CA TENDON SHEATH INCISION Left 02/20/2024    Procedure: RELEASE TRIGGER FINGER LEFT INDEX, RELEASE TRIGGER FINGER LEFT LONG;  Surgeon: Hair Pollock MD;  [facility];  Service: Orthopedics    UPPER GASTROINTESTINAL ENDOSCOPY      WRIST SURGERY       Eval/Re-Eval POC Expires Auth #/ Referral # Total Visits Start Date Expiration Date Extension Info Visits Limitation   3/24 6/16                                                               1 2 3 4 5 6   3/24 3/31 RE 4/3 4/7 4/11 4/17 FOTO   7 8 9 10 11 12   4/21 4/25 4/28 RE      13 14 15 16 17 18           19 20 21 22 23 24           25 26 27 28 29 30               Pt 1:1 220-250  Manuals 3/24 3/31 4/3 4/7 4/11 4/14 4/17 4/21 4/25 4/28   PROM  R hip RK R hip RK R hip RK R hip RK R hip RK R hip RK R hip CB R hip RK   STM R glute med       RK  CB                              Neuro Re-Ed             QS  5\" x10 5\" x10 HEP         GS  5\" x10 5\" x10 HEP         LAQ  X10 ea. #2 2x10 #3 2x10 #3 2x10 #4 2x10       Supine hip abd   Rtb x10 Gtb 2x10 Gtb 2x10 Btb 2x10 Btb 2x10 Blk 2x10 BLK 2x    Supine hip add   5\" x10 5\" x20 5\" x20 5\" x20 5\" x20      bridges    2x10 2x10 2x10 2x10 2x10 2x10 2x10   SLS        15\" x3 ea. 15\" x3 ea    SLR        2x5 PT assisted 3x5 PT assisted 2x10                Ther Ex             Heel " "slides  X10 strap X10 strap30\"x3 X20 w/ strap assist         Calf stretch  30\"x3 30\"x3          Ankle pumps  educated           HR   x20 x20 x20 x20 x20 x20 x20    Side stepping   3 laps 5 laps   5 laps 5 laps at table rtb 5 laps at table rtb 5 laps at table rtb   marching   NV X10 RLE Hold NV   #2 x20 ea. 2x20 with RTB    HS curls    #3 2x10 #3 2x10 #4 2x10  2x10 #2     Standing hip abd    2x10 ea. 2x10 ea. 2x10 ea. 2x10 ea. 2x10 ea. 2x10 ea. RTB around knees 2x10 ea.   Step ups     6\" x20 6\" 2x10 RLE fwd and lat  6\" 2x10 RLE fwd and lat 6\" 2x10 RLE fwd and lat 8\" 2x10 RLE fwd and lat   Mini squats     2x10 2x10 2x10 2x10 2x10 2x10   hurdles     Low 5 laps Low no UE 5 laps   Low no UE 5 laps    bike       5' 5' 5' 5'                             Ther Activity             RE          RK                Gait Training                                       Modalities             CP    Post session Psot session  Post session Post session                         "

## 2025-05-02 ENCOUNTER — APPOINTMENT (OUTPATIENT)
Dept: PHYSICAL THERAPY | Facility: MEDICAL CENTER | Age: 75
End: 2025-05-02
Payer: COMMERCIAL

## 2025-05-03 DIAGNOSIS — Z96.641 S/P HIP REPLACEMENT, RIGHT: ICD-10-CM

## 2025-05-05 ENCOUNTER — APPOINTMENT (OUTPATIENT)
Dept: PHYSICAL THERAPY | Facility: MEDICAL CENTER | Age: 75
End: 2025-05-05
Payer: COMMERCIAL

## 2025-05-06 RX ORDER — MELOXICAM 7.5 MG/1
TABLET ORAL
Qty: 30 TABLET | Refills: 0 | Status: SHIPPED | OUTPATIENT
Start: 2025-05-06

## 2025-05-07 ENCOUNTER — OFFICE VISIT (OUTPATIENT)
Dept: PHYSICAL THERAPY | Facility: MEDICAL CENTER | Age: 75
End: 2025-05-07
Payer: COMMERCIAL

## 2025-05-07 DIAGNOSIS — M16.11 PRIMARY OSTEOARTHRITIS OF ONE HIP, RIGHT: Primary | ICD-10-CM

## 2025-05-07 PROCEDURE — 97112 NEUROMUSCULAR REEDUCATION: CPT | Performed by: PHYSICAL THERAPIST

## 2025-05-07 PROCEDURE — 97110 THERAPEUTIC EXERCISES: CPT | Performed by: PHYSICAL THERAPIST

## 2025-05-07 NOTE — PROGRESS NOTES
Daily Note     Today's date: 2025  Patient name: Amando Mace  : 1950  MRN: [id number]  Referring provider: Pebbles Santiago MD  Dx:   Encounter Diagnosis     ICD-10-CM    1. Primary osteoarthritis of one hip, right  M16.11           Start Time: 1230  Stop Time: 1315  Total time in clinic (min): 45 minutes    Subjective: pt repots that he is doing well. Reports tat he canceled his last 2 appointments due to other scheduling conflicts. His hip is doing well. Reports that he has increased soreness in the back of his hip and down his thigh but also repots doing more at home.       Objective: See treatment diary below      Assessment: Tolerated treatment well. Significant improvement in PROM and tolerance to PROM noted. Continued LE strengthening with good tolerance and performance. Pt educated about incraeasing activity levels and to expect some level of soreness with this. Continue to progress LE strength, gait and balance training.   Patient would benefit from continued PT      Plan: Continue per plan of care.      Precautions:   Patient Active Problem List   Diagnosis    Actinic keratosis    Chronic GERD    Coronary atherosclerosis    Essential hypertension    Lipid disorder    Low back pain    Localized osteoarthrosis of right hip    Primary osteoarthritis, left shoulder    Hx of adenomatous colonic polyps    Benign prostatic hyperplasia with incomplete bladder emptying    Tinea cruris    Trigger finger, right index finger    History of PTCA stent    Hyperlipidemia    Bilateral hip pain    Prediabetes    PONV (postoperative nausea and vomiting)    S/P total right hip arthroplasty     Past Medical History:   Diagnosis Date    Arthritis     Carpal tunnel syndrome on left 2024    Colon polyps     Cubital tunnel syndrome on left 2024    GERD (gastroesophageal reflux disease)     Hyperlipidemia     Hypertension     PONV (postoperative nausea and vomiting)     Postoperative urinary retention      Seasonal allergies     Trigger finger, left index finger 02/20/2024    Trigger middle finger of left hand 05/08/2019     Past Surgical History:   Procedure Laterality Date    BACK SURGERY      laminectomy: L5-S1    CARDIAC SURGERY      cardiac stent    CARPAL TUNNEL RELEASE      COLONOSCOPY      benign polyp removal    ORCHIECTOMY Left     [facility] ARTHRP ACETBLR/PROX FEM PROSTC AGRFT/ALGRFT Right 3/27/2025    Procedure: ARTHROPLASTY HIP TOTAL ANTERIOR;  Surgeon: Pebbles Santiago MD;  Location: [facility];  Service: Orthopedics    [facility] ESOPHAGOGASTRODUODENOSCOPY TRANSORAL DIAGNOSTIC N/A 08/29/2017    Procedure: EGD AND COLONOSCOPY;  Surgeon: Talat Park MD;  [facility];  Service: Gastroenterology    [facility] NEUROPLASTY &/TRANSPOS MEDIAN NRV CARPAL TUNNE Left 02/20/2024    Procedure: RELEASE CARPAL TUNNEL REVISION;  Surgeon: Hair Pollock MD;  [facility];  Service: Orthopedics    [facility] NEUROPLASTY &/TRANSPOSITION ULNAR NERVE ELBOW Left 02/20/2024    Procedure: RELEASE CUBITAL TUNNEL, FDS RADIAL SLIP HEMITENECTOMY;  Surgeon: Hair Pollock MD;  [facility];  Service: Orthopedics    [facility] TENDON SHEATH INCISION Left 02/20/2024    Procedure: RELEASE TRIGGER FINGER LEFT INDEX, RELEASE TRIGGER FINGER LEFT LONG;  Surgeon: Hair Pollock MD;  [facility];  Service: Orthopedics    UPPER GASTROINTESTINAL ENDOSCOPY      WRIST SURGERY       Eval/Re-Eval POC Expires Auth #/ Referral # Total Visits Start Date Expiration Date Extension Info Visits Limitation   3/24 6/16                                                               1 2 3 4 5 6   3/24 3/31 RE 4/3 4/7 4/11 4/17 FOTO   7 8 9 10 11 12   4/21 4/25 4/28 RE      13 14 15 16 17 18           19 20 21 22 23 24           25 26 27 28 29 30               Pt 1:1 [id number]  Manuals 5/7 4/21 4/25 4/28   PROM RK       RK R hip CB R hip RK   STM R glute med         CB                              Neuro Re-Ed            "  Supine hip abd        Blk 2x10 BLK 2x    Supine hip add             bridges x20       2x10 2x10 2x10   SLS        15\" x3 ea. 15\" x3 ea    SLR 2x10 RLE w/ hold eccentric focus       2x5 PT assisted 3x5 PT assisted 2x10                Ther Ex             Heel slides             Calf stretch             Ankle pumps             HR        x20 x20    Side stepping        5 laps at table rtb 5 laps at table rtb 5 laps at table rtb   marching X20 ea.       #2 x20 ea. 2x20 with RTB    HS curls        2x10 #2     Standing hip abd 2x10 ea.       2x10 ea. 2x10 ea. RTB around knees 2x10 ea.   Step ups 8\" 2x10 RLE fwd and lat       6\" 2x10 RLE fwd and lat 6\" 2x10 RLE fwd and lat 8\" 2x10 RLE fwd and lat   Mini squats 2x10       2x10 2x10 2x10   hurdles 9\" 5 laps fwd and lateral        Low no UE 5 laps    bike 5'       5' 5' 5'   lunges RLE 2x10                         Ther Activity             RE          RK                Gait Training                                       Modalities             CP        Post session                         "

## 2025-05-13 ENCOUNTER — APPOINTMENT (OUTPATIENT)
Dept: PHYSICAL THERAPY | Facility: MEDICAL CENTER | Age: 75
End: 2025-05-13
Payer: COMMERCIAL

## 2025-05-15 ENCOUNTER — OFFICE VISIT (OUTPATIENT)
Dept: PHYSICAL THERAPY | Facility: MEDICAL CENTER | Age: 75
End: 2025-05-15
Payer: COMMERCIAL

## 2025-05-15 DIAGNOSIS — M16.11 PRIMARY OSTEOARTHRITIS OF ONE HIP, RIGHT: Primary | ICD-10-CM

## 2025-05-15 PROCEDURE — 97140 MANUAL THERAPY 1/> REGIONS: CPT

## 2025-05-15 PROCEDURE — 97112 NEUROMUSCULAR REEDUCATION: CPT

## 2025-05-15 PROCEDURE — 97110 THERAPEUTIC EXERCISES: CPT

## 2025-05-15 NOTE — PROGRESS NOTES
"Daily Note     Today's date: 5/15/2025  Patient name: Amando Mace  : 1950  MRN: [id number]  Referring provider: Pebbles Santiago MD  Dx:   Encounter Diagnosis     ICD-10-CM    1. Primary osteoarthritis of one hip, right  M16.11           Start Time: 1530  Stop Time: 1614  Total time in clinic (min): 44 minutes    Subjective: Pt states he is doing well. No pain currently. Did a lot of work outside on Monday and \"probably walked several miles.\" Afterwards, he was just tired no significant pain increase. Reports he is not doing the exercises quite every day but is trying to stay active.       Objective: See treatment diary below      Assessment: Tolerated treatment well. No increased pain throughout. Continued functional strengthening per plan. Reincorporated single leg balance with appropriate challenge.  Patient would benefit from continued PT      Plan: Continue per plan of care.      Precautions:   Patient Active Problem List   Diagnosis    Actinic keratosis    Chronic GERD    Coronary atherosclerosis    Essential hypertension    Lipid disorder    Low back pain    Localized osteoarthrosis of right hip    Primary osteoarthritis, left shoulder    Hx of adenomatous colonic polyps    Benign prostatic hyperplasia with incomplete bladder emptying    Tinea cruris    Trigger finger, right index finger    History of PTCA stent    Hyperlipidemia    Bilateral hip pain    Prediabetes    PONV (postoperative nausea and vomiting)    S/P total right hip arthroplasty     Past Medical History:   Diagnosis Date    Arthritis     Carpal tunnel syndrome on left 2024    Colon polyps     Cubital tunnel syndrome on left 2024    GERD (gastroesophageal reflux disease)     Hyperlipidemia     Hypertension     PONV (postoperative nausea and vomiting)     Postoperative urinary retention     Seasonal allergies     Trigger finger, left index finger 2024    Trigger middle finger of left hand 2019     Past " "Surgical History:   Procedure Laterality Date    BACK SURGERY      laminectomy: L5-S1    CARDIAC SURGERY      cardiac stent    CARPAL TUNNEL RELEASE      COLONOSCOPY      benign polyp removal    ORCHIECTOMY Left     MI ARTHRP ACETBLR/PROX FEM PROSTC AGRFT/ALGRFT Right 3/27/2025    Procedure: ARTHROPLASTY HIP TOTAL ANTERIOR;  Surgeon: Pebbles Santiago MD;  [facility];  Service: Orthopedics    MI ESOPHAGOGASTRODUODENOSCOPY TRANSORAL DIAGNOSTIC N/A 08/29/2017    Procedure: EGD AND COLONOSCOPY;  Surgeon: Talat Park MD;  [facility];  Service: Gastroenterology    MI NEUROPLASTY &/TRANSPOS MEDIAN NRV CARPAL TUNNE Left 02/20/2024    Procedure: RELEASE CARPAL TUNNEL REVISION;  Surgeon: Hair Pollock MD;  [facility];  Service: Orthopedics    MI NEUROPLASTY &/TRANSPOSITION ULNAR NERVE ELBOW Left 02/20/2024    Procedure: RELEASE CUBITAL TUNNEL, FDS RADIAL SLIP HEMITENECTOMY;  Surgeon: Hair Pollock MD;  [facility];  Service: Orthopedics    MI TENDON SHEATH INCISION Left 02/20/2024    Procedure: RELEASE TRIGGER FINGER LEFT INDEX, RELEASE TRIGGER FINGER LEFT LONG;  Surgeon: Hair Pollock MD;  [facility];  Service: Orthopedics    UPPER GASTROINTESTINAL ENDOSCOPY      WRIST SURGERY       Eval/Re-Eval POC Expires Auth #/ Referral # Total Visits Start Date Expiration Date Extension Info Visits Limitation   3/24 6/16                                                               1 2 3 4 5 6   3/24 3/31 RE 4/3 4/7 4/11 4/17 FOTO   7 8 9 10 11 12   4/21 4/25 4/28 RE 5/7 5/15    13 14 15 16 17 18           19 20 21 22 23 24           25 26 27 28 29 30               Manuals 5/7 5/15      4/21 4/25 4/28   PROM RK MDD      RK R hip CB R hip RK   STM R glute med         CB                              Neuro Re-Ed             Supine hip abd        Blk 2x10 BLK 2x    Supine hip add             bridges x20 x20      2x10 2x10 2x10   SLS  3x30\" ea side foam " "light UE touch      15\" x3 ea. 15\" x3 ea    SLR 2x10 RLE w/ hold eccentric focus 2x10 RLE w/ hold eccentric focus      2x5 PT assisted 3x5 PT assisted 2x10                Ther Ex             Heel slides             Calf stretch             Ankle pumps             HR        x20 x20    Side stepping  5 laps at table rtb      5 laps at table rtb 5 laps at table rtb 5 laps at table rtb   marching X20 ea. X20 ea       #2 x20 ea. 2x20 with RTB    HS curls        2x10 #2     Standing hip abd 2x10 ea. 2x10 ea.      2x10 ea. 2x10 ea. RTB around knees 2x10 ea.   Step ups 8\" 2x10 RLE fwd and lat 8\" 2x10 RLE fwd and lat      6\" 2x10 RLE fwd and lat 6\" 2x10 RLE fwd and lat 8\" 2x10 RLE fwd and lat   Mini squats 2x10 2x10      2x10 2x10 2x10   hurdles 9\" 5 laps fwd and lateral 9\" 5 laps fwd and lateral       Low no UE 5 laps    bike 5' 5'      5' 5' 5'   lunges RLE 2x10                         Ther Activity             RE          RK                Gait Training                                       Modalities             CP        Post session                         "

## 2025-05-19 ENCOUNTER — OFFICE VISIT (OUTPATIENT)
Dept: PHYSICAL THERAPY | Facility: MEDICAL CENTER | Age: 75
End: 2025-05-19
Payer: COMMERCIAL

## 2025-05-19 DIAGNOSIS — M16.11 PRIMARY OSTEOARTHRITIS OF ONE HIP, RIGHT: Primary | ICD-10-CM

## 2025-05-19 PROCEDURE — 97110 THERAPEUTIC EXERCISES: CPT | Performed by: PHYSICAL THERAPIST

## 2025-05-19 PROCEDURE — 97112 NEUROMUSCULAR REEDUCATION: CPT | Performed by: PHYSICAL THERAPIST

## 2025-05-19 NOTE — PROGRESS NOTES
"Daily Note     Today's date: 2025  Patient name: Amando Mace  : 1950  MRN: [id number]  Referring provider: Pebbles Santiago MD  Dx:   Encounter Diagnosis     ICD-10-CM    1. Primary osteoarthritis of one hip, right  M16.11           Start Time: 1347  Stop Time: 1435  Total time in clinic (min): 48 minutes    Subjective: pt reports that he is doing \"really good\".  Reports that he was doing yard work all weekend and is estimating that he walked 4 miles without soreness or pain. Overall he feels he is ready to be DC from PT.       Objective: See treatment diary below      Assessment: Tolerated treatment well. PROM assessment completed showing >90* hip flexion ~30* hip abd.  Good hamstring length. No pain reported. Pt demonstrates good tolerance to all functional activities including squatting and stairs. Pt demonstrates good SL balance on uneven surfaces without fall risk. Pt demonstrates good understanding of HEP and is planning on being compliant with this following DC from PT.  All questions and concerns were addressed at this time. Pt was educated to contact PT with any questions or concerns in the future. At this time, pt will be DC from PT.   Patient would benefit from continued PT      Plan: Continue per plan of care.      Precautions:   Patient Active Problem List   Diagnosis    Actinic keratosis    Chronic GERD    Coronary atherosclerosis    Essential hypertension    Lipid disorder    Low back pain    Localized osteoarthrosis of right hip    Primary osteoarthritis, left shoulder    Hx of adenomatous colonic polyps    Benign prostatic hyperplasia with incomplete bladder emptying    Tinea cruris    Trigger finger, right index finger    History of PTCA stent    Hyperlipidemia    Bilateral hip pain    Prediabetes    PONV (postoperative nausea and vomiting)    S/P total right hip arthroplasty     Past Medical History:   Diagnosis Date    Arthritis     Carpal tunnel syndrome on left 2024    " Colon polyps     Cubital tunnel syndrome on left 02/20/2024    GERD (gastroesophageal reflux disease)     Hyperlipidemia     Hypertension     PONV (postoperative nausea and vomiting)     Postoperative urinary retention     Seasonal allergies     Trigger finger, left index finger 02/20/2024    Trigger middle finger of left hand 05/08/2019     Past Surgical History:   Procedure Laterality Date    BACK SURGERY      laminectomy: L5-S1    CARDIAC SURGERY      cardiac stent    CARPAL TUNNEL RELEASE      COLONOSCOPY      benign polyp removal    ORCHIECTOMY Left     [place] ARTHRP ACETBLR/PROX FEM PROSTC AGRFT/ALGRFT Right 3/27/2025    Procedure: ARTHROPLASTY HIP TOTAL ANTERIOR;  Surgeon: Pebbles Santiago MD;  [facility]: [facility];  Service: Orthopedics    [place] ESOPHAGOGASTRODUODENOSCOPY TRANSORAL DIAGNOSTIC N/A 08/29/2017    Procedure: EGD AND COLONOSCOPY;  Surgeon: Talat Park MD;  [facility];  Service: Gastroenterology    [place] NEUROPLASTY &/TRANSPOS MEDIAN NRV CARPAL TUNNE Left 02/20/2024    Procedure: RELEASE CARPAL TUNNEL REVISION;  Surgeon: Hair Pollock MD;  [facility];  Service: Orthopedics    [place] NEUROPLASTY &/TRANSPOSITION ULNAR NERVE ELBOW Left 02/20/2024    Procedure: RELEASE CUBITAL TUNNEL, FDS RADIAL SLIP HEMITENECTOMY;  Surgeon: Hair Pollock MD;  [facility];  Service: Orthopedics    [place] TENDON SHEATH INCISION Left 02/20/2024    Procedure: RELEASE TRIGGER FINGER LEFT INDEX, RELEASE TRIGGER FINGER LEFT LONG;  Surgeon: Hair Pollock MD;  [facility];  Service: Orthopedics    UPPER GASTROINTESTINAL ENDOSCOPY      WRIST SURGERY       Eval/Re-Eval POC Expires Auth #/ Referral # Total Visits Start Date Expiration Date Extension Info Visits Limitation   3/24 6/16                                                               1 2 3 4 5 6   3/24 3/31 RE 4/3 4/7 4/11 4/17 FOTO   7 8 9 10 11 12   4/21 4/25 4/28 RE 5/7 5/15 5/19 DC FOTO   13 14 15 16 17 18  "          19 20 21 22 23 24           25 26 27 28 29 30               Manuals 5/7 5/15 5/19     4/21 4/25 4/28   PROM RK MDD RK     RK R hip CB R hip RK   STM R glute med         CB                              Neuro Re-Ed             Supine hip abd        Blk 2x10 BLK 2x    Supine hip add             bridges x20 x20 x20     2x10 2x10 2x10   SLS  3x30\" ea side foam light UE touch 3x30\" ea side foam light UE touch     15\" x3 ea. 15\" x3 ea    SLR 2x10 RLE w/ hold eccentric focus 2x10 RLE w/ hold eccentric focus 2x10 RLE w/ hold eccentric focus     2x5 PT assisted 3x5 PT assisted 2x10                Ther Ex             HR        x20 x20    Side stepping  5 laps at table rtb 5 laps at table rtb     5 laps at table rtb 5 laps at table rtb 5 laps at table rtb   marching X20 ea. X20 ea  X20 ea. #2     #2 x20 ea. 2x20 with RTB    HS curls        2x10 #2     Standing hip abd 2x10 ea. 2x10 ea. 2x10 ea. #2     2x10 ea. 2x10 ea. RTB around knees 2x10 ea.   Step ups 8\" 2x10 RLE fwd and lat 8\" 2x10 RLE fwd and lat 8\" 2x10 RLE fwd and lat     6\" 2x10 RLE fwd and lat 6\" 2x10 RLE fwd and lat 8\" 2x10 RLE fwd and lat   Mini squats 2x10 2x10 2x10     2x10 2x10 2x10   hurdles 9\" 5 laps fwd and lateral 9\" 5 laps fwd and lateral 9\" 5 laps fwd and lateral      Low no UE 5 laps    bike 5' 5' 5'     5' 5' 5'   lunges RLE 2x10  RLE 2x10                       Ther Activity             RE          RK                Gait Training                                       Modalities             CP        Post session                           "

## 2025-05-22 ENCOUNTER — APPOINTMENT (OUTPATIENT)
Dept: PHYSICAL THERAPY | Facility: MEDICAL CENTER | Age: 75
End: 2025-05-22
Payer: COMMERCIAL

## 2025-05-29 DIAGNOSIS — Z96.641 S/P HIP REPLACEMENT, RIGHT: ICD-10-CM

## 2025-05-29 RX ORDER — MELOXICAM 7.5 MG/1
TABLET ORAL
Qty: 30 TABLET | Refills: 0 | Status: SHIPPED | OUTPATIENT
Start: 2025-05-29

## 2025-06-04 ENCOUNTER — OFFICE VISIT (OUTPATIENT)
Dept: OBGYN CLINIC | Facility: CLINIC | Age: 75
End: 2025-06-04
Payer: COMMERCIAL

## 2025-06-04 VITALS — WEIGHT: 187 LBS | HEIGHT: 69 IN | BODY MASS INDEX: 27.7 KG/M2

## 2025-06-04 DIAGNOSIS — M18.12 ARTHRITIS OF CARPOMETACARPAL (CMC) JOINT OF LEFT THUMB: ICD-10-CM

## 2025-06-04 DIAGNOSIS — M65.341 TRIGGER RING FINGER OF RIGHT HAND: Primary | ICD-10-CM

## 2025-06-04 PROCEDURE — 99213 OFFICE O/P EST LOW 20 MIN: CPT | Performed by: SURGERY

## 2025-06-04 PROCEDURE — 20550 NJX 1 TENDON SHEATH/LIGAMENT: CPT | Performed by: SURGERY

## 2025-06-04 RX ORDER — DEXAMETHASONE SODIUM PHOSPHATE 10 MG/ML
40 INJECTION, SOLUTION INTRAMUSCULAR; INTRAVENOUS
Status: COMPLETED | OUTPATIENT
Start: 2025-06-04 | End: 2025-06-04

## 2025-06-04 RX ORDER — LIDOCAINE HYDROCHLORIDE 10 MG/ML
1 INJECTION, SOLUTION INFILTRATION; PERINEURAL
Status: COMPLETED | OUTPATIENT
Start: 2025-06-04 | End: 2025-06-04

## 2025-06-04 RX ADMIN — LIDOCAINE HYDROCHLORIDE 1 ML: 10 INJECTION, SOLUTION INFILTRATION; PERINEURAL at 09:30

## 2025-06-04 RX ADMIN — DEXAMETHASONE SODIUM PHOSPHATE 40 MG: 10 INJECTION, SOLUTION INTRAMUSCULAR; INTRAVENOUS at 09:30

## 2025-06-04 NOTE — ASSESSMENT & PLAN NOTE
He continues to get relief with injection  We will continue to monitor. Reminded patient that these can be done every 3 months. He would be eligible in 6 weeks for one.   Follow up in 6 weeks.

## 2025-06-04 NOTE — PROGRESS NOTES
ASSESSMENT/PLAN:      Assessment & Plan  Trigger ring finger of right hand  On exam, he has clicking, little pain,   He wishes to try another injection for the ring trigger finger.   He tolerated the injection well.   We will follow up in 6 weeks to determine if the finger is livable or surgical intervention.        Arthritis of carpometacarpal (CMC) joint of left thumb  He continues to get relief with injection  We will continue to monitor. Reminded patient that these can be done every 3 months. He would be eligible in 6 weeks for one.   Follow up in 6 weeks.            The patient verbalized understanding of exam findings and treatment plan. We engaged in the shared decision-making process and treatment options were discussed at length with the patient. Surgical and conservative management discussed today along with risks and benefits.    Diagnoses and all orders for this visit:    Trigger ring finger of right hand        Follow Up:  No follow-ups on file.      To Do Next Visit:  Re-evaluation of current issue    ____________________________________________________________________________________________________________________________________________      CHIEF COMPLAINT:  Chief Complaint   Patient presents with    Left Hand - Follow-up     Having minimal pain       SUBJECTIVE:  Amando Mace is a 74 y.o. year old RHD male who presents today for 6-week follow-up for his right ring trigger finger that was injection on 4/24/2025.  Patient was also provided a cortisone injection for his chronic left thumb CMC joint pain due to osteoarthritis.  Patient reports that the injection for the right ring trigger finger has given him some relief in regards to both his pain and the mechanical locking.  He does have locking still is just not as frequent.  In regards to the injection for his left thumb, we changed the cortisone to betamethasone and he notes that he has no pain with the twisting of knobs that he did prior to  the injection.         I have personally reviewed all the relevant PMH, PSH, SH, FH, Medications and allergies.     PAST MEDICAL HISTORY:  Past Medical History[1]    PAST SURGICAL HISTORY:  Past Surgical History[2]    FAMILY HISTORY:  Family History[3]    SOCIAL HISTORY:  Social History[4]    MEDICATIONS:  Current Medications[5]    ALLERGIES:  Allergies[6]    REVIEW OF SYSTEMS:  Review of Systems   Constitutional:  Negative for chills, fever and unexpected weight change.   HENT:  Negative for hearing loss, nosebleeds and sore throat.    Eyes:  Negative for pain, redness and visual disturbance.   Respiratory:  Negative for cough, shortness of breath and wheezing.    Cardiovascular:  Negative for chest pain, palpitations and leg swelling.   Gastrointestinal:  Negative for abdominal pain, nausea and vomiting.   Endocrine: Negative for polydipsia and polyuria.   Genitourinary:  Negative for dysuria and hematuria.   Skin:  Negative for rash and wound.   Neurological:  Negative for dizziness, light-headedness and headaches.   Psychiatric/Behavioral:  Negative for decreased concentration, dysphoric mood and suicidal ideas. The patient is not nervous/anxious.        VITALS:  There were no vitals filed for this visit.      _____________________________________________________  PHYSICAL EXAMINATION:  General: well developed and well nourished, alert, oriented times 3, and appears comfortable  Psychiatric: Normal  HEENT: Normocephalic, Atraumatic Trachea Midline, No torticollis  Pulmonary: No audible wheezing or respiratory distress   Cardiovascular: No pitting edema, 2+ radial pulse   Abdominal/GI: abdomen non tender, non distended   Skin: No masses, erythema, lacerations, fluctation, ulcerations  Neurovascular: Sensation Intact to the Median, Ulnar, Radial Nerve, Motor Intact to the Median, Ulnar, Radial Nerve, and Pulses Intact  Musculoskeletal: Normal, except as noted in detailed exam and in HPI.      MUSCULOSKELETAL  "EXAMINATION:    right ring finger:  Positive palpable nodule over the A1 pulley.  Negative tenderness to palpation over A1 pulley. Negative catching. Positive clicking.     ___________________________________________________    STUDIES REVIEWED:  No images required to be obtained or reviewed for today's visit   LABS REVIEWED:    HgA1c:   Lab Results   Component Value Date    HGBA1C 6.0 (H) 03/05/2025     BMP:   Lab Results   Component Value Date    CALCIUM 8.9 03/29/2025    K 4.1 03/29/2025    CO2 22 03/29/2025     03/29/2025    BUN 14 03/29/2025    CREATININE 0.81 03/29/2025             PROCEDURES PERFORMED:  Hand/upper extremity injection: R ring A1    Universal Protocol:  Consent: Verbal consent obtained  Risks and benefits: risks, benefits and alternatives were discussed  Consent given by: patient  Time out: Immediately prior to procedure a \"time out\" was called to verify the correct patient, procedure, equipment, support staff and site/side marked as required.  Timeout called at: 6/4/2025 9:58 AM.  Patient understanding: patient states understanding of the procedure being performed  Site marked: the operative site was marked  Patient identity confirmed: verbally with patient  Supporting Documentation  Indications: tendon swelling and pain   Procedure Details  Condition:trigger finger Location: ring finger - R ring A1   Preparation: Patient was prepped and draped in the usual sterile fashion  Needle size: 25 G  Ultrasound guidance: no  Approach: volar  Medications administered: 1 mL lidocaine 1 %; 40 mg dexamethasone 100 mg/10 mL  Patient tolerance: patient tolerated the procedure well with no immediate complications  Dressing:  Sterile dressing applied         _____________________________________________________      Scribe Attestation      I,:  Debi Espinosa am acting as a scribe while in the presence of the attending physician.:       I,:  Hair Pollock MD personally performed the services " described in this documentation    as scribed in my presence.:                         [1]   Past Medical History:  Diagnosis Date    Arthritis     Carpal tunnel syndrome on left 02/20/2024    Colon polyps     Cubital tunnel syndrome on left 02/20/2024    GERD (gastroesophageal reflux disease)     Hyperlipidemia     Hypertension     PONV (postoperative nausea and vomiting)     Postoperative urinary retention     Seasonal allergies     Trigger finger, left index finger 02/20/2024    Trigger middle finger of left hand 05/08/2019   [2]   Past Surgical History:  Procedure Laterality Date    BACK SURGERY      laminectomy: L5-S1    CARDIAC SURGERY      cardiac stent    CARPAL TUNNEL RELEASE      COLONOSCOPY      benign polyp removal    ORCHIECTOMY Left     AK ARTHRP ACETBLR/PROX FEM PROSTC AGRFT/ALGRFT Right 3/27/2025    Procedure: ARTHROPLASTY HIP TOTAL ANTERIOR;  Surgeon: Pebbles Santiago MD;  [facility];  Service: Orthopedics    AK ESOPHAGOGASTRODUODENOSCOPY TRANSORAL DIAGNOSTIC N/A 08/29/2017    Procedure: EGD AND COLONOSCOPY;  Surgeon: Talat Park MD;  [facility];  Service: Gastroenterology    AK NEUROPLASTY &/TRANSPOS MEDIAN NRV CARPAL TUNNE Left 02/20/2024    Procedure: RELEASE CARPAL TUNNEL REVISION;  Surgeon: Hair Pollock MD;  [facility];  Service: Orthopedics    AK NEUROPLASTY &/TRANSPOSITION ULNAR NERVE ELBOW Left 02/20/2024    Procedure: RELEASE CUBITAL TUNNEL, FDS RADIAL SLIP HEMITENECTOMY;  Surgeon: Hair Pollock MD;  [facility];  Service: Orthopedics    AK TENDON SHEATH INCISION Left 02/20/2024    Procedure: RELEASE TRIGGER FINGER LEFT INDEX, RELEASE TRIGGER FINGER LEFT LONG;  Surgeon: Hair Pollock MD;  [facility];  Service: Orthopedics    UPPER GASTROINTESTINAL ENDOSCOPY      WRIST SURGERY     [3]   Family History  Problem Relation Name Age of Onset    Diabetes Mother Lisa     Hypertension Mother Lisa      Stomach cancer Father Gume     Hypertension Father Gume     Liver cancer Father Gume     Cancer Father Gume    [4]   Social History  Tobacco Use    Smoking status: Never     Passive exposure: Never    Smokeless tobacco: Never   Vaping Use    Vaping status: Never Used   Substance Use Topics    Alcohol use: Yes     Alcohol/week: 2.0 standard drinks of alcohol     Types: 1 Glasses of wine, 1 Cans of beer per week    Drug use: No   [5]   Current Outpatient Medications:     ascorbic acid (VITAMIN C) 500 mg tablet, Take 500 mg by mouth in the morning., Disp: , Rfl:     aspirin (ECOTRIN LOW STRENGTH) 81 mg EC tablet, Take 1 tablet (81 mg total) by mouth 2 (two) times a day Take 1(one) 81 mg tablet twice daily x 35 days after total joint arthroplasty, Disp: 70 tablet, Rfl: 0    Cannabidiol POWD, Apply topically daily as needed Topical CBD ointment, Disp: , Rfl:     Cholecalciferol 2000 units TABS, Take 2,000 Units by mouth, Disp: , Rfl:     Coenzyme Q10 200 MG capsule, Take 200 mg by mouth in the morning., Disp: , Rfl:     docusate sodium (COLACE) 100 mg capsule, Take 1 capsule (100 mg total) by mouth 2 (two) times a day, Disp: 10 capsule, Rfl: 0    Echinacea 125 MG CAPS, Take 1 tablet by mouth in the morning and 1 tablet in the evening., Disp: , Rfl:     famotidine (PEPCID) 40 MG tablet, Take 1 tablet (40 mg total) by mouth daily at bedtime as needed for heartburn, Disp: 90 tablet, Rfl: 1    ferrous sulfate 324 (65 Fe) mg, Take 1 tablet (324 mg total) by mouth daily before breakfast, Disp: 60 tablet, Rfl: 1    FIBER SELECT GUMMIES CHEW, Chew in the morning and in the evening., Disp: , Rfl:     fluticasone (FLONASE) 50 mcg/act nasal spray, 1 spray into each nostril in the morning., Disp: , Rfl:     folic acid (FOLVITE) 1 mg tablet, TAKE 1 TABLET BY MOUTH EVERY DAY, Disp: 30 tablet, Rfl: 1    lisinopril (ZESTRIL) 10 mg tablet, Take 1 tablet (10 mg total) by mouth daily, Disp: 90 tablet, Rfl: 3    melatonin 3 mg,  Take 1 tablet by mouth if needed, Disp: , Rfl:     meloxicam (MOBIC) 7.5 mg tablet, TAKE 1 TABLET BY MOUTH EVERY DAY AS NEEDED FOR MODERATE PAIN, Disp: 30 tablet, Rfl: 0    methocarbamol (ROBAXIN) 500 mg tablet, Take 1 tablet (500 mg total) by mouth 3 (three) times a day as needed for muscle spasms, Disp: 30 tablet, Rfl: 0    metoprolol succinate (TOPROL-XL) 25 mg 24 hr tablet, Take 12.5 mg by mouth in the evening. Take before meals, Disp: , Rfl:     Silodosin 8 MG CAPS, TAKE ONE CAPSULE BY MOUTH AT BEDTIME, Disp: 90 capsule, Rfl: 1    simvastatin (ZOCOR) 40 mg tablet, Take 20 mg by mouth in the evening. Take before meals, Disp: , Rfl:     Turmeric (QC Tumeric Complex) 500 MG CAPS, Take by mouth Unknown dosage tumeric with tumerol, Disp: , Rfl:     ondansetron (ZOFRAN) 4 mg tablet, Take 1 tablet (4 mg total) by mouth every 8 (eight) hours as needed for nausea or vomiting (Patient not taking: Reported on 4/18/2025), Disp: 5 tablet, Rfl: 0  [6] No Known Allergies

## 2025-06-04 NOTE — ASSESSMENT & PLAN NOTE
On exam, he has clicking, little pain,   He wishes to try another injection for the ring trigger finger.   He tolerated the injection well.   We will follow up in 6 weeks to determine if the finger is livable or surgical intervention.

## 2025-06-05 ENCOUNTER — OFFICE VISIT (OUTPATIENT)
Dept: UROLOGY | Facility: CLINIC | Age: 75
End: 2025-06-05
Payer: COMMERCIAL

## 2025-06-05 VITALS
DIASTOLIC BLOOD PRESSURE: 74 MMHG | OXYGEN SATURATION: 96 % | BODY MASS INDEX: 27.62 KG/M2 | WEIGHT: 187 LBS | HEART RATE: 71 BPM | SYSTOLIC BLOOD PRESSURE: 128 MMHG

## 2025-06-05 DIAGNOSIS — R39.14 BENIGN PROSTATIC HYPERPLASIA WITH INCOMPLETE BLADDER EMPTYING: Primary | ICD-10-CM

## 2025-06-05 DIAGNOSIS — N40.1 BENIGN PROSTATIC HYPERPLASIA WITH INCOMPLETE BLADDER EMPTYING: Primary | ICD-10-CM

## 2025-06-05 PROCEDURE — 99213 OFFICE O/P EST LOW 20 MIN: CPT | Performed by: PHYSICIAN ASSISTANT

## 2025-06-05 RX ORDER — SILODOSIN 8 MG/1
1 CAPSULE ORAL
Qty: 90 CAPSULE | Refills: 3 | Status: SHIPPED | OUTPATIENT
Start: 2025-06-05

## 2025-06-05 NOTE — PROGRESS NOTES
Name: Amando Mace      : 1950      MRN: [id number]  Encounter Provider: Enrique Travis PA-C  Encounter Date: 2025   Encounter department: [facility] [place]  :  Assessment & Plan  Benign prostatic hyperplasia with incomplete bladder emptying    Orders:    PSA Total, Diagnostic; Future    Silodosin 8 MG CAPS; Take 1 capsule by mouth daily at bedtime  Follow-up in 1 year with PSA prior to visit      History of Present Illness   Amando Mace is a 74 y.o. male who presents history of BPH with lower urinary tract symptoms.  Currently on silodosin and comfortable.  PSA last year 0.31.  Voiding pattern is stable.    Review of Systems       Objective   /74 (BP Location: Left arm, Patient Position: Sitting, Cuff Size: Standard)   Pulse 71   Wt 84.8 kg (187 lb)   SpO2 96%   BMI 27.62 kg/m²     Physical Exam GEN: no acute distress    RESP: breathing comfortably with no accessory muscle use    ABD: soft, non-tender, non-distended   INCISION:    EXT: no significant peripheral edema   (Male): Penis circumcised, phallus normal, meatus patent.  Testicles descended into scrotum bilaterally without masses nor tenderness.  No inguinal hernias bilaterally.  DELPHINE: Prostate is enlarged at 35 grams. The prostate is not boggy. The prostate is not tender.  No nodules noted    RADIOLOGY:   none        Results   Lab Results   Component Value Date    PSA 0.31 2024    PSA 0.4 2022    PSA 0.3 2021     Lab Results   Component Value Date    CALCIUM 8.9 2025    K 4.1 2025    CO2 22 2025     2025    BUN 14 2025    CREATININE 0.81 2025     Lab Results   Component Value Date    WBC 9.76 2025    HGB 12.6 2025    HCT 36.8 2025    MCV 93 2025     2025       Office Urine Dip  No results found for this or any previous visit (from the past hour).

## 2025-06-06 NOTE — ASSESSMENT & PLAN NOTE
Orders:    PSA Total, Diagnostic; Future    Silodosin 8 MG CAPS; Take 1 capsule by mouth daily at bedtime  Follow-up in 1 year with PSA prior to visit

## 2025-06-20 ENCOUNTER — APPOINTMENT (OUTPATIENT)
Dept: RADIOLOGY | Facility: AMBULARY SURGERY CENTER | Age: 75
End: 2025-06-20
Attending: ORTHOPAEDIC SURGERY
Payer: COMMERCIAL

## 2025-06-20 ENCOUNTER — OFFICE VISIT (OUTPATIENT)
Dept: OBGYN CLINIC | Facility: CLINIC | Age: 75
End: 2025-06-20

## 2025-06-20 VITALS — HEIGHT: 69 IN | WEIGHT: 187 LBS | BODY MASS INDEX: 27.7 KG/M2

## 2025-06-20 DIAGNOSIS — Z96.641 S/P HIP REPLACEMENT, RIGHT: Primary | ICD-10-CM

## 2025-06-20 DIAGNOSIS — Z96.641 S/P HIP REPLACEMENT, RIGHT: ICD-10-CM

## 2025-06-20 PROCEDURE — 99024 POSTOP FOLLOW-UP VISIT: CPT | Performed by: ORTHOPAEDIC SURGERY

## 2025-06-20 PROCEDURE — 73502 X-RAY EXAM HIP UNI 2-3 VIEWS: CPT

## 2025-06-20 NOTE — PROGRESS NOTES
Assessment:  Assessment & Plan  S/P hip replacement, right  New xrays right hip/pelvis were obtained and reviewed  Discussed that as he fatigues and the muscles don't have the endurance he will go back to the limping gait. This will gradually get back.   Continue to strengthen and get back into all his activities.   Reminder to take ABX prior to dental cleanings or procedures. His dentist provided some for him.   Provided stretches for the anterior hip  We will follow-up the patient 9 months with new x-rays of the right hip.  Orders:    XR hip/pelv 2-3 vws right if performed; Future      To do next visit:  Return in about 9 months (around 3/20/2026) for right hip.    The above stated was discussed in layman's terms and the patient expressed understanding.  All questions were answered to the patient's satisfaction.       Scribe Attestation      I,:  Debi Espinosa am acting as a scribe while in the presence of the attending physician.:       I,:  Pebbles Santiago MD personally performed the services described in this documentation    as scribed in my presence.:               Subjective:   Amando Mace is a 74 y.o. male who presents today for a follow-up for her right hip.  Patient is now 3 months status post right anterior total hip arthroplasty performed 3/27/2025. He has been getting back to his life things. He has been in the garden, transplanting sod, kneeling. He feels even. He takes the mobic as needed.       Review of systems negative unless otherwise specified in HPI  Review of Systems   Constitutional:  Negative for chills, fever and unexpected weight change.   HENT:  Negative for hearing loss, nosebleeds and sore throat.    Eyes:  Negative for pain, redness and visual disturbance.   Respiratory:  Negative for cough, shortness of breath and wheezing.    Cardiovascular:  Negative for chest pain, palpitations and leg swelling.   Gastrointestinal:  Negative for abdominal pain, nausea and vomiting.    Endocrine: Negative for polydipsia and polyuria.   Genitourinary:  Negative for dysuria and hematuria.   Skin:  Negative for rash and wound.   Neurological:  Negative for dizziness, light-headedness and headaches.   Psychiatric/Behavioral:  Negative for decreased concentration, dysphoric mood and suicidal ideas. The patient is not nervous/anxious.        Past Medical History[1]    Past Surgical History[2]    Family History[3]    Social History     Occupational History    Occupation: part time   Tobacco Use    Smoking status: Never     Passive exposure: Never    Smokeless tobacco: Never   Vaping Use    Vaping status: Never Used   Substance and Sexual Activity    Alcohol use: Yes     Alcohol/week: 2.0 standard drinks of alcohol     Types: 1 Glasses of wine, 1 Cans of beer per week    Drug use: No    Sexual activity: Yes     Partners: Female       Current Medications[4]    Allergies[5]       There were no vitals filed for this visit.    Body mass index is 27.62 kg/m².  Wt Readings from Last 3 Encounters:   06/20/25 84.8 kg (187 lb)   06/05/25 84.8 kg (187 lb)   06/04/25 84.8 kg (187 lb)       Objective:                    Right Hip Exam     Tenderness   The patient is experiencing no tenderness.     Range of Motion   Flexion:  80   External rotation:  40   Internal rotation:  10     Muscle Strength   Abduction: 5/5   Adduction: 5/5   Flexion: 5/5     Tests   LUIS EDUARDO: negative    Other   Erythema: absent  Sensation: normal  Pulse: present    Comments:  Calf is soft and non tender  NVI distally            Diagnostics, reviewed and taken today if performed as documented:    None performed:    None performed but reviewed:      The attending physician has personally reviewed the pertinent films in PACS and interpretation is as follows:  X-rays of the right hip/pelvis 2-3 views: Prothesis is in good anatomic position with no signs of loosening, fracture or dislocation    Procedures, if performed today:    Procedures    None  "performed      Portions of the record may have been created with voice recognition software.  Occasional wrong word or \"sound a like\" substitutions may have occurred due to the inherent limitations of voice recognition software.  Read the chart carefully and recognize, using context, where substitutions have occurred.         [1]   Past Medical History:  Diagnosis Date    Arthritis     Carpal tunnel syndrome on left 02/20/2024    Colon polyps     Cubital tunnel syndrome on left 02/20/2024    GERD (gastroesophageal reflux disease)     Hyperlipidemia     Hypertension     PONV (postoperative nausea and vomiting)     Postoperative urinary retention     Seasonal allergies     Trigger finger, left index finger 02/20/2024    Trigger middle finger of left hand 05/08/2019   [2]   Past Surgical History:  Procedure Laterality Date    BACK SURGERY      laminectomy: L5-S1    CARDIAC SURGERY      cardiac stent    CARPAL TUNNEL RELEASE      COLONOSCOPY      benign polyp removal    ORCHIECTOMY Left     PARTIAL HIP ARTHROPLASTY Right 03/27/2025    RI ARTHRP ACETBLR/PROX FEM PROSTC AGRFT/ALGRFT Right 03/27/2025    Procedure: ARTHROPLASTY HIP TOTAL ANTERIOR;  Surgeon: Pebbles Santiago MD;  [facility]: [facility];  Service: Orthopedics    RI ESOPHAGOGASTRODUODENOSCOPY TRANSORAL DIAGNOSTIC N/A 08/29/2017    Procedure: EGD AND COLONOSCOPY;  Surgeon: Talat Park MD;  [facility];  Service: Gastroenterology    RI NEUROPLASTY &/TRANSPOS MEDIAN NRV CARPAL TUNNE Left 02/20/2024    Procedure: RELEASE CARPAL TUNNEL REVISION;  Surgeon: Hair Pollock MD;  [facility];  Service: Orthopedics    RI NEUROPLASTY &/TRANSPOSITION ULNAR NERVE ELBOW Left 02/20/2024    Procedure: RELEASE CUBITAL TUNNEL, FDS RADIAL SLIP HEMITENECTOMY;  Surgeon: Hair Pollock MD;  [facility];  Service: Orthopedics    RI TENDON SHEATH INCISION Left 02/20/2024    Procedure: RELEASE TRIGGER FINGER LEFT INDEX, RELEASE TRIGGER " FINGER LEFT LONG;  Surgeon: Hair Pollock MD;  [facility];  Service: Orthopedics    UPPER GASTROINTESTINAL ENDOSCOPY      WRIST SURGERY     [3]   Family History  Problem Relation Name Age of Onset    Diabetes Mother Lisa     Hypertension Mother Lisa     Stomach cancer Father Gume     Hypertension Father Gume     Liver cancer Father Gume     Cancer Father Gume    [4]   Current Outpatient Medications:     ascorbic acid (VITAMIN C) 500 mg tablet, Take 500 mg by mouth in the morning., Disp: , Rfl:     aspirin (ECOTRIN LOW STRENGTH) 81 mg EC tablet, Take 1 tablet (81 mg total) by mouth 2 (two) times a day Take 1(one) 81 mg tablet twice daily x 35 days after total joint arthroplasty, Disp: 70 tablet, Rfl: 0    Cannabidiol POWD, Apply topically daily as needed Topical CBD ointment, Disp: , Rfl:     Cholecalciferol 2000 units TABS, Take 2,000 Units by mouth, Disp: , Rfl:     Coenzyme Q10 200 MG capsule, Take 200 mg by mouth in the morning., Disp: , Rfl:     docusate sodium (COLACE) 100 mg capsule, Take 1 capsule (100 mg total) by mouth 2 (two) times a day, Disp: 10 capsule, Rfl: 0    Echinacea 125 MG CAPS, Take 1 tablet by mouth in the morning and 1 tablet in the evening., Disp: , Rfl:     famotidine (PEPCID) 40 MG tablet, Take 1 tablet (40 mg total) by mouth daily at bedtime as needed for heartburn, Disp: 90 tablet, Rfl: 1    ferrous sulfate 324 (65 Fe) mg, Take 1 tablet (324 mg total) by mouth daily before breakfast, Disp: 60 tablet, Rfl: 1    FIBER SELECT GUMMIES CHEW, Chew in the morning and in the evening., Disp: , Rfl:     fluticasone (FLONASE) 50 mcg/act nasal spray, 1 spray into each nostril in the morning., Disp: , Rfl:     folic acid (FOLVITE) 1 mg tablet, TAKE 1 TABLET BY MOUTH EVERY DAY, Disp: 30 tablet, Rfl: 1    lisinopril (ZESTRIL) 10 mg tablet, Take 1 tablet (10 mg total) by mouth daily, Disp: 90 tablet, Rfl: 3    melatonin 3 mg, Take 1 tablet by mouth if needed, Disp:  , Rfl:     meloxicam (MOBIC) 7.5 mg tablet, TAKE 1 TABLET BY MOUTH EVERY DAY AS NEEDED FOR MODERATE PAIN, Disp: 30 tablet, Rfl: 0    methocarbamol (ROBAXIN) 500 mg tablet, Take 1 tablet (500 mg total) by mouth 3 (three) times a day as needed for muscle spasms, Disp: 30 tablet, Rfl: 0    metoprolol succinate (TOPROL-XL) 25 mg 24 hr tablet, Take 12.5 mg by mouth in the evening. Take before meals, Disp: , Rfl:     ondansetron (ZOFRAN) 4 mg tablet, Take 1 tablet (4 mg total) by mouth every 8 (eight) hours as needed for nausea or vomiting (Patient not taking: Reported on 4/18/2025), Disp: 5 tablet, Rfl: 0    Silodosin 8 MG CAPS, Take 1 capsule by mouth daily at bedtime, Disp: 90 capsule, Rfl: 3    simvastatin (ZOCOR) 40 mg tablet, Take 20 mg by mouth in the evening. Take before meals, Disp: , Rfl:     Turmeric (QC Tumeric Complex) 500 MG CAPS, Take by mouth Unknown dosage tumeric with tumerol, Disp: , Rfl:   [5] No Known Allergies

## 2025-06-20 NOTE — PATIENT INSTRUCTIONS
Amando Mace  1950    [facility]                                                                                               Dr. Pebbles Santiago                                                                                                            ANTIBIOTIC USE FOR JOINT REPLACEMENT PATIENTS  You have had surgery to replace one of your joints with a metal prosthesis. Going forward, you should take oral antibiotics before any dental work, including routine cleanings. These procedures are a potential source of injection. If you develop an infection, it could spread to your operative joint and cause complications.  Please show the following guidelines to your medical doctor and dentist, so he/she can prescribe the appropriate medications.  The following information is recommended by the American Heart, Dental, and Orthopedic Associations:  STANDARD GENERAL PROPHYLAXIS  antibiotic prophylaxis recommended lifetime  Recommend refraining from dental procedures until 3 months post operatively  Amoxicillin for Adults:    500mg - Take 4 capsules (2 grams) orally one hour before the procedure  If allergic to Penicillin  Clindamycin for Adults:   300mg - Take 2 capsules (600 mg) orally one hour before the procedure  Azithromycin for Adults:  250mg - Take 2 capsules (500 mg) orally one hour before the procedure  Cephalexin for Adults:     500mg - Take 4 capsules (2 grams) orally one hour before the procedure  Note: Cephalosporins should not be used if you have ever developed an immediate hypersensitivity reaction (i.e., hives, swelling, severe itching, difficulty breathing) to Penicillin  Please feel free to contact our office at [phone] with questions or concerns.       Anterior Hip Stretches:          Place the right foot with toe on the floor behind you and lean into the chair or table to feel the stretch in the front of the hip.   Hold for 10-20 sec and perform 10 reps. You can  do this up to 3x/daily          Perform this on a stable chair with right leg behind providing the stretching in the front of the right hip.   Hold for 10-20 sec and perform 10 reps. You can do this up to 3x/daily

## 2025-06-23 ENCOUNTER — RA CDI HCC (OUTPATIENT)
Dept: OTHER | Facility: HOSPITAL | Age: 75
End: 2025-06-23

## 2025-06-30 DIAGNOSIS — Z96.641 S/P HIP REPLACEMENT, RIGHT: ICD-10-CM

## 2025-06-30 RX ORDER — MELOXICAM 7.5 MG/1
TABLET ORAL
Qty: 30 TABLET | Refills: 0 | Status: SHIPPED | OUTPATIENT
Start: 2025-06-30

## 2025-07-01 ENCOUNTER — OFFICE VISIT (OUTPATIENT)
Dept: FAMILY MEDICINE CLINIC | Facility: CLINIC | Age: 75
End: 2025-07-01
Payer: COMMERCIAL

## 2025-07-01 VITALS
DIASTOLIC BLOOD PRESSURE: 84 MMHG | WEIGHT: 190 LBS | HEIGHT: 69 IN | OXYGEN SATURATION: 96 % | TEMPERATURE: 97.3 F | HEART RATE: 63 BPM | BODY MASS INDEX: 28.14 KG/M2 | SYSTOLIC BLOOD PRESSURE: 120 MMHG

## 2025-07-01 DIAGNOSIS — I10 ESSENTIAL HYPERTENSION: Primary | ICD-10-CM

## 2025-07-01 DIAGNOSIS — I25.10 ATHEROSCLEROSIS OF NATIVE CORONARY ARTERY OF NATIVE HEART WITHOUT ANGINA PECTORIS: ICD-10-CM

## 2025-07-01 DIAGNOSIS — R73.03 PREDIABETES: ICD-10-CM

## 2025-07-01 DIAGNOSIS — E78.2 MIXED HYPERLIPIDEMIA: ICD-10-CM

## 2025-07-01 PROCEDURE — 99214 OFFICE O/P EST MOD 30 MIN: CPT | Performed by: FAMILY MEDICINE

## 2025-07-01 PROCEDURE — G2211 COMPLEX E/M VISIT ADD ON: HCPCS | Performed by: FAMILY MEDICINE

## 2025-07-01 NOTE — PROGRESS NOTES
Name: Amando aMce      : 1950      MRN: [id number]  Encounter Provider: Luis Lozoya MD  Encounter Date: 2025   Encounter department: [facility]    Assessment & Plan  Essential hypertension  Well controlled. Cont present treatment. Monitor labs. Recheck 6m  Orders:  •  CBC and differential; Future  •  Comprehensive metabolic panel; Future  •  Lipid panel; Future    Atherosclerosis of native coronary artery of native heart without angina pectoris  Pt remains asymptomatic.  Check labs. Continue present care.  F/u with Cardio  Orders:  •  CBC and differential; Future  •  Comprehensive metabolic panel; Future  •  Lipid panel; Future    Prediabetes  BG in March was less than 100. Continue to monitor diet. Recheck 6m       Mixed hyperlipidemia  Has been controlled. Due for repeat labs. Continue present care. Monitor diet. Recheck 6m            History of Present Illness     f/u multiple med issues   - pt states that he continues to do well  - pt s/p R THR in March. Pt has been released by PT and feels well. Pt states that the L hip is OK despite radiographic evidence of OA  - pt is up to date with Cardio.  Pt remains active with yardwork.  He denies CP, palpitations, lightheadedness or other CV symptoms with or without exertion  - pt is up to date with . Silodosin remains effective  - no new GI complaints        Review of Systems   Constitutional: Negative.    HENT: Negative.     Eyes: Negative.    Respiratory: Negative.     Cardiovascular: Negative.    Gastrointestinal: Negative.    Endocrine: Negative.    Genitourinary: Negative.    Musculoskeletal: Negative.    Skin: Negative.    Allergic/Immunologic: Negative.    Neurological: Negative.    Hematological: Negative.    Psychiatric/Behavioral: Negative.       Past Medical History[1]  Past Surgical History[2]  Family History[3]  Social History[4]  Medications[5]  No Known Allergies  Immunization History  "  Administered Date(s) Administered   • COVID-19 Moderna mRNA Vaccine 12 Yr+ 50 mcg/0.5 mL (Spikevax) 10/29/2024   • COVID-19 PFIZER VACCINE 0.3 ML IM 03/18/2021, 04/08/2021, 10/21/2021, 06/22/2022   • COVID-19 Pfizer Vac BIVALENT Andrew-sucrose 12 Yr+ IM 10/04/2022   • COVID-19 Pfizer mRNA vacc PF andrew-sucrose 12 yr and older (Comirnaty) 11/22/2023   • INFLUENZA 01/13/2016, 10/16/2016, 01/11/2017, 01/10/2018, 11/07/2018, 11/02/2021, 10/28/2022, 11/07/2022, 11/09/2023   • Influenza Split High Dose Preservative Free IM 11/12/2024   • Influenza, high dose seasonal 0.7 mL 11/07/2018, 11/08/2019, 10/22/2020   • Influenza, seasonal, injectable 01/07/2014   • Pneumococcal Conjugate 13-Valent 11/07/2018   • Pneumococcal Polysaccharide PPV23 11/08/2019   • Respiratory Syncytial Virus Vaccine (Recombinant, Adjuvanted) 12/11/2023   • Rsv, Unspecified 12/11/2023     Objective   /84   Pulse 63   Temp (!) 97.3 °F (36.3 °C)   Ht 5' 9\" (1.753 m)   Wt 86.2 kg (190 lb)   SpO2 96%   BMI 28.06 kg/m²     Physical Exam  Vitals reviewed.   HENT:      Mouth/Throat:      Mouth: Mucous membranes are moist.     Eyes:      Extraocular Movements: Extraocular movements intact.      Conjunctiva/sclera: Conjunctivae normal.      Pupils: Pupils are equal, round, and reactive to light.       Cardiovascular:      Rate and Rhythm: Normal rate and regular rhythm.      Pulses: Normal pulses.   Pulmonary:      Effort: Pulmonary effort is normal. No respiratory distress.      Breath sounds: No rales.   Abdominal:      General: There is no distension.      Palpations: There is no mass.      Tenderness: There is no abdominal tenderness.     Musculoskeletal:         General: Normal range of motion.      Cervical back: No tenderness.      Right lower leg: No edema.      Left lower leg: No edema.   Lymphadenopathy:      Cervical: No cervical adenopathy.     Skin:     General: Skin is warm.      Capillary Refill: Capillary refill takes less than 2 " seconds.     Neurological:      General: No focal deficit present.      Mental Status: He is alert and oriented to person, place, and time.                [1]  Past Medical History:  Diagnosis Date   • Arthritis    • Carpal tunnel syndrome on left 02/20/2024   • Colon polyps    • Cubital tunnel syndrome on left 02/20/2024   • GERD (gastroesophageal reflux disease)    • Hyperlipidemia    • Hypertension    • PONV (postoperative nausea and vomiting)    • Postoperative urinary retention    • Seasonal allergies    • Trigger finger, left index finger 02/20/2024   • Trigger middle finger of left hand 05/08/2019   [2]  Past Surgical History:  Procedure Laterality Date   • BACK SURGERY      laminectomy: L5-S1   • CARDIAC SURGERY      cardiac stent   • CARPAL TUNNEL RELEASE     • COLONOSCOPY      benign polyp removal   • ORCHIECTOMY Left    • PARTIAL HIP ARTHROPLASTY Right 03/27/2025   • MI ARTHRP ACETBLR/PROX FEM PROSTC AGRFT/ALGRFT Right 03/27/2025    Procedure: ARTHROPLASTY HIP TOTAL ANTERIOR;  Surgeon: Pebbles Santiago MD;  [facility];  Service: Orthopedics   • MI ESOPHAGOGASTRODUODENOSCOPY TRANSORAL DIAGNOSTIC N/A 08/29/2017    Procedure: EGD AND COLONOSCOPY;  Surgeon: Talat Park MD;  [facility];  Service: Gastroenterology   • MI NEUROPLASTY &/TRANSPOS MEDIAN NRV CARPAL TUNNE Left 02/20/2024    Procedure: RELEASE CARPAL TUNNEL REVISION;  Surgeon: Hair Pollock MD;  [facility];  Service: Orthopedics   • MI NEUROPLASTY &/TRANSPOSITION ULNAR NERVE ELBOW Left 02/20/2024    Procedure: RELEASE CUBITAL TUNNEL, FDS RADIAL SLIP HEMITENECTOMY;  Surgeon: Hair Pollock MD;  [facility];  Service: Orthopedics   • MI TENDON SHEATH INCISION Left 02/20/2024    Procedure: RELEASE TRIGGER FINGER LEFT INDEX, RELEASE TRIGGER FINGER LEFT LONG;  Surgeon: Hair Pollock MD;  [facility];  Service: Orthopedics   • UPPER GASTROINTESTINAL ENDOSCOPY     • WRIST  SURGERY     [3]  Family History  Problem Relation Name Age of Onset   • Diabetes Mother Lisa    • Hypertension Mother Lisa    • Stomach cancer Father Gume    • Hypertension Father Gume    • Liver cancer Father Gume    • Cancer Father Gume    [4]  Social History  Tobacco Use   • Smoking status: Never     Passive exposure: Never   • Smokeless tobacco: Never   Vaping Use   • Vaping status: Never Used   Substance and Sexual Activity   • Alcohol use: Yes     Alcohol/week: 2.0 standard drinks of alcohol     Types: 1 Glasses of wine, 1 Cans of beer per week   • Drug use: No   • Sexual activity: Yes     Partners: Female   [5]  Current Outpatient Medications on File Prior to Visit   Medication Sig   • ascorbic acid (VITAMIN C) 500 mg tablet Take 500 mg by mouth in the morning.   • aspirin (ECOTRIN LOW STRENGTH) 81 mg EC tablet Take 1 tablet (81 mg total) by mouth 2 (two) times a day Take 1(one) 81 mg tablet twice daily x 35 days after total joint arthroplasty   • Cannabidiol POWD Apply topically daily as needed Topical CBD ointment   • Cholecalciferol 2000 units TABS Take 2,000 Units by mouth   • Coenzyme Q10 200 MG capsule Take 200 mg by mouth in the morning.   • docusate sodium (COLACE) 100 mg capsule Take 1 capsule (100 mg total) by mouth 2 (two) times a day   • Echinacea 125 MG CAPS Take 1 tablet by mouth in the morning and 1 tablet in the evening.   • famotidine (PEPCID) 40 MG tablet Take 1 tablet (40 mg total) by mouth daily at bedtime as needed for heartburn   • ferrous sulfate 324 (65 Fe) mg Take 1 tablet (324 mg total) by mouth daily before breakfast   • FIBER SELECT GUMMIES CHEW Chew in the morning and in the evening.   • fluticasone (FLONASE) 50 mcg/act nasal spray 1 spray into each nostril in the morning.   • lisinopril (ZESTRIL) 10 mg tablet Take 1 tablet (10 mg total) by mouth daily   • melatonin 3 mg Take 1 tablet by mouth if needed   • meloxicam (MOBIC) 7.5 mg tablet TAKE 1 TABLET BY MOUTH  EVERY DAY AS NEEDED FOR MODERATE PAIN   • metoprolol succinate (TOPROL-XL) 25 mg 24 hr tablet Take 12.5 mg by mouth in the evening. Take before meals   • Silodosin 8 MG CAPS Take 1 capsule by mouth daily at bedtime   • simvastatin (ZOCOR) 40 mg tablet Take 20 mg by mouth in the evening. Take before meals   • Turmeric (QC Tumeric Complex) 500 MG CAPS Take by mouth Unknown dosage tumeric with tumerol

## 2025-07-01 NOTE — ASSESSMENT & PLAN NOTE
Well controlled. Cont present treatment. Monitor labs. Recheck 6m  Orders:  •  CBC and differential; Future  •  Comprehensive metabolic panel; Future  •  Lipid panel; Future

## 2025-07-01 NOTE — ASSESSMENT & PLAN NOTE
Pt remains asymptomatic.  Check labs. Continue present care.  F/u with Cardio  Orders:  •  CBC and differential; Future  •  Comprehensive metabolic panel; Future  •  Lipid panel; Future

## 2025-07-16 VITALS — HEIGHT: 69 IN | WEIGHT: 190.04 LBS | BODY MASS INDEX: 28.15 KG/M2

## 2025-07-16 DIAGNOSIS — M65.341 TRIGGER RING FINGER OF RIGHT HAND: ICD-10-CM

## 2025-07-16 DIAGNOSIS — M18.12 ARTHRITIS OF CARPOMETACARPAL (CMC) JOINT OF LEFT THUMB: Primary | ICD-10-CM

## 2025-07-16 DIAGNOSIS — M65.331 TRIGGER FINGER, RIGHT MIDDLE FINGER: ICD-10-CM

## 2025-07-16 PROCEDURE — 99213 OFFICE O/P EST LOW 20 MIN: CPT | Performed by: SURGERY

## 2025-07-16 PROCEDURE — 20600 DRAIN/INJ JOINT/BURSA W/O US: CPT | Performed by: SURGERY

## 2025-07-16 RX ORDER — BUPIVACAINE HYDROCHLORIDE 2.5 MG/ML
0.5 INJECTION, SOLUTION INFILTRATION; PERINEURAL
Status: COMPLETED | OUTPATIENT
Start: 2025-07-16 | End: 2025-07-16

## 2025-07-16 RX ORDER — AMOXICILLIN 500 MG/1
CAPSULE ORAL
COMMUNITY
Start: 2025-07-14

## 2025-07-16 RX ORDER — TRIAMCINOLONE ACETONIDE 40 MG/ML
20 INJECTION, SUSPENSION INTRA-ARTICULAR; INTRAMUSCULAR
Status: COMPLETED | OUTPATIENT
Start: 2025-07-16 | End: 2025-07-16

## 2025-07-16 RX ADMIN — TRIAMCINOLONE ACETONIDE 20 MG: 40 INJECTION, SUSPENSION INTRA-ARTICULAR; INTRAMUSCULAR at 10:00

## 2025-07-16 RX ADMIN — BUPIVACAINE HYDROCHLORIDE 0.5 ML: 2.5 INJECTION, SOLUTION INFILTRATION; PERINEURAL at 10:00

## 2025-07-16 NOTE — ASSESSMENT & PLAN NOTE
Clicking and locking of both digits on exam today   Patient is s/p two injections to the ring and one injection to the long finger   He eventually would like to consider surgery for these but would like to wait until closer to the winter   He would like to hold off on any injections for the time being   Plan to discuss surgery at the next visit

## 2025-07-16 NOTE — PROGRESS NOTES
Hair Pollock M.D.  Attending, Orthopaedic Surgery  Hand, Wrist, and Elbow Surgery  [facility]opaedic [facility]      ORTHOPAEDIC HAND, WRIST, AND ELBOW OFFICE  VISIT       ASSESSMENT/PLAN:        Assessment & Plan  Arthritis of carpometacarpal (CMC) joint of left thumb  Patient gets decent relief with periodic injections, last on was 4/24/25  Due to acute exacerbation of chronic first CMC pain due to arthritis the decision was made to proceed with a repeat injection today   This was tolerated well  Continue heat, anti-inflammatories, etc as needed  Follow up in about 3 months for repeat evaluation   Orders:    Small joint arthrocentesis    Trigger ring finger of right hand  Trigger finger, right middle finger  Clicking and locking of both digits on exam today   Patient is s/p two injections to the ring and one injection to the long finger   He eventually would like to consider surgery for these but would like to wait until closer to the winter   He would like to hold off on any injections for the time being   Plan to discuss surgery at the next visit                   The patient verbalized understanding of exam findings and treatment plan. We engaged in the shared decision-making process and treatment options were discussed at length with the patient. Surgical and conservative management discussed today along with risks and benefits.      Follow Up:  Return in about 3 months (around 10/16/2025) for Recheck.    To Do Next Visit:  Re-evaluation of current issue        ____________________________________________________________________________________________________________________________________________      CHIEF COMPLAINT:  Chief Complaint   Patient presents with    Left Hand - Follow-up     Follow up for injections, pain score 5    Right Hand - Pain     Ring finger, follow up for injections, pain score 4, worse at night       SUBJECTIVE:  Amando Mace is a 74 y.o. year old RHD male who presents for  follow up evaluation of the bilateral hands. He underwent a right ring trigger finger injection at his last visit which helped slightly but he continues to note clicking and locking of the finger despite two injections. Right long finger is locking as well, this was injected once previously. He underwent a left CMC injection about 3 months ago which helped his pain but did not take it away. His pain has been worsening on the left over the last few weeks      I have personally reviewed all the relevant PMH, PSH, SH, FH, Medications and allergies      PAST MEDICAL HISTORY:  Past Medical History[1]    PAST SURGICAL HISTORY:  Past Surgical History[2]    FAMILY HISTORY:  Family History[3]    SOCIAL HISTORY:  Social History[4]    MEDICATIONS:  Current Medications[5]    ALLERGIES:  Allergies[6]        REVIEW OF SYSTEMS:  Review of Systems   Constitutional:  Negative for chills and fever.   HENT:  Negative for ear pain and sore throat.    Eyes:  Negative for pain and visual disturbance.   Respiratory:  Negative for cough and shortness of breath.    Cardiovascular:  Negative for chest pain and palpitations.   Gastrointestinal:  Negative for abdominal pain and vomiting.   Genitourinary:  Negative for dysuria and hematuria.   Musculoskeletal:  Positive for arthralgias. Negative for back pain.   Skin:  Negative for color change and rash.   Neurological:  Negative for seizures and syncope.   All other systems reviewed and are negative.      VITALS:  There were no vitals filed for this visit.    LABS:      _____________________________________________________  PHYSICAL EXAMINATION:  General: well developed and well nourished, alert, oriented times 3, and appears comfortable  Psychiatric: Normal  HEENT: Normocephalic, Atraumatic Trachea Midline, No torticollis  Pulmonary: No audible wheezing or respiratory distress   Abdomen/GI: Non tender, non distended   Cardiovascular: No pitting edema, 2+ radial pulse   Skin: No masses,  "erythema, lacerations, fluctation, ulcerations  Neurovascular: Sensation Intact to the Median, Ulnar, Radial Nerve, Motor Intact to the Median, Ulnar, Radial Nerve, and Pulses Intact  Musculoskeletal: Normal, except as noted in detailed exam and in HPI.      MUSCULOSKELETAL EXAMINATION:  right long and ring finger:  Negative palpable nodule over the A1 pulley.  Positive tenderness to palpation over A1 pulley. Positive catching. Positive clicking.      left CMC Exam:  No adduction contracture  No hyperextension deformity of MCP joint  Positive localized tenderness over radial and dorsal aspect of thumb (CMC joint)  Grind test is Positive for pain and Negative for crepitus  Metacarpal load shift test positive  No triggering or tenderness over the A1 pulley  No pain with Finkelstein’s maneuver     ___________________________________________________  STUDIES REVIEWED:  I have personally reviewed and interpreted  AP lateral and oblique radiographs of the left hand which demonstrate severe first CMC arthritis          LABS REVIEWED:    HgA1c:   Lab Results   Component Value Date    HGBA1C 6.0 (H) 03/05/2025     BMP:   Lab Results   Component Value Date    CALCIUM 8.9 03/29/2025    K 4.1 03/29/2025    CO2 22 03/29/2025     03/29/2025    BUN 14 03/29/2025    CREATININE 0.81 03/29/2025               PROCEDURES PERFORMED:  Small joint arthrocentesis: L thumb CMC    Performed by: Hari Pollock MD  Authorized by: Hair Pollock MD    [place] Protocol:  procedure performed by consultantConsent: Verbal consent obtained  Risks and benefits: risks, benefits and alternatives were discussed  Consent given by: patient  Time out: Immediately prior to procedure a \"time out\" was called to verify the correct patient, procedure, equipment, support staff and site/side marked as required.  Patient understanding: patient states understanding of the procedure being performed  Site marked: the operative site was " marked  Required items: required blood products, implants, devices, and special equipment available  Patient identity confirmed: verbally with patient  Supporting Documentation  Indications: pain   Procedure Details  Location: thumb - L thumb CMC  Needle size: 25 G  Ultrasound guidance: no  Approach: dorsal  Medications administered: 20 mg triamcinolone acetonide 40 mg/mL; 0.5 mL bupivacaine 0.25 %    Patient tolerance: patient tolerated the procedure well with no immediate complications  Dressing:  Sterile dressing applied          _____________________________________________________      Scribe Attestation      I,:  Sherri Toussaint PA-C am acting as a scribe while in the presence of the attending physician.:       I,:  Hair Pollock MD personally performed the services described in this documentation    as scribed in my presence.:                          [1]   Past Medical History:  Diagnosis Date    Arthritis     Carpal tunnel syndrome on left 02/20/2024    Colon polyps     Cubital tunnel syndrome on left 02/20/2024    GERD (gastroesophageal reflux disease)     Hyperlipidemia     Hypertension     PONV (postoperative nausea and vomiting)     Postoperative urinary retention     Seasonal allergies     Trigger finger, left index finger 02/20/2024    Trigger middle finger of left hand 05/08/2019   [2]   Past Surgical History:  Procedure Laterality Date    BACK SURGERY      laminectomy: L5-S1    CARDIAC SURGERY      cardiac stent    CARPAL TUNNEL RELEASE      COLONOSCOPY      benign polyp removal    ORCHIECTOMY Left     PARTIAL HIP ARTHROPLASTY Right 03/27/2025    TX ARTHRP ACETBLR/PROX FEM PROSTC AGRFT/ALGRFT Right 03/27/2025    Procedure: ARTHROPLASTY HIP TOTAL ANTERIOR;  Surgeon: Pebbles Santiago MD;  Location: [facility];  Service: Orthopedics    TX ESOPHAGOGASTRODUODENOSCOPY TRANSORAL DIAGNOSTIC N/A 08/29/2017    Procedure: EGD AND COLONOSCOPY;  Surgeon: Talat Park MD;  Location: [facility];  Service:  Gastroenterology    OK NEUROPLASTY &/TRANSPOS MEDIAN NRV CARPAL TUNNE Left 02/20/2024    Procedure: RELEASE CARPAL TUNNEL REVISION;  Surgeon: Hair Pollock MD;  [facility];  Service: Orthopedics    OK NEUROPLASTY &/TRANSPOSITION ULNAR NERVE ELBOW Left 02/20/2024    Procedure: RELEASE CUBITAL TUNNEL, FDS RADIAL SLIP HEMITENECTOMY;  Surgeon: Hair Pollock MD;  [facility];  Service: Orthopedics    OK TENDON SHEATH INCISION Left 02/20/2024    Procedure: RELEASE TRIGGER FINGER LEFT INDEX, RELEASE TRIGGER FINGER LEFT LONG;  Surgeon: Hair Pollock MD;  [facility];  Service: Orthopedics    UPPER GASTROINTESTINAL ENDOSCOPY      WRIST SURGERY     [3]   Family History  Problem Relation Name Age of Onset    Diabetes Mother Lisa     Hypertension Mother Lisa     Stomach cancer Father Gume     Hypertension Father Gume     Liver cancer Father Gume     Cancer Father Gume    [4]   Social History  Tobacco Use    Smoking status: Never     Passive exposure: Never    Smokeless tobacco: Never   Vaping Use    Vaping status: Never Used   Substance Use Topics    Alcohol use: Yes     Alcohol/week: 2.0 standard drinks of alcohol     Types: 1 Glasses of wine, 1 Cans of beer per week    Drug use: No   [5]   Current Outpatient Medications:     amoxicillin (AMOXIL) 500 mg capsule, prior to dental visit, Disp: , Rfl:     ascorbic acid (VITAMIN C) 500 mg tablet, Take 500 mg by mouth in the morning., Disp: , Rfl:     aspirin (ECOTRIN LOW STRENGTH) 81 mg EC tablet, Take 1 tablet (81 mg total) by mouth 2 (two) times a day Take 1(one) 81 mg tablet twice daily x 35 days after total joint arthroplasty, Disp: 70 tablet, Rfl: 0    Cannabidiol POWD, Apply topically daily as needed Topical CBD ointment, Disp: , Rfl:     Cholecalciferol 2000 units TABS, Take 2,000 Units by mouth, Disp: , Rfl:     Coenzyme Q10 200 MG capsule, Take 200 mg by mouth in the morning., Disp: , Rfl:      docusate sodium (COLACE) 100 mg capsule, Take 1 capsule (100 mg total) by mouth 2 (two) times a day, Disp: 10 capsule, Rfl: 0    Echinacea 125 MG CAPS, Take 1 tablet by mouth in the morning and 1 tablet in the evening., Disp: , Rfl:     famotidine (PEPCID) 40 MG tablet, Take 1 tablet (40 mg total) by mouth daily at bedtime as needed for heartburn, Disp: 90 tablet, Rfl: 1    ferrous sulfate 324 (65 Fe) mg, Take 1 tablet (324 mg total) by mouth daily before breakfast, Disp: 60 tablet, Rfl: 1    FIBER SELECT GUMMIES CHEW, Chew in the morning and in the evening., Disp: , Rfl:     fluticasone (FLONASE) 50 mcg/act nasal spray, 1 spray into each nostril in the morning., Disp: , Rfl:     lisinopril (ZESTRIL) 10 mg tablet, Take 1 tablet (10 mg total) by mouth daily (Patient taking differently: Take 10 mg by mouth in the morning. Half dose.), Disp: 90 tablet, Rfl: 3    melatonin 3 mg, Take 1 tablet by mouth if needed, Disp: , Rfl:     meloxicam (MOBIC) 7.5 mg tablet, TAKE 1 TABLET BY MOUTH EVERY DAY AS NEEDED FOR MODERATE PAIN, Disp: 30 tablet, Rfl: 0    metoprolol succinate (TOPROL-XL) 25 mg 24 hr tablet, Take 12.5 mg by mouth in the evening. Take before meals, Disp: , Rfl:     Silodosin 8 MG CAPS, Take 1 capsule by mouth daily at bedtime, Disp: 90 capsule, Rfl: 3    simvastatin (ZOCOR) 40 mg tablet, Take 20 mg by mouth in the evening. Take before meals, Disp: , Rfl:     Turmeric (QC Tumeric Complex) 500 MG CAPS, Take by mouth Unknown dosage tumeric with tumerol, Disp: , Rfl:   [6] No Known Allergies

## 2025-07-16 NOTE — ASSESSMENT & PLAN NOTE
Patient gets decent relief with periodic injections, last on was 4/24/25  Due to acute exacerbation of chronic first CMC pain due to arthritis the decision was made to proceed with a repeat injection today   This was tolerated well  Continue heat, anti-inflammatories, etc as needed  Follow up in about 3 months for repeat evaluation   Orders:    Small joint arthrocentesis

## 2025-07-29 DIAGNOSIS — Z96.641 S/P HIP REPLACEMENT, RIGHT: ICD-10-CM

## 2025-07-30 RX ORDER — MELOXICAM 7.5 MG/1
TABLET ORAL
Qty: 30 TABLET | Refills: 2 | Status: SHIPPED | OUTPATIENT
Start: 2025-07-30

## (undated) DEVICE — SYRINGE 50ML LL

## (undated) DEVICE — 3M™ STERI-STRIP™ REINFORCED ADHESIVE SKIN CLOSURES, R1547, 1/2 IN X 4 IN (12 MM X 100 MM), 6 STRIPS/ENVELOPE: Brand: 3M™ STERI-STRIP™

## (undated) DEVICE — DRESSING MEPILEX AG BORDER POST-OP 4 X 8 IN

## (undated) DEVICE — HANDPIECE SET WITH RETRACTABLE COAXIAL FAN SPRAY TIP AND SUCTION TUBE: Brand: INTERPULSE

## (undated) DEVICE — SKN PRP WNG SPNGE PVP SCRB STR: Brand: MEDLINE INDUSTRIES, INC.

## (undated) DEVICE — KNIFE LIGHT 10,PK: Brand: KNIFELIGHT

## (undated) DEVICE — SUT MONOCRYL 3-0 PS-2 18 IN Y497G

## (undated) DEVICE — 40601 PROLONGED POSITIONING SYSTEM: Brand: 40601 PROLONGED POSITIONING SYSTEM

## (undated) DEVICE — DRAPE CAMERA/LASER

## (undated) DEVICE — SUT STRATAFIX SPIRAL PDS PLUS 1 CTX 18 IN SXPP1A400

## (undated) DEVICE — C-ARM: Brand: UNBRANDED

## (undated) DEVICE — GLOVE SRG BIOGEL 6.5

## (undated) DEVICE — ELECTRODE BLADE E-Z CLEAN 4IN -0014A

## (undated) DEVICE — PACK MAJOR ORTHO W/SPLITS PBDS

## (undated) DEVICE — STIRRUP STRAP ADULT DISP

## (undated) DEVICE — NEPTUNE E-SEP SMOKE EVACUATION PENCIL, COATED, 70MM BLADE, PUSH BUTTON SWITCH: Brand: NEPTUNE E-SEP

## (undated) DEVICE — GLOVE INDICATOR PI UNDERGLOVE SZ 6.5 BLUE

## (undated) DEVICE — NEEDLE 23G X 1 1/2 SAFETY-GLIDE THIN WALL

## (undated) DEVICE — NEEDLE BLUNT 18 G X 1 1/2IN

## (undated) DEVICE — GAUZE SPONGES,16 PLY: Brand: CURITY

## (undated) DEVICE — OSCILLATING TIP SAW CARTRIDGE: Brand: PRECISION FALCON

## (undated) DEVICE — STERILE BETHLEHEM PLASTIC HAND: Brand: CARDINAL HEALTH

## (undated) DEVICE — CAPIT HIP MOP - ACTIS

## (undated) DEVICE — HOOD: Brand: FLYTE, SURGICOOL

## (undated) DEVICE — ACE WRAP 4 IN UNSTERILE

## (undated) DEVICE — GLOVE SRG BIOGEL 7

## (undated) DEVICE — BLADE MINI RND TIP ONE SIDE SHARP

## (undated) DEVICE — OCCLUSIVE GAUZE STRIP,3% BISMUTH TRIBROMOPHENATE IN PETROLATUM BLEND: Brand: XEROFORM

## (undated) DEVICE — PAD GROUNDING DUAL ADULT

## (undated) DEVICE — STRETCH BANDAGE: Brand: CURITY

## (undated) DEVICE — ACE WRAP 3 IN UNSTERILE

## (undated) DEVICE — IMPERVIOUS STOCKINETTE: Brand: DEROYAL

## (undated) DEVICE — CUFF TOURNIQUET 18 X 4 IN QUICK CONNECT DISP 1 BLADDER

## (undated) DEVICE — GLOVE SRG BIOGEL 8

## (undated) DEVICE — CHLORAPREP HI-LITE 26ML ORANGE

## (undated) DEVICE — CAPIT HIP UPCHRG GRIPTON CUP

## (undated) DEVICE — SUT MONOCRYL 3-0 SH 27 IN Y416H

## (undated) DEVICE — BASIC SINGLE BASIN-LF: Brand: MEDLINE INDUSTRIES, INC.

## (undated) DEVICE — INTENDED FOR TISSUE SEPARATION, AND OTHER PROCEDURES THAT REQUIRE A SHARP SURGICAL BLADE TO PUNCTURE OR CUT.: Brand: BARD-PARKER ® CARBON RIB-BACK BLADES

## (undated) DEVICE — SYRINGE 20ML LL

## (undated) DEVICE — TOWEL SET X-RAY

## (undated) DEVICE — DISPOSABLE EQUIPMENT COVER: Brand: SMALL TOWEL DRAPE

## (undated) DEVICE — SUT PROLENE 4-0 PS-2 18 IN 8682G

## (undated) DEVICE — COBAN 4 IN STERILE

## (undated) DEVICE — GLOVE SRG BIOGEL 8.5

## (undated) DEVICE — SUT VICRYL 2-0 CT-1 27 IN J259H

## (undated) DEVICE — HEAVY DUTY TABLE COVER: Brand: CONVERTORS

## (undated) DEVICE — GLOVE INDICATOR PI UNDERGLOVE SZ 8.5 BLUE

## (undated) DEVICE — GLOVE INDICATOR PI UNDERGLOVE SZ 7 BLUE

## (undated) DEVICE — SPONGE SCRUB 4 PCT CHLORHEXIDINE

## (undated) DEVICE — DRAPE SHEET THREE QUARTER

## (undated) DEVICE — EXOFIN PRECISION PEN HIGH VISCOSITY TOPICAL SKIN ADHESIVE: Brand: EXOFIN PRECISION PEN, 1G